# Patient Record
Sex: FEMALE | Race: WHITE | Employment: OTHER | URBAN - METROPOLITAN AREA
[De-identification: names, ages, dates, MRNs, and addresses within clinical notes are randomized per-mention and may not be internally consistent; named-entity substitution may affect disease eponyms.]

---

## 2020-07-23 LAB
LEFT VENTRICULAR EJECTION FRACTION HIGH VALUE: NORMAL
LEFT VENTRICULAR EJECTION FRACTION MODE: NORMAL
LV EF: NORMAL %

## 2020-08-17 ENCOUNTER — OFFICE VISIT (OUTPATIENT)
Dept: CARDIOLOGY CLINIC | Age: 80
End: 2020-08-17
Payer: MEDICARE

## 2020-08-17 VITALS
OXYGEN SATURATION: 95 % | WEIGHT: 148 LBS | HEART RATE: 75 BPM | BODY MASS INDEX: 24.66 KG/M2 | HEIGHT: 65 IN | DIASTOLIC BLOOD PRESSURE: 62 MMHG | SYSTOLIC BLOOD PRESSURE: 110 MMHG | RESPIRATION RATE: 16 BRPM

## 2020-08-17 PROBLEM — R06.09 DOE (DYSPNEA ON EXERTION): Status: ACTIVE | Noted: 2020-08-17

## 2020-08-17 PROCEDURE — 93000 ELECTROCARDIOGRAM COMPLETE: CPT | Performed by: INTERNAL MEDICINE

## 2020-08-17 PROCEDURE — 99205 OFFICE O/P NEW HI 60 MIN: CPT | Performed by: INTERNAL MEDICINE

## 2020-08-17 RX ORDER — FLUOXETINE HYDROCHLORIDE 40 MG/1
CAPSULE ORAL
COMMUNITY
Start: 2020-07-30 | End: 2021-07-29 | Stop reason: SDUPTHER

## 2020-08-17 RX ORDER — BUPROPION HYDROCHLORIDE 300 MG/1
TABLET ORAL
COMMUNITY
Start: 2020-07-30 | End: 2021-07-29 | Stop reason: SDUPTHER

## 2020-08-17 RX ORDER — OLANZAPINE 5 MG/1
TABLET ORAL
COMMUNITY
Start: 2020-07-30 | End: 2021-07-29 | Stop reason: SDUPTHER

## 2020-08-17 RX ORDER — PANTOPRAZOLE SODIUM 40 MG/1
TABLET, DELAYED RELEASE ORAL
COMMUNITY
Start: 2020-05-29 | End: 2021-07-29 | Stop reason: SDUPTHER

## 2020-08-17 NOTE — PROGRESS NOTES
Chief Complaint   Patient presents with    Hypertension    Shortness of Breath       Patient Active Problem List    Diagnosis Date Noted    JOSE (dyspnea on exertion) 08/17/2020    Hypertension        Current Outpatient Medications   Medication Sig Dispense Refill    buPROPion (WELLBUTRIN XL) 300 MG extended release tablet take 1 tablet by mouth every morning      FLUoxetine (PROZAC) 40 MG capsule take 1 capsule by mouth at bedtime      OLANZapine (ZYPREXA) 5 MG tablet take 1 tablet by mouth at bedtime      pantoprazole (PROTONIX) 40 MG tablet take 1 tablet by mouth once daily      losartan (COZAAR) 100 MG tablet Take 100 mg by mouth daily      metFORMIN (GLUCOPHAGE) 1000 MG tablet Take 1,000 mg by mouth 2 times daily (with meals)      LEVOTHYROXINE SODIUM PO Take 75 mcg by mouth daily.  aspirin 81 MG EC tablet Take 81 mg by mouth daily. No current facility-administered medications for this visit.          No Known Allergies    Vitals:    08/17/20 0823   BP: 110/62   Site: Right Upper Arm   Position: Sitting   Cuff Size: Medium Adult   Pulse: 75   Resp: 16   SpO2: 95%   Weight: 148 lb (67.1 kg)   Height: 5' 5\" (1.651 m)       Social History     Socioeconomic History    Marital status:      Spouse name: Not on file    Number of children: Not on file    Years of education: Not on file    Highest education level: Not on file   Occupational History    Not on file   Social Needs    Financial resource strain: Not on file    Food insecurity     Worry: Not on file     Inability: Not on file   [employer] needs     Medical: Not on file     Non-medical: Not on file   Tobacco Use    Smoking status: Never Smoker    Smokeless tobacco: Never Used   Substance and Sexual Activity    Alcohol use: No     Comment: 1 cup of coffee    Drug use: No    Sexual activity: Not on file   Lifestyle    Physical activity     Days per week: Not on file     Minutes per session: Not on file    Stress: Not on file   Relationships    Social connections     Talks on phone: Not on file     Gets together: Not on file     Attends Denominational service: Not on file     Active member of club or organization: Not on file     Attends meetings of clubs or organizations: Not on file     Relationship status: Not on file    Intimate partner violence     Fear of current or ex partner: Not on file     Emotionally abused: Not on file     Physically abused: Not on file     Forced sexual activity: Not on file   Other Topics Concern    Not on file   Social History Narrative    Not on file       Family History   Problem Relation Age of Onset    Cancer Mother     Stroke Mother     Cancer Father          SUBJECTIVE: Casimiro Otero presents to the office today for consult - dr. Claudia Mota -for cardiac evaluation   No cardiac hx. .  She complains of dyspnea and denies   exertional chest pressure/discomfort, irregular heart beat, lower extremity edema, near-syncope, orthopnea, palpitations, paroxysmal nocturnal dyspnea, syncope and tachypnea. Claims she has sob with exertion for years, but now \" I just can't take it anymore. \"  Lives with  and daughter and shares in the chores. She will get sob walking her long driveway, or going up and down stairs. This has not changed it seems, just her acceptance of it. Non smoker, no COPD, never on statin;        Review of Systems:   Heart: as above   Lungs: as above   Eyes: denies changes in vision or discharge. Ears: denies changes in hearing or pain. Nose: denies epistaxis or masses   Throat: denies sore throat or trouble swallowing. Neuro: denies numbness, tingling, tremors. Skin: denies rashes or itching. : denies hematuria, dysuria   GI: denies vomiting, diarrhea   Psych: denies mood changed, anxiety, depression. all others negative.     Physical Exam   /62 (Site: Right Upper Arm, Position: Sitting, Cuff Size: Medium Adult)   Pulse 75   Resp 16   Ht 5' 5\" (1.651

## 2020-08-19 ENCOUNTER — HOSPITAL ENCOUNTER (OUTPATIENT)
Dept: CARDIOLOGY | Age: 80
Discharge: HOME | End: 2020-08-19
Payer: MEDICARE

## 2020-08-19 VITALS
HEART RATE: 85 BPM | HEIGHT: 65 IN | SYSTOLIC BLOOD PRESSURE: 140 MMHG | DIASTOLIC BLOOD PRESSURE: 84 MMHG | OXYGEN SATURATION: 97 % | RESPIRATION RATE: 20 BRPM | WEIGHT: 148 LBS | BODY MASS INDEX: 24.66 KG/M2 | TEMPERATURE: 98.3 F

## 2020-08-19 PROCEDURE — 93017 CV STRESS TEST TRACING ONLY: CPT

## 2020-08-19 PROCEDURE — 93018 CV STRESS TEST I&R ONLY: CPT | Performed by: INTERNAL MEDICINE

## 2020-08-19 PROCEDURE — 93016 CV STRESS TEST SUPVJ ONLY: CPT | Performed by: INTERNAL MEDICINE

## 2020-08-19 NOTE — PROCEDURES
[address] - [address]. CARMEN ferraro, [address] [place] [address]  [phone]    Exercise Stress Study       Name: Terri Dunne Account Number:  [de-identified]      :  1940          Sex: female         Date of Study:  2020    Height: 5' 5\" (165.1 cm)          Weight: 148 lb (67.1 kg)    Ordering Provider: Kenya Cornejo          PCP: Agustin Bass MD    Cardiologist: Kenya Cornejo              Interpreting Physician: Alina Lopez MD    Indication:   Detecting the presence and location of coronary artery disease    Clinical History:   Patient has no known history of coronary artery disease. Resting ECG:    SR with rightward axis and with nonspecific ST changes    Exercise: The patient exercised using a Nii protocol, completing 2:00 minutes and reaching an estimated work load of 4.6 metabolic equivalents (METS). Resting HR was 93. Peak exercise heart rate was 151 ( 109% of maximum predicted heart rate for age). Baseline /84. Peak exercise /120. The blood pressure response to exercise was hypertensive      Exercise was terminated due to dyspnea and heart rate attained. The  patient states her chest was pinching for a few minutes in recovery stage, did resolve with rest.        Pulse oximetry was used to monitor oxygen saturation during the stress test.  The study was performed on Room Air. The resting pulse oximeter was 97%. The post stress O2 saturation seen during exercise was 97 %. Exercise ECG:   The patient demonstrated no arrhythmias during exercise. With exercise, there were no ST segment changes of significance at the heart rate achieved. Impression:    1. Exercise EKG was normal.  2. The patient experienced no chest pain with exercise. 3. Lee treadmill score was +2 implying intermediate risk of acute ischemic events. 4. Exercise capacity was below average.      Thank you for sending your patient to this [facility].     Electronically signed by Jonas Stone MD on 8/19/2020 at 3:17 PM

## 2020-08-20 ENCOUNTER — TELEPHONE (OUTPATIENT)
Dept: CARDIOLOGY CLINIC | Age: 80
End: 2020-08-20

## 2021-02-26 LAB
ALBUMIN SERPL-MCNC: NORMAL G/DL
ALP BLD-CCNC: NORMAL U/L
ALT SERPL-CCNC: NORMAL U/L
ANION GAP SERPL CALCULATED.3IONS-SCNC: NORMAL MMOL/L
AST SERPL-CCNC: NORMAL U/L
AVERAGE GLUCOSE: 163
BILIRUB SERPL-MCNC: NORMAL MG/DL
BUN BLDV-MCNC: NORMAL MG/DL
CALCIUM SERPL-MCNC: NORMAL MG/DL
CHLORIDE BLD-SCNC: NORMAL MMOL/L
CHOLESTEROL, TOTAL: 172 MG/DL
CHOLESTEROL/HDL RATIO: 3.1
CO2: NORMAL
CREAT SERPL-MCNC: NORMAL MG/DL
GFR CALCULATED: NORMAL
GLUCOSE BLD-MCNC: NORMAL MG/DL
HBA1C MFR BLD: 7.3 %
HDLC SERPL-MCNC: 55 MG/DL (ref 35–70)
LDL CHOLESTEROL CALCULATED: 99 MG/DL (ref 0–160)
LDL CHOLESTEROL DIRECT: NORMAL
POTASSIUM SERPL-SCNC: NORMAL MMOL/L
SODIUM BLD-SCNC: NORMAL MMOL/L
TOTAL PROTEIN: NORMAL
TRIGL SERPL-MCNC: 91 MG/DL
VLDLC SERPL CALC-MCNC: 18 MG/DL

## 2021-04-08 VITALS
HEIGHT: 64 IN | BODY MASS INDEX: 25.4 KG/M2 | HEART RATE: 76 BPM | SYSTOLIC BLOOD PRESSURE: 150 MMHG | RESPIRATION RATE: 14 BRPM | DIASTOLIC BLOOD PRESSURE: 82 MMHG | TEMPERATURE: 96.4 F

## 2021-04-08 RX ORDER — TIZANIDINE 2 MG/1
2 TABLET ORAL EVERY EVENING
COMMUNITY
End: 2021-05-24

## 2021-04-08 RX ORDER — MELOXICAM 7.5 MG/1
7.5 TABLET ORAL 2 TIMES DAILY PRN
COMMUNITY
End: 2021-05-24

## 2021-04-08 RX ORDER — GLIMEPIRIDE 1 MG/1
1 TABLET ORAL 2 TIMES DAILY
COMMUNITY
End: 2021-07-29 | Stop reason: SDUPTHER

## 2021-05-07 ENCOUNTER — TELEPHONE (OUTPATIENT)
Dept: PRIMARY CARE CLINIC | Age: 81
End: 2021-05-07

## 2021-05-07 DIAGNOSIS — M81.0 AGE-RELATED OSTEOPOROSIS WITHOUT CURRENT PATHOLOGICAL FRACTURE: Primary | ICD-10-CM

## 2021-05-10 ENCOUNTER — TELEPHONE (OUTPATIENT)
Dept: PRIMARY CARE CLINIC | Age: 81
End: 2021-05-10

## 2021-05-10 DIAGNOSIS — E11.9 DIABETES MELLITUS TYPE 2 IN NONOBESE (HCC): ICD-10-CM

## 2021-05-10 DIAGNOSIS — D63.8 CHRONIC DISEASE ANEMIA: ICD-10-CM

## 2021-05-10 DIAGNOSIS — N18.1 TYPE 2 DIABETES MELLITUS WITH STAGE 1 CHRONIC KIDNEY DISEASE AND HYPERTENSION (HCC): ICD-10-CM

## 2021-05-10 DIAGNOSIS — I12.9 TYPE 2 DIABETES MELLITUS WITH STAGE 1 CHRONIC KIDNEY DISEASE AND HYPERTENSION (HCC): ICD-10-CM

## 2021-05-10 DIAGNOSIS — E11.22 TYPE 2 DIABETES MELLITUS WITH STAGE 1 CHRONIC KIDNEY DISEASE AND HYPERTENSION (HCC): ICD-10-CM

## 2021-05-10 DIAGNOSIS — M85.80 OSTEOPENIA OF THE ELDERLY: Primary | ICD-10-CM

## 2021-05-10 DIAGNOSIS — I10 ESSENTIAL HYPERTENSION: ICD-10-CM

## 2021-05-17 DIAGNOSIS — E11.22 TYPE 2 DIABETES MELLITUS WITH STAGE 1 CHRONIC KIDNEY DISEASE AND HYPERTENSION (HCC): ICD-10-CM

## 2021-05-17 DIAGNOSIS — D63.8 CHRONIC DISEASE ANEMIA: ICD-10-CM

## 2021-05-17 DIAGNOSIS — N18.1 TYPE 2 DIABETES MELLITUS WITH STAGE 1 CHRONIC KIDNEY DISEASE AND HYPERTENSION (HCC): ICD-10-CM

## 2021-05-17 DIAGNOSIS — E11.9 DIABETES MELLITUS TYPE 2 IN NONOBESE (HCC): ICD-10-CM

## 2021-05-17 DIAGNOSIS — I12.9 TYPE 2 DIABETES MELLITUS WITH STAGE 1 CHRONIC KIDNEY DISEASE AND HYPERTENSION (HCC): ICD-10-CM

## 2021-05-17 LAB
ALBUMIN SERPL-MCNC: 4 G/DL (ref 3.5–5.2)
ALP BLD-CCNC: 90 U/L (ref 35–104)
ALT SERPL-CCNC: 18 U/L (ref 0–32)
ANION GAP SERPL CALCULATED.3IONS-SCNC: 16 MMOL/L (ref 7–16)
AST SERPL-CCNC: 21 U/L (ref 0–31)
BASOPHILS ABSOLUTE: 0.06 E9/L (ref 0–0.2)
BASOPHILS RELATIVE PERCENT: 0.7 % (ref 0–2)
BILIRUB SERPL-MCNC: 0.3 MG/DL (ref 0–1.2)
BUN BLDV-MCNC: 26 MG/DL (ref 6–23)
CALCIUM SERPL-MCNC: 9.7 MG/DL (ref 8.6–10.2)
CHLORIDE BLD-SCNC: 104 MMOL/L (ref 98–107)
CHOLESTEROL, TOTAL: 199 MG/DL (ref 0–199)
CO2: 22 MMOL/L (ref 22–29)
CREAT SERPL-MCNC: 1.3 MG/DL (ref 0.5–1)
EOSINOPHILS ABSOLUTE: 0.18 E9/L (ref 0.05–0.5)
EOSINOPHILS RELATIVE PERCENT: 2.1 % (ref 0–6)
GFR AFRICAN AMERICAN: 47
GFR NON-AFRICAN AMERICAN: 39 ML/MIN/1.73
GLUCOSE FASTING: 126 MG/DL (ref 74–99)
HCT VFR BLD CALC: 34.7 % (ref 34–48)
HDLC SERPL-MCNC: 44 MG/DL
HEMOGLOBIN: 10.6 G/DL (ref 11.5–15.5)
IMMATURE GRANULOCYTES #: 0.02 E9/L
IMMATURE GRANULOCYTES %: 0.2 % (ref 0–5)
LDL CHOLESTEROL CALCULATED: 113 MG/DL (ref 0–99)
LYMPHOCYTES ABSOLUTE: 2.88 E9/L (ref 1.5–4)
LYMPHOCYTES RELATIVE PERCENT: 34.2 % (ref 20–42)
MCH RBC QN AUTO: 27.2 PG (ref 26–35)
MCHC RBC AUTO-ENTMCNC: 30.5 % (ref 32–34.5)
MCV RBC AUTO: 89.2 FL (ref 80–99.9)
MONOCYTES ABSOLUTE: 0.53 E9/L (ref 0.1–0.95)
MONOCYTES RELATIVE PERCENT: 6.3 % (ref 2–12)
NEUTROPHILS ABSOLUTE: 4.75 E9/L (ref 1.8–7.3)
NEUTROPHILS RELATIVE PERCENT: 56.5 % (ref 43–80)
PDW BLD-RTO: 14.3 FL (ref 11.5–15)
PLATELET # BLD: 340 E9/L (ref 130–450)
PMV BLD AUTO: 11 FL (ref 7–12)
POTASSIUM SERPL-SCNC: 5.2 MMOL/L (ref 3.5–5)
RBC # BLD: 3.89 E12/L (ref 3.5–5.5)
SODIUM BLD-SCNC: 142 MMOL/L (ref 132–146)
TOTAL PROTEIN: 7 G/DL (ref 6.4–8.3)
TRIGL SERPL-MCNC: 210 MG/DL (ref 0–149)
VLDLC SERPL CALC-MCNC: 42 MG/DL
WBC # BLD: 8.4 E9/L (ref 4.5–11.5)

## 2021-05-24 ENCOUNTER — OFFICE VISIT (OUTPATIENT)
Dept: PRIMARY CARE CLINIC | Age: 81
End: 2021-05-24
Payer: MEDICARE

## 2021-05-24 VITALS
SYSTOLIC BLOOD PRESSURE: 110 MMHG | HEART RATE: 75 BPM | TEMPERATURE: 97.2 F | HEIGHT: 64 IN | DIASTOLIC BLOOD PRESSURE: 62 MMHG | BODY MASS INDEX: 25.4 KG/M2 | OXYGEN SATURATION: 96 %

## 2021-05-24 DIAGNOSIS — I12.9 TYPE 2 DIABETES MELLITUS WITH STAGE 1 CHRONIC KIDNEY DISEASE AND HYPERTENSION (HCC): Primary | ICD-10-CM

## 2021-05-24 DIAGNOSIS — F32.1 MODERATE MAJOR DEPRESSION (HCC): ICD-10-CM

## 2021-05-24 DIAGNOSIS — E11.22 TYPE 2 DIABETES MELLITUS WITH STAGE 1 CHRONIC KIDNEY DISEASE AND HYPERTENSION (HCC): Primary | ICD-10-CM

## 2021-05-24 DIAGNOSIS — I36.1 NONRHEUMATIC TRICUSPID VALVE REGURGITATION: ICD-10-CM

## 2021-05-24 DIAGNOSIS — M85.80 OSTEOPENIA OF THE ELDERLY: ICD-10-CM

## 2021-05-24 DIAGNOSIS — E06.3 AUTOIMMUNE HYPOTHYROIDISM: ICD-10-CM

## 2021-05-24 DIAGNOSIS — N18.1 TYPE 2 DIABETES MELLITUS WITH STAGE 1 CHRONIC KIDNEY DISEASE AND HYPERTENSION (HCC): Primary | ICD-10-CM

## 2021-05-24 DIAGNOSIS — K21.9 GASTROESOPHAGEAL REFLUX DISEASE WITHOUT ESOPHAGITIS: ICD-10-CM

## 2021-05-24 DIAGNOSIS — I10 ESSENTIAL HYPERTENSION: ICD-10-CM

## 2021-05-24 DIAGNOSIS — D50.9 HYPOCHROMIC MICROCYTIC ANEMIA: ICD-10-CM

## 2021-05-24 LAB
CHP ED QC CHECK: NORMAL
GLUCOSE BLD-MCNC: 176 MG/DL
HBA1C MFR BLD: 7.6 %

## 2021-05-24 PROCEDURE — 82962 GLUCOSE BLOOD TEST: CPT | Performed by: INTERNAL MEDICINE

## 2021-05-24 PROCEDURE — 3051F HG A1C>EQUAL 7.0%<8.0%: CPT | Performed by: INTERNAL MEDICINE

## 2021-05-24 PROCEDURE — 83036 HEMOGLOBIN GLYCOSYLATED A1C: CPT | Performed by: INTERNAL MEDICINE

## 2021-05-24 PROCEDURE — 99213 OFFICE O/P EST LOW 20 MIN: CPT | Performed by: INTERNAL MEDICINE

## 2021-05-24 ASSESSMENT — PATIENT HEALTH QUESTIONNAIRE - PHQ9
SUM OF ALL RESPONSES TO PHQ QUESTIONS 1-9: 0
1. LITTLE INTEREST OR PLEASURE IN DOING THINGS: 0
SUM OF ALL RESPONSES TO PHQ9 QUESTIONS 1 & 2: 0
SUM OF ALL RESPONSES TO PHQ QUESTIONS 1-9: 0
2. FEELING DOWN, DEPRESSED OR HOPELESS: 0

## 2021-05-24 NOTE — PROGRESS NOTES
Chief Complaint   Patient presents with    Diabetes     doing a poct glucose and hbg a1c.  states woke up in night shaking and sweating. ate graahm crackers and symptons resolved    Hypertension     f/u visit with labs       HPI:  Patient is here for follow-up of diabetes mellitus hypertension and osteopenia. She is doing well without any new complaints except for mild nocturnal hypoglycemia once in a great while. That is resolved by eating a couple of crackers. Hemoglobin A1c is 7.6. Rest of lab work were discussed with patient. Hypochromic anemia is stable and patient is refusing any colonoscopy. DEXA scan showed a T score of - 0.1-1.2. Patient was counseled to continue calcium and vitamin D. Past Medical History, Surgical History, and Family History has been reviewed and updated.     Review of Systems:  Constitutional:  No fever, no fatigue, no chills, no headaches, no weight change  Dermatology:  No rash, no mole, no dry or sensitive skin  ENT:  No cough, no sore throat, no sinus pain, no runny nose, no ear pain  Cardiology:  No chest pain, no palpitations, no leg edema, no shortness of breath, no PND  Gastroenterology:  No dysphagia, no abdominal pain, no nausea, no vomiting, no constipation, no diarrhea, no heartburn  Musculoskeletal:  No joint pain, no leg cramps, no back pain, no muscle aches  Respiratory:  No shortness of breath, no orthopnea, no wheezing, no JOSE, no hemoptysis  Urology:  No blood in the urine, no urinary frequency, no urinary incontinence, no urinary urgency, no nocturia, no dysuria    Vitals:    05/24/21 1103   BP: 110/62   Site: Left Upper Arm   Position: Sitting   Cuff Size: Medium Adult   Pulse: 75   Temp: 97.2 °F (36.2 °C)   SpO2: 96%   Height: 5' 4\" (1.626 m)       General:  Patient alert and oriented x 3, NAD, pleasant  HEENT:  Atraumatic, normocephalic, PERRLA, EOMI, clear conjunctiva, TMs clear, nose-clear, throat - no erythema  Neck:  Supple, no goiter, no carotid GFR Non-   Date Value Ref Range Status   05/17/2021 39 >=60 mL/min/1.73 Final     Comment:     Chronic Kidney Disease: less than 60 ml/min/1.73 sq.m. Kidney Failure: less than 15 ml/min/1.73 sq.m. Results valid for patients 18 years and older. GFR    Date Value Ref Range Status   05/17/2021 47  Final        No results found. Assessment/Plan:      Outpatient Encounter Medications as of 5/24/2021   Medication Sig Dispense Refill    glimepiride (AMARYL) 1 MG tablet Take 1 mg by mouth 2 times daily 1 TAB BY  MOUTH 2 TIIMES A DAY      Multiple Vitamin (MULTI VITAMIN W/D-3 PO) Take by mouth      buPROPion (WELLBUTRIN XL) 300 MG extended release tablet take 1 tablet by mouth every morning      FLUoxetine (PROZAC) 40 MG capsule take 1 capsule by mouth at bedtime      OLANZapine (ZYPREXA) 5 MG tablet take 1 tablet by mouth at bedtime      pantoprazole (PROTONIX) 40 MG tablet take 1 tablet by mouth once daily      losartan (COZAAR) 100 MG tablet Take 100 mg by mouth daily      metFORMIN (GLUCOPHAGE) 1000 MG tablet Take 1,000 mg by mouth 2 times daily (with meals)      levothyroxine (SYNTHROID) 75 MCG tablet Take 75 mcg by mouth daily       aspirin 81 MG chewable tablet Take 81 mg by mouth daily       [DISCONTINUED] meloxicam (MOBIC) 7.5 MG tablet Take 7.5 mg by mouth 2 times daily as needed for Pain TAKE 1 TABLET BY MOUTH 2 TIMES A DAY AS NEEDED (Patient not taking: Reported on 5/24/2021)      [DISCONTINUED] tiZANidine (ZANAFLEX) 2 MG tablet Take 2 mg by mouth every evening TAKE  ONE TABLET BY MOUTH EVERY  EVENING (Patient not taking: Reported on 5/24/2021)       No facility-administered encounter medications on file as of 5/24/2021. Kieran Martinez was seen today for diabetes and hypertension.     Diagnoses and all orders for this visit:    Type 2 diabetes mellitus with stage 1 chronic kidney disease and hypertension (HCC)  -     POCT glycosylated hemoglobin (Hb A1C)  -     POCT Glucose    Essential hypertension  -     Comprehensive Metabolic Panel; Future    Osteopenia of the elderly    Gastroesophageal reflux disease without esophagitis    Autoimmune hypothyroidism  -     TSH without Reflex; Future    Moderate major depression (HCC)    Nonrheumatic tricuspid valve regurgitation    Hypochromic microcytic anemia         There are no Patient Instructions on file for this visit. On this date 5/24/2021 I have spent 20 minutes reviewing previous notes, test results and face to face with the patient discussing the diagnosis and importance of compliance with the treatment plan as well as documenting on the day of the visit.       Geeta Seaman MD   5/24/21

## 2021-06-04 DIAGNOSIS — M81.0 AGE-RELATED OSTEOPOROSIS WITHOUT CURRENT PATHOLOGICAL FRACTURE: ICD-10-CM

## 2021-07-29 RX ORDER — LEVOTHYROXINE SODIUM 0.07 MG/1
75 TABLET ORAL DAILY
Qty: 90 TABLET | Refills: 0 | Status: SHIPPED
Start: 2021-07-29 | End: 2021-11-01 | Stop reason: SDUPTHER

## 2021-07-29 RX ORDER — GLIMEPIRIDE 1 MG/1
1 TABLET ORAL 2 TIMES DAILY
Qty: 180 TABLET | Refills: 0 | Status: SHIPPED
Start: 2021-07-29 | End: 2021-11-01 | Stop reason: SDUPTHER

## 2021-07-29 RX ORDER — OLANZAPINE 5 MG/1
5 TABLET ORAL NIGHTLY
Qty: 90 TABLET | Refills: 0 | Status: SHIPPED
Start: 2021-07-29 | End: 2022-04-19 | Stop reason: SDUPTHER

## 2021-07-29 RX ORDER — LOSARTAN POTASSIUM 100 MG/1
100 TABLET ORAL DAILY
Qty: 90 TABLET | Refills: 0 | Status: SHIPPED
Start: 2021-07-29 | End: 2021-11-01 | Stop reason: SDUPTHER

## 2021-07-29 RX ORDER — BUPROPION HYDROCHLORIDE 300 MG/1
300 TABLET ORAL EVERY MORNING
Qty: 90 TABLET | Refills: 0 | Status: SHIPPED
Start: 2021-07-29 | End: 2022-04-19 | Stop reason: SDUPTHER

## 2021-07-29 RX ORDER — PANTOPRAZOLE SODIUM 40 MG/1
40 TABLET, DELAYED RELEASE ORAL DAILY
Qty: 90 TABLET | Refills: 0 | Status: SHIPPED
Start: 2021-07-29 | End: 2021-11-01 | Stop reason: SDUPTHER

## 2021-07-29 RX ORDER — FLUOXETINE HYDROCHLORIDE 40 MG/1
40 CAPSULE ORAL NIGHTLY
Qty: 90 CAPSULE | Refills: 0 | Status: SHIPPED
Start: 2021-07-29 | End: 2021-11-01 | Stop reason: SDUPTHER

## 2021-07-29 NOTE — TELEPHONE ENCOUNTER
Last Appointment:  5/24/2021  Future Appointments   Date Time Provider Herber Davis   8/24/2021 10:00 AM Jennifer Felton MD [facility]          All medications   [facility]

## 2021-08-24 ENCOUNTER — OFFICE VISIT (OUTPATIENT)
Dept: PRIMARY CARE CLINIC | Age: 81
End: 2021-08-24
Payer: MEDICARE

## 2021-08-24 VITALS
OXYGEN SATURATION: 97 % | HEART RATE: 74 BPM | TEMPERATURE: 96 F | DIASTOLIC BLOOD PRESSURE: 72 MMHG | SYSTOLIC BLOOD PRESSURE: 134 MMHG | BODY MASS INDEX: 25.4 KG/M2 | HEIGHT: 64 IN

## 2021-08-24 DIAGNOSIS — E06.3 AUTOIMMUNE HYPOTHYROIDISM: ICD-10-CM

## 2021-08-24 DIAGNOSIS — F32.1 MODERATE MAJOR DEPRESSION (HCC): ICD-10-CM

## 2021-08-24 DIAGNOSIS — M85.80 OSTEOPENIA OF THE ELDERLY: ICD-10-CM

## 2021-08-24 DIAGNOSIS — E78.2 MIXED HYPERLIPIDEMIA: ICD-10-CM

## 2021-08-24 DIAGNOSIS — N18.1 TYPE 2 DIABETES MELLITUS WITH STAGE 1 CHRONIC KIDNEY DISEASE AND HYPERTENSION (HCC): Primary | ICD-10-CM

## 2021-08-24 DIAGNOSIS — I10 ESSENTIAL HYPERTENSION: ICD-10-CM

## 2021-08-24 DIAGNOSIS — I12.9 TYPE 2 DIABETES MELLITUS WITH STAGE 1 CHRONIC KIDNEY DISEASE AND HYPERTENSION (HCC): Primary | ICD-10-CM

## 2021-08-24 DIAGNOSIS — E11.22 TYPE 2 DIABETES MELLITUS WITH STAGE 1 CHRONIC KIDNEY DISEASE AND HYPERTENSION (HCC): Primary | ICD-10-CM

## 2021-08-24 LAB
CHP ED QC CHECK: NORMAL
GLUCOSE BLD-MCNC: 234 MG/DL
HBA1C MFR BLD: 6.9 %

## 2021-08-24 PROCEDURE — 4040F PNEUMOC VAC/ADMIN/RCVD: CPT | Performed by: INTERNAL MEDICINE

## 2021-08-24 PROCEDURE — 1123F ACP DISCUSS/DSCN MKR DOCD: CPT | Performed by: INTERNAL MEDICINE

## 2021-08-24 PROCEDURE — G8427 DOCREV CUR MEDS BY ELIG CLIN: HCPCS | Performed by: INTERNAL MEDICINE

## 2021-08-24 PROCEDURE — 1036F TOBACCO NON-USER: CPT | Performed by: INTERNAL MEDICINE

## 2021-08-24 PROCEDURE — G8399 PT W/DXA RESULTS DOCUMENT: HCPCS | Performed by: INTERNAL MEDICINE

## 2021-08-24 PROCEDURE — 83036 HEMOGLOBIN GLYCOSYLATED A1C: CPT | Performed by: INTERNAL MEDICINE

## 2021-08-24 PROCEDURE — G8417 CALC BMI ABV UP PARAM F/U: HCPCS | Performed by: INTERNAL MEDICINE

## 2021-08-24 PROCEDURE — 1090F PRES/ABSN URINE INCON ASSESS: CPT | Performed by: INTERNAL MEDICINE

## 2021-08-24 PROCEDURE — 82962 GLUCOSE BLOOD TEST: CPT | Performed by: INTERNAL MEDICINE

## 2021-08-24 PROCEDURE — 99213 OFFICE O/P EST LOW 20 MIN: CPT | Performed by: INTERNAL MEDICINE

## 2021-08-24 SDOH — ECONOMIC STABILITY: FOOD INSECURITY: WITHIN THE PAST 12 MONTHS, THE FOOD YOU BOUGHT JUST DIDN'T LAST AND YOU DIDN'T HAVE MONEY TO GET MORE.: NEVER TRUE

## 2021-08-24 SDOH — ECONOMIC STABILITY: FOOD INSECURITY: WITHIN THE PAST 12 MONTHS, YOU WORRIED THAT YOUR FOOD WOULD RUN OUT BEFORE YOU GOT MONEY TO BUY MORE.: NEVER TRUE

## 2021-08-24 ASSESSMENT — SOCIAL DETERMINANTS OF HEALTH (SDOH): HOW HARD IS IT FOR YOU TO PAY FOR THE VERY BASICS LIKE FOOD, HOUSING, MEDICAL CARE, AND HEATING?: NOT HARD AT ALL

## 2021-08-24 NOTE — PROGRESS NOTES
Chief Complaint   Patient presents with    Diabetes     f/u visit with labs. POCT glucose and hgba1c entered. . states when she wakes up in morning she is very hungry, shaky and needs to eat right away. states sometimes she needs to get up middle of night shaky and sweaty needing to get up to eat something. HPI:  Patient is here for follow-up of hypertension hyperlipidemia and diabetes mellitus. Patient has been compliant with medications. Blood work was discussed with patient. It appears within reasonable range with stable renal function and hyperkalemia. Her hemoglobin A1c is 6.9. Patient complains of some nausea mainly in the morning associated with some shaky feeling. She has been taking her Amaryl at night before she goes to bed and she was advised to take it earlier in the day or eat after supper at 4- 5:00 in the afternoon. She has been on Protonix daily. .  She appears depressed today as it is the memorial for for her 's passing away. She denies any chest pain abdominal pains vomiting or diarrhea. Her last mammogram was February 2021 and was negative. DEXA scan was May 2021 and showed mild osteopenia and she was instructed to continue with vitamin D and calcium    Past Medical History, Surgical History, and Family History has been reviewed and updated.     Review of Systems:  Constitutional:  No fever, no fatigue, no chills, no headaches, no weight change  Dermatology:  No rash, no mole, no dry or sensitive skin  ENT:  No cough, no sore throat, no sinus pain, no runny nose, no ear pain  Cardiology:  No chest pain, no palpitations, no leg edema, no shortness of breath, no PND  Gastroenterology:  No dysphagia, no abdominal pain, no nausea, no vomiting, no constipation, no diarrhea, no heartburn  Musculoskeletal:  No joint pain, no leg cramps, no back pain, no muscle aches  Respiratory:  No shortness of breath, no orthopnea, no wheezing, no JOSE, no hemoptysis  Urology:  No blood in the urine, no urinary frequency, no urinary incontinence, no urinary urgency, no nocturia, no dysuria    Vitals:    08/24/21 1013 08/24/21 1022   BP: (!) 158/74 134/72   Site: Right Upper Arm Right Upper Arm   Position: Sitting Sitting   Cuff Size: Large Adult Large Adult   Pulse: 74    Temp: 96 °F (35.6 °C)    SpO2: 97%    Height: 5' 4\" (1.626 m)        General:  Patient alert and oriented x 3, NAD, appears depressed.   Mild kyphosis  HEENT:  Atraumatic, normocephalic, PERRLA, EOMI, clear conjunctiva, TMs clear, nose-clear, throat - no erythema  Neck:  Supple, no goiter, no carotid bruits, no LAD  Lungs:  CTA   Heart:  RRR, no murmurs, gallops or rubs  Abdomen:  Soft/nt/nd, + bowel sounds  Extremities:  No clubbing, cyanosis or edema  Skin: unremarkable    Hemoglobin A1C   Date Value Ref Range Status   08/24/2021 6.9 % Final     Cholesterol, Total   Date Value Ref Range Status   05/17/2021 199 0 - 199 mg/dL Final     Triglycerides   Date Value Ref Range Status   05/17/2021 210 (H) 0 - 149 mg/dL Final     HDL   Date Value Ref Range Status   05/17/2021 44 >40 mg/dL Final     LDL Calculated   Date Value Ref Range Status   05/17/2021 113 (H) 0 - 99 mg/dL Final     VLDL Cholesterol Calculated   Date Value Ref Range Status   05/17/2021 42 mg/dL Final     VLDL   Date Value Ref Range Status   02/26/2021 18 mg/dL Final     Chol/HDL Ratio   Date Value Ref Range Status   02/26/2021 3.1  Final     Sodium   Date Value Ref Range Status   08/18/2021 142 132 - 146 mmol/L Final     Potassium   Date Value Ref Range Status   08/18/2021 5.2 (H) 3.5 - 5.0 mmol/L Final     Chloride   Date Value Ref Range Status   08/18/2021 106 98 - 107 mmol/L Final     CO2   Date Value Ref Range Status   08/18/2021 20 (L) 22 - 29 mmol/L Final     BUN   Date Value Ref Range Status   08/18/2021 21 6 - 23 mg/dL Final     CREATININE   Date Value Ref Range Status   08/18/2021 1.2 (H) 0.5 - 1.0 mg/dL Final     Glucose   Date Value Ref Range Status   08/24/2021 234 mg/dL Final     Comment:     states she had to eat this am     Calcium   Date Value Ref Range Status   08/18/2021 9.8 8.6 - 10.2 mg/dL Final     Total Protein   Date Value Ref Range Status   08/18/2021 7.0 6.4 - 8.3 g/dL Final     Albumin   Date Value Ref Range Status   08/18/2021 4.1 3.5 - 5.2 g/dL Final     Total Bilirubin   Date Value Ref Range Status   08/18/2021 0.2 0.0 - 1.2 mg/dL Final     Alkaline Phosphatase   Date Value Ref Range Status   08/18/2021 86 35 - 104 U/L Final     AST   Date Value Ref Range Status   08/18/2021 21 0 - 31 U/L Final     ALT   Date Value Ref Range Status   08/18/2021 18 0 - 32 U/L Final     GFR Non-   Date Value Ref Range Status   08/18/2021 43 >=60 mL/min/1.73 Final     Comment:     Chronic Kidney Disease: less than 60 ml/min/1.73 sq.m. Kidney Failure: less than 15 ml/min/1.73 sq.m. Results valid for patients 18 years and older. GFR    Date Value Ref Range Status   08/18/2021 52  Final        No results found.      Assessment/Plan:      Outpatient Encounter Medications as of 8/24/2021   Medication Sig Dispense Refill    buPROPion (WELLBUTRIN XL) 300 MG extended release tablet Take 1 tablet by mouth every morning 90 tablet 0    FLUoxetine (PROZAC) 40 MG capsule Take 1 capsule by mouth nightly 90 capsule 0    glimepiride (AMARYL) 1 MG tablet Take 1 tablet by mouth 2 times daily 1 TAB BY  MOUTH 2 TIIMES A  tablet 0    levothyroxine (SYNTHROID) 75 MCG tablet Take 1 tablet by mouth daily 90 tablet 0    losartan (COZAAR) 100 MG tablet Take 1 tablet by mouth daily 90 tablet 0    metFORMIN (GLUCOPHAGE) 1000 MG tablet Take 1 tablet by mouth 2 times daily (with meals) 180 tablet 0    OLANZapine (ZYPREXA) 5 MG tablet Take 1 tablet by mouth nightly 90 tablet 0    pantoprazole (PROTONIX) 40 MG tablet Take 1 tablet by mouth daily 90 tablet 0    Multiple Vitamin (MULTI VITAMIN W/D-3 PO) Take by mouth      aspirin 81 MG chewable tablet Take 81 mg by mouth daily        No facility-administered encounter medications on file as of 8/24/2021. Ryan Rasmussen was seen today for diabetes. Diagnoses and all orders for this visit:    Type 2 diabetes mellitus with stage 1 chronic kidney disease and hypertension (HCC)  -     POCT Glucose  -     POCT glycosylated hemoglobin (Hb A1C)  -     Comprehensive Metabolic Panel; Future    Autoimmune hypothyroidism  -     TSH without Reflex; Future    Essential hypertension    Moderate major depression (HCC)    Osteopenia of the elderly    Mixed hyperlipidemia  -     Lipid Panel; Future         There are no Patient Instructions on file for this visit. On this date 8/24/2021 I have spent 20 minutes reviewing previous notes, test results and face to face with the patient discussing the diagnosis and importance of compliance with the treatment plan as well as documenting on the day of the visit.       Jose David Baird MD   8/24/21

## 2021-11-01 RX ORDER — FLUOXETINE HYDROCHLORIDE 40 MG/1
40 CAPSULE ORAL NIGHTLY
Qty: 90 CAPSULE | Refills: 0 | Status: SHIPPED
Start: 2021-11-01 | End: 2022-01-26 | Stop reason: SDUPTHER

## 2021-11-01 RX ORDER — OLANZAPINE 5 MG/1
5 TABLET ORAL NIGHTLY
Qty: 90 TABLET | Refills: 0 | OUTPATIENT
Start: 2021-11-01

## 2021-11-01 RX ORDER — PANTOPRAZOLE SODIUM 40 MG/1
40 TABLET, DELAYED RELEASE ORAL DAILY
Qty: 90 TABLET | Refills: 0 | Status: SHIPPED
Start: 2021-11-01 | End: 2022-01-26 | Stop reason: SDUPTHER

## 2021-11-01 RX ORDER — BUPROPION HYDROCHLORIDE 300 MG/1
300 TABLET ORAL EVERY MORNING
Qty: 90 TABLET | Refills: 0 | OUTPATIENT
Start: 2021-11-01

## 2021-11-01 RX ORDER — LEVOTHYROXINE SODIUM 0.07 MG/1
75 TABLET ORAL DAILY
Qty: 90 TABLET | Refills: 0 | Status: SHIPPED
Start: 2021-11-01 | End: 2022-01-26 | Stop reason: SDUPTHER

## 2021-11-01 RX ORDER — GLIMEPIRIDE 1 MG/1
1 TABLET ORAL 2 TIMES DAILY
Qty: 180 TABLET | Refills: 0 | Status: SHIPPED
Start: 2021-11-01 | End: 2022-01-26 | Stop reason: SDUPTHER

## 2021-11-01 RX ORDER — LOSARTAN POTASSIUM 100 MG/1
100 TABLET ORAL DAILY
Qty: 90 TABLET | Refills: 0 | Status: SHIPPED
Start: 2021-11-01 | End: 2022-01-26 | Stop reason: SDUPTHER

## 2021-11-09 DIAGNOSIS — I12.9 TYPE 2 DIABETES MELLITUS WITH STAGE 1 CHRONIC KIDNEY DISEASE AND HYPERTENSION (HCC): ICD-10-CM

## 2021-11-09 DIAGNOSIS — E06.3 AUTOIMMUNE HYPOTHYROIDISM: ICD-10-CM

## 2021-11-09 DIAGNOSIS — E11.22 TYPE 2 DIABETES MELLITUS WITH STAGE 1 CHRONIC KIDNEY DISEASE AND HYPERTENSION (HCC): ICD-10-CM

## 2021-11-09 DIAGNOSIS — N18.1 TYPE 2 DIABETES MELLITUS WITH STAGE 1 CHRONIC KIDNEY DISEASE AND HYPERTENSION (HCC): ICD-10-CM

## 2021-11-09 DIAGNOSIS — E78.2 MIXED HYPERLIPIDEMIA: ICD-10-CM

## 2021-11-09 LAB
ALBUMIN SERPL-MCNC: 4.2 G/DL (ref 3.5–5.2)
ALP BLD-CCNC: 81 U/L (ref 35–104)
ALT SERPL-CCNC: 17 U/L (ref 0–32)
ANION GAP SERPL CALCULATED.3IONS-SCNC: 15 MMOL/L (ref 7–16)
AST SERPL-CCNC: 22 U/L (ref 0–31)
BILIRUB SERPL-MCNC: 0.3 MG/DL (ref 0–1.2)
BUN BLDV-MCNC: 23 MG/DL (ref 6–23)
CALCIUM SERPL-MCNC: 9.5 MG/DL (ref 8.6–10.2)
CHLORIDE BLD-SCNC: 103 MMOL/L (ref 98–107)
CHOLESTEROL, TOTAL: 224 MG/DL (ref 0–199)
CO2: 20 MMOL/L (ref 22–29)
CREAT SERPL-MCNC: 1.2 MG/DL (ref 0.5–1)
GFR AFRICAN AMERICAN: 52
GFR NON-AFRICAN AMERICAN: 43 ML/MIN/1.73
GLUCOSE BLD-MCNC: 142 MG/DL (ref 74–99)
HDLC SERPL-MCNC: 47 MG/DL
LDL CHOLESTEROL CALCULATED: 134 MG/DL (ref 0–99)
POTASSIUM SERPL-SCNC: 5 MMOL/L (ref 3.5–5)
SODIUM BLD-SCNC: 138 MMOL/L (ref 132–146)
TOTAL PROTEIN: 7.4 G/DL (ref 6.4–8.3)
TRIGL SERPL-MCNC: 213 MG/DL (ref 0–149)
TSH SERPL DL<=0.05 MIU/L-ACNC: 2.19 UIU/ML (ref 0.27–4.2)
VLDLC SERPL CALC-MCNC: 43 MG/DL

## 2021-11-16 ENCOUNTER — OFFICE VISIT (OUTPATIENT)
Dept: PRIMARY CARE CLINIC | Age: 81
End: 2021-11-16
Payer: MEDICARE

## 2021-11-16 VITALS
RESPIRATION RATE: 16 BRPM | WEIGHT: 147 LBS | HEIGHT: 64 IN | SYSTOLIC BLOOD PRESSURE: 130 MMHG | HEART RATE: 77 BPM | DIASTOLIC BLOOD PRESSURE: 70 MMHG | OXYGEN SATURATION: 97 % | BODY MASS INDEX: 25.1 KG/M2 | TEMPERATURE: 98.6 F

## 2021-11-16 DIAGNOSIS — I10 PRIMARY HYPERTENSION: Primary | ICD-10-CM

## 2021-11-16 DIAGNOSIS — E06.3 AUTOIMMUNE HYPOTHYROIDISM: ICD-10-CM

## 2021-11-16 DIAGNOSIS — E11.9 TYPE 2 DIABETES MELLITUS WITHOUT COMPLICATION, WITHOUT LONG-TERM CURRENT USE OF INSULIN (HCC): ICD-10-CM

## 2021-11-16 PROCEDURE — 1123F ACP DISCUSS/DSCN MKR DOCD: CPT | Performed by: INTERNAL MEDICINE

## 2021-11-16 PROCEDURE — G8399 PT W/DXA RESULTS DOCUMENT: HCPCS | Performed by: INTERNAL MEDICINE

## 2021-11-16 PROCEDURE — 99213 OFFICE O/P EST LOW 20 MIN: CPT | Performed by: INTERNAL MEDICINE

## 2021-11-16 PROCEDURE — G8484 FLU IMMUNIZE NO ADMIN: HCPCS | Performed by: INTERNAL MEDICINE

## 2021-11-16 PROCEDURE — 1090F PRES/ABSN URINE INCON ASSESS: CPT | Performed by: INTERNAL MEDICINE

## 2021-11-16 PROCEDURE — G8427 DOCREV CUR MEDS BY ELIG CLIN: HCPCS | Performed by: INTERNAL MEDICINE

## 2021-11-16 PROCEDURE — 4040F PNEUMOC VAC/ADMIN/RCVD: CPT | Performed by: INTERNAL MEDICINE

## 2021-11-16 PROCEDURE — 1036F TOBACCO NON-USER: CPT | Performed by: INTERNAL MEDICINE

## 2021-11-16 PROCEDURE — G8417 CALC BMI ABV UP PARAM F/U: HCPCS | Performed by: INTERNAL MEDICINE

## 2021-11-16 RX ORDER — LOSARTAN POTASSIUM 100 MG/1
100 TABLET ORAL DAILY
Qty: 90 TABLET | Refills: 0 | Status: CANCELLED | OUTPATIENT
Start: 2021-11-16

## 2021-11-16 RX ORDER — BUPROPION HYDROCHLORIDE 300 MG/1
300 TABLET ORAL EVERY MORNING
Qty: 90 TABLET | Refills: 0 | Status: CANCELLED | OUTPATIENT
Start: 2021-11-16

## 2021-11-16 RX ORDER — FLUOXETINE HYDROCHLORIDE 40 MG/1
40 CAPSULE ORAL NIGHTLY
Qty: 90 CAPSULE | Refills: 0 | Status: CANCELLED | OUTPATIENT
Start: 2021-11-16

## 2021-11-16 RX ORDER — GLIMEPIRIDE 1 MG/1
1 TABLET ORAL 2 TIMES DAILY
Qty: 180 TABLET | Refills: 0 | Status: CANCELLED | OUTPATIENT
Start: 2021-11-16

## 2021-11-16 RX ORDER — OLANZAPINE 5 MG/1
5 TABLET ORAL NIGHTLY
Qty: 90 TABLET | Refills: 0 | Status: CANCELLED | OUTPATIENT
Start: 2021-11-16

## 2021-11-16 RX ORDER — PANTOPRAZOLE SODIUM 40 MG/1
40 TABLET, DELAYED RELEASE ORAL DAILY
Qty: 90 TABLET | Refills: 0 | Status: CANCELLED | OUTPATIENT
Start: 2021-11-16

## 2021-11-16 RX ORDER — LEVOTHYROXINE SODIUM 0.07 MG/1
75 TABLET ORAL DAILY
Qty: 90 TABLET | Refills: 0 | Status: CANCELLED | OUTPATIENT
Start: 2021-11-16

## 2021-11-16 NOTE — PROGRESS NOTES
Chief Complaint   Patient presents with    Diabetes     pt here for her 3 month follow up pt has no complaints or concerns at this time A1C nt due until the 24th insurance will not cover until closer till then     Other     pt had her flu shot and covid booster        HPI:  Patient is here for follow-up of diabetes mellitus hypertension and hyperlipidemia. She is doing well without any new complaints. She has been compliant with medications. Blood work was discussed with patient appears within reasonable range except for mild hyperlipidemia and patient was advised for low-fat diet. Her last eye exam was 2 months ago  She has been checking her feet regularly  She has taken all her Covid vaccines and flu vaccine. Past Medical History, Surgical History, and Family History has been reviewed and updated.     Review of Systems:  Constitutional:  No fever, no fatigue, no chills, no headaches, no weight change  Dermatology:  No rash, no mole, no dry or sensitive skin  ENT:  No cough, no sore throat, no sinus pain, no runny nose, no ear pain  Cardiology:  No chest pain, no palpitations, no leg edema, no shortness of breath, no PND  Gastroenterology:  No dysphagia, no abdominal pain, no nausea, no vomiting, no constipation, no diarrhea, no heartburn  Musculoskeletal:  No joint pain, no leg cramps, no back pain, no muscle aches  Respiratory:  No shortness of breath, no orthopnea, no wheezing, no JOSE, no hemoptysis  Urology:  No blood in the urine, no urinary frequency, no urinary incontinence, no urinary urgency, no nocturia, no dysuria    Vitals:    11/16/21 1138   BP: 130/70   Pulse: 77   Resp: 16   Temp: 98.6 °F (37 °C)   SpO2: 97%   Weight: 147 lb (66.7 kg)   Height: 5' 4\" (1.626 m)       General:  Patient alert and oriented x 3, NAD, pleasant, mild kyphosis  HEENT:  Atraumatic, normocephalic, PERRLA, EOMI, clear conjunctiva, TMs clear, nose-clear, throat - no erythema  Neck:  Supple, no goiter, no carotid bruits, no LAD  Lungs:  CTA   Heart:  RRR, no murmurs, gallops or rubs  Abdomen:  Soft/nt/nd, + bowel sounds  Extremities:  No clubbing, cyanosis or edema  Skin: unremarkable    Hemoglobin A1C   Date Value Ref Range Status   08/24/2021 6.9 % Final     Cholesterol, Total   Date Value Ref Range Status   11/09/2021 224 (H) 0 - 199 mg/dL Final     Triglycerides   Date Value Ref Range Status   11/09/2021 213 (H) 0 - 149 mg/dL Final     HDL   Date Value Ref Range Status   11/09/2021 47 >40 mg/dL Final     LDL Calculated   Date Value Ref Range Status   11/09/2021 134 (H) 0 - 99 mg/dL Final     VLDL Cholesterol Calculated   Date Value Ref Range Status   11/09/2021 43 mg/dL Final     VLDL   Date Value Ref Range Status   02/26/2021 18 mg/dL Final     Chol/HDL Ratio   Date Value Ref Range Status   02/26/2021 3.1  Final     Sodium   Date Value Ref Range Status   11/09/2021 138 132 - 146 mmol/L Final     Potassium   Date Value Ref Range Status   11/09/2021 5.0 3.5 - 5.0 mmol/L Final     Chloride   Date Value Ref Range Status   11/09/2021 103 98 - 107 mmol/L Final     CO2   Date Value Ref Range Status   11/09/2021 20 (L) 22 - 29 mmol/L Final     BUN   Date Value Ref Range Status   11/09/2021 23 6 - 23 mg/dL Final     CREATININE   Date Value Ref Range Status   11/09/2021 1.2 (H) 0.5 - 1.0 mg/dL Final     Glucose   Date Value Ref Range Status   11/09/2021 142 (H) 74 - 99 mg/dL Final     Calcium   Date Value Ref Range Status   11/09/2021 9.5 8.6 - 10.2 mg/dL Final     Total Protein   Date Value Ref Range Status   11/09/2021 7.4 6.4 - 8.3 g/dL Final     Albumin   Date Value Ref Range Status   11/09/2021 4.2 3.5 - 5.2 g/dL Final     Total Bilirubin   Date Value Ref Range Status   11/09/2021 0.3 0.0 - 1.2 mg/dL Final     Alkaline Phosphatase   Date Value Ref Range Status   11/09/2021 81 35 - 104 U/L Final     AST   Date Value Ref Range Status   11/09/2021 22 0 - 31 U/L Final     ALT   Date Value Ref Range Status   11/09/2021 17 0 - 32 U/L Final     GFR Non-   Date Value Ref Range Status   11/09/2021 43 >=60 mL/min/1.73 Final     Comment:     Chronic Kidney Disease: less than 60 ml/min/1.73 sq.m. Kidney Failure: less than 15 ml/min/1.73 sq.m. Results valid for patients 18 years and older. GFR    Date Value Ref Range Status   11/09/2021 52  Final        No results found. Assessment/Plan:      Outpatient Encounter Medications as of 11/16/2021   Medication Sig Dispense Refill    FLUoxetine (PROZAC) 40 MG capsule Take 1 capsule by mouth nightly 90 capsule 0    glimepiride (AMARYL) 1 MG tablet Take 1 tablet by mouth 2 times daily 1 TAB BY  MOUTH 2 TIIMES A  tablet 0    levothyroxine (SYNTHROID) 75 MCG tablet Take 1 tablet by mouth daily 90 tablet 0    losartan (COZAAR) 100 MG tablet Take 1 tablet by mouth daily 90 tablet 0    metFORMIN (GLUCOPHAGE) 1000 MG tablet Take 1 tablet by mouth 2 times daily (with meals) 180 tablet 0    pantoprazole (PROTONIX) 40 MG tablet Take 1 tablet by mouth daily 90 tablet 0    buPROPion (WELLBUTRIN XL) 300 MG extended release tablet Take 1 tablet by mouth every morning 90 tablet 0    OLANZapine (ZYPREXA) 5 MG tablet Take 1 tablet by mouth nightly 90 tablet 0    Multiple Vitamin (MULTI VITAMIN W/D-3 PO) Take by mouth      aspirin 81 MG chewable tablet Take 81 mg by mouth daily        No facility-administered encounter medications on file as of 11/16/2021. Rosa Mcdermott was seen today for diabetes and other. Diagnoses and all orders for this visit:    Primary hypertension  -     Comprehensive Metabolic Panel; Future  -     Urinalysis; Future  -     Microalbumin / Creatinine Urine Ratio; Future    Autoimmune hypothyroidism    Type 2 diabetes mellitus without complication, without long-term current use of insulin ([facility]         There are no Patient Instructions on file for this visit.      On this date 11/16/2021 I have spent 25 minutes reviewing previous notes, test results and face to face with the patient discussing the diagnosis and importance of compliance with the treatment plan as well as documenting on the day of the visit.       Matilda Alva MD   11/16/21

## 2021-11-18 ENCOUNTER — TELEPHONE (OUTPATIENT)
Dept: PRIMARY CARE CLINIC | Age: 81
End: 2021-11-18

## 2021-11-18 NOTE — TELEPHONE ENCOUNTER
Was able to reach patient. She states the insurance company that covers her medications had conversation with her recently about the age of her current glucometer and suggested getting a new meter with the supplies from [facility]. Patient states she does wish to do that. Order info forwarded to physician for authorization.

## 2021-11-18 NOTE — TELEPHONE ENCOUNTER
Attempted to contact patient to discuss request from [facility] about new type of diabetic supplies before order can be authorized. .  The VM mailbox associated with the number listed on the chart  indicated her mailbox was full and messages could not be left.

## 2022-01-25 ENCOUNTER — TELEPHONE (OUTPATIENT)
Dept: PRIMARY CARE CLINIC | Age: 82
End: 2022-01-25

## 2022-01-25 NOTE — TELEPHONE ENCOUNTER
----- Message from David sent at 1/25/2022 12:18 PM EST -----  Subject: Message to Provider    QUESTIONS  Information for Provider? Patient requesting refill on all medications she   does not know the names or dosage or frequency of any of the medications   she needs refills for. 2. Patient is also requesting a muscle relaxer for   back pain. pt of Dr. Quinton Ruvalcaba aid in [place]   ---------------------------------------------------------------------------  --------------  [address]  What is the best way for the office to contact you? OK to leave message on   voicemail  Preferred Call Back Phone Number? [phone]  ---------------------------------------------------------------------------  --------------  SCRIPT ANSWERS  Relationship to Patient?  Self

## 2022-01-25 NOTE — TELEPHONE ENCOUNTER
----- Message from David sent at 1/25/2022 12:18 PM EST -----  Subject: Message to Provider    QUESTIONS  Information for Provider? Patient requesting refill on all medications she   does not know the names or dosage or frequency of any of the medications   she needs refills for. 2. Patient is also requesting a muscle relaxer for   back pain. pt of Dr. Kenneth Hall aid in [place]   ---------------------------------------------------------------------------  --------------  [address]  What is the best way for the office to contact you? OK to leave message on   voicemail  Preferred Call Back Phone Number? [phone]  ---------------------------------------------------------------------------  --------------  SCRIPT ANSWERS  Relationship to Patient?  Self

## 2022-01-26 RX ORDER — LOSARTAN POTASSIUM 100 MG/1
100 TABLET ORAL DAILY
Qty: 90 TABLET | Refills: 0 | Status: SHIPPED
Start: 2022-01-26 | End: 2022-04-19 | Stop reason: SDUPTHER

## 2022-01-26 RX ORDER — OLANZAPINE 5 MG/1
5 TABLET ORAL NIGHTLY
Qty: 90 TABLET | Refills: 0 | OUTPATIENT
Start: 2022-01-26

## 2022-01-26 RX ORDER — GLIMEPIRIDE 1 MG/1
1 TABLET ORAL 2 TIMES DAILY
Qty: 180 TABLET | Refills: 0 | Status: SHIPPED
Start: 2022-01-26 | End: 2022-04-19 | Stop reason: SDUPTHER

## 2022-01-26 RX ORDER — LEVOTHYROXINE SODIUM 0.07 MG/1
75 TABLET ORAL DAILY
Qty: 90 TABLET | Refills: 0 | Status: SHIPPED
Start: 2022-01-26 | End: 2022-04-19 | Stop reason: SDUPTHER

## 2022-01-26 RX ORDER — PANTOPRAZOLE SODIUM 40 MG/1
40 TABLET, DELAYED RELEASE ORAL DAILY
Qty: 90 TABLET | Refills: 0 | Status: SHIPPED
Start: 2022-01-26 | End: 2022-04-19 | Stop reason: SDUPTHER

## 2022-01-26 RX ORDER — FLUOXETINE HYDROCHLORIDE 40 MG/1
40 CAPSULE ORAL NIGHTLY
Qty: 90 CAPSULE | Refills: 0 | Status: SHIPPED
Start: 2022-01-26 | End: 2022-04-19 | Stop reason: SDUPTHER

## 2022-01-27 ENCOUNTER — TELEPHONE (OUTPATIENT)
Dept: PRIMARY CARE CLINIC | Age: 82
End: 2022-01-27

## 2022-01-27 RX ORDER — TIZANIDINE 2 MG/1
2 TABLET ORAL NIGHTLY PRN
Qty: 10 TABLET | Refills: 0 | Status: SHIPPED
Start: 2022-01-27 | End: 2022-09-14

## 2022-03-04 DIAGNOSIS — I10 PRIMARY HYPERTENSION: ICD-10-CM

## 2022-03-04 LAB
ALBUMIN SERPL-MCNC: 4.3 G/DL (ref 3.5–5.2)
ALP BLD-CCNC: 95 U/L (ref 35–104)
ALT SERPL-CCNC: 31 U/L (ref 0–32)
ANION GAP SERPL CALCULATED.3IONS-SCNC: 12 MMOL/L (ref 7–16)
AST SERPL-CCNC: 25 U/L (ref 0–31)
BACTERIA: ABNORMAL /HPF
BILIRUB SERPL-MCNC: 0.2 MG/DL (ref 0–1.2)
BILIRUBIN URINE: NEGATIVE
BLOOD, URINE: ABNORMAL
BUN BLDV-MCNC: 25 MG/DL (ref 6–23)
CALCIUM SERPL-MCNC: 9.6 MG/DL (ref 8.6–10.2)
CHLORIDE BLD-SCNC: 108 MMOL/L (ref 98–107)
CLARITY: CLEAR
CO2: 20 MMOL/L (ref 22–29)
COLOR: YELLOW
CREAT SERPL-MCNC: 1.1 MG/DL (ref 0.5–1)
CREATININE URINE: 58 MG/DL (ref 29–226)
GFR AFRICAN AMERICAN: 57
GFR NON-AFRICAN AMERICAN: 47 ML/MIN/1.73
GLUCOSE BLD-MCNC: 132 MG/DL (ref 74–99)
GLUCOSE URINE: NEGATIVE MG/DL
KETONES, URINE: NEGATIVE MG/DL
LEUKOCYTE ESTERASE, URINE: ABNORMAL
MICROALBUMIN UR-MCNC: 12.8 MG/L
MICROALBUMIN/CREAT UR-RTO: 22.1 (ref 0–30)
NITRITE, URINE: POSITIVE
PH UA: 5.5 (ref 5–9)
POTASSIUM SERPL-SCNC: 5.2 MMOL/L (ref 3.5–5)
PROTEIN UA: NEGATIVE MG/DL
RBC UA: ABNORMAL /HPF (ref 0–2)
SODIUM BLD-SCNC: 140 MMOL/L (ref 132–146)
SPECIFIC GRAVITY UA: 1.01 (ref 1–1.03)
TOTAL PROTEIN: 7.5 G/DL (ref 6.4–8.3)
UROBILINOGEN, URINE: 0.2 E.U./DL
WBC UA: ABNORMAL /HPF (ref 0–5)

## 2022-03-14 ENCOUNTER — OFFICE VISIT (OUTPATIENT)
Dept: PRIMARY CARE CLINIC | Age: 82
End: 2022-03-14
Payer: MEDICARE

## 2022-03-14 VITALS
SYSTOLIC BLOOD PRESSURE: 178 MMHG | TEMPERATURE: 97.6 F | HEIGHT: 64 IN | BODY MASS INDEX: 25.23 KG/M2 | OXYGEN SATURATION: 98 % | HEART RATE: 78 BPM | DIASTOLIC BLOOD PRESSURE: 80 MMHG

## 2022-03-14 DIAGNOSIS — G89.29 CHRONIC RIGHT-SIDED LOW BACK PAIN WITHOUT SCIATICA: ICD-10-CM

## 2022-03-14 DIAGNOSIS — R06.09 DYSPNEA ON EXERTION: ICD-10-CM

## 2022-03-14 DIAGNOSIS — F32.1 MODERATE MAJOR DEPRESSION (HCC): ICD-10-CM

## 2022-03-14 DIAGNOSIS — I10 PRIMARY HYPERTENSION: ICD-10-CM

## 2022-03-14 DIAGNOSIS — N18.1 TYPE 2 DIABETES MELLITUS WITH STAGE 1 CHRONIC KIDNEY DISEASE AND HYPERTENSION (HCC): Primary | ICD-10-CM

## 2022-03-14 DIAGNOSIS — I36.1 NONRHEUMATIC TRICUSPID VALVE REGURGITATION: ICD-10-CM

## 2022-03-14 DIAGNOSIS — I12.9 TYPE 2 DIABETES MELLITUS WITH STAGE 1 CHRONIC KIDNEY DISEASE AND HYPERTENSION (HCC): Primary | ICD-10-CM

## 2022-03-14 DIAGNOSIS — M54.50 CHRONIC RIGHT-SIDED LOW BACK PAIN WITHOUT SCIATICA: ICD-10-CM

## 2022-03-14 DIAGNOSIS — I27.20 PULMONARY HYPERTENSION (HCC): ICD-10-CM

## 2022-03-14 DIAGNOSIS — E11.22 TYPE 2 DIABETES MELLITUS WITH STAGE 1 CHRONIC KIDNEY DISEASE AND HYPERTENSION (HCC): Primary | ICD-10-CM

## 2022-03-14 PROCEDURE — G8417 CALC BMI ABV UP PARAM F/U: HCPCS | Performed by: INTERNAL MEDICINE

## 2022-03-14 PROCEDURE — G8484 FLU IMMUNIZE NO ADMIN: HCPCS | Performed by: INTERNAL MEDICINE

## 2022-03-14 PROCEDURE — G8399 PT W/DXA RESULTS DOCUMENT: HCPCS | Performed by: INTERNAL MEDICINE

## 2022-03-14 PROCEDURE — 1123F ACP DISCUSS/DSCN MKR DOCD: CPT | Performed by: INTERNAL MEDICINE

## 2022-03-14 PROCEDURE — 99214 OFFICE O/P EST MOD 30 MIN: CPT | Performed by: INTERNAL MEDICINE

## 2022-03-14 PROCEDURE — 1036F TOBACCO NON-USER: CPT | Performed by: INTERNAL MEDICINE

## 2022-03-14 PROCEDURE — G8427 DOCREV CUR MEDS BY ELIG CLIN: HCPCS | Performed by: INTERNAL MEDICINE

## 2022-03-14 PROCEDURE — 1090F PRES/ABSN URINE INCON ASSESS: CPT | Performed by: INTERNAL MEDICINE

## 2022-03-14 PROCEDURE — 4040F PNEUMOC VAC/ADMIN/RCVD: CPT | Performed by: INTERNAL MEDICINE

## 2022-03-14 RX ORDER — MELOXICAM 7.5 MG/1
7.5 TABLET ORAL DAILY PRN
Qty: 30 TABLET | Refills: 0 | Status: SHIPPED
Start: 2022-03-14 | End: 2022-04-19 | Stop reason: SDUPTHER

## 2022-03-14 RX ORDER — METOPROLOL SUCCINATE 50 MG/1
50 TABLET, EXTENDED RELEASE ORAL DAILY
Qty: 90 TABLET | Refills: 1 | Status: SHIPPED
Start: 2022-03-14 | End: 2022-04-19 | Stop reason: SDUPTHER

## 2022-03-14 NOTE — PROGRESS NOTES
Chief Complaint   Patient presents with    3 Month Follow-Up     Pt is here as a 3 month F/U, with no known recent exposure to Covid. Pt states she had labs drawn on 3/4 and these are on file for review. Pt states she has been lethargic lately, and takes a Multivitamin daily. HPI:  Patient is here for follow-up of diabetes mellitus hypertension hyperlipidemia chronic low back pain. Patient complains of multiple issues today including low back pain that is getting progressively worse especially on the right parathoracic and paralumbar spine area. She claims that the muscle relaxer did not help her at all although there is a muscle spasm that can be felt. We will start her on a small dose of meloxicam as needed and she was instructed to use Tylenol extra strength 2 of them every 8 hours daily. She is also complaining about shortness of breath with mild to moderate exertion after climbing up 1 flight of stairs or cleaning her house she has to sit down and rest and catch her breath. She also has been having a lot of headaches lately especially in the morning and today her blood pressure is 178/80. She claims that she has been compliant with medications. We will add metoprolol succinate 50 mg nightly. She is also complaining of bilateral earlobe pains and that they are very tender to sleep on them at night. Earlobes appear within normal range except for the outer rim with mild irritation and erythema especially on the right side. Patient was instructed to maybe change her laundry detergent that this might be an allergic reaction to the laundry detergent. There is no other problems with the ear. .    Past Medical History, Surgical History, and Family History has been reviewed and updated.     Review of Systems:  Constitutional:  No fever, no fatigue, no chills, no headaches, no weight change  Dermatology:  No rash, no mole, no dry or sensitive skin  ENT:  No cough, no sore throat, no sinus pain, no runny nose, no ear pain  Cardiology:  No chest pain, no palpitations, no leg edema, no shortness of breath, no PND  Gastroenterology:  No dysphagia, no abdominal pain, no nausea, no vomiting, no constipation, no diarrhea, no heartburn  Musculoskeletal:  No joint pain, no leg cramps, no back pain, no muscle aches  Respiratory:  No shortness of breath, no orthopnea, no wheezing, positive JOSE, no hemoptysis  Urology:  No blood in the urine, no urinary frequency, no urinary incontinence, no urinary urgency, no nocturia, no dysuria    Vitals:    03/14/22 1126 03/14/22 1208   BP: (!) 140/80 (!) 178/80   Pulse: 78    Temp: 97.6 °F (36.4 °C)    TempSrc: Temporal    SpO2: 98%    Height: 5' 4\" (1.626 m)        General:  Patient alert and oriented x 3, NAD, pleasant  HEENT:  Atraumatic, normocephalic, PERRLA, EOMI, clear conjunctiva, TMs clear, mild irritation of the outer rim of the earlobe especially on the left side nose-clear, throat - no erythema  Neck:  Supple, no goiter, no carotid bruits, no LAD  Lungs:  CTA   back: Positive muscular spasm on the paralumbar and parathoracic area especially on the right side  Heart:  RRR, no murmurs, gallops or rubs  Abdomen:  Soft/nt/nd, + bowel sounds  Extremities:  No clubbing, cyanosis or edema  Skin: unremarkable    Hemoglobin A1C   Date Value Ref Range Status   08/24/2021 6.9 % Final     Cholesterol, Total   Date Value Ref Range Status   11/09/2021 224 (H) 0 - 199 mg/dL Final     Triglycerides   Date Value Ref Range Status   11/09/2021 213 (H) 0 - 149 mg/dL Final     HDL   Date Value Ref Range Status   11/09/2021 47 >40 mg/dL Final     LDL Calculated   Date Value Ref Range Status   11/09/2021 134 (H) 0 - 99 mg/dL Final     VLDL Cholesterol Calculated   Date Value Ref Range Status   11/09/2021 43 mg/dL Final     VLDL   Date Value Ref Range Status   02/26/2021 18 mg/dL Final     Chol/HDL Ratio   Date Value Ref Range Status   02/26/2021 3.1  Final     Sodium   Date Value Ref Range Status 03/04/2022 140 132 - 146 mmol/L Final     Potassium   Date Value Ref Range Status   03/04/2022 5.2 (H) 3.5 - 5.0 mmol/L Final     Chloride   Date Value Ref Range Status   03/04/2022 108 (H) 98 - 107 mmol/L Final     CO2   Date Value Ref Range Status   03/04/2022 20 (L) 22 - 29 mmol/L Final     BUN   Date Value Ref Range Status   03/04/2022 25 (H) 6 - 23 mg/dL Final     CREATININE   Date Value Ref Range Status   03/04/2022 1.1 (H) 0.5 - 1.0 mg/dL Final     Glucose   Date Value Ref Range Status   03/04/2022 132 (H) 74 - 99 mg/dL Final     Calcium   Date Value Ref Range Status   03/04/2022 9.6 8.6 - 10.2 mg/dL Final     Total Protein   Date Value Ref Range Status   03/04/2022 7.5 6.4 - 8.3 g/dL Final     Albumin   Date Value Ref Range Status   03/04/2022 4.3 3.5 - 5.2 g/dL Final     Total Bilirubin   Date Value Ref Range Status   03/04/2022 0.2 0.0 - 1.2 mg/dL Final     Alkaline Phosphatase   Date Value Ref Range Status   03/04/2022 95 35 - 104 U/L Final     AST   Date Value Ref Range Status   03/04/2022 25 0 - 31 U/L Final     ALT   Date Value Ref Range Status   03/04/2022 31 0 - 32 U/L Final     GFR Non-   Date Value Ref Range Status   03/04/2022 47 >=60 mL/min/1.73 Final     Comment:     Chronic Kidney Disease: less than 60 ml/min/1.73 sq.m. Kidney Failure: less than 15 ml/min/1.73 sq.m. Results valid for patients 18 years and older. GFR    Date Value Ref Range Status   03/04/2022 57  Final        No results found.      Assessment/Plan:      Outpatient Encounter Medications as of 3/14/2022   Medication Sig Dispense Refill    metoprolol succinate (TOPROL XL) 50 MG extended release tablet Take 1 tablet by mouth daily 90 tablet 1    meloxicam (MOBIC) 7.5 MG tablet Take 1 tablet by mouth daily as needed for Pain 30 tablet 0    tiZANidine (ZANAFLEX) 2 MG tablet Take 1 tablet by mouth nightly as needed (muscle spasm) 10 tablet 0    FLUoxetine (PROZAC) 40 MG capsule Take 1 capsule by mouth nightly 90 capsule 0    glimepiride (AMARYL) 1 MG tablet Take 1 tablet by mouth 2 times daily 1 TAB BY  MOUTH 2 TIIMES A  tablet 0    levothyroxine (SYNTHROID) 75 MCG tablet Take 1 tablet by mouth daily 90 tablet 0    losartan (COZAAR) 100 MG tablet Take 1 tablet by mouth daily 90 tablet 0    metFORMIN (GLUCOPHAGE) 1000 MG tablet Take 1 tablet by mouth 2 times daily (with meals) 180 tablet 0    pantoprazole (PROTONIX) 40 MG tablet Take 1 tablet by mouth daily 90 tablet 0    buPROPion (WELLBUTRIN XL) 300 MG extended release tablet Take 1 tablet by mouth every morning 90 tablet 0    OLANZapine (ZYPREXA) 5 MG tablet Take 1 tablet by mouth nightly 90 tablet 0    Multiple Vitamin (MULTI VITAMIN W/D-3 PO) Take by mouth      aspirin 81 MG chewable tablet Take 81 mg by mouth daily        No facility-administered encounter medications on file as of 3/14/2022. Lia White was seen today for 3 month follow-up. Diagnoses and all orders for this visit:    Type 2 diabetes mellitus with stage 1 chronic kidney disease and hypertension ([facility] [address]    Primary hypertension    Moderate major depression (HCC)    Chronic right-sided low back pain without sciatica  -     meloxicam (MOBIC) 7.5 MG tablet; Take 1 tablet by mouth daily as needed for Pain    Dyspnea on exertion    Nonrheumatic tricuspid valve regurgitation    Pulmonary hypertension (HCC)    Other orders  -     metoprolol succinate (TOPROL XL) 50 MG extended release tablet; Take 1 tablet by mouth daily    Add metoprolol succinate 50 mg nightly RTC in 1 month for blood pressure check    There are no Patient Instructions on file for this visit. On this date 3/14/2022 I have spent 35 minutes reviewing previous notes, test results and face to face with the patient discussing the diagnosis and importance of compliance with the treatment plan as well as documenting on the day of the visit.       Lavelle Villegas MD   3/14/22

## 2022-04-19 ENCOUNTER — OFFICE VISIT (OUTPATIENT)
Dept: PRIMARY CARE CLINIC | Age: 82
End: 2022-04-19
Payer: MEDICARE

## 2022-04-19 VITALS
SYSTOLIC BLOOD PRESSURE: 120 MMHG | HEIGHT: 64 IN | OXYGEN SATURATION: 98 % | TEMPERATURE: 96.6 F | BODY MASS INDEX: 25.23 KG/M2 | DIASTOLIC BLOOD PRESSURE: 78 MMHG | HEART RATE: 68 BPM

## 2022-04-19 DIAGNOSIS — B35.1 FUNGAL TOENAIL INFECTION: ICD-10-CM

## 2022-04-19 DIAGNOSIS — I12.9 TYPE 2 DIABETES MELLITUS WITH STAGE 1 CHRONIC KIDNEY DISEASE AND HYPERTENSION (HCC): Primary | ICD-10-CM

## 2022-04-19 DIAGNOSIS — E11.22 TYPE 2 DIABETES MELLITUS WITH STAGE 1 CHRONIC KIDNEY DISEASE AND HYPERTENSION (HCC): Primary | ICD-10-CM

## 2022-04-19 DIAGNOSIS — M54.50 CHRONIC RIGHT-SIDED LOW BACK PAIN WITHOUT SCIATICA: ICD-10-CM

## 2022-04-19 DIAGNOSIS — E06.3 AUTOIMMUNE HYPOTHYROIDISM: ICD-10-CM

## 2022-04-19 DIAGNOSIS — I10 PRIMARY HYPERTENSION: ICD-10-CM

## 2022-04-19 DIAGNOSIS — G89.29 CHRONIC RIGHT-SIDED THORACIC BACK PAIN: ICD-10-CM

## 2022-04-19 DIAGNOSIS — N18.1 TYPE 2 DIABETES MELLITUS WITH STAGE 1 CHRONIC KIDNEY DISEASE AND HYPERTENSION (HCC): Primary | ICD-10-CM

## 2022-04-19 DIAGNOSIS — M54.6 CHRONIC RIGHT-SIDED THORACIC BACK PAIN: ICD-10-CM

## 2022-04-19 DIAGNOSIS — G89.29 CHRONIC RIGHT-SIDED LOW BACK PAIN WITHOUT SCIATICA: ICD-10-CM

## 2022-04-19 DIAGNOSIS — D50.9 HYPOCHROMIC MICROCYTIC ANEMIA: ICD-10-CM

## 2022-04-19 LAB — HBA1C MFR BLD: 7.9 %

## 2022-04-19 PROCEDURE — G8417 CALC BMI ABV UP PARAM F/U: HCPCS | Performed by: INTERNAL MEDICINE

## 2022-04-19 PROCEDURE — 3051F HG A1C>EQUAL 7.0%<8.0%: CPT | Performed by: INTERNAL MEDICINE

## 2022-04-19 PROCEDURE — 83036 HEMOGLOBIN GLYCOSYLATED A1C: CPT | Performed by: INTERNAL MEDICINE

## 2022-04-19 PROCEDURE — 99214 OFFICE O/P EST MOD 30 MIN: CPT | Performed by: INTERNAL MEDICINE

## 2022-04-19 PROCEDURE — 1123F ACP DISCUSS/DSCN MKR DOCD: CPT | Performed by: INTERNAL MEDICINE

## 2022-04-19 PROCEDURE — 4040F PNEUMOC VAC/ADMIN/RCVD: CPT | Performed by: INTERNAL MEDICINE

## 2022-04-19 PROCEDURE — G8427 DOCREV CUR MEDS BY ELIG CLIN: HCPCS | Performed by: INTERNAL MEDICINE

## 2022-04-19 PROCEDURE — G8399 PT W/DXA RESULTS DOCUMENT: HCPCS | Performed by: INTERNAL MEDICINE

## 2022-04-19 PROCEDURE — 1090F PRES/ABSN URINE INCON ASSESS: CPT | Performed by: INTERNAL MEDICINE

## 2022-04-19 PROCEDURE — 1036F TOBACCO NON-USER: CPT | Performed by: INTERNAL MEDICINE

## 2022-04-19 RX ORDER — PANTOPRAZOLE SODIUM 40 MG/1
40 TABLET, DELAYED RELEASE ORAL DAILY
Qty: 90 TABLET | Refills: 0 | Status: SHIPPED
Start: 2022-04-19 | End: 2022-06-14 | Stop reason: SDUPTHER

## 2022-04-19 RX ORDER — LEVOTHYROXINE SODIUM 0.07 MG/1
75 TABLET ORAL DAILY
Qty: 90 TABLET | Refills: 0 | Status: SHIPPED
Start: 2022-04-19 | End: 2022-06-14 | Stop reason: SDUPTHER

## 2022-04-19 RX ORDER — LOSARTAN POTASSIUM 100 MG/1
100 TABLET ORAL DAILY
Qty: 90 TABLET | Refills: 0 | Status: SHIPPED
Start: 2022-04-19 | End: 2022-06-14 | Stop reason: SDUPTHER

## 2022-04-19 RX ORDER — GLIMEPIRIDE 1 MG/1
1 TABLET ORAL 2 TIMES DAILY
Qty: 180 TABLET | Refills: 0 | Status: SHIPPED
Start: 2022-04-19 | End: 2022-06-14 | Stop reason: SDUPTHER

## 2022-04-19 RX ORDER — TRAZODONE HYDROCHLORIDE 50 MG/1
TABLET ORAL
COMMUNITY
Start: 2022-04-08 | End: 2022-09-14 | Stop reason: SDUPTHER

## 2022-04-19 RX ORDER — BUPROPION HYDROCHLORIDE 300 MG/1
300 TABLET ORAL EVERY MORNING
Qty: 90 TABLET | Refills: 0 | Status: SHIPPED
Start: 2022-04-19 | End: 2022-06-14 | Stop reason: SDUPTHER

## 2022-04-19 RX ORDER — OLANZAPINE 5 MG/1
5 TABLET ORAL NIGHTLY
Qty: 90 TABLET | Refills: 0 | Status: SHIPPED
Start: 2022-04-19 | End: 2022-06-14 | Stop reason: SDUPTHER

## 2022-04-19 RX ORDER — FLUOXETINE HYDROCHLORIDE 40 MG/1
40 CAPSULE ORAL NIGHTLY
Qty: 90 CAPSULE | Refills: 0 | Status: SHIPPED
Start: 2022-04-19 | End: 2022-06-14 | Stop reason: SDUPTHER

## 2022-04-19 RX ORDER — TIZANIDINE 2 MG/1
2 TABLET ORAL NIGHTLY PRN
Qty: 10 TABLET | Refills: 0 | Status: CANCELLED | OUTPATIENT
Start: 2022-04-19

## 2022-04-19 RX ORDER — MELOXICAM 15 MG/1
15 TABLET ORAL DAILY PRN
Qty: 90 TABLET | Refills: 0 | Status: SHIPPED
Start: 2022-04-19 | End: 2022-06-14 | Stop reason: SDUPTHER

## 2022-04-19 RX ORDER — METOPROLOL SUCCINATE 50 MG/1
50 TABLET, EXTENDED RELEASE ORAL DAILY
Qty: 90 TABLET | Refills: 0 | Status: SHIPPED
Start: 2022-04-19 | End: 2022-06-14 | Stop reason: SDUPTHER

## 2022-04-19 NOTE — PROGRESS NOTES
PND  Gastroenterology:  No dysphagia, no abdominal pain, no nausea, no vomiting, no constipation, no diarrhea, no heartburn  Musculoskeletal:  No joint pain, no leg cramps, no back pain, no muscle aches  Respiratory:  No shortness of breath, no orthopnea, no wheezing, no JOSE, no hemoptysis  Urology:  No blood in the urine, no urinary frequency, no urinary incontinence, no urinary urgency, no nocturia, no dysuria    Vitals:    04/19/22 0944   BP: 120/78   Pulse: 68   Temp: 96.6 °F (35.9 °C)   TempSrc: Temporal   SpO2: 98%   Height: 5' 4\" (1.626 m)       General:  Patient alert and oriented x 3, NAD, depressed and kyphotic  HEENT:  Atraumatic, normocephalic, PERRLA, EOMI, clear conjunctiva, TMs clear, nose-clear, throat - no erythema  Neck:  Supple, no goiter, no carotid bruits, no LAD  Lungs:  CTA   Heart:  RRR, no murmurs, gallops or rubs  Abdomen:  Soft/nt/nd, + bowel sounds  Extremities:  No clubbing, cyanosis or edema  Fungal toenail on the right big toe.   Intact pedal pulses  Skin: unremarkable    Hemoglobin A1C   Date Value Ref Range Status   04/19/2022 7.9 % Final     Cholesterol, Total   Date Value Ref Range Status   11/09/2021 224 (H) 0 - 199 mg/dL Final     Triglycerides   Date Value Ref Range Status   11/09/2021 213 (H) 0 - 149 mg/dL Final     HDL   Date Value Ref Range Status   11/09/2021 47 >40 mg/dL Final     LDL Calculated   Date Value Ref Range Status   11/09/2021 134 (H) 0 - 99 mg/dL Final     VLDL Cholesterol Calculated   Date Value Ref Range Status   11/09/2021 43 mg/dL Final     VLDL   Date Value Ref Range Status   02/26/2021 18 mg/dL Final     Chol/HDL Ratio   Date Value Ref Range Status   02/26/2021 3.1  Final     Sodium   Date Value Ref Range Status   03/04/2022 140 132 - 146 mmol/L Final     Potassium   Date Value Ref Range Status   03/04/2022 5.2 (H) 3.5 - 5.0 mmol/L Final     Chloride   Date Value Ref Range Status   03/04/2022 108 (H) 98 - 107 mmol/L Final     CO2   Date Value Ref Range Status   03/04/2022 20 (L) 22 - 29 mmol/L Final     BUN   Date Value Ref Range Status   03/04/2022 25 (H) 6 - 23 mg/dL Final     CREATININE   Date Value Ref Range Status   03/04/2022 1.1 (H) 0.5 - 1.0 mg/dL Final     Glucose   Date Value Ref Range Status   03/04/2022 132 (H) 74 - 99 mg/dL Final     Calcium   Date Value Ref Range Status   03/04/2022 9.6 8.6 - 10.2 mg/dL Final     Total Protein   Date Value Ref Range Status   03/04/2022 7.5 6.4 - 8.3 g/dL Final     Albumin   Date Value Ref Range Status   03/04/2022 4.3 3.5 - 5.2 g/dL Final     Total Bilirubin   Date Value Ref Range Status   03/04/2022 0.2 0.0 - 1.2 mg/dL Final     Alkaline Phosphatase   Date Value Ref Range Status   03/04/2022 95 35 - 104 U/L Final     AST   Date Value Ref Range Status   03/04/2022 25 0 - 31 U/L Final     ALT   Date Value Ref Range Status   03/04/2022 31 0 - 32 U/L Final     GFR Non-   Date Value Ref Range Status   03/04/2022 47 >=60 mL/min/1.73 Final     Comment:     Chronic Kidney Disease: less than 60 ml/min/1.73 sq.m. Kidney Failure: less than 15 ml/min/1.73 sq.m. Results valid for patients 18 years and older. GFR    Date Value Ref Range Status   03/04/2022 57  Final        No results found.      Assessment/Plan:      Outpatient Encounter Medications as of 4/19/2022   Medication Sig Dispense Refill    traZODone (DESYREL) 50 MG tablet take 1 tablet by mouth at bedtime if needed      buPROPion (WELLBUTRIN XL) 300 MG extended release tablet Take 1 tablet by mouth every morning 90 tablet 0    glimepiride (AMARYL) 1 MG tablet Take 1 tablet by mouth 2 times daily 1 TAB BY  MOUTH 2 TIIMES A  tablet 0    FLUoxetine (PROZAC) 40 MG capsule Take 1 capsule by mouth nightly 90 capsule 0    levothyroxine (SYNTHROID) 75 MCG tablet Take 1 tablet by mouth daily 90 tablet 0    losartan (COZAAR) 100 MG tablet Take 1 tablet by mouth daily 90 tablet 0    meloxicam (MOBIC) 15 MG tablet Take 1 tablet by mouth daily as needed for Pain 90 tablet 0    metFORMIN (GLUCOPHAGE) 1000 MG tablet Take 1 tablet by mouth 2 times daily (with meals) 180 tablet 0    metoprolol succinate (TOPROL XL) 50 MG extended release tablet Take 1 tablet by mouth daily 90 tablet 0    OLANZapine (ZYPREXA) 5 MG tablet Take 1 tablet by mouth nightly 90 tablet 0    pantoprazole (PROTONIX) 40 MG tablet Take 1 tablet by mouth daily 90 tablet 0    tiZANidine (ZANAFLEX) 2 MG tablet Take 1 tablet by mouth nightly as needed (muscle spasm) 10 tablet 0    Multiple Vitamin (MULTI VITAMIN W/D-3 PO) Take by mouth      aspirin 81 MG chewable tablet Take 81 mg by mouth daily       [DISCONTINUED] metoprolol succinate (TOPROL XL) 50 MG extended release tablet Take 1 tablet by mouth daily 90 tablet 1    [DISCONTINUED] meloxicam (MOBIC) 7.5 MG tablet Take 1 tablet by mouth daily as needed for Pain 30 tablet 0    [DISCONTINUED] FLUoxetine (PROZAC) 40 MG capsule Take 1 capsule by mouth nightly 90 capsule 0    [DISCONTINUED] glimepiride (AMARYL) 1 MG tablet Take 1 tablet by mouth 2 times daily 1 TAB BY  MOUTH 2 TIIMES A  tablet 0    [DISCONTINUED] levothyroxine (SYNTHROID) 75 MCG tablet Take 1 tablet by mouth daily 90 tablet 0    [DISCONTINUED] losartan (COZAAR) 100 MG tablet Take 1 tablet by mouth daily 90 tablet 0    [DISCONTINUED] metFORMIN (GLUCOPHAGE) 1000 MG tablet Take 1 tablet by mouth 2 times daily (with meals) 180 tablet 0    [DISCONTINUED] pantoprazole (PROTONIX) 40 MG tablet Take 1 tablet by mouth daily 90 tablet 0    [DISCONTINUED] buPROPion (WELLBUTRIN XL) 300 MG extended release tablet Take 1 tablet by mouth every morning 90 tablet 0    [DISCONTINUED] OLANZapine (ZYPREXA) 5 MG tablet Take 1 tablet by mouth nightly 90 tablet 0     No facility-administered encounter medications on file as of 4/19/2022. Baron Tay was seen today for 1 month follow-up.     Diagnoses and all orders for this visit:    Type 2 diabetes

## 2022-05-03 ENCOUNTER — OFFICE VISIT (OUTPATIENT)
Dept: PODIATRY | Age: 82
End: 2022-05-03
Payer: MEDICARE

## 2022-05-03 VITALS — BODY MASS INDEX: 24.75 KG/M2 | WEIGHT: 145 LBS | HEIGHT: 64 IN

## 2022-05-03 DIAGNOSIS — B35.1 ONYCHOMYCOSIS: Primary | ICD-10-CM

## 2022-05-03 DIAGNOSIS — E11.9 TYPE 2 DIABETES MELLITUS WITHOUT COMPLICATION, WITHOUT LONG-TERM CURRENT USE OF INSULIN (HCC): ICD-10-CM

## 2022-05-03 DIAGNOSIS — M79.675 PAIN OF TOE OF LEFT FOOT: ICD-10-CM

## 2022-05-03 PROCEDURE — 99203 OFFICE O/P NEW LOW 30 MIN: CPT | Performed by: PODIATRIST

## 2022-05-03 PROCEDURE — 3051F HG A1C>EQUAL 7.0%<8.0%: CPT | Performed by: PODIATRIST

## 2022-05-03 PROCEDURE — 4040F PNEUMOC VAC/ADMIN/RCVD: CPT | Performed by: PODIATRIST

## 2022-05-03 PROCEDURE — 1036F TOBACCO NON-USER: CPT | Performed by: PODIATRIST

## 2022-05-03 PROCEDURE — G8427 DOCREV CUR MEDS BY ELIG CLIN: HCPCS | Performed by: PODIATRIST

## 2022-05-03 PROCEDURE — 1090F PRES/ABSN URINE INCON ASSESS: CPT | Performed by: PODIATRIST

## 2022-05-03 PROCEDURE — G8420 CALC BMI NORM PARAMETERS: HCPCS | Performed by: PODIATRIST

## 2022-05-03 PROCEDURE — G8399 PT W/DXA RESULTS DOCUMENT: HCPCS | Performed by: PODIATRIST

## 2022-05-03 PROCEDURE — 1123F ACP DISCUSS/DSCN MKR DOCD: CPT | Performed by: PODIATRIST

## 2022-05-03 NOTE — PROGRESS NOTES
Patient is in today for evaluation of left great toenail. Patient believes she has a fungal infection.  pcp is Aramis Sahni MD  Last ov 4/19/22

## 2022-05-03 NOTE — PROGRESS NOTES
5/3/22     Yaw Jatinder    : 1940 Sex: female   Age: 79 y.o. 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 Chief Complaint   Patient presents with    Nail Problem     left great toe        HPI: Patient has tried multiple OTC treatments without improvement noted. Patient presented today to discuss treatment options available. Patient states that she does have issues with certain shoe gear and activities due to the nail dystrophy present. Patient denies any recent injury to the left foot. No other additional abnormalities noted. ROS:  Const: Positives and pertinent negatives as per HPI. Musculo: Denies symptoms other than stated above. Neuro: Denies symptoms other than stated above. Skin: Denies symptoms other than stated above. Current Medications:    Current Outpatient Medications:     ciclopirox (PENLAC) 8 % solution, Apply topically daily to affected nails. , Disp: 6 mL, Rfl: 2    traZODone (DESYREL) 50 MG tablet, take 1 tablet by mouth at bedtime if needed, Disp: , Rfl:     buPROPion (WELLBUTRIN XL) 300 MG extended release tablet, Take 1 tablet by mouth every morning, Disp: 90 tablet, Rfl: 0    glimepiride (AMARYL) 1 MG tablet, Take 1 tablet by mouth 2 times daily 1 TAB BY  MOUTH 2 TIIMES A DAY, Disp: 180 tablet, Rfl: 0    FLUoxetine (PROZAC) 40 MG capsule, Take 1 capsule by mouth nightly, Disp: 90 capsule, Rfl: 0    levothyroxine (SYNTHROID) 75 MCG tablet, Take 1 tablet by mouth daily, Disp: 90 tablet, Rfl: 0    losartan (COZAAR) 100 MG tablet, Take 1 tablet by mouth daily, Disp: 90 tablet, Rfl: 0    meloxicam (MOBIC) 15 MG tablet, Take 1 tablet by mouth daily as needed for Pain, Disp: 90 tablet, Rfl: 0    metFORMIN (GLUCOPHAGE) 1000 MG tablet, Take 1 tablet by mouth 2 times daily (with meals), Disp: 180 tablet, Rfl: 0    metoprolol succinate (TOPROL XL) 50 MG extended release tablet, Take 1 tablet by mouth daily, Disp: 90 tablet, Rfl: 0    OLANZapine (ZYPREXA) 5 MG tablet, Take 1 tablet by mouth nightly, Disp: 90 tablet, Rfl: 0    pantoprazole (PROTONIX) 40 MG tablet, Take 1 tablet by mouth daily, Disp: 90 tablet, Rfl: 0    tiZANidine (ZANAFLEX) 2 MG tablet, Take 1 tablet by mouth nightly as needed (muscle spasm), Disp: 10 tablet, Rfl: 0    Multiple Vitamin (MULTI VITAMIN W/D-3 PO), Take by mouth, Disp: , Rfl:     aspirin 81 MG chewable tablet, Take 81 mg by mouth daily , Disp: , Rfl:     Allergies:  No Known Allergies    Vitals:    05/03/22 0927   Weight: 145 lb (65.8 kg)   Height: 5' 4\" (1.626 m)        Past Medical History:   Diagnosis Date    Depression     Diabetes mellitus ([facility] [address]     Hyperlipidemia     Hypertension     Thyroid disease     [facility]      Family History   Problem Relation Age of Onset    Cancer Mother         ESOPHAGEAL     Stroke Mother     Cancer Father         KIDNEY     Coronary Art Dis Brother      Past Surgical History:   Procedure Laterality Date    CHOLECYSTECTOMY      COLONOSCOPY      EYE SURGERY      cataract both eyes    HYSTERECTOMY      JOINT REPLACEMENT Left     KNEE    SHOULDER SURGERY  rt rotator    UPPER GASTROINTESTINAL ENDOSCOPY  5/15//12    toni     Social History     Tobacco Use    Smoking status: Never Smoker    Smokeless tobacco: Never Used   Vaping Use    Vaping Use: Not on file   Substance Use Topics    Alcohol use: No     Comment: 1 cup of coffee    Drug use: No           Diagnostic studies:    No results found. Procedures:    None    Exam:  VASCULAR: Pedal pulses palpable but diminished to palpation left foot.   Capillary refill time delayed digits 1 through 5 left foot  NEUROLOGICAL: Epicritic sensations intact digital regions left foot  DERMATOLOGICAL: Nail dystrophy, thickening, discoloration, with mild subungual debris present left great toe. No maceration of webspaces noted left foot. Mild edematous issues noted into the left lower extremities with varicosities and stasis skin changes present. MUSCULOSKELETAL: No digital deformities noted left foot. Mild antalgic gait noted upon evaluation. Plan Per Assessment  Lala Beasley was seen today for nail problem. Diagnoses and all orders for this visit:    Onychomycosis  -     ciclopirox (PENLAC) 8 % solution; Apply topically daily to affected nails. Pain of toe of left foot    Type 2 diabetes mellitus without complication, without long-term current use of insulin ([facility] [address]        1. New patient evaluation and management  2. We did discuss appropriate nail care treatment options with patient in detail today. Prescription was given today for topical nail lacquer for patient to utilize daily to the nail region. 3. We did discuss additional diabetic foot care techniques with patient today. 4. Patient will be followed up at a later date for continued evaluation and management until issues are improved. Seen By:    Ailyn Melissa DPM    Electronically signed by Ailyn Melissa DPM on 5/3/2022 at 10:54 AM      This note was created using voice recognition software. The note was reviewed however may contain grammatical errors.

## 2022-05-03 NOTE — LETTER
[address]  Phone: [phone]  Fax: [phone]    Rebekah Hallman  [id number]   1940  5/3/2022      Dear Dr. Ameya Holden,    I would like to thank you for the kind referral of Kang Hallman. She presented to the office today for evaluation regarding nail dystrophy and tenderness to her left great toe. We did discuss topical treatment options with patient today, prescription was given for a nail lacquer to be applied daily. We did discuss additional diabetic foot care options with patient today. Patient will be followed up at a later date until issues are improved. If you should have any questions concerning her visit today, please do not hesitate to contact me.     Sincerely,    Emmie Harris DPM

## 2022-05-11 DIAGNOSIS — G89.29 CHRONIC RIGHT-SIDED THORACIC BACK PAIN: ICD-10-CM

## 2022-05-11 DIAGNOSIS — M54.6 CHRONIC RIGHT-SIDED THORACIC BACK PAIN: ICD-10-CM

## 2022-05-11 LAB
BASOPHILS ABSOLUTE: 0.07 E9/L (ref 0–0.2)
BASOPHILS RELATIVE PERCENT: 0.8 % (ref 0–2)
EOSINOPHILS ABSOLUTE: 0.2 E9/L (ref 0.05–0.5)
EOSINOPHILS RELATIVE PERCENT: 2.4 % (ref 0–6)
HCT VFR BLD CALC: 35.3 % (ref 34–48)
HEMOGLOBIN: 10.6 G/DL (ref 11.5–15.5)
IMMATURE GRANULOCYTES #: 0.03 E9/L
IMMATURE GRANULOCYTES %: 0.4 % (ref 0–5)
LYMPHOCYTES ABSOLUTE: 2.06 E9/L (ref 1.5–4)
LYMPHOCYTES RELATIVE PERCENT: 24.3 % (ref 20–42)
MCH RBC QN AUTO: 25.4 PG (ref 26–35)
MCHC RBC AUTO-ENTMCNC: 30 % (ref 32–34.5)
MCV RBC AUTO: 84.4 FL (ref 80–99.9)
MONOCYTES ABSOLUTE: 0.71 E9/L (ref 0.1–0.95)
MONOCYTES RELATIVE PERCENT: 8.4 % (ref 2–12)
NEUTROPHILS ABSOLUTE: 5.39 E9/L (ref 1.8–7.3)
NEUTROPHILS RELATIVE PERCENT: 63.7 % (ref 43–80)
PDW BLD-RTO: 16.9 FL (ref 11.5–15)
PLATELET # BLD: 354 E9/L (ref 130–450)
PMV BLD AUTO: 9.9 FL (ref 7–12)
RBC # BLD: 4.18 E12/L (ref 3.5–5.5)
WBC # BLD: 8.5 E9/L (ref 4.5–11.5)

## 2022-05-13 LAB
ALBUMIN SERPL-MCNC: 3.2 G/DL (ref 3.5–4.7)
ALPHA-1-GLOBULIN: 0.2 G/DL (ref 0.2–0.4)
ALPHA-2-GLOBULIN: 1 G/DL (ref 0.5–1)
BETA GLOBULIN: 1.1 G/DL (ref 0.8–1.3)
ELECTROPHORESIS: ABNORMAL
GAMMA GLOBULIN: 1.2 G/DL (ref 0.7–1.6)
TOTAL PROTEIN: 6.7 G/DL (ref 6.4–8.3)

## 2022-05-23 ENCOUNTER — OFFICE VISIT (OUTPATIENT)
Dept: PRIMARY CARE CLINIC | Age: 82
End: 2022-05-23
Payer: MEDICARE

## 2022-05-23 VITALS
SYSTOLIC BLOOD PRESSURE: 162 MMHG | OXYGEN SATURATION: 97 % | BODY MASS INDEX: 24.89 KG/M2 | HEART RATE: 73 BPM | HEIGHT: 64 IN | DIASTOLIC BLOOD PRESSURE: 70 MMHG | TEMPERATURE: 97.9 F

## 2022-05-23 DIAGNOSIS — G89.29 CHRONIC RIGHT-SIDED THORACIC BACK PAIN: ICD-10-CM

## 2022-05-23 DIAGNOSIS — E11.9 TYPE 2 DIABETES MELLITUS WITHOUT COMPLICATION, WITHOUT LONG-TERM CURRENT USE OF INSULIN (HCC): ICD-10-CM

## 2022-05-23 DIAGNOSIS — K52.1 DIARRHEA DUE TO DRUG: Primary | ICD-10-CM

## 2022-05-23 DIAGNOSIS — R91.1 SOLID NODULE OF LUNG 6 MM TO 8 MM IN DIAMETER: ICD-10-CM

## 2022-05-23 DIAGNOSIS — D50.9 HYPOCHROMIC MICROCYTIC ANEMIA: ICD-10-CM

## 2022-05-23 DIAGNOSIS — M54.6 CHRONIC RIGHT-SIDED THORACIC BACK PAIN: ICD-10-CM

## 2022-05-23 DIAGNOSIS — F32.1 MODERATE MAJOR DEPRESSION (HCC): ICD-10-CM

## 2022-05-23 DIAGNOSIS — I27.20 PULMONARY HYPERTENSION (HCC): ICD-10-CM

## 2022-05-23 DIAGNOSIS — E06.3 AUTOIMMUNE HYPOTHYROIDISM: ICD-10-CM

## 2022-05-23 PROCEDURE — 1036F TOBACCO NON-USER: CPT | Performed by: INTERNAL MEDICINE

## 2022-05-23 PROCEDURE — G8427 DOCREV CUR MEDS BY ELIG CLIN: HCPCS | Performed by: INTERNAL MEDICINE

## 2022-05-23 PROCEDURE — G8420 CALC BMI NORM PARAMETERS: HCPCS | Performed by: INTERNAL MEDICINE

## 2022-05-23 PROCEDURE — 1123F ACP DISCUSS/DSCN MKR DOCD: CPT | Performed by: INTERNAL MEDICINE

## 2022-05-23 PROCEDURE — 99214 OFFICE O/P EST MOD 30 MIN: CPT | Performed by: INTERNAL MEDICINE

## 2022-05-23 PROCEDURE — 3051F HG A1C>EQUAL 7.0%<8.0%: CPT | Performed by: INTERNAL MEDICINE

## 2022-05-23 PROCEDURE — G8399 PT W/DXA RESULTS DOCUMENT: HCPCS | Performed by: INTERNAL MEDICINE

## 2022-05-23 PROCEDURE — 1090F PRES/ABSN URINE INCON ASSESS: CPT | Performed by: INTERNAL MEDICINE

## 2022-05-23 RX ORDER — METRONIDAZOLE 500 MG/1
500 TABLET ORAL 2 TIMES DAILY
Qty: 14 TABLET | Refills: 0 | Status: SHIPPED | OUTPATIENT
Start: 2022-05-23 | End: 2022-05-30

## 2022-05-23 ASSESSMENT — PATIENT HEALTH QUESTIONNAIRE - PHQ9
10. IF YOU CHECKED OFF ANY PROBLEMS, HOW DIFFICULT HAVE THESE PROBLEMS MADE IT FOR YOU TO DO YOUR WORK, TAKE CARE OF THINGS AT HOME, OR GET ALONG WITH OTHER PEOPLE: 0
7. TROUBLE CONCENTRATING ON THINGS, SUCH AS READING THE NEWSPAPER OR WATCHING TELEVISION: 0
SUM OF ALL RESPONSES TO PHQ QUESTIONS 1-9: 0
SUM OF ALL RESPONSES TO PHQ QUESTIONS 1-9: 0
8. MOVING OR SPEAKING SO SLOWLY THAT OTHER PEOPLE COULD HAVE NOTICED. OR THE OPPOSITE, BEING SO FIGETY OR RESTLESS THAT YOU HAVE BEEN MOVING AROUND A LOT MORE THAN USUAL: 0
1. LITTLE INTEREST OR PLEASURE IN DOING THINGS: 0
SUM OF ALL RESPONSES TO PHQ QUESTIONS 1-9: 0
SUM OF ALL RESPONSES TO PHQ QUESTIONS 1-9: 0
10. IF YOU CHECKED OFF ANY PROBLEMS, HOW DIFFICULT HAVE THESE PROBLEMS MADE IT FOR YOU TO DO YOUR WORK, TAKE CARE OF THINGS AT HOME, OR GET ALONG WITH OTHER PEOPLE: 0
3. TROUBLE FALLING OR STAYING ASLEEP: 0
2. FEELING DOWN, DEPRESSED OR HOPELESS: 0
SUM OF ALL RESPONSES TO PHQ QUESTIONS 1-9: 0
5. POOR APPETITE OR OVEREATING: 0
2. FEELING DOWN, DEPRESSED OR HOPELESS: 0
3. TROUBLE FALLING OR STAYING ASLEEP: 0
5. POOR APPETITE OR OVEREATING: 0
4. FEELING TIRED OR HAVING LITTLE ENERGY: 0
4. FEELING TIRED OR HAVING LITTLE ENERGY: 0
SUM OF ALL RESPONSES TO PHQ9 QUESTIONS 1 & 2: 0
1. LITTLE INTEREST OR PLEASURE IN DOING THINGS: 0
7. TROUBLE CONCENTRATING ON THINGS, SUCH AS READING THE NEWSPAPER OR WATCHING TELEVISION: 0
SUM OF ALL RESPONSES TO PHQ9 QUESTIONS 1 & 2: 0
SUM OF ALL RESPONSES TO PHQ QUESTIONS 1-9: 0
8. MOVING OR SPEAKING SO SLOWLY THAT OTHER PEOPLE COULD HAVE NOTICED. OR THE OPPOSITE, BEING SO FIGETY OR RESTLESS THAT YOU HAVE BEEN MOVING AROUND A LOT MORE THAN USUAL: 0

## 2022-05-23 NOTE — PROGRESS NOTES
Chief Complaint   Patient presents with    Diabetes     Pt is here as a 1 month F/U, with c/o SOB, and some unsteadiness when ambulating  that has been ongoing, but worse this past week. Pt states she did fall on her buttocks when she missed a step in the house. CT of the Chest on file from 2 weeks ago. HPI:  Patient is here for follow-up of diabetes mellitus hypertension and hyperlipidemia and chronic anemia. Patient had a large bowel movement in the office and soiled herself and she is complaining about having diarrhea for the last 6 months to a year. She describes it as going 1 time but very watery and loose. She denies any abdominal pains or cramping any nausea or vomiting. She has been on the metformin and questionable the reason for the diarrhea as she is denying any antibiotics or any recent exposure to any sick individuals. .  Blood work was discussed with patient. Her hemoglobin count appears stable at 10.6. She had a CT scan of the lung and showed a right pulmonary nodule 6 mm in diameter to be followed up in 6 to 12-month. According the patient she had fallen down few days ago going down the cellar but she denies any current acute problem she has been complaining about her back pain which incidentally showing on the CT scan of the chest thoracic spine decrease in height. Past Medical History, Surgical History, and Family History has been reviewed and updated.     Review of Systems:  Constitutional:  No fever, no fatigue, no chills, no headaches, no weight change  Dermatology:  No rash, no mole, no dry or sensitive skin  ENT:  No cough, no sore throat, no sinus pain, no runny nose, no ear pain  Cardiology:  No chest pain, no palpitations, no leg edema, no shortness of breath, no PND  Gastroenterology:  No dysphagia, no abdominal pain, no nausea, no vomiting, no constipation, no diarrhea, no heartburn  Musculoskeletal:  No joint pain, no leg cramps, no back pain, no muscle aches  Respiratory:  No shortness of breath, no orthopnea, no wheezing, no JOSE, no hemoptysis  Urology:  No blood in the urine, no urinary frequency, no urinary incontinence, no urinary urgency, no nocturia, no dysuria    Vitals:    05/23/22 1020   BP: (!) 162/70   Pulse: 73   Temp: 97.9 °F (36.6 °C)   TempSrc: Temporal   SpO2: 97%   Height: 5' 4\" (1.626 m)       General:  Patient alert and oriented x 3, NAD, foul odor and soiling of undergarments  HEENT:  Atraumatic, normocephalic, PERRLA, EOMI, clear conjunctiva, TMs clear, nose-clear, throat - no erythema  Neck:  Supple, no goiter, no carotid bruits, no LAD  Lungs:  CTA   Heart:  RRR, no murmurs, gallops or rubs  Abdomen:  Soft/nt/nd, + bowel sounds  Extremities:  No clubbing, cyanosis or edema  Skin: unremarkable    Hemoglobin A1C   Date Value Ref Range Status   04/19/2022 7.9 % Final     Cholesterol, Total   Date Value Ref Range Status   11/09/2021 224 (H) 0 - 199 mg/dL Final     Triglycerides   Date Value Ref Range Status   11/09/2021 213 (H) 0 - 149 mg/dL Final     HDL   Date Value Ref Range Status   11/09/2021 47 >40 mg/dL Final     LDL Calculated   Date Value Ref Range Status   11/09/2021 134 (H) 0 - 99 mg/dL Final     VLDL Cholesterol Calculated   Date Value Ref Range Status   11/09/2021 43 mg/dL Final     VLDL   Date Value Ref Range Status   02/26/2021 18 mg/dL Final     Chol/HDL Ratio   Date Value Ref Range Status   02/26/2021 3.1  Final     Sodium   Date Value Ref Range Status   03/04/2022 140 132 - 146 mmol/L Final     Potassium   Date Value Ref Range Status   03/04/2022 5.2 (H) 3.5 - 5.0 mmol/L Final     Chloride   Date Value Ref Range Status   03/04/2022 108 (H) 98 - 107 mmol/L Final     CO2   Date Value Ref Range Status   03/04/2022 20 (L) 22 - 29 mmol/L Final     BUN   Date Value Ref Range Status   03/04/2022 25 (H) 6 - 23 mg/dL Final     CREATININE   Date Value Ref Range Status   03/04/2022 1.1 (H) 0.5 - 1.0 mg/dL Final     Glucose   Date Value Ref Range Status   03/04/2022 132 (H) 74 - 99 mg/dL Final     Calcium   Date Value Ref Range Status   03/04/2022 9.6 8.6 - 10.2 mg/dL Final     Total Protein   Date Value Ref Range Status   05/11/2022 6.7 6.4 - 8.3 g/dL Final     Albumin   Date Value Ref Range Status   05/11/2022 3.2 (L) 3.5 - 4.7 g/dL Final     Total Bilirubin   Date Value Ref Range Status   03/04/2022 0.2 0.0 - 1.2 mg/dL Final     Alkaline Phosphatase   Date Value Ref Range Status   03/04/2022 95 35 - 104 U/L Final     AST   Date Value Ref Range Status   03/04/2022 25 0 - 31 U/L Final     ALT   Date Value Ref Range Status   03/04/2022 31 0 - 32 U/L Final     GFR Non-   Date Value Ref Range Status   03/04/2022 47 >=60 mL/min/1.73 Final     Comment:     Chronic Kidney Disease: less than 60 ml/min/1.73 sq.m. Kidney Failure: less than 15 ml/min/1.73 sq.m. Results valid for patients 18 years and older. GFR    Date Value Ref Range Status   03/04/2022 57  Final        CT CHEST WO CONTRAST    Result Date: 5/3/2022  EXAMINATION: CT OF THE CHEST WITHOUT CONTRAST 5/3/2022 8:47 am TECHNIQUE: CT of the chest was performed without the administration of intravenous contrast. Multiplanar reformatted images are provided for review. Dose modulation, iterative reconstruction, and/or weight based adjustment of the mA/kV was utilized to reduce the radiation dose to as low as reasonably achievable. COMPARISON: None. HISTORY: ORDERING SYSTEM PROVIDED HISTORY: Chronic right-sided thoracic back pain FINDINGS: Mediastinum: Thyroid is homogeneous in attenuation. No bulky mediastinal adenopathy. Central airways are patent. Esophagus is normal course and caliber to the distal segment where there is a moderate hiatal hernia. Patent nonaneurysmal thoracic aorta. Cardiac size within normal limits without pericardial effusion. Lungs/pleura: Lungs are clear without focal opacification or consolidation.  Located in the anterior aspect of the superior right lower lobe adjacent to the major fissure is a 6 mm area of nodular density could represent nodular scarring less likely fissural node given appearance no pleural effusion or pleural process. Upper Abdomen: Visualized portions of the upper abdomen unremarkable. Soft Tissues/Bones: No aggressive osseous lesion other than a T9 chronic appearing compression deformity likely with 25% height loss anteriorly. No retropulsion or spinal canal stenosis associated. No acute intrathoracic process with right lower lobe pulmonary nodule recommendations as below with mild chronic changes background. No focal consolidation T9 vertebral body height loss anteriorly with minimal superior endplate irregularity probable chronic appearing compression deformity with 25% height loss centrally versus prominent Schmorl's node and adjacent degenerative changes however no retropulsion or spinal canal stenosis associated. RECOMMENDATIONS: 6 mm right solid pulmonary nodule. Recommend a non-contrast Chest CT at 6-12 months. If patient is high risk for malignancy, recommend an additional non-contrast Chest CT at 18-24 months; if patient is low risk for malignancy a non-contrast Chest CT at 18-24 months is optional. These guidelines do not apply to immunocompromised patients and patients with cancer. Follow up in patients with significant comorbidities as clinically warranted. For lung cancer screening, adhere to Lung-RADS guidelines. Reference: Radiology. 2017; 284(1):228-43. Assessment/Plan:      Outpatient Encounter Medications as of 5/23/2022   Medication Sig Dispense Refill    metroNIDAZOLE (FLAGYL) 500 MG tablet Take 1 tablet by mouth 2 times daily for 7 days 14 tablet 0    ciclopirox (PENLAC) 8 % solution Apply topically daily to affected nails.  6 mL 2    traZODone (DESYREL) 50 MG tablet take 1 tablet by mouth at bedtime if needed      buPROPion (WELLBUTRIN XL) 300 MG extended release tablet Take 1 tablet by mouth every morning 90 tablet 0    glimepiride (AMARYL) 1 MG tablet Take 1 tablet by mouth 2 times daily 1 TAB BY  MOUTH 2 TIIMES A  tablet 0    FLUoxetine (PROZAC) 40 MG capsule Take 1 capsule by mouth nightly 90 capsule 0    levothyroxine (SYNTHROID) 75 MCG tablet Take 1 tablet by mouth daily 90 tablet 0    losartan (COZAAR) 100 MG tablet Take 1 tablet by mouth daily 90 tablet 0    meloxicam (MOBIC) 15 MG tablet Take 1 tablet by mouth daily as needed for Pain 90 tablet 0    metoprolol succinate (TOPROL XL) 50 MG extended release tablet Take 1 tablet by mouth daily 90 tablet 0    OLANZapine (ZYPREXA) 5 MG tablet Take 1 tablet by mouth nightly 90 tablet 0    pantoprazole (PROTONIX) 40 MG tablet Take 1 tablet by mouth daily 90 tablet 0    tiZANidine (ZANAFLEX) 2 MG tablet Take 1 tablet by mouth nightly as needed (muscle spasm) 10 tablet 0    Multiple Vitamin (MULTI VITAMIN W/D-3 PO) Take by mouth      aspirin 81 MG chewable tablet Take 81 mg by mouth daily       [DISCONTINUED] metFORMIN (GLUCOPHAGE) 1000 MG tablet Take 1 tablet by mouth 2 times daily (with meals) 180 tablet 0     No facility-administered encounter medications on file as of 5/23/2022. Elba Keys was seen today for diabetes. Diagnoses and all orders for this visit:    Diarrhea due to drug  -     metroNIDAZOLE (FLAGYL) 500 MG tablet;  Take 1 tablet by mouth 2 times daily for 7 days    Solid nodule of lung 6 mm to 8 mm in diameter    Type 2 diabetes mellitus without complication, without long-term current use of insulin (HCC)    Autoimmune hypothyroidism    Moderate major depression (HCC)    Hypochromic microcytic anemia    Chronic right-sided thoracic back pain    Pulmonary hypertension ([facility] [address]    Patient was counseled to change and be clean but she declined claiming she is just going home  Discontinue metformin  Start Flagyl 500 mg twice a day for 1 week  RTC in 3 to 4 weeks for follow-up    There are no Patient Instructions on file for this visit. On this date 5/23/2022 I have spent 30 minutes reviewing previous notes, test results and face to face with the patient discussing the diagnosis and importance of compliance with the treatment plan as well as documenting on the day of the visit.       Miquel Painter MD   5/23/22

## 2022-06-07 ENCOUNTER — OFFICE VISIT (OUTPATIENT)
Dept: PODIATRY | Age: 82
End: 2022-06-07
Payer: MEDICARE

## 2022-06-07 VITALS — WEIGHT: 140 LBS | BODY MASS INDEX: 23.9 KG/M2 | HEIGHT: 64 IN

## 2022-06-07 DIAGNOSIS — I87.2 VENOUS INSUFFICIENCY (CHRONIC) (PERIPHERAL): ICD-10-CM

## 2022-06-07 DIAGNOSIS — E11.9 TYPE 2 DIABETES MELLITUS WITHOUT COMPLICATION, WITHOUT LONG-TERM CURRENT USE OF INSULIN (HCC): ICD-10-CM

## 2022-06-07 DIAGNOSIS — B35.1 ONYCHOMYCOSIS: Primary | ICD-10-CM

## 2022-06-07 PROCEDURE — G8420 CALC BMI NORM PARAMETERS: HCPCS | Performed by: PODIATRIST

## 2022-06-07 PROCEDURE — 99213 OFFICE O/P EST LOW 20 MIN: CPT | Performed by: PODIATRIST

## 2022-06-07 PROCEDURE — G8399 PT W/DXA RESULTS DOCUMENT: HCPCS | Performed by: PODIATRIST

## 2022-06-07 PROCEDURE — 3051F HG A1C>EQUAL 7.0%<8.0%: CPT | Performed by: PODIATRIST

## 2022-06-07 PROCEDURE — 1123F ACP DISCUSS/DSCN MKR DOCD: CPT | Performed by: PODIATRIST

## 2022-06-07 PROCEDURE — 1090F PRES/ABSN URINE INCON ASSESS: CPT | Performed by: PODIATRIST

## 2022-06-07 PROCEDURE — G8427 DOCREV CUR MEDS BY ELIG CLIN: HCPCS | Performed by: PODIATRIST

## 2022-06-07 PROCEDURE — 1036F TOBACCO NON-USER: CPT | Performed by: PODIATRIST

## 2022-06-07 NOTE — PROGRESS NOTES
22     Dimitri Sargent    : 1940   Sex: female    Age: 79 y.o. Patient's PCP/Provider is:  Artis Roper MD    Subjective:  Patient is seen today for follow-up regarding continued evaluation regarding nail dystrophy issues left great toe. Patient had been applying the topical medication as instructed without issues noted. She denies any additional issues at this time. Chief Complaint   Patient presents with    Nail Problem     left great toe       ROS:  Const: Positives and pertinent negatives as per HPI. Musculo: Denies symptoms other than stated above. Neuro: Denies symptoms other than stated above. Skin: Denies symptoms other than stated above. Current Medications:    Current Outpatient Medications:     ciclopirox (PENLAC) 8 % solution, Apply topically daily to affected nails. , Disp: 6 mL, Rfl: 2    traZODone (DESYREL) 50 MG tablet, take 1 tablet by mouth at bedtime if needed, Disp: , Rfl:     buPROPion (WELLBUTRIN XL) 300 MG extended release tablet, Take 1 tablet by mouth every morning, Disp: 90 tablet, Rfl: 0    glimepiride (AMARYL) 1 MG tablet, Take 1 tablet by mouth 2 times daily 1 TAB BY  MOUTH 2 TIIMES A DAY, Disp: 180 tablet, Rfl: 0    FLUoxetine (PROZAC) 40 MG capsule, Take 1 capsule by mouth nightly, Disp: 90 capsule, Rfl: 0    levothyroxine (SYNTHROID) 75 MCG tablet, Take 1 tablet by mouth daily, Disp: 90 tablet, Rfl: 0    losartan (COZAAR) 100 MG tablet, Take 1 tablet by mouth daily, Disp: 90 tablet, Rfl: 0    meloxicam (MOBIC) 15 MG tablet, Take 1 tablet by mouth daily as needed for Pain, Disp: 90 tablet, Rfl: 0    metoprolol succinate (TOPROL XL) 50 MG extended release tablet, Take 1 tablet by mouth daily, Disp: 90 tablet, Rfl: 0    OLANZapine (ZYPREXA) 5 MG tablet, Take 1 tablet by mouth nightly, Disp: 90 tablet, Rfl: 0    pantoprazole (PROTONIX) 40 MG tablet, Take 1 tablet by mouth daily, Disp: 90 tablet, Rfl: 0    tiZANidine (ZANAFLEX) 2 MG tablet, Take 1 tablet by mouth nightly as needed (muscle spasm), Disp: 10 tablet, Rfl: 0    Multiple Vitamin (MULTI VITAMIN W/D-3 PO), Take by mouth, Disp: , Rfl:     aspirin 81 MG chewable tablet, Take 81 mg by mouth daily , Disp: , Rfl:     Allergies:  No Known Allergies    Vitals:    06/07/22 1114   Weight: 140 lb (63.5 kg)   Height: 5' 4\" (1.626 m)       Exam:  Neurovascular status unchanged. Great toenail improved proximal third of the nailbed region with current treatment. Old ingrown nail issues noted distally. No maceration webspaces noted left foot. No plantar calluses or forefoot swelling noted. Diagnostic Studies:     No results found. Procedures:    None    Plan Per Assessment  Linda Messina was seen today for nail problem. Diagnoses and all orders for this visit:    Onychomycosis    Type 2 diabetes mellitus without complication, without long-term current use of insulin (HCC)    Venous insufficiency (chronic) (peripheral)      1. Evaluation and management  2. Debridement dystrophic nail regions performed to patient tolerance. 3. We did recommend continued use of the topical nail lacquer to the affected digital nail on a daily basis. 4. Diabetic foot discussions were performed on today's visit as well to prevent additional issues. 5. Patient will be followed up at a later date for continued evaluation and care. Seen By:    Jania Petersen DPM    Electronically signed by Jania Petersen DPM on 6/7/2022 at 12:33 PM    This note was created using voice recognition software. The note was reviewed however may contain grammatical errors. - - -

## 2022-06-07 NOTE — PROGRESS NOTES
Patient is in today for 1 month left great toenail fungus. Patient has no other concerns at this time.  pcp is Mookie Lara MD  Last ov 5/23/22

## 2022-06-14 ENCOUNTER — OFFICE VISIT (OUTPATIENT)
Dept: PRIMARY CARE CLINIC | Age: 82
End: 2022-06-14
Payer: MEDICARE

## 2022-06-14 VITALS
OXYGEN SATURATION: 97 % | WEIGHT: 143 LBS | SYSTOLIC BLOOD PRESSURE: 158 MMHG | HEART RATE: 60 BPM | TEMPERATURE: 98 F | BODY MASS INDEX: 24.41 KG/M2 | DIASTOLIC BLOOD PRESSURE: 88 MMHG | HEIGHT: 64 IN

## 2022-06-14 DIAGNOSIS — G89.29 CHRONIC RIGHT-SIDED LOW BACK PAIN WITHOUT SCIATICA: ICD-10-CM

## 2022-06-14 DIAGNOSIS — I12.9 TYPE 2 DIABETES MELLITUS WITH STAGE 1 CHRONIC KIDNEY DISEASE AND HYPERTENSION (HCC): ICD-10-CM

## 2022-06-14 DIAGNOSIS — K52.1 DRUG-INDUCED DIARRHEA: ICD-10-CM

## 2022-06-14 DIAGNOSIS — M54.50 CHRONIC RIGHT-SIDED LOW BACK PAIN WITHOUT SCIATICA: ICD-10-CM

## 2022-06-14 DIAGNOSIS — I10 PRIMARY HYPERTENSION: ICD-10-CM

## 2022-06-14 DIAGNOSIS — N18.1 TYPE 2 DIABETES MELLITUS WITH STAGE 1 CHRONIC KIDNEY DISEASE AND HYPERTENSION (HCC): ICD-10-CM

## 2022-06-14 DIAGNOSIS — M54.6 ACUTE MIDLINE THORACIC BACK PAIN: Primary | ICD-10-CM

## 2022-06-14 DIAGNOSIS — E06.3 AUTOIMMUNE HYPOTHYROIDISM: ICD-10-CM

## 2022-06-14 DIAGNOSIS — E11.22 TYPE 2 DIABETES MELLITUS WITH STAGE 1 CHRONIC KIDNEY DISEASE AND HYPERTENSION (HCC): ICD-10-CM

## 2022-06-14 DIAGNOSIS — R29.6 FALL IN ELDERLY PATIENT: ICD-10-CM

## 2022-06-14 PROCEDURE — G8420 CALC BMI NORM PARAMETERS: HCPCS | Performed by: INTERNAL MEDICINE

## 2022-06-14 PROCEDURE — 1036F TOBACCO NON-USER: CPT | Performed by: INTERNAL MEDICINE

## 2022-06-14 PROCEDURE — 1123F ACP DISCUSS/DSCN MKR DOCD: CPT | Performed by: INTERNAL MEDICINE

## 2022-06-14 PROCEDURE — 99214 OFFICE O/P EST MOD 30 MIN: CPT | Performed by: INTERNAL MEDICINE

## 2022-06-14 PROCEDURE — 1090F PRES/ABSN URINE INCON ASSESS: CPT | Performed by: INTERNAL MEDICINE

## 2022-06-14 PROCEDURE — 3051F HG A1C>EQUAL 7.0%<8.0%: CPT | Performed by: INTERNAL MEDICINE

## 2022-06-14 PROCEDURE — G8427 DOCREV CUR MEDS BY ELIG CLIN: HCPCS | Performed by: INTERNAL MEDICINE

## 2022-06-14 PROCEDURE — G8399 PT W/DXA RESULTS DOCUMENT: HCPCS | Performed by: INTERNAL MEDICINE

## 2022-06-14 RX ORDER — LIDOCAINE 4 G/G
1 PATCH TOPICAL DAILY
Qty: 30 PATCH | Refills: 0 | Status: SHIPPED | OUTPATIENT
Start: 2022-06-14 | End: 2022-07-14

## 2022-06-14 RX ORDER — BUPROPION HYDROCHLORIDE 300 MG/1
300 TABLET ORAL EVERY MORNING
Qty: 90 TABLET | Refills: 0 | Status: SHIPPED | OUTPATIENT
Start: 2022-06-14

## 2022-06-14 RX ORDER — OLANZAPINE 5 MG/1
5 TABLET ORAL NIGHTLY
Qty: 90 TABLET | Refills: 0 | Status: SHIPPED | OUTPATIENT
Start: 2022-06-14

## 2022-06-14 RX ORDER — GLIMEPIRIDE 1 MG/1
1 TABLET ORAL 2 TIMES DAILY
Qty: 180 TABLET | Refills: 0 | Status: SHIPPED
Start: 2022-06-14 | End: 2022-09-14 | Stop reason: SDUPTHER

## 2022-06-14 RX ORDER — METOPROLOL SUCCINATE 50 MG/1
50 TABLET, EXTENDED RELEASE ORAL DAILY
Qty: 90 TABLET | Refills: 0 | Status: SHIPPED
Start: 2022-06-14 | End: 2022-09-14 | Stop reason: SDUPTHER

## 2022-06-14 RX ORDER — FLUOXETINE HYDROCHLORIDE 40 MG/1
40 CAPSULE ORAL NIGHTLY
Qty: 90 CAPSULE | Refills: 0 | Status: SHIPPED
Start: 2022-06-14 | End: 2022-10-26 | Stop reason: SDUPTHER

## 2022-06-14 RX ORDER — PANTOPRAZOLE SODIUM 40 MG/1
40 TABLET, DELAYED RELEASE ORAL DAILY
Qty: 90 TABLET | Refills: 0 | Status: SHIPPED
Start: 2022-06-14 | End: 2022-09-14 | Stop reason: SDUPTHER

## 2022-06-14 RX ORDER — MELOXICAM 15 MG/1
15 TABLET ORAL DAILY PRN
Qty: 90 TABLET | Refills: 0 | Status: SHIPPED
Start: 2022-06-14 | End: 2022-09-14 | Stop reason: SDUPTHER

## 2022-06-14 RX ORDER — LOSARTAN POTASSIUM 100 MG/1
100 TABLET ORAL DAILY
Qty: 90 TABLET | Refills: 0 | Status: SHIPPED
Start: 2022-06-14 | End: 2022-09-14 | Stop reason: SDUPTHER

## 2022-06-14 RX ORDER — LEVOTHYROXINE SODIUM 0.07 MG/1
75 TABLET ORAL DAILY
Qty: 90 TABLET | Refills: 0 | Status: SHIPPED
Start: 2022-06-14 | End: 2022-09-14 | Stop reason: SDUPTHER

## 2022-06-14 NOTE — PROGRESS NOTES
Chief Complaint   Patient presents with    Hypertension     Pt is here as a F/U, from 3 weeks ago in which she had diarrhea and elevated BP. Pt states her back is hurting her today, as she is ambulating with a cane. HPI:  Patient is here for follow-up of diarrhea and explosive stools. This has completely resolved after stopping the metformin and taking the Flagyl for 1 week. Patient complains of severe mid back pain that started about 2 weeks ago since she fell down backwards. She declined going to the emergency room at the time. She denied any loss of consciousness or hitting her head. She was able to get up by herself but she is now using a cane for stability as she cannot walk straight without hurting. Try to talk to her about getting an x-ray of the thoracic spine but she had one just 3 months ago which showed a compression fraction that is probably old at T9 with some degenerative joint disease and spondylosis and she refused to get another one. Her blood pressure is mildly elevated as she is in pain today. I offered her to send her to pain clinic but she declined. We will start her on lidocaine patch daily. .    Past Medical History, Surgical History, and Family History has been reviewed and updated.     Review of Systems:  Constitutional:  No fever, no fatigue, no chills, no headaches, no weight change  Dermatology:  No rash, no mole, no dry or sensitive skin  ENT:  No cough, no sore throat, no sinus pain, no runny nose, no ear pain  Cardiology:  No chest pain, no palpitations, no leg edema, no shortness of breath, no PND  Gastroenterology:  No dysphagia, no abdominal pain, no nausea, no vomiting, no constipation, no diarrhea, no heartburn  Musculoskeletal:  No joint pain, no leg cramps, no back pain, no muscle aches  Respiratory:  No shortness of breath, no orthopnea, no wheezing, no JOSE, no hemoptysis  Urology:  No blood in the urine, no urinary frequency, no urinary incontinence, no urinary urgency, no nocturia, no dysuria    Vitals:    06/14/22 1059   BP: (!) 158/88   Pulse: 60   Temp: 98 °F (36.7 °C)   TempSrc: Temporal   SpO2: 97%   Weight: 143 lb (64.9 kg)   Height: 5' 4\" (1.626 m)       General:  Patient alert and oriented x 3, NAD, with kyphosis using a cane as she feels unsteady  HEENT:  Atraumatic, normocephalic, PERRLA, EOMI, clear conjunctiva, TMs clear, nose-clear, throat - no erythema  Neck:  Supple, no goiter, no carotid bruits, no LAD  Lungs:  CTA  Back: Positive kyphoscoliosis in the thoracic spine with point tenderness at the level of T9-T10 level. No skin rash.   Heart:  RRR, no murmurs, gallops or rubs  Abdomen:  Soft/nt/nd, + bowel sounds  Extremities:  No clubbing, cyanosis or edema  Skin: unremarkable    Hemoglobin A1C   Date Value Ref Range Status   04/19/2022 7.9 % Final     Cholesterol, Total   Date Value Ref Range Status   11/09/2021 224 (H) 0 - 199 mg/dL Final     Triglycerides   Date Value Ref Range Status   11/09/2021 213 (H) 0 - 149 mg/dL Final     HDL   Date Value Ref Range Status   11/09/2021 47 >40 mg/dL Final     LDL Calculated   Date Value Ref Range Status   11/09/2021 134 (H) 0 - 99 mg/dL Final     VLDL Cholesterol Calculated   Date Value Ref Range Status   11/09/2021 43 mg/dL Final     VLDL   Date Value Ref Range Status   02/26/2021 18 mg/dL Final     Chol/HDL Ratio   Date Value Ref Range Status   02/26/2021 3.1  Final     Sodium   Date Value Ref Range Status   03/04/2022 140 132 - 146 mmol/L Final     Potassium   Date Value Ref Range Status   03/04/2022 5.2 (H) 3.5 - 5.0 mmol/L Final     Chloride   Date Value Ref Range Status   03/04/2022 108 (H) 98 - 107 mmol/L Final     CO2   Date Value Ref Range Status   03/04/2022 20 (L) 22 - 29 mmol/L Final     BUN   Date Value Ref Range Status   03/04/2022 25 (H) 6 - 23 mg/dL Final     CREATININE   Date Value Ref Range Status   03/04/2022 1.1 (H) 0.5 - 1.0 mg/dL Final     Glucose   Date Value Ref Range Status   03/04/2022 132 (H) 74 - 99 mg/dL Final     Calcium   Date Value Ref Range Status   03/04/2022 9.6 8.6 - 10.2 mg/dL Final     Total Protein   Date Value Ref Range Status   05/11/2022 6.7 6.4 - 8.3 g/dL Final     Albumin   Date Value Ref Range Status   05/11/2022 3.2 (L) 3.5 - 4.7 g/dL Final     Total Bilirubin   Date Value Ref Range Status   03/04/2022 0.2 0.0 - 1.2 mg/dL Final     Alkaline Phosphatase   Date Value Ref Range Status   03/04/2022 95 35 - 104 U/L Final     AST   Date Value Ref Range Status   03/04/2022 25 0 - 31 U/L Final     ALT   Date Value Ref Range Status   03/04/2022 31 0 - 32 U/L Final     GFR Non-   Date Value Ref Range Status   03/04/2022 47 >=60 mL/min/1.73 Final     Comment:     Chronic Kidney Disease: less than 60 ml/min/1.73 sq.m. Kidney Failure: less than 15 ml/min/1.73 sq.m. Results valid for patients 18 years and older. GFR    Date Value Ref Range Status   03/04/2022 57  Final        No results found.      Assessment/Plan:      Outpatient Encounter Medications as of 6/14/2022   Medication Sig Dispense Refill    buPROPion (WELLBUTRIN XL) 300 MG extended release tablet Take 1 tablet by mouth every morning 90 tablet 0    FLUoxetine (PROZAC) 40 MG capsule Take 1 capsule by mouth nightly 90 capsule 0    glimepiride (AMARYL) 1 MG tablet Take 1 tablet by mouth 2 times daily 1 TAB BY  MOUTH 2 TIIMES A  tablet 0    levothyroxine (SYNTHROID) 75 MCG tablet Take 1 tablet by mouth daily 90 tablet 0    losartan (COZAAR) 100 MG tablet Take 1 tablet by mouth daily 90 tablet 0    meloxicam (MOBIC) 15 MG tablet Take 1 tablet by mouth daily as needed for Pain 90 tablet 0    metoprolol succinate (TOPROL XL) 50 MG extended release tablet Take 1 tablet by mouth daily 90 tablet 0    OLANZapine (ZYPREXA) 5 MG tablet Take 1 tablet by mouth nightly 90 tablet 0    pantoprazole (PROTONIX) 40 MG tablet Take 1 tablet by mouth daily 90 tablet 0    lidocaine 4 % external patch Place 1 patch onto the skin daily 30 patch 0    ciclopirox (PENLAC) 8 % solution Apply topically daily to affected nails. 6 mL 2    traZODone (DESYREL) 50 MG tablet take 1 tablet by mouth at bedtime if needed      tiZANidine (ZANAFLEX) 2 MG tablet Take 1 tablet by mouth nightly as needed (muscle spasm) 10 tablet 0    Multiple Vitamin (MULTI VITAMIN W/D-3 PO) Take by mouth      aspirin 81 MG chewable tablet Take 81 mg by mouth daily       [DISCONTINUED] buPROPion (WELLBUTRIN XL) 300 MG extended release tablet Take 1 tablet by mouth every morning 90 tablet 0    [DISCONTINUED] glimepiride (AMARYL) 1 MG tablet Take 1 tablet by mouth 2 times daily 1 TAB BY  MOUTH 2 TIIMES A  tablet 0    [DISCONTINUED] FLUoxetine (PROZAC) 40 MG capsule Take 1 capsule by mouth nightly 90 capsule 0    [DISCONTINUED] levothyroxine (SYNTHROID) 75 MCG tablet Take 1 tablet by mouth daily 90 tablet 0    [DISCONTINUED] losartan (COZAAR) 100 MG tablet Take 1 tablet by mouth daily 90 tablet 0    [DISCONTINUED] meloxicam (MOBIC) 15 MG tablet Take 1 tablet by mouth daily as needed for Pain 90 tablet 0    [DISCONTINUED] metoprolol succinate (TOPROL XL) 50 MG extended release tablet Take 1 tablet by mouth daily 90 tablet 0    [DISCONTINUED] OLANZapine (ZYPREXA) 5 MG tablet Take 1 tablet by mouth nightly 90 tablet 0    [DISCONTINUED] pantoprazole (PROTONIX) 40 MG tablet Take 1 tablet by mouth daily 90 tablet 0     No facility-administered encounter medications on file as of 6/14/2022. Patsy Lei was seen today for hypertension. Diagnoses and all orders for this visit:    Acute midline thoracic back pain    Type 2 diabetes mellitus with stage 1 chronic kidney disease and hypertension (HCC)  -     glimepiride (AMARYL) 1 MG tablet; Take 1 tablet by mouth 2 times daily 1 TAB BY  MOUTH 2 TIIMES A DAY  -     Lipid Panel; Future  -     Comprehensive Metabolic Panel;  Future  -     CBC with Auto Differential; Future  - Urinalysis; Future  -     Microalbumin / Creatinine Urine Ratio; Future    Primary hypertension  -     losartan (COZAAR) 100 MG tablet; Take 1 tablet by mouth daily  -     metoprolol succinate (TOPROL XL) 50 MG extended release tablet; Take 1 tablet by mouth daily    Chronic right-sided low back pain without sciatica  -     meloxicam (MOBIC) 15 MG tablet; Take 1 tablet by mouth daily as needed for Pain    Autoimmune hypothyroidism  -     TSH; Future    Fall in elderly patient    Drug-induced diarrhea    Other orders  -     buPROPion (WELLBUTRIN XL) 300 MG extended release tablet; Take 1 tablet by mouth every morning  -     FLUoxetine (PROZAC) 40 MG capsule; Take 1 capsule by mouth nightly  -     levothyroxine (SYNTHROID) 75 MCG tablet; Take 1 tablet by mouth daily  -     OLANZapine (ZYPREXA) 5 MG tablet; Take 1 tablet by mouth nightly  -     pantoprazole (PROTONIX) 40 MG tablet; Take 1 tablet by mouth daily  -     lidocaine 4 % external patch; Place 1 patch onto the skin daily         There are no Patient Instructions on file for this visit. On this date 6/14/2022 I have spent 30 minutes reviewing previous notes, test results and face to face with the patient discussing the diagnosis and importance of compliance with the treatment plan as well as documenting on the day of the visit.       Roxanne Wang MD   6/14/22

## 2022-06-17 ENCOUNTER — TELEPHONE (OUTPATIENT)
Dept: PRIMARY CARE CLINIC | Age: 82
End: 2022-06-17

## 2022-06-17 NOTE — TELEPHONE ENCOUNTER
----- Message from Edsarah Vargason sent at 6/17/2022 10:54 AM EDT -----  Subject: Referral Request    QUESTIONS   Reason for referral request? Patient would like a referral for pain   management for back pain   Has the physician seen you for this condition before? Yes  Select a date? 2022-06-14  Select the Provider the patient wants to be referred to, if known (PCP or   Specialist)? Roxie Maldonado   Preferred Specialist (if applicable)? Do you already have an appointment scheduled? No  Additional Information for Provider?   ---------------------------------------------------------------------------  --------------  CALL BACK INFO  What is the best way for the office to contact you? OK to leave message on   voicemail  Preferred Call Back Phone Number? [phone]  ---------------------------------------------------------------------------  --------------  SCRIPT ANSWERS  Relationship to Patient?  Self

## 2022-06-20 DIAGNOSIS — M54.6 ACUTE MIDLINE THORACIC BACK PAIN: Primary | ICD-10-CM

## 2022-06-20 NOTE — TELEPHONE ENCOUNTER
PT CALLED AND TOLD ABOUT REFERRAL TO [facility]IN [facility], THEY WILL CALL PT WITH A APPT

## 2022-07-05 ENCOUNTER — OFFICE VISIT (OUTPATIENT)
Dept: PAIN MANAGEMENT | Age: 82
End: 2022-07-05
Payer: MEDICARE

## 2022-07-05 VITALS
BODY MASS INDEX: 24.41 KG/M2 | DIASTOLIC BLOOD PRESSURE: 82 MMHG | HEIGHT: 64 IN | WEIGHT: 143 LBS | HEART RATE: 72 BPM | OXYGEN SATURATION: 96 % | SYSTOLIC BLOOD PRESSURE: 120 MMHG | TEMPERATURE: 96.8 F

## 2022-07-05 DIAGNOSIS — M47.894 THORACIC FACET JOINT SYNDROME: ICD-10-CM

## 2022-07-05 DIAGNOSIS — M47.812 CERVICAL FACET JOINT SYNDROME: Primary | ICD-10-CM

## 2022-07-05 DIAGNOSIS — S22.000A COMPRESSION FRACTURE OF THORACIC VERTEBRA, UNSPECIFIED THORACIC VERTEBRAL LEVEL, INITIAL ENCOUNTER (HCC): ICD-10-CM

## 2022-07-05 DIAGNOSIS — G89.4 CHRONIC PAIN SYNDROME: ICD-10-CM

## 2022-07-05 DIAGNOSIS — M50.90 CERVICAL DISC DISORDER: ICD-10-CM

## 2022-07-05 DIAGNOSIS — M51.9 THORACIC DISC DISEASE: ICD-10-CM

## 2022-07-05 DIAGNOSIS — M47.814 THORACIC SPONDYLOSIS: ICD-10-CM

## 2022-07-05 DIAGNOSIS — M47.812 CERVICAL SPONDYLOSIS: ICD-10-CM

## 2022-07-05 PROCEDURE — G8427 DOCREV CUR MEDS BY ELIG CLIN: HCPCS | Performed by: PAIN MEDICINE

## 2022-07-05 PROCEDURE — 99204 OFFICE O/P NEW MOD 45 MIN: CPT | Performed by: PAIN MEDICINE

## 2022-07-05 PROCEDURE — G8420 CALC BMI NORM PARAMETERS: HCPCS | Performed by: PAIN MEDICINE

## 2022-07-05 PROCEDURE — 1123F ACP DISCUSS/DSCN MKR DOCD: CPT | Performed by: PAIN MEDICINE

## 2022-07-05 PROCEDURE — G8399 PT W/DXA RESULTS DOCUMENT: HCPCS | Performed by: PAIN MEDICINE

## 2022-07-05 PROCEDURE — 1090F PRES/ABSN URINE INCON ASSESS: CPT | Performed by: PAIN MEDICINE

## 2022-07-05 PROCEDURE — 1036F TOBACCO NON-USER: CPT | Performed by: PAIN MEDICINE

## 2022-07-05 NOTE — PROGRESS NOTES
[address] [facility], [address] [facility]      [phone]          Consult Note      Patient:  GENA Victoria 1940    Date of Service:  22    Requesting Physician:  Sherryle Porch, MD    Reason for Consult:      Patient presents with complaints of mid back pain that started 2 years and has been progressively getting worse lately. Pain is sharp/constant. Pain is aggravated by movement. Pain is relieved by heating pad. HISTORY OF PRESENT ILLNESS:      Pain does not radiate. She  does not have numbness, tingling and does not have bladder or bowel dysfunction. She has not been on anticoagulation medications to include none. The patient  has not been on herbal supplements. The patient is diabetic. Imaging:  Thoracic spine Xray . Compression of T9  Mild spondylosis T11-12     Previous treatments: medications. .      Opioid Agreement:  Renewal date:N/A    Past Medical History:   Diagnosis Date    Depression     [facility] [address]     Hyperlipidemia     Hypertension     Thyroid disease     Type 2 diabetes mellitus without complication ([facility] [address]      Past Surgical History:   Procedure Laterality Date    CHOLECYSTECTOMY      COLONOSCOPY      EYE SURGERY      cataract both eyes    HYSTERECTOMY (CERVIX STATUS UNKNOWN)      JOINT REPLACEMENT Left     KNEE    SHOULDER SURGERY  rt rotator    UPPER GASTROINTESTINAL ENDOSCOPY  5/15//12    toni       Prior to Admission medications    Medication Sig Start Date End Date Taking?  Authorizing Provider   buPROPion (WELLBUTRIN XL) 300 MG extended release tablet Take 1 tablet by mouth every morning 22  Yes Zane Cao MD   FLUoxetine (PROZAC) 40 MG capsule Take 1 capsule by mouth nightly 22  Yes Zane Cao MD   glimepiride (AMARYL) 1 MG tablet Take 1 tablet by mouth 2 times daily 1 TAB BY  MOUTH 2 TIIMES A DAY 22  Yes Znae Cao MD   levothyroxine (SYNTHROID) 75 MCG tablet Take 1 tablet by mouth daily 6/14/22  Yes Tory Neves MD   losartan (COZAAR) 100 MG tablet Take 1 tablet by mouth daily 6/14/22  Yes Tory Neves MD   meloxicam (MOBIC) 15 MG tablet Take 1 tablet by mouth daily as needed for Pain 6/14/22  Yes Tory Neves MD   metoprolol succinate (TOPROL XL) 50 MG extended release tablet Take 1 tablet by mouth daily 6/14/22  Yes Tory Neves MD   OLANZapine (ZYPREXA) 5 MG tablet Take 1 tablet by mouth nightly 6/14/22  Yes Tory Neves MD   pantoprazole (PROTONIX) 40 MG tablet Take 1 tablet by mouth daily 6/14/22  Yes Tory Neves MD   lidocaine 4 % external patch Place 1 patch onto the skin daily 6/14/22 7/14/22 Yes Tory Neves MD   traZODone (DESYREL) 50 MG tablet take 1 tablet by mouth at bedtime if needed 4/8/22  Yes Historical Provider, MD   ciclopirox (PENLAC) 8 % solution Apply topically daily to affected nails.   Patient not taking: Reported on 7/5/2022 5/3/22   Kahlil Chavira DPM   tiZANidine (ZANAFLEX) 2 MG tablet Take 1 tablet by mouth nightly as needed (muscle spasm)  Patient not taking: Reported on 7/5/2022 1/27/22   Tory Neves MD   Multiple Vitamin (MULTI VITAMIN W/D-3 PO) Take by mouth  Patient not taking: Reported on 7/5/2022    Historical Provider, MD   aspirin 81 MG chewable tablet Take 81 mg by mouth daily   Patient not taking: Reported on 7/5/2022    Historical Provider, MD       No Known Allergies    Social History     Socioeconomic History    Marital status:      Spouse name: Not on file    Number of children: Not on file    Years of education: Not on file    Highest education level: Not on file   Occupational History    Not on file   Tobacco Use    Smoking status: Never Smoker    Smokeless tobacco: Never Used   Vaping Use    Vaping Use: Not on file   Substance and Sexual Activity    Alcohol use: No     Comment: 1 cup of coffee    Drug use: No    Sexual activity: Not on file   Other Topics Concern    Not on file   Social History Narrative    Not on file     Social Determinants of Health     Financial Resource Strain: Low Risk     Difficulty of Paying Living Expenses: Not hard at all   Food Insecurity: No Food Insecurity    Worried About Running Out of Food in the Last Year: Never true    [address] in the Last Year: Never true   Transportation Needs:     Lack of Transportation (Medical): Not on file    Lack of Transportation (Non-Medical): Not on file   Physical Activity:     Days of Exercise per Week: Not on file    Minutes of Exercise per Session: Not on file   Stress:     Feeling of Stress : Not on file   Social Connections:     Frequency of Communication with Friends and Family: Not on file    Frequency of Social Gatherings with Friends and Family: Not on file    Attends [employer] Services: Not on file    Active Member of [address] [place] [address] or Organizations: Not on file    Attends Club or Organization Meetings: Not on file    Marital Status: Not on file   Intimate Partner Violence:     Fear of Current or Ex-Partner: Not on file    Emotionally Abused: Not on file    Physically Abused: Not on file    Sexually Abused: Not on file   Housing Stability:     Unable to Pay for Housing in the Last Year: Not on file    Number of [place] in the Last Year: Not on file    Unstable Housing in the Last Year: Not on file       Family History   Problem Relation Age of Onset    Cancer Mother         ESOPHAGEAL     Stroke Mother     Cancer Father         KIDNEY     Coronary Art Dis Brother        REVIEW OF SYSTEMS:     Patient specifically denies fever/chills, chest pain, shortness of breath, new bowel or bladder complaints. All other review of systems was negative.     PHYSICAL EXAMINATION:      /82   Pulse 72   Temp 96.8 °F (36 °C) (Infrared)   Ht 5' 4\" (1.626 m)   Wt 143 lb (64.9 kg)   SpO2 96%   BMI 24.55 kg/m²     General:      General appearance:   elderly, pleasant and well-hydrated. , in severe discomfort and A & O x3  Build:Overweight    HEENT:    Head:normocephalic and atraumatic  Sclera: icterus absent,     Lungs:    Breathing:Breathing Pattern: regular, no distress    Abdomen:    Shape:non-distended and normal  Tenderness:none    Cervical spine:    Inspection:normal  Palpation:tenderness paravertebral muscles, facet loading, left, right, positive and tenderness. Range of motion:abnormal moderately flexion, extension rotation bilateral and is  painful. Thoracic spine:    Spine inspection:exaggerated kyphosis   pationPal:tenderness paravertebral muscles and midline tenderness. T6-8  Range of motion:not tested    Musculoskeletal:    Trigger points in Paraveteral:absent bilaterally  Jiang's:negative right, negative left   SLR:negative right, negative left, sitting     Extremities:    Tremors:None bilaterally upper and lower  Range of motion: pain with internal rotation of hips negative. Intact:Yes  Edema:Normal    Knee: Inspection:Left knee replaced    Neurological:    Sensory:normal to light touch bilateral upper and lower extremities  Motor:              Right Bicep4+/5           Left Bicep4+/5              Right Triceps4+/5       Left Triceps4+/5          Right Deltoid4+/5     Left Deltoid4+/5                  Right Quadriceps4+/5          Left Quadriceps4+/5           Right Gastrocnemius4+/5    Left Gastrocnemius4+/5  Right Ant Tibialis4+/5  Left Ant Tibialis4+/5  Reflexes:    Right Brachioradialis reflexNOT DONE  Left Brachioradialis reflexNOT DONE  Right Biceps reflexNOT DONE  Left Biceps reflexNOT DONE  Right Triceps reflex+/-  Left Triceps reflexNOT DONE  Right Quadriceps reflex1+  Left Quadriceps reflexNOT DONE  Right Achilles reflex1+  Left Achilles reflex0  [address]    Dermatology:    Skin:no unusual rashes and no skin lesions    Impression:  Mid back pain  Plan:  ?? compression fracture. Will schedule patient for thoracic spine MRI.   Cancel urine screen. OARRS report reviewed 07/2022. Patient encouraged to stay active and to lose weight  Treatment plan discussed with the patient including medications and procedure side effects. We discussed with the patient that combining opioids, benzodiazepines, alcohol, illicit drugs or sleep aids increases the risk of respiratory depression including death. We discussed that these medications may cause drowsiness, sedation or dizziness and have counseled the patient not to drive or operate machinery. We have discussed that these medications will be prescribed only by one provider. We have discussed with the patient about age related risk factors and have thoroughly discussed the importance of taking these medications as prescribed. The patient verbalizes understanding. mily Boo M.D.

## 2022-07-12 ENCOUNTER — OFFICE VISIT (OUTPATIENT)
Dept: PODIATRY | Age: 82
End: 2022-07-12
Payer: MEDICARE

## 2022-07-12 VITALS — WEIGHT: 140 LBS | HEIGHT: 64 IN | BODY MASS INDEX: 23.9 KG/M2

## 2022-07-12 DIAGNOSIS — M79.675 PAIN OF TOE OF LEFT FOOT: ICD-10-CM

## 2022-07-12 DIAGNOSIS — L60.0 OC (ONYCHOCRYPTOSIS): ICD-10-CM

## 2022-07-12 DIAGNOSIS — B35.1 ONYCHOMYCOSIS: Primary | ICD-10-CM

## 2022-07-12 PROCEDURE — G8399 PT W/DXA RESULTS DOCUMENT: HCPCS | Performed by: PODIATRIST

## 2022-07-12 PROCEDURE — G8427 DOCREV CUR MEDS BY ELIG CLIN: HCPCS | Performed by: PODIATRIST

## 2022-07-12 PROCEDURE — G8420 CALC BMI NORM PARAMETERS: HCPCS | Performed by: PODIATRIST

## 2022-07-12 PROCEDURE — 1036F TOBACCO NON-USER: CPT | Performed by: PODIATRIST

## 2022-07-12 PROCEDURE — 1123F ACP DISCUSS/DSCN MKR DOCD: CPT | Performed by: PODIATRIST

## 2022-07-12 PROCEDURE — 1090F PRES/ABSN URINE INCON ASSESS: CPT | Performed by: PODIATRIST

## 2022-07-12 PROCEDURE — 99213 OFFICE O/P EST LOW 20 MIN: CPT | Performed by: PODIATRIST

## 2022-07-12 NOTE — PROGRESS NOTES
22     Deandre Carballo    : 1940   Sex: female    Age: 79 y.o. Patient's PCP/Provider is:  Meghana West MD    Subjective:  Patient is seen today for follow-up regarding continued evaluation regarding nail dystrophy and ingrown nail issues left great toe. Overall patient is doing well with current topical medications. She denies any additional issues at this time. Chief Complaint   Patient presents with    Nail Problem     left great toenail        ROS:  Const: Positives and pertinent negatives as per HPI. Musculo: Denies symptoms other than stated above. Neuro: Denies symptoms other than stated above. Skin: Denies symptoms other than stated above. Current Medications:    Current Outpatient Medications:     buPROPion (WELLBUTRIN XL) 300 MG extended release tablet, Take 1 tablet by mouth every morning, Disp: 90 tablet, Rfl: 0    FLUoxetine (PROZAC) 40 MG capsule, Take 1 capsule by mouth nightly, Disp: 90 capsule, Rfl: 0    glimepiride (AMARYL) 1 MG tablet, Take 1 tablet by mouth 2 times daily 1 TAB BY  MOUTH 2 TIIMES A DAY, Disp: 180 tablet, Rfl: 0    levothyroxine (SYNTHROID) 75 MCG tablet, Take 1 tablet by mouth daily, Disp: 90 tablet, Rfl: 0    losartan (COZAAR) 100 MG tablet, Take 1 tablet by mouth daily, Disp: 90 tablet, Rfl: 0    meloxicam (MOBIC) 15 MG tablet, Take 1 tablet by mouth daily as needed for Pain, Disp: 90 tablet, Rfl: 0    metoprolol succinate (TOPROL XL) 50 MG extended release tablet, Take 1 tablet by mouth daily, Disp: 90 tablet, Rfl: 0    OLANZapine (ZYPREXA) 5 MG tablet, Take 1 tablet by mouth nightly, Disp: 90 tablet, Rfl: 0    pantoprazole (PROTONIX) 40 MG tablet, Take 1 tablet by mouth daily, Disp: 90 tablet, Rfl: 0    lidocaine 4 % external patch, Place 1 patch onto the skin daily, Disp: 30 patch, Rfl: 0    ciclopirox (PENLAC) 8 % solution, Apply topically daily to affected nails. , Disp: 6 mL, Rfl: 2    traZODone (DESYREL) 50 MG tablet, take 1 tablet by mouth at bedtime if needed, Disp: , Rfl:     tiZANidine (ZANAFLEX) 2 MG tablet, Take 1 tablet by mouth nightly as needed (muscle spasm), Disp: 10 tablet, Rfl: 0    Multiple Vitamin (MULTI VITAMIN W/D-3 PO), Take by mouth , Disp: , Rfl:     aspirin 81 MG chewable tablet, Take 81 mg by mouth daily  (Patient not taking: Reported on 7/5/2022), Disp: , Rfl:     Allergies:  No Known Allergies    Vitals:    07/12/22 1031   Weight: 140 lb (63.5 kg)   Height: 5' 4\" (1.626 m)       Exam:  Neurovascular status unchanged. Mycotic nail issues noted left great toe region. Ingrown nail issues noted left great toe. No signs of any infection noted left foot. No maceration webspaces noted bilateral foot. No plantar calluses or heel fissuring noted bilateral foot. Diagnostic Studies:     No results found. Procedures:    None    Plan Per Assessment  Higinio Omalley was seen today for nail problem. Diagnoses and all orders for this visit:    Onychomycosis    OC (onychocryptosis)    Pain of toe of left foot      1. Evaluation and management  2. Debridement dystrophic and ingrown nail was performed to patient tolerance. No nail matrix procedures performed on today's visit. 3. She is to continue with the topical nail regimen on a daily basis. 4. Patient will be followed up at a later date for continued evaluation and management. Seen By:    Salina Arana DPM    Electronically signed by Salina Arana DPM on 7/12/2022 at 10:47 AM    This note was created using voice recognition software. The note was reviewed however may contain grammatical errors.

## 2022-07-20 ENCOUNTER — OFFICE VISIT (OUTPATIENT)
Dept: PAIN MANAGEMENT | Age: 82
End: 2022-07-20
Payer: MEDICARE

## 2022-07-20 VITALS
TEMPERATURE: 98.7 F | SYSTOLIC BLOOD PRESSURE: 128 MMHG | HEIGHT: 64 IN | OXYGEN SATURATION: 97 % | HEART RATE: 68 BPM | DIASTOLIC BLOOD PRESSURE: 72 MMHG | WEIGHT: 140 LBS | BODY MASS INDEX: 23.9 KG/M2

## 2022-07-20 DIAGNOSIS — M51.9 THORACIC DISC DISEASE: ICD-10-CM

## 2022-07-20 DIAGNOSIS — M47.814 THORACIC SPONDYLOSIS: ICD-10-CM

## 2022-07-20 DIAGNOSIS — M50.90 CERVICAL DISC DISORDER: ICD-10-CM

## 2022-07-20 DIAGNOSIS — S22.000A COMPRESSION FRACTURE OF THORACIC VERTEBRA, UNSPECIFIED THORACIC VERTEBRAL LEVEL, INITIAL ENCOUNTER (HCC): ICD-10-CM

## 2022-07-20 DIAGNOSIS — M47.812 CERVICAL FACET JOINT SYNDROME: ICD-10-CM

## 2022-07-20 DIAGNOSIS — M47.812 CERVICAL SPONDYLOSIS: ICD-10-CM

## 2022-07-20 DIAGNOSIS — M47.894 THORACIC FACET JOINT SYNDROME: ICD-10-CM

## 2022-07-20 DIAGNOSIS — G89.4 CHRONIC PAIN SYNDROME: Primary | ICD-10-CM

## 2022-07-20 PROCEDURE — G8399 PT W/DXA RESULTS DOCUMENT: HCPCS | Performed by: PAIN MEDICINE

## 2022-07-20 PROCEDURE — 1090F PRES/ABSN URINE INCON ASSESS: CPT | Performed by: PAIN MEDICINE

## 2022-07-20 PROCEDURE — G8427 DOCREV CUR MEDS BY ELIG CLIN: HCPCS | Performed by: PAIN MEDICINE

## 2022-07-20 PROCEDURE — 99213 OFFICE O/P EST LOW 20 MIN: CPT | Performed by: PAIN MEDICINE

## 2022-07-20 PROCEDURE — 1036F TOBACCO NON-USER: CPT | Performed by: PAIN MEDICINE

## 2022-07-20 PROCEDURE — G8420 CALC BMI NORM PARAMETERS: HCPCS | Performed by: PAIN MEDICINE

## 2022-07-20 PROCEDURE — 1123F ACP DISCUSS/DSCN MKR DOCD: CPT | Performed by: PAIN MEDICINE

## 2022-07-20 PROCEDURE — 99214 OFFICE O/P EST MOD 30 MIN: CPT | Performed by: PAIN MEDICINE

## 2022-07-20 RX ORDER — SODIUM CHLORIDE 0.9 % (FLUSH) 0.9 %
5-40 SYRINGE (ML) INJECTION PRN
Status: CANCELLED | OUTPATIENT
Start: 2022-07-20

## 2022-07-20 RX ORDER — SODIUM CHLORIDE 0.9 % (FLUSH) 0.9 %
5-40 SYRINGE (ML) INJECTION EVERY 12 HOURS SCHEDULED
Status: CANCELLED | OUTPATIENT
Start: 2022-07-20

## 2022-07-20 RX ORDER — SODIUM CHLORIDE 9 MG/ML
INJECTION, SOLUTION INTRAVENOUS PRN
Status: CANCELLED | OUTPATIENT
Start: 2022-07-20

## 2022-07-20 NOTE — PROGRESS NOTES
[address]  [address] [facility], [address] [place] [address]  [phone]    Follow up Note      Kelly Wang     Date of Visit:  7/20/2022    CC:  Patient presents for follow up   Chief Complaint   Patient presents with    Results     MRI     HPI:    Pain is unchanged. Appropriate analgesia with current medications regimen: N/A. Change in quality of symptoms:no. Medication side effects:not applicable . Recent diagnostic testing:Thoracic spine MRI. Recent interventional procedures:none. She has not been on anticoagulation medications to include none. The patient  has not been on herbal supplements. The patient is diabetic. Imaging:  Thoracic spine MRI 2022:    1. Mild chronic compression fracture deformity of the T8 vertebral body. No   acute fracture. 2. Mild exaggeration of the thoracic kyphosis. 3. Small disc protrusions at T6-7 and T10-11. No central canal stenosis. 4. Mild neural foraminal stenoses at multiple levels, resulting from facet   hypertrophic changes. Thoracic spine Xray 2022. Compression of T9  Mild spondylosis T11-12      Previous treatments: medications. .         Potential Aberrant Drug-Related Behavior:    No    Urine Drug Screening:  None    OARRS report:  07/2022 consistent. Opioid Agreement:  Renewal date:N/A    Past Medical History:   Diagnosis Date    Depression     Diabetes mellitus ([address]     Hyperlipidemia     Hypertension     Thyroid disease     Type 2 diabetes mellitus without complication ([address]      Past Surgical History:   Procedure Laterality Date    CHOLECYSTECTOMY      COLONOSCOPY      EYE SURGERY      cataract both eyes    HYSTERECTOMY (CERVIX STATUS UNKNOWN)      JOINT REPLACEMENT Left     KNEE    SHOULDER SURGERY  rt rotator    UPPER GASTROINTESTINAL ENDOSCOPY  5/15//12    toni     Prior to Admission medications    Medication Sig Start Date End Date Taking?  Authorizing Provider   buPROPion (WELLBUTRIN XL) 300 MG extended release tablet Take 1 tablet by mouth every morning 6/14/22  Yes Kassandra Correa MD   FLUoxetine (PROZAC) 40 MG capsule Take 1 capsule by mouth nightly 6/14/22  Yes Kassandra Correa MD   glimepiride (AMARYL) 1 MG tablet Take 1 tablet by mouth 2 times daily 1 TAB BY  MOUTH 2 TIIMES A DAY 6/14/22  Yes Kassandra Correa MD   levothyroxine (SYNTHROID) 75 MCG tablet Take 1 tablet by mouth daily 6/14/22  Yes Kassandra Correa MD   losartan (COZAAR) 100 MG tablet Take 1 tablet by mouth daily 6/14/22  Yes Kassandra Correa MD   meloxicam (MOBIC) 15 MG tablet Take 1 tablet by mouth daily as needed for Pain 6/14/22  Yes Kassandra Correa MD   metoprolol succinate (TOPROL XL) 50 MG extended release tablet Take 1 tablet by mouth daily 6/14/22  Yes Kassandra Correa MD   OLANZapine (ZYPREXA) 5 MG tablet Take 1 tablet by mouth nightly 6/14/22  Yes Kassandra Correa MD   pantoprazole (PROTONIX) 40 MG tablet Take 1 tablet by mouth daily 6/14/22  Yes Kassandra Correa MD   ciclopirox (PENLAC) 8 % solution Apply topically daily to affected nails. 5/3/22  Yes Antonieta Crisostomo, VENANCIO   traZODone (DESYREL) 50 MG tablet take 1 tablet by mouth at bedtime if needed 4/8/22  Yes Historical Provider, MD   tiZANidine (ZANAFLEX) 2 MG tablet Take 1 tablet by mouth nightly as needed (muscle spasm) 1/27/22  Yes Kassandra Correa MD   Multiple Vitamin (MULTI VITAMIN W/D-3 PO) Take by mouth    Yes Historical Provider, MD   aspirin 81 MG chewable tablet Take 81 mg by mouth in the morning.   .   Yes Historical Provider, MD     No Known Allergies    Social History     Socioeconomic History    Marital status:      Spouse name: Not on file    Number of children: Not on file    Years of education: Not on file    Highest education level: Not on file   Occupational History    Not on file   Tobacco Use    Smoking status: Never    Smokeless tobacco: Never   Vaping Use    Vaping Use: Not on file   Substance and Sexual Activity    Alcohol use: No     Comment: 1 cup of coffee    Drug use: No    Sexual activity: Not on file   Other Topics Concern    Not on file   Social History Narrative    Not on file     Social Determinants of Health     Financial Resource Strain: Low Risk     Difficulty of Paying Living Expenses: Not hard at all   Food Insecurity: No Food Insecurity    Worried About Running Out of Food in the Last Year: Never true    Ran Out of Food in the Last Year: Never true   Transportation Needs: Not on file   Physical Activity: Not on file   Stress: Not on file   Social Connections: Not on file   Intimate Partner Violence: Not on file   Housing Stability: Not on file     Family History   Problem Relation Age of Onset    Cancer Mother         ESOPHAGEAL     Stroke Mother     Cancer Father         KIDNEY     Coronary Art Dis Brother      REVIEW OF SYSTEMS:     Kaitlynn Palafox denies fever/chills, chest pain, shortness of breath, new bowel or bladder complaints. All other review of systems was negative. PHYSICAL EXAMINATION:      /72   Pulse 68   Temp 98.7 °F (37.1 °C) (Infrared)   Ht 5' 4\" (1.626 m)   Wt 140 lb (63.5 kg)   SpO2 97%   BMI 24.03 kg/m²     General:       General appearance:   elderly, pleasant and well-hydrated. , in moderate discomfort and A & O x3  Build:Overweight     HEENT:     Head:normocephalic and atraumatic  Sclera: icterus absent,     Lungs:     Breathing:Breathing Pattern: regular, no distress     Abdomen:     Shape:non-distended and normal  Tenderness:none     Cervical spine:     Inspection:normal  Palpation:tenderness paravertebral muscles, facet loading, left, right, positive and tenderness. Range of motion:abnormal moderately flexion, extension rotation bilateral and is  painful. Thoracic spine:     Spine inspection:exaggerated kyphosis   pationPal:tenderness paravertebral muscles and midline tenderness. T6-8/facet tenderness bilaterally  Range of motion:not tested     Musculoskeletal:     Trigger points in Paraveteral:absent bilaterally  Jiang's:negative right, negative left  SLR:negative right, negative left, sitting     Extremities:     Tremors:None bilaterally upper and lower  Range of motion: pain with internal rotation of hips negative. Intact:Yes  Edema:Normal     Knee: Inspection:Left knee replaced     Neurological:     Sensory:normal to light touch bilateral upper and lower extremities  Motor:                           Right Quadriceps4+/5          Left Quadriceps4+/5           Right Gastrocnemius4+/5    Left Gastrocnemius4+/5  Right Ant Tibialis4+/5  Left Ant Tibialis4+/5  Reflexes:    Right Quadriceps reflex1+  Left Quadriceps reflexNOT DONE  Right Achilles reflex1+  Left Achilles reflex0  [address]     Dermatology:     Skin:no unusual rashes and no skin lesions     Impression:  Mid back pain  Plan:  Follow up on her mid back pain with no acute issues. Results of thoracic spine MRI were discussed with the patient. Discussed thoracic facet MBB at T8,9,19, patient agrees. OARRS report reviewed 07/2022. Patient encouraged to stay active and to lose weight  Treatment plan discussed with the patient including medications and procedure side effects. We discussed with the patient that combining opioids, benzodiazepines, alcohol, illicit drugs or sleep aids increases the risk of respiratory depression including death. We discussed that these medications may cause drowsiness, sedation or dizziness and have counseled the patient not to drive or operate machinery. We have discussed that these medications will be prescribed only by one provider. We have discussed with the patient about age related risk factors and have thoroughly discussed the importance of taking these medications as prescribed. The patient verbalizes understanding. ccrefhuaning kelby Ochoa M.D.

## 2022-07-20 NOTE — PROGRESS NOTES
Do you currently have any of the following:    Fever: No  Headache:  No  Cough: No  Shortness of breath: No  Exposed to anyone with these symptoms: No                                                                                                                Terrell Torre presents to the [facility] on 7/20/2022. Dora Paris is complaining of pain in neck. The pain is intermittent. The pain is described as shooting and sharp. Pain is rated on her best day at a 0, on her worst day at a 10, and on average at a 5 on the VAS scale. She took her last dose of  Zanaflex  yesterday. Dora Paris does not have issues with constipation. Any procedures since your last visit: No.    She is  on NSAIDS and  is  not on anticoagulation medications to include ASA and is managed by Tory Neves MD  .     Pacemaker or defibrillator: No.    Medication Contract and Consent for Opioid Use Documents Filed        No documents found                       Temp 98.7 °F (37.1 °C) (Infrared)   Ht 5' 4\" (1.626 m)   Wt 140 lb (63.5 kg)   BMI 24.03 kg/m²      No LMP recorded. Patient has had a hysterectomy.

## 2022-08-04 ENCOUNTER — TELEPHONE (OUTPATIENT)
Dept: PAIN MANAGEMENT | Age: 82
End: 2022-08-04

## 2022-08-04 NOTE — TELEPHONE ENCOUNTER
Call to [address] Dr that procedure was approved for 8/11/2022 and that the surgery center should call her a few days before for the pre op call and after 3:00 PM the business day before with the arrival time. Instructed to call office back if any questions. Lizett Malcolm verbalized understanding.      Alfreda Youssef RN  Pain Management

## 2022-08-11 ENCOUNTER — HOSPITAL ENCOUNTER (OUTPATIENT)
Age: 82
Setting detail: OUTPATIENT SURGERY
Discharge: HOME | End: 2022-08-11
Attending: PAIN MEDICINE | Admitting: PAIN MEDICINE
Payer: MEDICARE

## 2022-08-11 ENCOUNTER — HOSPITAL ENCOUNTER (OUTPATIENT)
Dept: OPERATING ROOM | Age: 82
Setting detail: OUTPATIENT SURGERY
Discharge: HOME | End: 2022-08-11
Attending: PAIN MEDICINE
Payer: MEDICARE

## 2022-08-11 VITALS
HEART RATE: 61 BPM | WEIGHT: 140 LBS | DIASTOLIC BLOOD PRESSURE: 72 MMHG | TEMPERATURE: 98 F | HEIGHT: 64 IN | BODY MASS INDEX: 23.9 KG/M2 | SYSTOLIC BLOOD PRESSURE: 165 MMHG | RESPIRATION RATE: 16 BRPM | OXYGEN SATURATION: 99 %

## 2022-08-11 DIAGNOSIS — M47.814 OSTEOARTHRITIS OF THORACIC SPINE, UNSPECIFIED SPINAL OSTEOARTHRITIS COMPLICATION STATUS: ICD-10-CM

## 2022-08-11 PROCEDURE — 6360000002 HC RX W HCPCS: Performed by: PAIN MEDICINE

## 2022-08-11 PROCEDURE — 3600000005 HC SURGERY LEVEL 5 BASE: Performed by: PAIN MEDICINE

## 2022-08-11 PROCEDURE — 7100000010 HC PHASE II RECOVERY - FIRST 15 MIN: Performed by: PAIN MEDICINE

## 2022-08-11 PROCEDURE — 2500000003 HC RX 250 WO HCPCS: Performed by: PAIN MEDICINE

## 2022-08-11 PROCEDURE — 2709999900 HC NON-CHARGEABLE SUPPLY: Performed by: PAIN MEDICINE

## 2022-08-11 PROCEDURE — 3209999900 FLUORO FOR SURGICAL PROCEDURES

## 2022-08-11 PROCEDURE — 7100000011 HC PHASE II RECOVERY - ADDTL 15 MIN: Performed by: PAIN MEDICINE

## 2022-08-11 RX ORDER — SODIUM CHLORIDE 0.9 % (FLUSH) 0.9 %
5-40 SYRINGE (ML) INJECTION PRN
Status: DISCONTINUED | OUTPATIENT
Start: 2022-08-11 | End: 2022-08-11 | Stop reason: HOSPADM

## 2022-08-11 RX ORDER — SODIUM CHLORIDE 9 MG/ML
INJECTION, SOLUTION INTRAVENOUS PRN
Status: DISCONTINUED | OUTPATIENT
Start: 2022-08-11 | End: 2022-08-11 | Stop reason: HOSPADM

## 2022-08-11 RX ORDER — SODIUM CHLORIDE 0.9 % (FLUSH) 0.9 %
5-40 SYRINGE (ML) INJECTION EVERY 12 HOURS SCHEDULED
Status: DISCONTINUED | OUTPATIENT
Start: 2022-08-11 | End: 2022-08-11 | Stop reason: HOSPADM

## 2022-08-11 RX ORDER — LIDOCAINE HYDROCHLORIDE 5 MG/ML
INJECTION, SOLUTION INFILTRATION; INTRAVENOUS PRN
Status: DISCONTINUED | OUTPATIENT
Start: 2022-08-11 | End: 2022-08-11 | Stop reason: ALTCHOICE

## 2022-08-11 ASSESSMENT — PAIN DESCRIPTION - DESCRIPTORS: DESCRIPTORS: ACHING

## 2022-08-11 ASSESSMENT — PAIN - FUNCTIONAL ASSESSMENT: PAIN_FUNCTIONAL_ASSESSMENT: 0-10

## 2022-08-11 NOTE — OP NOTE
2022    Patient: Denia Wooten  :  1940  Age:  79 y.o. Sex:  female     PRE-OPERATIVE DIAGNOSIS: Bilateral Thoracic spondylosis, thoracic facet syndrome. POST-OPERATIVE DIAGNOSIS: Same. PROCEDURE:  # 1 Fluoroscopic guided thoracic facet medial branch blocks at levels T8,9,10  Bilateral.    SURGEON:  BRIAN Sosa M.D. ANESTHESIA: Local    ESTIMATED BLOOD LOSS: None.  ______________________________________________________________________  BRIEF HISTORY:  Denia Wooten comes in today for 1 Fluoroscopic guided thoracic facet medial branch blocks at levels T8,9,10 Bilateral. The potential complications of this procedure were discussed with her again today. She has elected to undergo the aforementioned procedure. Wendie complete History & Physical examination were reviewed in depth, a copy of which is in the chart. DESCRIPTION OF PROCEDURE:    After confirming written and informed consent, a time-out was performed and Wendie name and date of birth, the procedure to be performed as well as the plan for the location of the needle insertion were confirmed. The patient was brought into the procedure room and placed in the prone position on the fluoroscopy table. Standard monitors were placed and vital signs were observed throughout the procedure. The area of the thoracic spine was prepped with chloraprep  and draped in a sterile manner. AP fluoroscopy was used to identify and myles the junction of the transverse process and the pedicle at the targeted levels. The skin and subcutaneous tissues in these identified areas were anesthetized with 0.5% lidocaine. A 22 # gauge 3 1/2 spinal needle was advanced toward the targeted points until bone was contacted. Satisfactory needle placement was confirmed by AP and oblique projections. At this point and after negative aspiration was confirmed.  A solution of 0.25% Marcaine with 40 Depomedrol 1 ml was then injected and distributed equally at each level.    Disposition the patient tolerated the procedure well and there were no complications . Vital signs remained stable throughout the procedure. The patient was escorted to the recovery area where they remained until discharge and written discharge instructions for the procedure were given. Plan: Tobi Chowdhury will return to our pain management center as scheduled.      Andrews Awan MD

## 2022-08-11 NOTE — DISCHARGE INSTRUCTIONS
[facility] Pain Management Department  [phone]   Post-Pain Block/ Radiofrequency Home Going Instructions    1-Go home, rest for the remainder of the day    2-Please do not lift over 20 pounds the day of the injection    3-If you received sedation No: alcohol, driving, operating lawn mowers, plows, tractors or other dangerous equipment until next morning. Do not make important decisions or sign legal documents for 24 hours. You may experience light headedness, dizziness, nausea or sleepiness after sedation. Do not stay alone. A responsible adult must be with you for 24 hours. You could be nauseated from the medications you have received. Your IV site may be sore and bruised. 4-No dietary restrictions     5-Resume all medications the same day, blood thinners to be resumed 24 hours after injection    6-Keep the surgical site clean and dry, you may shower the next morning and remove the      dressing. 7- No sitz baths, tub baths or hot tubs/swimming for 24 hours. 8- If you have any pain at the injection site(s), application of an ice pack to the area should be       helpful, 20 minutes on/20 minutes off for next 48 hours. 9- [facility] at if you develop. Fever greater than 100.4 F  Have bleeding or drainage from the puncture site  Have progressive Leg/arm numbness and or weakness  Loss of control of bowel and or bladder (wet/soil yourself)  Severe headache with inability to lift head    10-You may return to work the next day            Infection After Surgery: Care Instructions  Overview  After surgery, an infection is always possible. It doesn't mean that thesurgery didn't go well. Because an infection can be serious, your doctor has taken steps to manage it. Your doctor checked the infection and cleaned it if necessary. Your doctor may have made an opening in the area so that the pus can drain out.  You may have gauze in the cut so that the area will stay open and keep draining. You mayneed antibiotics. You will need to follow up with your doctor to make sure the infection has goneaway. Follow-up care is a key part of your treatment and safety. Be sure to make and go to all appointments, and call your doctor if you are having problems. It's also a good idea to know your test results and keep alist of the medicines you take. How can you care for yourself at home? Make sure your surgeon knows about the infection, especially if you saw another doctor about your symptoms. If your doctor prescribed antibiotics, take them as directed. Do not stop taking them just because you feel better. You need to take the full course of antibiotics. Ask your doctor if you can take an over-the-counter pain medicine, such as acetaminophen (Tylenol), ibuprofen (Advil, Motrin), or naproxen (Aleve). Be safe with medicines. Read and follow all instructions on the label. Do not take two or more pain medicines at the same time unless the doctor told you to. Many pain medicines have acetaminophen, which is Tylenol. Too much acetaminophen (Tylenol) can be harmful. Prop up the area on a pillow anytime you sit or lie down during the next 3 days. Try to keep it above the level of your heart. This will help reduce swelling. Keep the skin clean and dry. You may have a bandage over the cut (incision). A bandage helps the incision heal and protects it. Your doctor will tell you how to take care of this. Keep it clean and dry. You may have drainage from the wound. If your doctor told you how to care for your incision, follow your doctor's instructions. If you did not get instructions, follow this general advice:  Wash around the incision with clean water 2 times a day. Don't use hydrogen peroxide or alcohol, which can slow healing. When should you call for help? Call your doctor now or seek immediate medical care if:    You have signs that your infection is getting worse, such as:   Increased pain, swelling, warmth, or redness in the area. Red streaks leading from the area. Pus draining from the wound. A new or higher fever. Watch closely for changes in your health, and be sure to contact your doctor ifyou have any problems. Where can you learn more? Go to [email or web address] org and sign in to your [employer] account. Enter C340 in the Premium Store box to learn more about \"Infection After Surgery: Care Instructions. \"     If you do not have an account, please click on the \"Sign Up Now\" link. Current as of: January 20, 2022               Content Version: 13.3  © 2006-2022 Healthwise, [facility]. Care instructions adapted under license by [facility] ([facility]). If you have questions about a medical condition or this instruction, always ask your healthcare professional. [address]  any warranty or liability for your use of this information.

## 2022-08-11 NOTE — H&P
[address]  [address] [facility], [address] [place] [address] Yovany  [phone]    Procedure History & Physical      Manuelita Ill Rable     HPI:    Patient  is here for axial mid back pain for bilateral T8,9,10 MBB  Labs/imaging studies reviewed   All question and concerns addressed including R/B/A associated with the procedure    Past Medical History:   Diagnosis Date    Depression     Diabetes mellitus ([facility] [address]     Hyperlipidemia     Hypertension     Thyroid disease     Type 2 diabetes mellitus without complication ([facility] [address]        Past Surgical History:   Procedure Laterality Date    CHOLECYSTECTOMY      COLONOSCOPY      EYE SURGERY      cataract both eyes    HYSTERECTOMY (CERVIX STATUS UNKNOWN)      JOINT REPLACEMENT Left     KNEE    SHOULDER SURGERY  rt rotator    UPPER GASTROINTESTINAL ENDOSCOPY  5/15//12    toni       Prior to Admission medications    Medication Sig Start Date End Date Taking?  Authorizing Provider   buPROPion (WELLBUTRIN XL) 300 MG extended release tablet Take 1 tablet by mouth every morning 6/14/22   Tessa Austin MD   FLUoxetine (PROZAC) 40 MG capsule Take 1 capsule by mouth nightly 6/14/22   Tessa Austin MD   glimepiride (AMARYL) 1 MG tablet Take 1 tablet by mouth 2 times daily 1 TAB BY  MOUTH 2 TIIMES A DAY 6/14/22   Tessa Austin MD   levothyroxine (SYNTHROID) 75 MCG tablet Take 1 tablet by mouth daily 6/14/22   Tessa Austin MD   losartan (COZAAR) 100 MG tablet Take 1 tablet by mouth daily 6/14/22   Tessa Austin MD   meloxicam (MOBIC) 15 MG tablet Take 1 tablet by mouth daily as needed for Pain 6/14/22   Tessa Austin MD   metoprolol succinate (TOPROL XL) 50 MG extended release tablet Take 1 tablet by mouth daily 6/14/22   Tessa Austin MD   OLANZapine (ZYPREXA) 5 MG tablet Take 1 tablet by mouth nightly 6/14/22   Tessa Austin MD   pantoprazole (PROTONIX) 40 MG tablet Take 1 tablet by mouth daily 6/14/22   Tessa Austin MD   ciclopirox (PENLAC) 8 % solution Apply topically daily to affected nails.  5/3/22   Delroy Ng, DPSOHEILA   traZODone (DESYREL) 50 MG tablet take 1 tablet by mouth at bedtime if needed 4/8/22   Historical Provider, MD   tiZANidine (ZANAFLEX) 2 MG tablet Take 1 tablet by mouth nightly as needed (muscle spasm) 1/27/22   Rafaela Velazquez MD       No Known Allergies    Social History     Socioeconomic History    Marital status:      Spouse name: Not on file    Number of children: Not on file    Years of education: Not on file    Highest education level: Not on file   Occupational History    Not on file   Tobacco Use    Smoking status: Never    Smokeless tobacco: Never   Vaping Use    Vaping Use: Not on file   Substance and Sexual Activity    Alcohol use: No     Comment: 1 cup of coffee    Drug use: No    Sexual activity: Not on file   Other Topics Concern    Not on file   Social History Narrative    Not on file     Social Determinants of Health     Financial Resource Strain: Low Risk     Difficulty of Paying Living Expenses: Not hard at all   Food Insecurity: No Food Insecurity    Worried About Running Out of Food in the Last Year: Never true    Ran Out of Food in the Last Year: Never true   Transportation Needs: Not on file   Physical Activity: Not on file   Stress: Not on file   Social Connections: Not on file   Intimate Partner Violence: Not on file   Housing Stability: Not on file       Family History   Problem Relation Age of Onset    Cancer Mother         ESOPHAGEAL     Stroke Mother     Cancer Father         KIDNEY     Coronary Art Dis Brother          REVIEW OF SYSTEMS:    CONSTITUTIONAL:  negative for  fevers, chills, sweats and fatigue    RESPIRATORY:  negative for  dry cough, cough with sputum, dyspnea, wheezing and chest pain    CARDIOVASCULAR:  negative for chest pain, dyspnea, palpitations, syncope    GASTROINTESTINAL:  negative for nausea, vomiting, change in bowel habits, diarrhea, constipation and abdominal pain    MUSCULOSKELETAL: negative for muscle weakness    SKIN: negative for itching or rashes. BEHAVIOR/PSYCH:  negative for poor appetite, increased appetite, decreased sleep and poor concentration    All other systems negative      PHYSICAL EXAM:    VITALS:  BP (S) (!) 165/72 Comment: patient states \"always high\". Assymptomatic  Pulse 61   Temp 98 °F (36.7 °C) (Skin)   Resp 16   Ht 5' 4\" (1.626 m)   Wt 140 lb (63.5 kg)   SpO2 99%   BMI 24.03 kg/m²     CONSTITUTIONAL:  awake, alert, cooperative, no apparent distress, and appears stated age    EYES: PERRLA, EOMI    LUNGS:  No increased work of breathing, no audible wheezing    CARDIOVASCULAR:  regular rate and rhythm    ABDOMEN:  Soft non tender non distended     EXTREMITIES: no signs of clubbing or cyanosis. MUSCULOSKELETAL: negative for flaccid muscle tone or spastic movements. SKIN: gross examination reveals no signs of rashes, or diaphoresis. NEURO: Cranial nerves II-XII grossly intact. No signs of agitated mood. Assessment/Plan:    Axial mid back pain for bilateral T8,9,10 MBB.

## 2022-08-16 ENCOUNTER — OFFICE VISIT (OUTPATIENT)
Dept: PODIATRY | Age: 82
End: 2022-08-16
Payer: MEDICARE

## 2022-08-16 VITALS — BODY MASS INDEX: 23.9 KG/M2 | WEIGHT: 140 LBS | HEIGHT: 64 IN | TEMPERATURE: 98 F

## 2022-08-16 DIAGNOSIS — B35.1 ONYCHOMYCOSIS: Primary | ICD-10-CM

## 2022-08-16 DIAGNOSIS — E11.9 TYPE 2 DIABETES MELLITUS WITHOUT COMPLICATION, WITHOUT LONG-TERM CURRENT USE OF INSULIN (HCC): ICD-10-CM

## 2022-08-16 DIAGNOSIS — L60.0 OC (ONYCHOCRYPTOSIS): ICD-10-CM

## 2022-08-16 PROCEDURE — G8420 CALC BMI NORM PARAMETERS: HCPCS | Performed by: PODIATRIST

## 2022-08-16 PROCEDURE — G8399 PT W/DXA RESULTS DOCUMENT: HCPCS | Performed by: PODIATRIST

## 2022-08-16 PROCEDURE — G8427 DOCREV CUR MEDS BY ELIG CLIN: HCPCS | Performed by: PODIATRIST

## 2022-08-16 PROCEDURE — 1036F TOBACCO NON-USER: CPT | Performed by: PODIATRIST

## 2022-08-16 PROCEDURE — 99213 OFFICE O/P EST LOW 20 MIN: CPT | Performed by: PODIATRIST

## 2022-08-16 PROCEDURE — 1090F PRES/ABSN URINE INCON ASSESS: CPT | Performed by: PODIATRIST

## 2022-08-16 PROCEDURE — 1123F ACP DISCUSS/DSCN MKR DOCD: CPT | Performed by: PODIATRIST

## 2022-08-16 PROCEDURE — 3051F HG A1C>EQUAL 7.0%<8.0%: CPT | Performed by: PODIATRIST

## 2022-08-16 NOTE — PROGRESS NOTES
22     Ethlyn Fearing    : 1940   Sex: female    Age: 79 y.o. Patient's PCP/Provider is:  Julio Agrawal MD    Subjective:  Patient is seen today for follow-up regarding continued evaluation regarding nail dystrophy and ingrown issues left great toe region. Patient has been applying the topical medication as instructed without additional issues noted. No other abnormalities noted at this time. Chief Complaint   Patient presents with    Follow-up     Discoloration of L grt toe ongoing fungal issure       ROS:  Const: Positives and pertinent negatives as per HPI. Musculo: Denies symptoms other than stated above. Neuro: Denies symptoms other than stated above. Skin: Denies symptoms other than stated above. Current Medications:    Current Outpatient Medications:     buPROPion (WELLBUTRIN XL) 300 MG extended release tablet, Take 1 tablet by mouth every morning, Disp: 90 tablet, Rfl: 0    FLUoxetine (PROZAC) 40 MG capsule, Take 1 capsule by mouth nightly, Disp: 90 capsule, Rfl: 0    glimepiride (AMARYL) 1 MG tablet, Take 1 tablet by mouth 2 times daily 1 TAB BY  MOUTH 2 TIIMES A DAY, Disp: 180 tablet, Rfl: 0    levothyroxine (SYNTHROID) 75 MCG tablet, Take 1 tablet by mouth daily, Disp: 90 tablet, Rfl: 0    losartan (COZAAR) 100 MG tablet, Take 1 tablet by mouth daily, Disp: 90 tablet, Rfl: 0    meloxicam (MOBIC) 15 MG tablet, Take 1 tablet by mouth daily as needed for Pain, Disp: 90 tablet, Rfl: 0    metoprolol succinate (TOPROL XL) 50 MG extended release tablet, Take 1 tablet by mouth daily, Disp: 90 tablet, Rfl: 0    OLANZapine (ZYPREXA) 5 MG tablet, Take 1 tablet by mouth nightly, Disp: 90 tablet, Rfl: 0    pantoprazole (PROTONIX) 40 MG tablet, Take 1 tablet by mouth daily, Disp: 90 tablet, Rfl: 0    ciclopirox (PENLAC) 8 % solution, Apply topically daily to affected nails. , Disp: 6 mL, Rfl: 2    traZODone (DESYREL) 50 MG tablet, take 1 tablet by mouth at bedtime if needed, Disp: , Rfl: tiZANidine (ZANAFLEX) 2 MG tablet, Take 1 tablet by mouth nightly as needed (muscle spasm), Disp: 10 tablet, Rfl: 0    Allergies:  No Known Allergies    Vitals:    08/16/22 0944   Temp: 98 °F (36.7 °C)   Weight: 140 lb (63.5 kg)   Height: 5' 4\" (1.626 m)       Exam:  Neurovascular status unchanged. Nail growth noted proximal third of the nailbed region left great toe. Mild ingrown nail issues noted distally left great toe. No signs of infection noted digital regions bilateral foot. No maceration webspaces noted bilateral foot. Mild edematous issues noted into both lower extremities. Diagnostic Studies:     No results found. Procedures:    None    Plan Per Assessment  Latonya Goodman was seen today for follow-up. Diagnoses and all orders for this visit:    Onychomycosis    OC (onychocryptosis)    Type 2 diabetes mellitus without complication, without long-term current use of insulin ([facility])      Evaluation and management  She is to continue with the topical nail lacquer to the affected digital nail region on a daily basis. We did discuss additional diabetic foot care techniques with patient in detail today to any additional lower extremity issues from occurring. Patient will be followed up at a later date for continued evaluation and management. Seen By:    Tess Ferguson DPM    Electronically signed by Tess Ferguson DPM on 8/16/2022 at 10:03 AM    This note was created using voice recognition software. The note was reviewed however may contain grammatical errors.

## 2022-08-18 ENCOUNTER — OFFICE VISIT (OUTPATIENT)
Dept: PAIN MANAGEMENT | Age: 82
End: 2022-08-18
Payer: MEDICARE

## 2022-08-18 VITALS
BODY MASS INDEX: 23.32 KG/M2 | RESPIRATION RATE: 16 BRPM | SYSTOLIC BLOOD PRESSURE: 116 MMHG | WEIGHT: 140 LBS | HEART RATE: 54 BPM | OXYGEN SATURATION: 100 % | TEMPERATURE: 96.5 F | HEIGHT: 65 IN | DIASTOLIC BLOOD PRESSURE: 60 MMHG

## 2022-08-18 DIAGNOSIS — G89.4 CHRONIC PAIN SYNDROME: Primary | ICD-10-CM

## 2022-08-18 DIAGNOSIS — M48.061 SPINAL STENOSIS OF LUMBAR REGION WITHOUT NEUROGENIC CLAUDICATION: ICD-10-CM

## 2022-08-18 PROCEDURE — 1123F ACP DISCUSS/DSCN MKR DOCD: CPT | Performed by: PAIN MEDICINE

## 2022-08-18 PROCEDURE — 1090F PRES/ABSN URINE INCON ASSESS: CPT | Performed by: PAIN MEDICINE

## 2022-08-18 PROCEDURE — G8399 PT W/DXA RESULTS DOCUMENT: HCPCS | Performed by: PAIN MEDICINE

## 2022-08-18 PROCEDURE — G8427 DOCREV CUR MEDS BY ELIG CLIN: HCPCS | Performed by: PAIN MEDICINE

## 2022-08-18 PROCEDURE — 99214 OFFICE O/P EST MOD 30 MIN: CPT | Performed by: PAIN MEDICINE

## 2022-08-18 PROCEDURE — G8420 CALC BMI NORM PARAMETERS: HCPCS | Performed by: PAIN MEDICINE

## 2022-08-18 PROCEDURE — 1036F TOBACCO NON-USER: CPT | Performed by: PAIN MEDICINE

## 2022-08-18 PROCEDURE — 99213 OFFICE O/P EST LOW 20 MIN: CPT | Performed by: PAIN MEDICINE

## 2022-08-18 NOTE — PROGRESS NOTES
[address]  [address] [facility], [address] [place] [address] Y[place]any  [phone]    Follow up Note      Rolando Wang     Date of Visit:  8/18/2022    CC:  Patient presents for follow up   Chief Complaint   Patient presents with    Follow Up After Procedure     Lower back pain. HPI:    Follow up mid back pain with complaints of increased low back pain aggravated by standing and walking. Appropriate analgesia with current medications regimen: N/A. Change in quality of symptoms:no. Medication side effects:not applicable . Recent diagnostic testing:none  Recent interventional procedures:Thoracic facet MBB at T8,,9,10 patient mentioned that her mid back pain is not bothering her that much. She has not been on anticoagulation medications to include none. The patient  has not been on herbal supplements. The patient is diabetic. Imaging:  Thoracic spine MRI 2022:    1. Mild chronic compression fracture deformity of the T8 vertebral body. No   acute fracture. 2. Mild exaggeration of the thoracic kyphosis. 3. Small disc protrusions at T6-7 and T10-11. No central canal stenosis. 4. Mild neural foraminal stenoses at multiple levels, resulting from facet   hypertrophic changes. Thoracic spine Xray 2022. Compression of T9  Mild spondylosis T11-12      Previous treatments: medications. .         Potential Aberrant Drug-Related Behavior:    No    Urine Drug Screening:  None    OARRS report:  08/2022 consistent.     Opioid Agreement:  Renewal date:N/A    Past Medical History:   Diagnosis Date    Depression     Diabetes mellitus ([address]     Hyperlipidemia     Hypertension     Thyroid disease     Type 2 diabetes mellitus without complication ([address]      Past Surgical History:   Procedure Laterality Date    CHOLECYSTECTOMY      COLONOSCOPY      EYE SURGERY      cataract both eyes    HYSTERECTOMY (CERVIX STATUS UNKNOWN)      JOINT REPLACEMENT Left     KNEE    PAIN MANAGEMENT PROCEDURE Bilateral 8/11/2022    BILATERAL THORACIC FACET MEDIAL BRANCH BLOCK AT T8, 9, 10 (CPT [id number]) performed by Marvin Singh MD at [place] [id number]  rt rotator    UPPER GASTROINTESTINAL ENDOSCOPY  5/15//12    toni     Prior to Admission medications    Medication Sig Start Date End Date Taking? Authorizing Provider   buPROPion (WELLBUTRIN XL) 300 MG extended release tablet Take 1 tablet by mouth every morning 6/14/22  Yes Filemon Ojeda MD   FLUoxetine (PROZAC) 40 MG capsule Take 1 capsule by mouth nightly 6/14/22  Yes Filemon Ojeda MD   glimepiride (AMARYL) 1 MG tablet Take 1 tablet by mouth 2 times daily 1 TAB BY  MOUTH 2 TIIMES A DAY 6/14/22  Yes Filemon Ojeda MD   levothyroxine (SYNTHROID) 75 MCG tablet Take 1 tablet by mouth daily 6/14/22  Yes Filemon Ojeda MD   losartan (COZAAR) 100 MG tablet Take 1 tablet by mouth daily 6/14/22  Yes Filemon Ojeda MD   meloxicam (MOBIC) 15 MG tablet Take 1 tablet by mouth daily as needed for Pain 6/14/22  Yes Filemon Ojeda MD   metoprolol succinate (TOPROL XL) 50 MG extended release tablet Take 1 tablet by mouth daily 6/14/22  Yes Filemon Ojdea MD   OLANZapine (ZYPREXA) 5 MG tablet Take 1 tablet by mouth nightly 6/14/22  Yes Filemon Ojeda MD   pantoprazole (PROTONIX) 40 MG tablet Take 1 tablet by mouth daily 6/14/22  Yes Filemon Ojeda MD   ciclopirox (PENLAC) 8 % solution Apply topically daily to affected nails.  5/3/22  Yes Salina Carr, DPM   traZODone (DESYREL) 50 MG tablet take 1 tablet by mouth at bedtime if needed 4/8/22  Yes Historical Provider, MD   tiZANidine (ZANAFLEX) 2 MG tablet Take 1 tablet by mouth nightly as needed (muscle spasm) 1/27/22  Yes Filemon Ojeda MD     No Known Allergies    Social History     Socioeconomic History    Marital status:      Spouse name: Not on file    Number of children: Not on file    Years of education: Not on file    Highest education level: Not on file   Occupational History    Not on file Tobacco Use    Smoking status: Never    Smokeless tobacco: Never   Vaping Use    Vaping Use: Not on file   Substance and Sexual Activity    Alcohol use: No     Comment: 1 cup of coffee    Drug use: No    Sexual activity: Not on file   Other Topics Concern    Not on file   Social History Narrative    Not on file     Social Determinants of Health     Financial Resource Strain: Low Risk     Difficulty of Paying Living Expenses: Not hard at all   Food Insecurity: No Food Insecurity    Worried About Running Out of Food in the Last Year: Never true    Ran Out of Food in the Last Year: Never true   Transportation Needs: Not on file   Physical Activity: Not on file   Stress: Not on file   Social Connections: Not on file   Intimate Partner Violence: Not on file   Housing Stability: Not on file     Family History   Problem Relation Age of Onset    Cancer Mother         ESOPHAGEAL     Stroke Mother     Cancer Father         KIDNEY     Coronary Art Dis Brother      REVIEW OF SYSTEMS:     Rosa Mcdermott denies fever/chills, chest pain, shortness of breath, new bowel or bladder complaints. All other review of systems was negative. PHYSICAL EXAMINATION:      /60   Pulse 54   Temp (!) 96.5 °F (35.8 °C) (Infrared)   Resp 16   Ht 5' 4.8\" (1.646 m)   Wt 140 lb (63.5 kg)   SpO2 100%   BMI 23.44 kg/m²     General:       General appearance:   elderly, pleasant and well-hydrated. , in moderate discomfort and A & O x3  Build:Overweight     HEENT:     Head:normocephalic and atraumatic  Sclera: icterus absent,     Lungs:     Breathing:Breathing Pattern: regular, no distress     Abdomen:     Shape:non-distended and normal  Tenderness:none     Cervical spine:     Inspection:normal  Palpation:tenderness paravertebral muscles, facet loading, left, right, positive and tenderness. Range of motion:abnormal moderately flexion, extension rotation bilateral and is  painful.      Thoracic spine:     Spine inspection:exaggerated kyphosis pationPal:tenderness paravertebral muscles and midline tenderness. T6-8/facet tenderness bilaterally  Range of motion:not tested    Lumbar spine:    Spine inspection:normal   CVA tenderness:No   Palpation:tenderness paravertebral muscles, facet loading, left, right, positive, and tenderness. Range of motion:abnormal moderately Lateral bending, flexion, extension rotation bilateral and is  painful. Musculoskeletal:     Trigger points in Paraveteral:absent bilaterally  Jiang's:negative right, negative left  SLR:negative right, negative left, sitting     Extremities:     Tremors:None bilaterally upper and lower  Range of motion: pain with internal rotation of hips negative. Intact:Yes  Edema:Normal     Knee: Inspection:Left knee replaced     Neurological:     Sensory:normal to light touch bilateral upper and lower extremities  Motor:                           Right Quadriceps4+/5          Left Quadriceps4+/5           Right Gastrocnemius4+/5    Left Gastrocnemius4+/5  Right Ant Tibialis4+/5  Left Ant Tibialis4+/5  Reflexes:    Right Quadriceps reflex1+  Left Quadriceps reflexNOT DONE  Right Achilles reflex1+  Left Achilles reflex0  [address]     Dermatology:     Skin:no unusual rashes and no skin lesions     Impression:  Mid back pain  Plan:  Follow up on her mid back pain with complaints of increased low back pain aggravated by standing and walking. Patient is s/p facet MBB at T8,9,10 and per patient her mid back pain is not bothering her, will hold off on repeating for now. Will schedule patient for lumbar spine MRI. OARRS report reviewed 08/2022. Patient encouraged to stay active and to lose weight. Treatment plan discussed with the patient including medications and procedure side effects. We discussed with the patient that combining opioids, benzodiazepines, alcohol, illicit drugs or sleep aids increases the risk of respiratory depression including death.  We discussed that these medications

## 2022-09-07 DIAGNOSIS — E06.3 AUTOIMMUNE HYPOTHYROIDISM: ICD-10-CM

## 2022-09-07 DIAGNOSIS — N18.1 TYPE 2 DIABETES MELLITUS WITH STAGE 1 CHRONIC KIDNEY DISEASE AND HYPERTENSION (HCC): ICD-10-CM

## 2022-09-07 DIAGNOSIS — E11.22 TYPE 2 DIABETES MELLITUS WITH STAGE 1 CHRONIC KIDNEY DISEASE AND HYPERTENSION (HCC): ICD-10-CM

## 2022-09-07 DIAGNOSIS — I12.9 TYPE 2 DIABETES MELLITUS WITH STAGE 1 CHRONIC KIDNEY DISEASE AND HYPERTENSION (HCC): ICD-10-CM

## 2022-09-07 LAB
ALBUMIN SERPL-MCNC: 4.1 G/DL (ref 3.5–5.2)
ALP BLD-CCNC: 121 U/L (ref 35–104)
ALT SERPL-CCNC: 24 U/L (ref 0–32)
ANION GAP SERPL CALCULATED.3IONS-SCNC: 14 MMOL/L (ref 7–16)
AST SERPL-CCNC: 22 U/L (ref 0–31)
BACTERIA: ABNORMAL /HPF
BASOPHILS ABSOLUTE: 0.06 E9/L (ref 0–0.2)
BASOPHILS RELATIVE PERCENT: 0.8 % (ref 0–2)
BILIRUB SERPL-MCNC: 0.2 MG/DL (ref 0–1.2)
BILIRUBIN URINE: NEGATIVE
BLOOD, URINE: ABNORMAL
BUN BLDV-MCNC: 19 MG/DL (ref 6–23)
CALCIUM SERPL-MCNC: 9.8 MG/DL (ref 8.6–10.2)
CHLORIDE BLD-SCNC: 100 MMOL/L (ref 98–107)
CHOLESTEROL, TOTAL: 231 MG/DL (ref 0–199)
CLARITY: CLEAR
CO2: 23 MMOL/L (ref 22–29)
COLOR: YELLOW
CREAT SERPL-MCNC: 1.2 MG/DL (ref 0.5–1)
CREATININE URINE: 68 MG/DL (ref 29–226)
EOSINOPHILS ABSOLUTE: 0.16 E9/L (ref 0.05–0.5)
EOSINOPHILS RELATIVE PERCENT: 2 % (ref 0–6)
EPITHELIAL CELLS, UA: ABNORMAL /HPF
GFR AFRICAN AMERICAN: 52
GFR NON-AFRICAN AMERICAN: 43 ML/MIN/1.73
GLUCOSE BLD-MCNC: 365 MG/DL (ref 74–99)
GLUCOSE URINE: >=1000 MG/DL
HCT VFR BLD CALC: 37.4 % (ref 34–48)
HDLC SERPL-MCNC: 53 MG/DL
HEMOGLOBIN: 11.8 G/DL (ref 11.5–15.5)
IMMATURE GRANULOCYTES #: 0.03 E9/L
IMMATURE GRANULOCYTES %: 0.4 % (ref 0–5)
KETONES, URINE: NEGATIVE MG/DL
LDL CHOLESTEROL CALCULATED: 138 MG/DL (ref 0–99)
LEUKOCYTE ESTERASE, URINE: NEGATIVE
LYMPHOCYTES ABSOLUTE: 2.55 E9/L (ref 1.5–4)
LYMPHOCYTES RELATIVE PERCENT: 32.3 % (ref 20–42)
MCH RBC QN AUTO: 27.3 PG (ref 26–35)
MCHC RBC AUTO-ENTMCNC: 31.6 % (ref 32–34.5)
MCV RBC AUTO: 86.4 FL (ref 80–99.9)
MICROALBUMIN UR-MCNC: 23.3 MG/L
MICROALBUMIN/CREAT UR-RTO: 34.3 (ref 0–30)
MONOCYTES ABSOLUTE: 0.64 E9/L (ref 0.1–0.95)
MONOCYTES RELATIVE PERCENT: 8.1 % (ref 2–12)
NEUTROPHILS ABSOLUTE: 4.46 E9/L (ref 1.8–7.3)
NEUTROPHILS RELATIVE PERCENT: 56.4 % (ref 43–80)
NITRITE, URINE: POSITIVE
PDW BLD-RTO: 16 FL (ref 11.5–15)
PH UA: 6 (ref 5–9)
PLATELET # BLD: 286 E9/L (ref 130–450)
PMV BLD AUTO: 10.1 FL (ref 7–12)
POTASSIUM SERPL-SCNC: 4.6 MMOL/L (ref 3.5–5)
PROTEIN UA: NEGATIVE MG/DL
RBC # BLD: 4.33 E12/L (ref 3.5–5.5)
RBC UA: ABNORMAL /HPF (ref 0–2)
SODIUM BLD-SCNC: 137 MMOL/L (ref 132–146)
SPECIFIC GRAVITY UA: 1.01 (ref 1–1.03)
TOTAL PROTEIN: 6.7 G/DL (ref 6.4–8.3)
TRIGL SERPL-MCNC: 198 MG/DL (ref 0–149)
TSH SERPL DL<=0.05 MIU/L-ACNC: 0.6 UIU/ML (ref 0.27–4.2)
UROBILINOGEN, URINE: 0.2 E.U./DL
VLDLC SERPL CALC-MCNC: 40 MG/DL
WBC # BLD: 7.9 E9/L (ref 4.5–11.5)
WBC UA: ABNORMAL /HPF (ref 0–5)
YEAST: PRESENT /HPF

## 2022-09-14 ENCOUNTER — HOSPITAL ENCOUNTER (INPATIENT)
Age: 82
LOS: 2 days | Discharge: HOME HEALTH | DRG: 638 | End: 2022-09-16
Attending: EMERGENCY MEDICINE | Admitting: INTERNAL MEDICINE
Payer: MEDICARE

## 2022-09-14 ENCOUNTER — APPOINTMENT (OUTPATIENT)
Dept: GENERAL RADIOLOGY | Age: 82
DRG: 638 | End: 2022-09-14
Payer: MEDICARE

## 2022-09-14 ENCOUNTER — OFFICE VISIT (OUTPATIENT)
Dept: PRIMARY CARE CLINIC | Age: 82
End: 2022-09-14
Payer: MEDICARE

## 2022-09-14 VITALS
DIASTOLIC BLOOD PRESSURE: 80 MMHG | TEMPERATURE: 97.3 F | WEIGHT: 136.6 LBS | HEART RATE: 92 BPM | SYSTOLIC BLOOD PRESSURE: 130 MMHG | OXYGEN SATURATION: 98 % | BODY MASS INDEX: 23.32 KG/M2 | HEIGHT: 64 IN

## 2022-09-14 DIAGNOSIS — N18.1 TYPE 2 DIABETES MELLITUS WITH STAGE 1 CHRONIC KIDNEY DISEASE AND HYPERTENSION (HCC): ICD-10-CM

## 2022-09-14 DIAGNOSIS — T43.595A: ICD-10-CM

## 2022-09-14 DIAGNOSIS — N30.00 ACUTE CYSTITIS WITHOUT HEMATURIA: ICD-10-CM

## 2022-09-14 DIAGNOSIS — E11.65 HYPERGLYCEMIA DUE TO DIABETES MELLITUS (HCC): Primary | ICD-10-CM

## 2022-09-14 DIAGNOSIS — M54.50 CHRONIC RIGHT-SIDED LOW BACK PAIN WITHOUT SCIATICA: ICD-10-CM

## 2022-09-14 DIAGNOSIS — I12.9 TYPE 2 DIABETES MELLITUS WITH STAGE 1 CHRONIC KIDNEY DISEASE AND HYPERTENSION (HCC): ICD-10-CM

## 2022-09-14 DIAGNOSIS — E08.65 DIABETES MELLITUS DUE TO UNDERLYING CONDITION, UNCONTROLLED, WITH HYPERGLYCEMIA, WITHOUT LONG-TERM CURRENT USE OF INSULIN (HCC): Primary | ICD-10-CM

## 2022-09-14 DIAGNOSIS — I10 PRIMARY HYPERTENSION: ICD-10-CM

## 2022-09-14 DIAGNOSIS — G89.29 CHRONIC RIGHT-SIDED LOW BACK PAIN WITHOUT SCIATICA: ICD-10-CM

## 2022-09-14 DIAGNOSIS — E11.22 TYPE 2 DIABETES MELLITUS WITH STAGE 1 CHRONIC KIDNEY DISEASE AND HYPERTENSION (HCC): ICD-10-CM

## 2022-09-14 LAB
ALBUMIN SERPL-MCNC: 3.8 G/DL (ref 3.5–5.2)
ALP BLD-CCNC: 124 U/L (ref 35–104)
ALT SERPL-CCNC: 19 U/L (ref 0–32)
ANION GAP SERPL CALCULATED.3IONS-SCNC: 14 MMOL/L (ref 7–16)
AST SERPL-CCNC: 17 U/L (ref 0–31)
BACTERIA: ABNORMAL /HPF
BASOPHILS ABSOLUTE: 0.04 E9/L (ref 0–0.2)
BASOPHILS RELATIVE PERCENT: 0.5 % (ref 0–2)
BETA-HYDROXYBUTYRATE: 0.33 MMOL/L (ref 0.02–0.27)
BILIRUB SERPL-MCNC: 0.3 MG/DL (ref 0–1.2)
BILIRUBIN URINE: NEGATIVE
BLOOD, URINE: NEGATIVE
BUN BLDV-MCNC: 25 MG/DL (ref 6–23)
CALCIUM SERPL-MCNC: 10 MG/DL (ref 8.6–10.2)
CHLORIDE BLD-SCNC: 90 MMOL/L (ref 98–107)
CHP ED QC CHECK: NORMAL
CHP ED QC CHECK: YES
CLARITY: CLEAR
CO2: 25 MMOL/L (ref 22–29)
COLOR: YELLOW
CREAT SERPL-MCNC: 1.3 MG/DL (ref 0.5–1)
EOSINOPHILS ABSOLUTE: 0.04 E9/L (ref 0.05–0.5)
EOSINOPHILS RELATIVE PERCENT: 0.5 % (ref 0–6)
GFR AFRICAN AMERICAN: 47
GFR NON-AFRICAN AMERICAN: 39 ML/MIN/1.73
GLUCOSE BLD-MCNC: 314 MG/DL
GLUCOSE BLD-MCNC: 528 MG/DL
GLUCOSE BLD-MCNC: 529 MG/DL (ref 74–99)
GLUCOSE URINE: >=1000 MG/DL
HBA1C MFR BLD: 15 %
HCT VFR BLD CALC: 38.7 % (ref 34–48)
HEMOGLOBIN: 12.3 G/DL (ref 11.5–15.5)
IMMATURE GRANULOCYTES #: 0.03 E9/L
IMMATURE GRANULOCYTES %: 0.4 % (ref 0–5)
KETONES, URINE: NEGATIVE MG/DL
LACTIC ACID: 2.8 MMOL/L (ref 0.5–2.2)
LEUKOCYTE ESTERASE, URINE: NEGATIVE
LIPASE: 56 U/L (ref 13–60)
LYMPHOCYTES ABSOLUTE: 1.5 E9/L (ref 1.5–4)
LYMPHOCYTES RELATIVE PERCENT: 17.6 % (ref 20–42)
MAGNESIUM: 2 MG/DL (ref 1.6–2.6)
MCH RBC QN AUTO: 26.9 PG (ref 26–35)
MCHC RBC AUTO-ENTMCNC: 31.8 % (ref 32–34.5)
MCV RBC AUTO: 84.7 FL (ref 80–99.9)
METER GLUCOSE: 256 MG/DL (ref 74–99)
METER GLUCOSE: 314 MG/DL (ref 74–99)
METER GLUCOSE: 452 MG/DL (ref 74–99)
METER GLUCOSE: >500 MG/DL (ref 74–99)
MONOCYTES ABSOLUTE: 0.48 E9/L (ref 0.1–0.95)
MONOCYTES RELATIVE PERCENT: 5.6 % (ref 2–12)
NEUTROPHILS ABSOLUTE: 6.45 E9/L (ref 1.8–7.3)
NEUTROPHILS RELATIVE PERCENT: 75.4 % (ref 43–80)
NITRITE, URINE: POSITIVE
OSMOLALITY URINE: 536 MOSM/KG (ref 300–900)
PDW BLD-RTO: 14.9 FL (ref 11.5–15)
PH UA: 6.5 (ref 5–9)
PHOSPHORUS: 2.9 MG/DL (ref 2.5–4.5)
PLATELET # BLD: 334 E9/L (ref 130–450)
PMV BLD AUTO: 9.4 FL (ref 7–12)
POTASSIUM REFLEX MAGNESIUM: 4.6 MMOL/L (ref 3.5–5)
PROTEIN UA: NEGATIVE MG/DL
RBC # BLD: 4.57 E12/L (ref 3.5–5.5)
RBC UA: ABNORMAL /HPF (ref 0–2)
SODIUM BLD-SCNC: 129 MMOL/L (ref 132–146)
SPECIFIC GRAVITY UA: <=1.005 (ref 1–1.03)
TOTAL PROTEIN: 7.1 G/DL (ref 6.4–8.3)
TROPONIN, HIGH SENSITIVITY: 26 NG/L (ref 0–9)
TROPONIN, HIGH SENSITIVITY: 34 NG/L (ref 0–9)
UROBILINOGEN, URINE: 0.2 E.U./DL
WBC # BLD: 8.5 E9/L (ref 4.5–11.5)
WBC UA: ABNORMAL /HPF (ref 0–5)
YEAST: PRESENT /HPF

## 2022-09-14 PROCEDURE — 6360000002 HC RX W HCPCS

## 2022-09-14 PROCEDURE — 87088 URINE BACTERIA CULTURE: CPT

## 2022-09-14 PROCEDURE — 83690 ASSAY OF LIPASE: CPT

## 2022-09-14 PROCEDURE — 82010 KETONE BODYS QUAN: CPT

## 2022-09-14 PROCEDURE — 83935 ASSAY OF URINE OSMOLALITY: CPT

## 2022-09-14 PROCEDURE — 1036F TOBACCO NON-USER: CPT | Performed by: INTERNAL MEDICINE

## 2022-09-14 PROCEDURE — 93005 ELECTROCARDIOGRAM TRACING: CPT

## 2022-09-14 PROCEDURE — 82962 GLUCOSE BLOOD TEST: CPT

## 2022-09-14 PROCEDURE — 99285 EMERGENCY DEPT VISIT HI MDM: CPT

## 2022-09-14 PROCEDURE — 83036 HEMOGLOBIN GLYCOSYLATED A1C: CPT | Performed by: INTERNAL MEDICINE

## 2022-09-14 PROCEDURE — G8420 CALC BMI NORM PARAMETERS: HCPCS | Performed by: INTERNAL MEDICINE

## 2022-09-14 PROCEDURE — 83605 ASSAY OF LACTIC ACID: CPT

## 2022-09-14 PROCEDURE — 84100 ASSAY OF PHOSPHORUS: CPT

## 2022-09-14 PROCEDURE — 6370000000 HC RX 637 (ALT 250 FOR IP)

## 2022-09-14 PROCEDURE — 84484 ASSAY OF TROPONIN QUANT: CPT

## 2022-09-14 PROCEDURE — 1090F PRES/ABSN URINE INCON ASSESS: CPT | Performed by: INTERNAL MEDICINE

## 2022-09-14 PROCEDURE — 96360 HYDRATION IV INFUSION INIT: CPT

## 2022-09-14 PROCEDURE — 83735 ASSAY OF MAGNESIUM: CPT

## 2022-09-14 PROCEDURE — 1123F ACP DISCUSS/DSCN MKR DOCD: CPT | Performed by: INTERNAL MEDICINE

## 2022-09-14 PROCEDURE — 2580000003 HC RX 258

## 2022-09-14 PROCEDURE — G8427 DOCREV CUR MEDS BY ELIG CLIN: HCPCS | Performed by: INTERNAL MEDICINE

## 2022-09-14 PROCEDURE — 3051F HG A1C>EQUAL 7.0%<8.0%: CPT | Performed by: INTERNAL MEDICINE

## 2022-09-14 PROCEDURE — 1200000000 HC SEMI PRIVATE

## 2022-09-14 PROCEDURE — 6370000000 HC RX 637 (ALT 250 FOR IP): Performed by: INTERNAL MEDICINE

## 2022-09-14 PROCEDURE — 80053 COMPREHEN METABOLIC PANEL: CPT

## 2022-09-14 PROCEDURE — 82962 GLUCOSE BLOOD TEST: CPT | Performed by: INTERNAL MEDICINE

## 2022-09-14 PROCEDURE — 99215 OFFICE O/P EST HI 40 MIN: CPT | Performed by: INTERNAL MEDICINE

## 2022-09-14 PROCEDURE — 96361 HYDRATE IV INFUSION ADD-ON: CPT

## 2022-09-14 PROCEDURE — 36415 COLL VENOUS BLD VENIPUNCTURE: CPT

## 2022-09-14 PROCEDURE — 85025 COMPLETE CBC W/AUTO DIFF WBC: CPT

## 2022-09-14 PROCEDURE — 81001 URINALYSIS AUTO W/SCOPE: CPT

## 2022-09-14 PROCEDURE — 2580000003 HC RX 258: Performed by: INTERNAL MEDICINE

## 2022-09-14 PROCEDURE — G8399 PT W/DXA RESULTS DOCUMENT: HCPCS | Performed by: INTERNAL MEDICINE

## 2022-09-14 PROCEDURE — 71045 X-RAY EXAM CHEST 1 VIEW: CPT

## 2022-09-14 RX ORDER — SODIUM CHLORIDE 0.9 % (FLUSH) 0.9 %
5-40 SYRINGE (ML) INJECTION 2 TIMES DAILY
Status: DISCONTINUED | OUTPATIENT
Start: 2022-09-14 | End: 2022-09-16 | Stop reason: HOSPADM

## 2022-09-14 RX ORDER — TRAZODONE HYDROCHLORIDE 50 MG/1
50 TABLET ORAL NIGHTLY PRN
Qty: 90 TABLET | Refills: 0 | Status: SHIPPED
Start: 2022-09-14 | End: 2022-09-14

## 2022-09-14 RX ORDER — PANTOPRAZOLE SODIUM 40 MG/1
40 TABLET, DELAYED RELEASE ORAL DAILY
Status: DISCONTINUED | OUTPATIENT
Start: 2022-09-15 | End: 2022-09-16 | Stop reason: HOSPADM

## 2022-09-14 RX ORDER — FLUOXETINE HYDROCHLORIDE 20 MG/1
40 CAPSULE ORAL DAILY
Status: DISCONTINUED | OUTPATIENT
Start: 2022-09-15 | End: 2022-09-16 | Stop reason: HOSPADM

## 2022-09-14 RX ORDER — 0.9 % SODIUM CHLORIDE 0.9 %
1000 INTRAVENOUS SOLUTION INTRAVENOUS ONCE
Status: COMPLETED | OUTPATIENT
Start: 2022-09-14 | End: 2022-09-14

## 2022-09-14 RX ORDER — METOPROLOL SUCCINATE 50 MG/1
50 TABLET, EXTENDED RELEASE ORAL DAILY
Status: DISCONTINUED | OUTPATIENT
Start: 2022-09-15 | End: 2022-09-16 | Stop reason: HOSPADM

## 2022-09-14 RX ORDER — OLANZAPINE 5 MG/1
5 TABLET ORAL NIGHTLY
Qty: 90 TABLET | Refills: 0 | Status: CANCELLED | OUTPATIENT
Start: 2022-09-14

## 2022-09-14 RX ORDER — LEVOTHYROXINE SODIUM 0.07 MG/1
75 TABLET ORAL DAILY
Status: DISCONTINUED | OUTPATIENT
Start: 2022-09-15 | End: 2022-09-16 | Stop reason: HOSPADM

## 2022-09-14 RX ORDER — LIDOCAINE 4 G/G
1 PATCH TOPICAL DAILY
COMMUNITY
End: 2022-09-20

## 2022-09-14 RX ORDER — SODIUM CHLORIDE 9 MG/ML
INJECTION, SOLUTION INTRAVENOUS CONTINUOUS
Status: DISCONTINUED | OUTPATIENT
Start: 2022-09-14 | End: 2022-09-16 | Stop reason: HOSPADM

## 2022-09-14 RX ORDER — PANTOPRAZOLE SODIUM 40 MG/1
40 TABLET, DELAYED RELEASE ORAL DAILY
Qty: 90 TABLET | Refills: 0 | Status: SHIPPED | OUTPATIENT
Start: 2022-09-14

## 2022-09-14 RX ORDER — LOSARTAN POTASSIUM 100 MG/1
100 TABLET ORAL DAILY
Qty: 90 TABLET | Refills: 0 | Status: SHIPPED | OUTPATIENT
Start: 2022-09-14

## 2022-09-14 RX ORDER — HYDRALAZINE HYDROCHLORIDE 20 MG/ML
5 INJECTION INTRAMUSCULAR; INTRAVENOUS EVERY 8 HOURS PRN
Status: DISCONTINUED | OUTPATIENT
Start: 2022-09-14 | End: 2022-09-16 | Stop reason: HOSPADM

## 2022-09-14 RX ORDER — MELOXICAM 15 MG/1
15 TABLET ORAL DAILY PRN
Qty: 90 TABLET | Refills: 0 | Status: SHIPPED
Start: 2022-09-14 | End: 2022-09-16

## 2022-09-14 RX ORDER — M-VIT,TX,IRON,MINS/CALC/FOLIC 27MG-0.4MG
1 TABLET ORAL DAILY
COMMUNITY

## 2022-09-14 RX ORDER — LEVOTHYROXINE SODIUM 0.07 MG/1
75 TABLET ORAL DAILY
Qty: 90 TABLET | Refills: 0 | Status: SHIPPED | OUTPATIENT
Start: 2022-09-14

## 2022-09-14 RX ORDER — INSULIN LISPRO 100 [IU]/ML
0-8 INJECTION, SOLUTION INTRAVENOUS; SUBCUTANEOUS
Status: DISCONTINUED | OUTPATIENT
Start: 2022-09-14 | End: 2022-09-16 | Stop reason: HOSPADM

## 2022-09-14 RX ORDER — GLIMEPIRIDE 2 MG/1
2 TABLET ORAL 2 TIMES DAILY
Qty: 180 TABLET | Refills: 0 | Status: ON HOLD
Start: 2022-09-14 | End: 2022-09-16 | Stop reason: HOSPADM

## 2022-09-14 RX ORDER — CIPROFLOXACIN 2 MG/ML
200 INJECTION, SOLUTION INTRAVENOUS EVERY 12 HOURS
Status: DISCONTINUED | OUTPATIENT
Start: 2022-09-14 | End: 2022-09-16 | Stop reason: HOSPADM

## 2022-09-14 RX ORDER — OLANZAPINE 5 MG/1
5 TABLET ORAL NIGHTLY
Status: DISCONTINUED | OUTPATIENT
Start: 2022-09-14 | End: 2022-09-16 | Stop reason: HOSPADM

## 2022-09-14 RX ORDER — FLUOXETINE HYDROCHLORIDE 40 MG/1
40 CAPSULE ORAL NIGHTLY
Qty: 90 CAPSULE | Refills: 0 | Status: CANCELLED | OUTPATIENT
Start: 2022-09-14

## 2022-09-14 RX ORDER — LOSARTAN POTASSIUM 100 MG/1
100 TABLET ORAL DAILY
Status: DISCONTINUED | OUTPATIENT
Start: 2022-09-15 | End: 2022-09-16 | Stop reason: HOSPADM

## 2022-09-14 RX ORDER — POTASSIUM CHLORIDE 20 MEQ/1
20 TABLET, EXTENDED RELEASE ORAL DAILY
Status: DISCONTINUED | OUTPATIENT
Start: 2022-09-14 | End: 2022-09-16 | Stop reason: HOSPADM

## 2022-09-14 RX ORDER — TRAZODONE HYDROCHLORIDE 50 MG/1
50 TABLET ORAL NIGHTLY
Status: DISCONTINUED | OUTPATIENT
Start: 2022-09-14 | End: 2022-09-16 | Stop reason: HOSPADM

## 2022-09-14 RX ORDER — ASPIRIN 81 MG/1
81 TABLET, CHEWABLE ORAL DAILY
COMMUNITY

## 2022-09-14 RX ORDER — LIDOCAINE 4 G/G
1 PATCH TOPICAL DAILY
Status: DISCONTINUED | OUTPATIENT
Start: 2022-09-15 | End: 2022-09-16 | Stop reason: HOSPADM

## 2022-09-14 RX ORDER — BUPROPION HYDROCHLORIDE 300 MG/1
300 TABLET ORAL EVERY MORNING
Qty: 90 TABLET | Refills: 0 | Status: CANCELLED | OUTPATIENT
Start: 2022-09-14

## 2022-09-14 RX ORDER — METOPROLOL SUCCINATE 50 MG/1
50 TABLET, EXTENDED RELEASE ORAL DAILY
Qty: 90 TABLET | Refills: 0 | Status: SHIPPED | OUTPATIENT
Start: 2022-09-14

## 2022-09-14 RX ORDER — INSULIN GLARGINE 100 [IU]/ML
15 INJECTION, SOLUTION SUBCUTANEOUS NIGHTLY
Status: DISCONTINUED | OUTPATIENT
Start: 2022-09-14 | End: 2022-09-15

## 2022-09-14 RX ORDER — TRAZODONE HYDROCHLORIDE 50 MG/1
50 TABLET ORAL NIGHTLY
COMMUNITY

## 2022-09-14 RX ORDER — INSULIN LISPRO 100 [IU]/ML
0-4 INJECTION, SOLUTION INTRAVENOUS; SUBCUTANEOUS NIGHTLY
Status: DISCONTINUED | OUTPATIENT
Start: 2022-09-14 | End: 2022-09-16 | Stop reason: HOSPADM

## 2022-09-14 RX ORDER — M-VIT,TX,IRON,MINS/CALC/FOLIC 27MG-0.4MG
1 TABLET ORAL DAILY
Status: DISCONTINUED | OUTPATIENT
Start: 2022-09-15 | End: 2022-09-16 | Stop reason: HOSPADM

## 2022-09-14 RX ORDER — BUPROPION HYDROCHLORIDE 150 MG/1
300 TABLET ORAL EVERY MORNING
Status: DISCONTINUED | OUTPATIENT
Start: 2022-09-15 | End: 2022-09-16 | Stop reason: HOSPADM

## 2022-09-14 RX ORDER — ASPIRIN 81 MG/1
81 TABLET, CHEWABLE ORAL DAILY
Status: DISCONTINUED | OUTPATIENT
Start: 2022-09-15 | End: 2022-09-16 | Stop reason: HOSPADM

## 2022-09-14 RX ORDER — CIPROFLOXACIN 250 MG/1
250 TABLET, FILM COATED ORAL 2 TIMES DAILY
Qty: 14 TABLET | Refills: 0 | Status: SHIPPED | OUTPATIENT
Start: 2022-09-14 | End: 2022-09-21

## 2022-09-14 RX ORDER — TIZANIDINE 2 MG/1
2 TABLET ORAL NIGHTLY PRN
Status: ON HOLD | COMMUNITY
End: 2022-09-16 | Stop reason: HOSPADM

## 2022-09-14 RX ADMIN — CIPROFLOXACIN 200 MG: 2 INJECTION, SOLUTION INTRAVENOUS at 15:22

## 2022-09-14 RX ADMIN — SODIUM CHLORIDE: 9 INJECTION, SOLUTION INTRAVENOUS at 22:46

## 2022-09-14 RX ADMIN — SODIUM CHLORIDE 1000 ML: 9 INJECTION, SOLUTION INTRAVENOUS at 13:40

## 2022-09-14 RX ADMIN — OLANZAPINE 5 MG: 5 TABLET, FILM COATED ORAL at 23:43

## 2022-09-14 RX ADMIN — INSULIN GLARGINE 15 UNITS: 100 INJECTION, SOLUTION SUBCUTANEOUS at 22:09

## 2022-09-14 RX ADMIN — SODIUM CHLORIDE 1000 ML: 9 INJECTION, SOLUTION INTRAVENOUS at 12:17

## 2022-09-14 RX ADMIN — POTASSIUM CHLORIDE 20 MEQ: 1500 TABLET, EXTENDED RELEASE ORAL at 14:56

## 2022-09-14 RX ADMIN — TRAZODONE HYDROCHLORIDE 50 MG: 50 TABLET ORAL at 23:43

## 2022-09-14 SDOH — ECONOMIC STABILITY: FOOD INSECURITY: WITHIN THE PAST 12 MONTHS, THE FOOD YOU BOUGHT JUST DIDN'T LAST AND YOU DIDN'T HAVE MONEY TO GET MORE.: NEVER TRUE

## 2022-09-14 SDOH — ECONOMIC STABILITY: FOOD INSECURITY: WITHIN THE PAST 12 MONTHS, YOU WORRIED THAT YOUR FOOD WOULD RUN OUT BEFORE YOU GOT MONEY TO BUY MORE.: NEVER TRUE

## 2022-09-14 ASSESSMENT — ENCOUNTER SYMPTOMS
WHEEZING: 0
EYE REDNESS: 0
SHORTNESS OF BREATH: 0
ABDOMINAL PAIN: 0
DIARRHEA: 0
NAUSEA: 0
RHINORRHEA: 0
VOMITING: 0
EYE ITCHING: 0

## 2022-09-14 ASSESSMENT — SOCIAL DETERMINANTS OF HEALTH (SDOH): HOW HARD IS IT FOR YOU TO PAY FOR THE VERY BASICS LIKE FOOD, HOUSING, MEDICAL CARE, AND HEATING?: NOT HARD AT ALL

## 2022-09-14 NOTE — PROGRESS NOTES
Chief Complaint   Patient presents with    Difficulty Walking     Pt is here as a routine visit, and states she had nerve block injections in her back and this is better at this time. Pt also states she is having difficulty ambulating, vertigo, with thirst and weight loss. AIC done at this visit, and labs on file from 9/7 for review. HPI:  Patient is here for follow-up of diabetes mellitus hypertension hyperlipidemia and depression. Blood work was discussed with patient her fasting blood sugar was more than 350. Her hemoglobin A1c today is more than 15. Patient claims that she has been compliant with the glimepiride and diet but apparently she is not. She has received an epidural injection in the thoracic spine which helped her a lot with her pain but now she is complaining about lumbosacral spine pain and severe lumbar kyphosis that she is not able to straighten her back out. She had an MRI done of the lumbosacral spine which showed severe spinal stenosis on the L4-L5 and L5-S1 level. She is seeing pain clinic next week for possible epidural injection. .  Patient complains of not feeling well and on reviewing her blood work her UA was positive for large nitrite and large bacteria. We will place her on Cipro 250 mg twice a day for GFR and creatinine clearance. On checking her blood sugar in the office it was 528. I contacted her daughter-in-law and we will contact her daughter to take her to the hospital to admit her for further management and to rule out DKA. Discussed with patient and she is in agreement    Past Medical History, Surgical History, and Family History has been reviewed and updated.     Review of Systems:  Constitutional:  No fever, no fatigue, no chills, no headaches, no weight change  Dermatology:  No rash, no mole, no dry or sensitive skin  ENT:  No cough, no sore throat, no sinus pain, no runny nose, no ear pain  Cardiology:  No chest pain, no palpitations, no leg edema, no shortness of breath, no PND  Gastroenterology:  No dysphagia, no abdominal pain, no nausea, no vomiting, no constipation, no diarrhea, no heartburn  Musculoskeletal:  No joint pain, no leg cramps, no back pain, no muscle aches  Respiratory:  No shortness of breath, no orthopnea, no wheezing, no JOSE, no hemoptysis  Urology:  No blood in the urine, no urinary frequency, no urinary incontinence, no urinary urgency, no nocturia, no dysuria    Vitals:    09/14/22 0922   BP: 130/80   Pulse: 92   Temp: 97.3 °F (36.3 °C)   TempSrc: Temporal   SpO2: 98%   Weight: 136 lb 9.6 oz (62 kg)   Height: 5' 4\" (1.626 m)       General:  Patient alert and oriented x 3, NAD, frail and exhausted  HEENT:  Atraumatic, normocephalic, PERRLA, EOMI, clear conjunctiva, TMs clear, nose-clear, throat - no erythema  Neck:  Supple, no goiter, no carotid bruits, no LAD  Lungs:  CTA   Heart:  RRR, no murmurs, gallops or rubs  Abdomen:  Soft/nt/nd, + bowel sounds  Extremities:  No clubbing, cyanosis or edema  Skin: unremarkable    Hemoglobin A1C   Date Value Ref Range Status   04/19/2022 7.9 % Final     Cholesterol, Total   Date Value Ref Range Status   09/07/2022 231 (H) 0 - 199 mg/dL Final     Triglycerides   Date Value Ref Range Status   09/07/2022 198 (H) 0 - 149 mg/dL Final     HDL   Date Value Ref Range Status   09/07/2022 53 >40 mg/dL Final     LDL Calculated   Date Value Ref Range Status   09/07/2022 138 (H) 0 - 99 mg/dL Final     VLDL Cholesterol Calculated   Date Value Ref Range Status   09/07/2022 40 mg/dL Final     VLDL   Date Value Ref Range Status   02/26/2021 18 mg/dL Final     Chol/HDL Ratio   Date Value Ref Range Status   02/26/2021 3.1  Final     Sodium   Date Value Ref Range Status   09/07/2022 137 132 - 146 mmol/L Final     Potassium   Date Value Ref Range Status   09/07/2022 4.6 3.5 - 5.0 mmol/L Final     Chloride   Date Value Ref Range Status   09/07/2022 100 98 - 107 mmol/L Final     CO2   Date Value Ref Range Status 09/07/2022 23 22 - 29 mmol/L Final     BUN   Date Value Ref Range Status   09/07/2022 19 6 - 23 mg/dL Final     Creatinine   Date Value Ref Range Status   09/07/2022 1.2 (H) 0.5 - 1.0 mg/dL Final     Glucose   Date Value Ref Range Status   09/07/2022 365 (H) 74 - 99 mg/dL Final     Calcium   Date Value Ref Range Status   09/07/2022 9.8 8.6 - 10.2 mg/dL Final     Total Protein   Date Value Ref Range Status   09/07/2022 6.7 6.4 - 8.3 g/dL Final     Albumin   Date Value Ref Range Status   09/07/2022 4.1 3.5 - 5.2 g/dL Final     Total Bilirubin   Date Value Ref Range Status   09/07/2022 0.2 0.0 - 1.2 mg/dL Final     Alkaline Phosphatase   Date Value Ref Range Status   09/07/2022 121 (H) 35 - 104 U/L Final     AST   Date Value Ref Range Status   09/07/2022 22 0 - 31 U/L Final     ALT   Date Value Ref Range Status   09/07/2022 24 0 - 32 U/L Final     GFR Non-   Date Value Ref Range Status   09/07/2022 43 >=60 mL/min/1.73 Final     Comment:     Chronic Kidney Disease: less than 60 ml/min/1.73 sq.m. Kidney Failure: less than 15 ml/min/1.73 sq.m. Results valid for patients 18 years and older. GFR    Date Value Ref Range Status   09/07/2022 52  Final        MRI LUMBAR SPINE WO CONTRAST    Result Date: 8/31/2022  EXAMINATION: MRI OF THE LUMBAR SPINE WITHOUT CONTRAST, 8/31/2022 11:16 am TECHNIQUE: Multiplanar multisequence MRI of the lumbar spine was performed without the administration of intravenous contrast. COMPARISON: None. HISTORY: ORDERING SYSTEM PROVIDED HISTORY: Spinal stenosis of lumbar region without neurogenic claudication FINDINGS: BONES/ALIGNMENT:  No fracture or joint dislocation. The vertebral body heights are preserved. Mild marrow edema along the endplates at J2-1 likely represent Modic type 1 changes. No evidence of a marrow infiltrative process. SPINAL CORD: The conus terminates normally. SOFT TISSUES: No paraspinal mass identified.  L1-L2: Disc desiccation with a tiny central disc protrusion. No significant central canal, lateral recess or neural foraminal stenoses. L2-L3: Disc desiccation with a small disc bulge. Mild facet hypertrophy. No significant central canal or lateral recess stenosis. No significant central canal, lateral recess or neural foraminal stenoses. L3-L4: Prominent loss of disc height with a disc osteophyte complex. Moderate facet and ligamentous hypertrophy. Moderate central canal stenosis. Severe lateral recess and neural foraminal stenoses. L4-L5: Moderate loss of disc height with a small disc osteophyte complex. Mild-to-moderate facet and ligamentum flavum hypertrophy. Mild-to-moderate central canal stenosis. Moderate lateral recess stenoses. Severe right and moderate to severe left neural foraminal stenoses. L5-S1: Severe loss of disc height with a disc osteophyte complex. Superimposed right paracentral disc protrusion with impingement of the right S1 nerve. .  Mild facet hypertrophy. Mild-to-moderate central canal stenosis. Severe right and moderate left lateral recess stenoses. Moderate to severe bilateral neural foraminal stenoses. 1.  No fracture or bony destructive lesion. 2. Moderate central canal stenosis at L3-4. Mild to moderate stenoses at L4-5 and L5-S1. 3.  Multilevel neural foraminal stenoses, worst (severe) at the bilateral L3-4 and right L4-5 levels. Moderate to severe neural foraminal stenoses at the left L4-5 and bilateral L5-S1 levels. 4. Severe lateral recess stenoses at L3-4 bilaterally and at the right L5-S1 levels.   4 RECOMMENDATIONS: Unavailable        Assessment/Plan:      Outpatient Encounter Medications as of 9/14/2022   Medication Sig Dispense Refill    glimepiride (AMARYL) 2 MG tablet Take 1 tablet by mouth 2 times daily 1 TAB BY  MOUTH 2 TIIMES A  tablet 0    levothyroxine (SYNTHROID) 75 MCG tablet Take 1 tablet by mouth daily 90 tablet 0    losartan (COZAAR) 100 MG tablet Take 1 tablet by mouth daily 90 tablet 0    meloxicam (MOBIC) 15 MG tablet Take 1 tablet by mouth daily as needed for Pain 90 tablet 0    metoprolol succinate (TOPROL XL) 50 MG extended release tablet Take 1 tablet by mouth daily 90 tablet 0    pantoprazole (PROTONIX) 40 MG tablet Take 1 tablet by mouth daily 90 tablet 0    ciprofloxacin (CIPRO) 250 MG tablet Take 1 tablet by mouth 2 times daily for 7 days 14 tablet 0    buPROPion (WELLBUTRIN XL) 300 MG extended release tablet Take 1 tablet by mouth every morning 90 tablet 0    FLUoxetine (PROZAC) 40 MG capsule Take 1 capsule by mouth nightly 90 capsule 0    OLANZapine (ZYPREXA) 5 MG tablet Take 1 tablet by mouth nightly 90 tablet 0    ciclopirox (PENLAC) 8 % solution Apply topically daily to affected nails. 6 mL 2    [DISCONTINUED] traZODone (DESYREL) 50 MG tablet Take 1 tablet by mouth nightly as needed for Sleep 90 tablet 0    [DISCONTINUED] glimepiride (AMARYL) 1 MG tablet Take 1 tablet by mouth 2 times daily 1 TAB BY  MOUTH 2 TIIMES A  tablet 0    [DISCONTINUED] levothyroxine (SYNTHROID) 75 MCG tablet Take 1 tablet by mouth daily 90 tablet 0    [DISCONTINUED] losartan (COZAAR) 100 MG tablet Take 1 tablet by mouth daily 90 tablet 0    [DISCONTINUED] meloxicam (MOBIC) 15 MG tablet Take 1 tablet by mouth daily as needed for Pain 90 tablet 0    [DISCONTINUED] metoprolol succinate (TOPROL XL) 50 MG extended release tablet Take 1 tablet by mouth daily 90 tablet 0    [DISCONTINUED] pantoprazole (PROTONIX) 40 MG tablet Take 1 tablet by mouth daily 90 tablet 0    [DISCONTINUED] traZODone (DESYREL) 50 MG tablet take 1 tablet by mouth at bedtime if needed      [DISCONTINUED] tiZANidine (ZANAFLEX) 2 MG tablet Take 1 tablet by mouth nightly as needed (muscle spasm) (Patient not taking: Reported on 9/14/2022) 10 tablet 0     No facility-administered encounter medications on file as of 9/14/2022. Geno Villa was seen today for difficulty walking.     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 (HCC)  -     glimepiride (AMARYL) 2 MG tablet; Take 1 tablet by mouth 2 times daily 1 TAB BY  MOUTH 2 TIIMES A DAY    Primary hypertension  -     losartan (COZAAR) 100 MG tablet; Take 1 tablet by mouth daily  -     metoprolol succinate (TOPROL XL) 50 MG extended release tablet; Take 1 tablet by mouth daily    Chronic right-sided low back pain without sciatica  -     meloxicam (MOBIC) 15 MG tablet; Take 1 tablet by mouth daily as needed for Pain    Acute cystitis without hematuria  -     ciprofloxacin (CIPRO) 250 MG tablet; Take 1 tablet by mouth 2 times daily for 7 days  -     Urinalysis; Future    Other orders  -     levothyroxine (SYNTHROID) 75 MCG tablet; Take 1 tablet by mouth daily  -     pantoprazole (PROTONIX) 40 MG tablet; Take 1 tablet by mouth daily  -     Discontinue: traZODone (DESYREL) 50 MG tablet; Take 1 tablet by mouth nightly as needed for Sleep  -     Cancel: Influenza, FLUAD, (age 72 y+), IM, PF, 0.5 mL     On checking her blood sugar to be above 500 and her hemoglobin A1c to be above 15 patient was advised to go to the hospital to rule out DKA. Talk to her daughter and her daughter-in-law and advised them to take her to the hospital.  Patient refused to go in an ambulance. Patient was given 15 units of Humulin R subcu x1 now  Will follow with her progress after being evaluated in the emergency room    There are no Patient Instructions on file for this visit. On this date 9/14/2022 I have spent 45 minutes reviewing previous notes, test results and face to face with the patient discussing the diagnosis and importance of compliance with the treatment plan as well as documenting on the day of the visit.       Tessa Austin MD   9/14/22

## 2022-09-14 NOTE — ED PROVIDER NOTES
Inocencia Trujillo is a 79 y.o. female    Chief Complaint   Patient presents with    Hyperglycemia     Sent in by PCP for admission           HPI   Inocencia Trujillo is a 79 y.o. female presenting to the ED for Hyperglycemia (Sent in by PCP for admission)    History comes primarily from the patient. Patient presents from her PCP's office, Dr. Ivan Jane, due to glucose level >500; Dr. Ivan Jane wanted the patient admitted and she admits her on patient's. Patient has been feeling mildly unwell, with feeling of malaise for the last couple weeks. She complains of polyuria and polydipsia. She has not been eating very much, but otherwise has not changes in diet. Patient does have diabetes but takes only 1 oral diabetic medication, glimepiride; she does not take any insulin. Otherwise, the patient has not felt ill or sick. Patient's other medications include Zyprexa and levothyroxine. She denies having taken any steroids recently. Patient does appear to have had a urinary tract infection for which she has been taking ciprofloxacin since 9/7. Patient did receive a shot of 15 units of regular insulin by her PCP prior to being sent to the ED. Review of Systems   Constitutional:  Positive for fatigue (malaise). Negative for activity change, appetite change and fever. HENT:  Negative for congestion and rhinorrhea. Eyes:  Negative for redness and itching. Respiratory:  Negative for shortness of breath and wheezing. Cardiovascular:  Negative for chest pain and palpitations. Gastrointestinal:  Negative for abdominal pain, diarrhea, nausea and vomiting. Endocrine: Positive for polydipsia and polyuria. Genitourinary:  Negative for difficulty urinating, frequency and urgency. Musculoskeletal:  Negative for arthralgias and myalgias. Skin:  Negative for pallor and rash. Neurological:  Negative for dizziness and numbness. Psychiatric/Behavioral:  Negative for agitation and confusion.     All other systems reviewed and are negative. Physical Exam  Vitals and nursing note reviewed. Constitutional:       General: She is not in acute distress. Appearance: Normal appearance. She is not ill-appearing. HENT:      Head: Normocephalic and atraumatic. Right Ear: External ear normal.      Left Ear: External ear normal.      Nose: Nose normal. No rhinorrhea. Mouth/Throat:      Mouth: Mucous membranes are moist.      Pharynx: Oropharynx is clear. Eyes:      Extraocular Movements: Extraocular movements intact. Conjunctiva/sclera: Conjunctivae normal.      Pupils: Pupils are equal, round, and reactive to light. Cardiovascular:      Rate and Rhythm: Normal rate and regular rhythm. Pulses: Normal pulses. Heart sounds: Normal heart sounds. No murmur heard. Pulmonary:      Effort: Pulmonary effort is normal. No respiratory distress. Breath sounds: Normal breath sounds. No wheezing. Abdominal:      General: Bowel sounds are normal. There is no distension. Palpations: Abdomen is soft. Tenderness: There is no abdominal tenderness. Musculoskeletal:         General: No swelling or deformity. Normal range of motion. Cervical back: Normal range of motion and neck supple. No rigidity. Right lower leg: No edema. Left lower leg: No edema. Skin:     General: Skin is warm and dry. Coloration: Skin is not jaundiced or pale. Neurological:      General: No focal deficit present. Mental Status: She is alert and oriented to person, place, and time. Mental status is at baseline. Motor: No weakness. Psychiatric:         Mood and Affect: Mood normal.         Behavior: Behavior normal.        Procedures     MDM  Patient presented to the Emergency Department for Hyperglycemia (Sent in by PCP for admission)    Patient's workup notable for hyperglycemia without DKA, and UTI. Patient treated with 2L of saline and per admitting provider, started on ciprofloxacin. Discussed with the patient's PcP, Dr. Madina Calvillo, the admitting provider. Patient will be started on subQ insulin and admitted to the hospital for further management. Patient noted to take zyprexa which can metabolic syndrome with hyperglycemia. This might be a consideration due to the fact that the patient has only ever been on one oral diabetic medication in the past.      EKG: This EKG is signed and interpreted by me. Rate: 71  Rhythm: sinus  Axis: normal  Interpretation: new t-wave inversions in V2 and aVL. Nomrmal qrs, qtc. Other intervals and segments appear normal. No ST elevations or depressions. Comparison: changes compared to previous EKG    ED Course as of 09/14/22 1606   Wed Sep 14, 2022   1453   ATTENDING PROVIDER ATTESTATION:     I have personally performed and/or participated in the history, exam, medical decision making, and procedures and agree with all pertinent clinical information unless otherwise noted. I have also reviewed and agree with the past medical, family and social history unless otherwise noted. I have discussed this patient in detail with the resident and provided the instruction and education regarding the evidence-based evaluation and treatment of elevated BGL in physician office. Any EKG that may have been performed has been personally reviewed by me and I agree with the documentation as noted by the resident. History: patient presents with concern for elevated BGL. She recently stopped the metformin due to diarrhea. Her BGL has been out of control since then. She denies other complaints. She did recently have a UTI. My findings: Kae Bryan is a 79 y.o. female whom is in no distress. Physical exam reveals well appearing. Slightly dry mucous membranes. Heart RRR, lungs CTA, abdomen is soft and nontender. No focal neurologic deficits. My plan: Symptomatic and supportive care.  Will admit at request of PCP for treatment and further modification of 7.30 E9/L    Immature Granulocytes # 0.03 E9/L    Lymphocytes Absolute 1.50 1.50 - 4.00 E9/L    Monocytes Absolute 0.48 0.10 - 0.95 E9/L    Eosinophils Absolute 0.04 (L) 0.05 - 0.50 E9/L    Basophils Absolute 0.04 0.00 - 0.20 E9/L   Comprehensive Metabolic Panel w/ Reflex to MG   Result Value Ref Range    Sodium 129 (L) 132 - 146 mmol/L    Potassium reflex Magnesium 4.6 3.5 - 5.0 mmol/L    Chloride 90 (L) 98 - 107 mmol/L    CO2 25 22 - 29 mmol/L    Anion Gap 14 7 - 16 mmol/L    Glucose 529 (HH) 74 - 99 mg/dL    BUN 25 (H) 6 - 23 mg/dL    Creatinine 1.3 (H) 0.5 - 1.0 mg/dL    GFR Non-African American 39 >=60 mL/min/1.73    GFR African American 47     Calcium 10.0 8.6 - 10.2 mg/dL    Total Protein 7.1 6.4 - 8.3 g/dL    Albumin 3.8 3.5 - 5.2 g/dL    Total Bilirubin 0.3 0.0 - 1.2 mg/dL    Alkaline Phosphatase 124 (H) 35 - 104 U/L    ALT 19 0 - 32 U/L    AST 17 0 - 31 U/L   Troponin   Result Value Ref Range    Troponin, High Sensitivity 34 (H) 0 - 9 ng/L   Lactic Acid   Result Value Ref Range    Lactic Acid 2.8 (H) 0.5 - 2.2 mmol/L   Urinalysis with Microscopic   Result Value Ref Range    Color, UA Yellow Straw/Yellow    Clarity, UA Clear Clear    Glucose, Ur >=1000 (A) Negative mg/dL    Bilirubin Urine Negative Negative    Ketones, Urine Negative Negative mg/dL    Specific Gravity, UA <=1.005 1.005 - 1.030    Blood, Urine Negative Negative    pH, UA 6.5 5.0 - 9.0    Protein, UA Negative Negative mg/dL    Urobilinogen, Urine 0.2 <2.0 E.U./dL    Nitrite, Urine POSITIVE (A) Negative    Leukocyte Esterase, Urine Negative Negative    WBC, UA 2-5 0 - 5 /HPF    RBC, UA NONE 0 - 2 /HPF    Bacteria, UA NONE SEEN None Seen /HPF    Yeast, UA Present (A) None Seen /HPF   Lipase   Result Value Ref Range    Lipase 56 13 - 60 U/L   Beta-Hydroxybutyrate   Result Value Ref Range    Beta-Hydroxybutyrate 0.33 (H) 0.02 - 0.27 mmol/L   Magnesium   Result Value Ref Range    Magnesium 2.0 1.6 - 2.6 mg/dL   Phosphorus   Result Value Ref Range Phosphorus 2.9 2.5 - 4.5 mg/dL   Troponin   Result Value Ref Range    Troponin, High Sensitivity 26 (H) 0 - 9 ng/L   POCT Glucose   Result Value Ref Range    Glucose 314 mg/dL    QC OK? yes    POCT Glucose   Result Value Ref Range    Meter Glucose >500 (H) 74 - 99 mg/dL   POCT Glucose   Result Value Ref Range    Meter Glucose 452 (H) 74 - 99 mg/dL   POCT Glucose   Result Value Ref Range    Meter Glucose 314 (H) 74 - 99 mg/dL   EKG 12 Lead   Result Value Ref Range    Ventricular Rate 71 BPM    Atrial Rate 71 BPM    P-R Interval 152 ms    QRS Duration 86 ms    Q-T Interval 404 ms    QTc Calculation (Bazett) 439 ms    P Axis -28 degrees    R Axis 50 degrees    T Axis 90 degrees       RADIOLOGY:  XR CHEST PORTABLE   Final Result   Right lower lobe subsegmental atelectasis. Otherwise unremarkable chest.               ------------------------- NURSING NOTES AND VITALS REVIEWED ---------------------------  Date / Time Roomed:  9/14/2022 11:13 AM  ED Bed Assignment:  01/01    The nursing notes within the ED encounter and vital signs as below have been reviewed. Patient Vitals for the past 24 hrs:   BP Temp Temp src Pulse Resp SpO2   09/14/22 1449 (!) 161/92 -- -- 57 17 100 %   09/14/22 1423 (!) 178/90 -- -- 60 18 100 %   09/14/22 1417 -- -- -- 62 17 100 %   09/14/22 1339 (!) 161/68 -- -- 62 21 100 %   09/14/22 1301 -- -- -- 65 15 99 %   09/14/22 1203 -- -- -- 70 14 99 %   09/14/22 1110 (!) 169/67 97.2 °F (36.2 °C) Infrared 95 16 96 %       Oxygen Saturation Interpretation: Normal    ------------------------------------------ PROGRESS NOTES ------------------------------------------  Re-evaluation(s):  Time: Multiple. Patients symptoms show no change  Repeat physical examination is not changed    Counseling:  I have spoken with the patient and discussed todays results, in addition to providing specific details for the plan of care and counseling regarding the diagnosis and prognosis.   Their questions are answered at this time and they are agreeable with the plan of admission.    --------------------------------- ADDITIONAL PROVIDER NOTES ---------------------------------  Consultations:  Time: 0986. Spoke with Dr. Chris Evans. Discussed case. They will admit the patient. This patient's ED course included: a personal history and physicial examination, multiple bedside re-evaluations, IV medications, cardiac monitoring, and continuous pulse oximetry    This patient has remained hemodynamically stable during their ED course. Diagnosis:  1. Hyperglycemia due to diabetes mellitus ([facility] [address]    2. Adverse effect of olanzapine, initial encounter        Disposition:  Patient's disposition: Admit to telemetry  Patient's condition is stable.            Manasa Singer MD  Resident  09/14/22 9814

## 2022-09-15 PROBLEM — N30.00 ACUTE CYSTITIS WITHOUT HEMATURIA: Status: ACTIVE | Noted: 2022-09-15

## 2022-09-15 PROBLEM — E11.65 HYPERGLYCEMIC CRISIS IN DIABETES MELLITUS (HCC): Status: ACTIVE | Noted: 2022-09-15

## 2022-09-15 LAB
ALBUMIN SERPL-MCNC: 3.7 G/DL (ref 3.5–5.2)
ALP BLD-CCNC: 111 U/L (ref 35–104)
ALT SERPL-CCNC: 19 U/L (ref 0–32)
ANION GAP SERPL CALCULATED.3IONS-SCNC: 10 MMOL/L (ref 7–16)
AST SERPL-CCNC: 19 U/L (ref 0–31)
BASOPHILS ABSOLUTE: 0.06 E9/L (ref 0–0.2)
BASOPHILS RELATIVE PERCENT: 0.7 % (ref 0–2)
BILIRUB SERPL-MCNC: 0.3 MG/DL (ref 0–1.2)
BUN BLDV-MCNC: 16 MG/DL (ref 6–23)
CALCIUM SERPL-MCNC: 9.5 MG/DL (ref 8.6–10.2)
CHLORIDE BLD-SCNC: 104 MMOL/L (ref 98–107)
CO2: 25 MMOL/L (ref 22–29)
CREAT SERPL-MCNC: 1.1 MG/DL (ref 0.5–1)
EKG ATRIAL RATE: 71 BPM
EKG P AXIS: -28 DEGREES
EKG P-R INTERVAL: 152 MS
EKG Q-T INTERVAL: 404 MS
EKG QRS DURATION: 86 MS
EKG QTC CALCULATION (BAZETT): 439 MS
EKG R AXIS: 50 DEGREES
EKG T AXIS: 90 DEGREES
EKG VENTRICULAR RATE: 71 BPM
EOSINOPHILS ABSOLUTE: 0.13 E9/L (ref 0.05–0.5)
EOSINOPHILS RELATIVE PERCENT: 1.5 % (ref 0–6)
GFR AFRICAN AMERICAN: 57
GFR NON-AFRICAN AMERICAN: 47 ML/MIN/1.73
GLUCOSE BLD-MCNC: 207 MG/DL (ref 74–99)
HCT VFR BLD CALC: 38.5 % (ref 34–48)
HEMOGLOBIN: 11.8 G/DL (ref 11.5–15.5)
IMMATURE GRANULOCYTES #: 0.03 E9/L
IMMATURE GRANULOCYTES %: 0.3 % (ref 0–5)
LACTIC ACID: 1 MMOL/L (ref 0.5–2.2)
LYMPHOCYTES ABSOLUTE: 2.37 E9/L (ref 1.5–4)
LYMPHOCYTES RELATIVE PERCENT: 27.2 % (ref 20–42)
MCH RBC QN AUTO: 26.8 PG (ref 26–35)
MCHC RBC AUTO-ENTMCNC: 30.6 % (ref 32–34.5)
MCV RBC AUTO: 87.3 FL (ref 80–99.9)
METER GLUCOSE: 181 MG/DL (ref 74–99)
METER GLUCOSE: 186 MG/DL (ref 74–99)
METER GLUCOSE: 221 MG/DL (ref 74–99)
METER GLUCOSE: 285 MG/DL (ref 74–99)
METER GLUCOSE: 311 MG/DL (ref 74–99)
MONOCYTES ABSOLUTE: 0.76 E9/L (ref 0.1–0.95)
MONOCYTES RELATIVE PERCENT: 8.7 % (ref 2–12)
NEUTROPHILS ABSOLUTE: 5.37 E9/L (ref 1.8–7.3)
NEUTROPHILS RELATIVE PERCENT: 61.6 % (ref 43–80)
PDW BLD-RTO: 15.3 FL (ref 11.5–15)
PLATELET # BLD: 335 E9/L (ref 130–450)
PMV BLD AUTO: 9.9 FL (ref 7–12)
POTASSIUM SERPL-SCNC: 4.1 MMOL/L (ref 3.5–5)
RBC # BLD: 4.41 E12/L (ref 3.5–5.5)
SODIUM BLD-SCNC: 139 MMOL/L (ref 132–146)
TOTAL PROTEIN: 6.6 G/DL (ref 6.4–8.3)
WBC # BLD: 8.7 E9/L (ref 4.5–11.5)

## 2022-09-15 PROCEDURE — 36415 COLL VENOUS BLD VENIPUNCTURE: CPT

## 2022-09-15 PROCEDURE — 6360000002 HC RX W HCPCS

## 2022-09-15 PROCEDURE — 83605 ASSAY OF LACTIC ACID: CPT

## 2022-09-15 PROCEDURE — 6370000000 HC RX 637 (ALT 250 FOR IP): Performed by: INTERNAL MEDICINE

## 2022-09-15 PROCEDURE — 1200000000 HC SEMI PRIVATE

## 2022-09-15 PROCEDURE — 80053 COMPREHEN METABOLIC PANEL: CPT

## 2022-09-15 PROCEDURE — 6370000000 HC RX 637 (ALT 250 FOR IP)

## 2022-09-15 PROCEDURE — 87040 BLOOD CULTURE FOR BACTERIA: CPT

## 2022-09-15 PROCEDURE — 99223 1ST HOSP IP/OBS HIGH 75: CPT | Performed by: INTERNAL MEDICINE

## 2022-09-15 PROCEDURE — 82962 GLUCOSE BLOOD TEST: CPT

## 2022-09-15 PROCEDURE — 85025 COMPLETE CBC W/AUTO DIFF WBC: CPT

## 2022-09-15 PROCEDURE — 2580000003 HC RX 258: Performed by: INTERNAL MEDICINE

## 2022-09-15 RX ORDER — DEXTROSE MONOHYDRATE 100 MG/ML
INJECTION, SOLUTION INTRAVENOUS CONTINUOUS PRN
Status: DISCONTINUED | OUTPATIENT
Start: 2022-09-15 | End: 2022-09-16 | Stop reason: HOSPADM

## 2022-09-15 RX ORDER — INSULIN GLARGINE 100 [IU]/ML
18 INJECTION, SOLUTION SUBCUTANEOUS NIGHTLY
Status: DISCONTINUED | OUTPATIENT
Start: 2022-09-15 | End: 2022-09-16 | Stop reason: HOSPADM

## 2022-09-15 RX ADMIN — ASPIRIN 81 MG 81 MG: 81 TABLET ORAL at 09:28

## 2022-09-15 RX ADMIN — INSULIN LISPRO 2 UNITS: 100 INJECTION, SOLUTION INTRAVENOUS; SUBCUTANEOUS at 11:31

## 2022-09-15 RX ADMIN — POTASSIUM CHLORIDE 20 MEQ: 1500 TABLET, EXTENDED RELEASE ORAL at 09:28

## 2022-09-15 RX ADMIN — CIPROFLOXACIN 200 MG: 2 INJECTION, SOLUTION INTRAVENOUS at 03:22

## 2022-09-15 RX ADMIN — OLANZAPINE 5 MG: 5 TABLET, FILM COATED ORAL at 20:16

## 2022-09-15 RX ADMIN — CIPROFLOXACIN 200 MG: 2 INJECTION, SOLUTION INTRAVENOUS at 15:20

## 2022-09-15 RX ADMIN — INSULIN LISPRO 6 UNITS: 100 INJECTION, SOLUTION INTRAVENOUS; SUBCUTANEOUS at 16:35

## 2022-09-15 RX ADMIN — INSULIN GLARGINE 18 UNITS: 100 INJECTION, SOLUTION SUBCUTANEOUS at 20:18

## 2022-09-15 RX ADMIN — LEVOTHYROXINE SODIUM 75 MCG: 0.07 TABLET ORAL at 05:56

## 2022-09-15 RX ADMIN — METOPROLOL SUCCINATE 50 MG: 50 TABLET, EXTENDED RELEASE ORAL at 09:28

## 2022-09-15 RX ADMIN — FLUOXETINE HYDROCHLORIDE 40 MG: 20 CAPSULE ORAL at 09:27

## 2022-09-15 RX ADMIN — SODIUM CHLORIDE, PRESERVATIVE FREE 10 ML: 5 INJECTION INTRAVENOUS at 09:26

## 2022-09-15 RX ADMIN — TRAZODONE HYDROCHLORIDE 50 MG: 50 TABLET ORAL at 20:17

## 2022-09-15 RX ADMIN — SODIUM CHLORIDE, PRESERVATIVE FREE 10 ML: 5 INJECTION INTRAVENOUS at 20:16

## 2022-09-15 RX ADMIN — LOSARTAN POTASSIUM 100 MG: 100 TABLET, FILM COATED ORAL at 09:27

## 2022-09-15 RX ADMIN — PANTOPRAZOLE SODIUM 40 MG: 40 TABLET, DELAYED RELEASE ORAL at 09:28

## 2022-09-15 RX ADMIN — MULTIPLE VITAMINS W/ MINERALS TAB 1 TABLET: TAB at 09:28

## 2022-09-15 RX ADMIN — BUPROPION HYDROCHLORIDE 300 MG: 150 TABLET, FILM COATED, EXTENDED RELEASE ORAL at 09:28

## 2022-09-15 ASSESSMENT — PAIN SCALES - GENERAL
PAINLEVEL_OUTOF10: 0
PAINLEVEL_OUTOF10: 0

## 2022-09-15 NOTE — H&P
History and Physical      CHIEF COMPLAINT:  \"Not feeling well\"  Patient had a blood sugar 528 in the ambulatory setting and an acute urinary tract infection    History of Present Illness: This is an 59-year-old white female well-known to me with past medical history significant for diabetes mellitus hypertension hyperlipidemia and bipolar disorder. She presented to the office for a routine follow-up with a complaint of not feeling well lately over the course of the last 2 weeks. Patient was not very specific but she generally felt weak, very thirsty with decreased appetite and loss of weight. Blood sugar in the office was 528. Hemoglobin A1c was more than 15. Patient was given 15 units of Humulin R subcu and she was advised to come to the emergency room. In the emergency room she received 2 L of IV normal saline. Her blood sugar started coming down in the 400 range. Her sodium was 129 secondary to the hyperglycemia. She was also found to have a UTI with large leukocyte karthik and bacteria and yeast.  She was started on Cipro 200 mg IV every 12 hours for creatinine clearance. Her bicarb remained within reasonable range, anion gap was ranging from 10-14. There were no ketones in the urine. She was admitted with severe hyperglycemia and was started on long-acting insulin at night and insulin sliding scale for the day. Today she feels a lot better denying any shortness of breath chest pains abdominal pains nausea vomiting or diarrhea. Her blood sugars are ranging in the 200 range. We will start diabetic education and will continue monitoring her blood sugars over the course of the next 24 hours.       Past Medical History:   Diagnosis Date    Depression     Diabetes mellitus ([facility] [address]     Hyperlipidemia     Hypertension     Thyroid disease     Type 2 diabetes mellitus without complication ([facility] [address]        Past Surgical History:   Procedure Laterality Date    CHOLECYSTECTOMY      COLONOSCOPY      EYE SURGERY cataract both eyes    HYSTERECTOMY (CERVIX STATUS UNKNOWN)      JOINT REPLACEMENT Left     KNEE    PAIN MANAGEMENT PROCEDURE Bilateral 8/11/2022    BILATERAL THORACIC FACET MEDIAL BRANCH BLOCK AT T8, 9, 10 (CPT 99175) performed by Chano Barrientos MD at [place] [id number]  rt rotator    UPPER GASTROINTESTINAL ENDOSCOPY  5/15//12    toni       Medications Prior to Admission:    Medications Prior to Admission: aspirin 81 MG chewable tablet, Take 81 mg by mouth daily  traZODone (DESYREL) 50 MG tablet, Take 50 mg by mouth nightly  tiZANidine (ZANAFLEX) 2 MG tablet, Take 2 mg by mouth nightly as needed  lidocaine 4 % external patch, Place 1 patch onto the skin daily Mid lower back  Multiple Vitamins-Minerals (THERAPEUTIC MULTIVITAMIN-MINERALS) tablet, Take 1 tablet by mouth daily  glimepiride (AMARYL) 2 MG tablet, Take 1 tablet by mouth 2 times daily 1 TAB BY  MOUTH 2 TIIMES A DAY  levothyroxine (SYNTHROID) 75 MCG tablet, Take 1 tablet by mouth daily  losartan (COZAAR) 100 MG tablet, Take 1 tablet by mouth daily  meloxicam (MOBIC) 15 MG tablet, Take 1 tablet by mouth daily as needed for Pain  metoprolol succinate (TOPROL XL) 50 MG extended release tablet, Take 1 tablet by mouth daily  pantoprazole (PROTONIX) 40 MG tablet, Take 1 tablet by mouth daily  ciprofloxacin (CIPRO) 250 MG tablet, Take 1 tablet by mouth 2 times daily for 7 days (Patient not taking: Reported on 9/14/2022)  buPROPion (WELLBUTRIN XL) 300 MG extended release tablet, Take 1 tablet by mouth every morning  FLUoxetine (PROZAC) 40 MG capsule, Take 1 capsule by mouth nightly (Patient taking differently: Take 40 mg by mouth daily)  OLANZapine (ZYPREXA) 5 MG tablet, Take 1 tablet by mouth nightly  ciclopirox (PENLAC) 8 % solution, Apply topically daily to affected nails. (Patient not taking: Reported on 9/14/2022)    Allergies:    Patient has no known allergies. Social History:    reports that she has never smoked.  She has never used 05:16 AM    K 4.6 09/14/2022 11:53 AM     09/15/2022 05:16 AM    CO2 25 09/15/2022 05:16 AM    BUN 16 09/15/2022 05:16 AM    CREATININE 1.1 09/15/2022 05:16 AM    GFRAA 57 09/15/2022 05:16 AM    LABGLOM 47 09/15/2022 05:16 AM    GLUCOSE 207 09/15/2022 05:16 AM    PROT 6.6 09/15/2022 05:16 AM    LABALBU 3.7 09/15/2022 05:16 AM    CALCIUM 9.5 09/15/2022 05:16 AM    BILITOT 0.3 09/15/2022 05:16 AM    ALKPHOS 111 09/15/2022 05:16 AM    AST 19 09/15/2022 05:16 AM    ALT 19 09/15/2022 05:16 AM         ASSESSMENT:      Principal Problem:    Hyperglycemic crisis in diabetes mellitus ([facility] [address]  Active Problems:    Pain of toe of left foot    Spinal stenosis of lumbar region without neurogenic claudication    Acute cystitis without hematuria    Hypertension    Gastroesophageal reflux disease without esophagitis    Hyperlipidemia    Depression    Pulmonary hypertension ([address]  Resolved Problems:    * No resolved hospital problems. *      PLAN:    #1 start IV fluid hydration  2. Insulin sliding scale and check sugar every 4 hours  3. Start long-acting basal insulin. Discontinue sulfonylurea  4. Start Cipro 200 mg IV every 12 hours  5. Diabetic education  6. Patient will be followed until time of discharge.         Please note that over 50 minutes was spent in evaluating the patient, review of records and results, discussion with staff/family, etc.      Electronically signed by Tessa Austin MD on 9/15/2022 at 12:04 PM

## 2022-09-15 NOTE — CARE COORDINATION
SS NOTE: NO COVID TESTING DONE. Pt is on room air. Pt has a peripheral line. Pt is on IV Apresoline and Cipro. Pt's BS this morning is 180. SW met with pt today. Pt relates that she resides with her dtr Fannie Gibson in a 1 floor plan with 2 step entry. Pt relates that she was I pta with adls iadls and dtr drove. Pt has no hx of dme, HHC or SNF/rehab. Pt's PCP is Dr Megan Cohen. Pt plans on returning home after dch. SS to continue. ATIYA Underwood.9/15/2022.3:44 PM.

## 2022-09-15 NOTE — PROGRESS NOTES
Dr Irma Abad notified of admission and that BP has been running 150-160/90's since ED and that current , awaiting orders.  Electronically signed by Eulalio Marin RN on 9/14/2022 at 9:09 PM

## 2022-09-16 VITALS
DIASTOLIC BLOOD PRESSURE: 67 MMHG | OXYGEN SATURATION: 100 % | BODY MASS INDEX: 23.08 KG/M2 | HEIGHT: 64 IN | WEIGHT: 135.2 LBS | TEMPERATURE: 97.4 F | HEART RATE: 65 BPM | SYSTOLIC BLOOD PRESSURE: 142 MMHG | RESPIRATION RATE: 15 BRPM

## 2022-09-16 LAB
ALBUMIN SERPL-MCNC: 3.4 G/DL (ref 3.5–5.2)
ALP BLD-CCNC: 98 U/L (ref 35–104)
ALT SERPL-CCNC: 18 U/L (ref 0–32)
ANION GAP SERPL CALCULATED.3IONS-SCNC: 7 MMOL/L (ref 7–16)
AST SERPL-CCNC: 19 U/L (ref 0–31)
BILIRUB SERPL-MCNC: <0.2 MG/DL (ref 0–1.2)
BUN BLDV-MCNC: 14 MG/DL (ref 6–23)
CALCIUM SERPL-MCNC: 9.2 MG/DL (ref 8.6–10.2)
CHLORIDE BLD-SCNC: 108 MMOL/L (ref 98–107)
CO2: 24 MMOL/L (ref 22–29)
CREAT SERPL-MCNC: 1.2 MG/DL (ref 0.5–1)
GFR AFRICAN AMERICAN: 52
GFR NON-AFRICAN AMERICAN: 43 ML/MIN/1.73
GLUCOSE BLD-MCNC: 301 MG/DL (ref 74–99)
METER GLUCOSE: 185 MG/DL (ref 74–99)
METER GLUCOSE: 316 MG/DL (ref 74–99)
METER GLUCOSE: 370 MG/DL (ref 74–99)
POTASSIUM SERPL-SCNC: 4.8 MMOL/L (ref 3.5–5)
SODIUM BLD-SCNC: 139 MMOL/L (ref 132–146)
TOTAL PROTEIN: 6 G/DL (ref 6.4–8.3)

## 2022-09-16 PROCEDURE — 80053 COMPREHEN METABOLIC PANEL: CPT

## 2022-09-16 PROCEDURE — 82962 GLUCOSE BLOOD TEST: CPT

## 2022-09-16 PROCEDURE — 6360000002 HC RX W HCPCS

## 2022-09-16 PROCEDURE — 6370000000 HC RX 637 (ALT 250 FOR IP): Performed by: INTERNAL MEDICINE

## 2022-09-16 PROCEDURE — 2580000003 HC RX 258: Performed by: INTERNAL MEDICINE

## 2022-09-16 PROCEDURE — 6370000000 HC RX 637 (ALT 250 FOR IP)

## 2022-09-16 PROCEDURE — 36415 COLL VENOUS BLD VENIPUNCTURE: CPT

## 2022-09-16 PROCEDURE — 99239 HOSP IP/OBS DSCHRG MGMT >30: CPT | Performed by: INTERNAL MEDICINE

## 2022-09-16 RX ORDER — INSULIN GLARGINE 100 [IU]/ML
25 INJECTION, SOLUTION SUBCUTANEOUS NIGHTLY
Qty: 10 ML | Refills: 3 | Status: SHIPPED | OUTPATIENT
Start: 2022-09-16 | End: 2022-09-23 | Stop reason: SDUPTHER

## 2022-09-16 RX ADMIN — PANTOPRAZOLE SODIUM 40 MG: 40 TABLET, DELAYED RELEASE ORAL at 08:38

## 2022-09-16 RX ADMIN — ASPIRIN 81 MG 81 MG: 81 TABLET ORAL at 08:38

## 2022-09-16 RX ADMIN — INSULIN LISPRO 8 UNITS: 100 INJECTION, SOLUTION INTRAVENOUS; SUBCUTANEOUS at 12:01

## 2022-09-16 RX ADMIN — LEVOTHYROXINE SODIUM 75 MCG: 0.07 TABLET ORAL at 05:57

## 2022-09-16 RX ADMIN — SODIUM CHLORIDE: 9 INJECTION, SOLUTION INTRAVENOUS at 06:00

## 2022-09-16 RX ADMIN — METOPROLOL SUCCINATE 50 MG: 50 TABLET, EXTENDED RELEASE ORAL at 08:38

## 2022-09-16 RX ADMIN — CIPROFLOXACIN 200 MG: 2 INJECTION, SOLUTION INTRAVENOUS at 02:35

## 2022-09-16 RX ADMIN — BUPROPION HYDROCHLORIDE 300 MG: 150 TABLET, FILM COATED, EXTENDED RELEASE ORAL at 08:37

## 2022-09-16 RX ADMIN — INSULIN LISPRO 6 UNITS: 100 INJECTION, SOLUTION INTRAVENOUS; SUBCUTANEOUS at 08:39

## 2022-09-16 RX ADMIN — FLUOXETINE HYDROCHLORIDE 40 MG: 20 CAPSULE ORAL at 08:37

## 2022-09-16 RX ADMIN — MULTIPLE VITAMINS W/ MINERALS TAB 1 TABLET: TAB at 08:38

## 2022-09-16 RX ADMIN — LOSARTAN POTASSIUM 100 MG: 100 TABLET, FILM COATED ORAL at 08:38

## 2022-09-16 RX ADMIN — SODIUM CHLORIDE, PRESERVATIVE FREE 10 ML: 5 INJECTION INTRAVENOUS at 08:37

## 2022-09-16 RX ADMIN — POTASSIUM CHLORIDE 20 MEQ: 1500 TABLET, EXTENDED RELEASE ORAL at 08:37

## 2022-09-16 ASSESSMENT — PAIN SCALES - GENERAL
PAINLEVEL_OUTOF10: 0
PAINLEVEL_OUTOF10: 0

## 2022-09-16 NOTE — DISCHARGE SUMMARY
Physician Discharge Summary     Patient ID:  Jeyson Messina  [id number]  8 y.o.  1940    Admit date: 9/14/2022    Discharge date and time: 9/16/2022    Admission Diagnoses: Principal Problem:    Hyperglycemic crisis in diabetes mellitus ([facility] [address]  Active Problems:    Pain of toe of left foot    Spinal stenosis of lumbar region without neurogenic claudication    Acute cystitis without hematuria    Hypertension    Gastroesophageal reflux disease without esophagitis    Hyperlipidemia    Depression    Pulmonary hypertension ([address]  Resolved Problems:    * No resolved hospital problems. *      Discharge Diagnoses:   Principal Problem:    Hyperglycemic crisis in diabetes mellitus ([facility] [place] [address]  Active Problems:    Pain of toe of left foot    Spinal stenosis of lumbar region without neurogenic claudication    Acute cystitis without hematuria    Hypertension    Gastroesophageal reflux disease without esophagitis    Hyperlipidemia    Depression    Pulmonary hypertension ([facility] [address]  Resolved Problems:    * No resolved hospital problems. *    Consults: Dietitian    Hospital Course: This is an 13-year-old white female well-known to me with past medical history significant for diabetes mellitus hypertension hyperlipidemia and bipolar disorder. She presented to the office for a routine follow-up with a complaint of not feeling well lately over the course of the last 2 weeks. Patient was not very specific but she generally felt weak, very thirsty with decreased appetite and loss of weight. Blood sugar in the office was 528. Hemoglobin A1c was more than 15. Patient was given 15 units of Humulin R subcu and she was advised to come to the emergency room. In the emergency room she received 2 L of IV normal saline. Her blood sugar started coming down in the 400 range. Her sodium was 129 secondary to the hyperglycemia.   She was also found to have a UTI with large leukocyte karthik and bacteria and yeast.  She was started on Cipro 200 mg IV every 12 hours for creatinine clearance. Her bicarb remained within reasonable range, anion gap was ranging from 10-14. There were no ketones in the urine. She was admitted with severe hyperglycemia and was started on long-acting insulin at night and insulin sliding scale for the day. Today she feels a lot better denying any shortness of breath chest pains abdominal pains nausea vomiting or diarrhea. Her blood sugars are better controlled yet still in the 300 range and so we will increase her Lantus at night to 25 units nightly. She had t diabetic education. On 9/16/2022 she was ambulating well her vitals were stable, her CO2 was 24 BUN of 14 and creatinine of 1.2 and blood sugar of 301. She will be discharged today with home health care.   Patient is to follow-up in the office in 1 week  Condition at discharge: Stable comfortable condition    Disposition: home    Patient Instructions:   Current Discharge Medication List        START taking these medications    Details   insulin glargine (LANTUS) 100 UNIT/ML injection vial Inject 25 Units into the skin nightly  Qty: 10 mL, Refills: 3           CONTINUE these medications which have NOT CHANGED    Details   aspirin 81 MG chewable tablet Take 81 mg by mouth daily      traZODone (DESYREL) 50 MG tablet Take 50 mg by mouth nightly      lidocaine 4 % external patch Place 1 patch onto the skin daily Mid lower back      Multiple Vitamins-Minerals (THERAPEUTIC MULTIVITAMIN-MINERALS) tablet Take 1 tablet by mouth daily      levothyroxine (SYNTHROID) 75 MCG tablet Take 1 tablet by mouth daily  Qty: 90 tablet, Refills: 0      losartan (COZAAR) 100 MG tablet Take 1 tablet by mouth daily  Qty: 90 tablet, Refills: 0    Associated Diagnoses: Primary hypertension      metoprolol succinate (TOPROL XL) 50 MG extended release tablet Take 1 tablet by mouth daily  Qty: 90 tablet, Refills: 0    Associated Diagnoses: Primary hypertension      pantoprazole (PROTONIX) 40 MG tablet Take 1 tablet by mouth daily  Qty: 90 tablet, Refills: 0      ciprofloxacin (CIPRO) 250 MG tablet Take 1 tablet by mouth 2 times daily for 7 days  Qty: 14 tablet, Refills: 0    Associated Diagnoses: Acute cystitis without hematuria      buPROPion (WELLBUTRIN XL) 300 MG extended release tablet Take 1 tablet by mouth every morning  Qty: 90 tablet, Refills: 0      FLUoxetine (PROZAC) 40 MG capsule Take 1 capsule by mouth nightly  Qty: 90 capsule, Refills: 0      OLANZapine (ZYPREXA) 5 MG tablet Take 1 tablet by mouth nightly  Qty: 90 tablet, Refills: 0           STOP taking these medications       glimepiride (AMARYL) 2 MG tablet Comments:   Reason for Stopping:         meloxicam (MOBIC) 15 MG tablet Comments:   Reason for Stopping:         tiZANidine (ZANAFLEX) 2 MG tablet Comments:   Reason for Stopping:         ciclopirox (PENLAC) 8 % solution Comments:   Reason for Stopping:             Activity: activity as tolerated  Diet: diabetic diet    Follow-up 1 week   note that over 30 minutes was spent in preparing discharge papers, discussing discharge with patient, medication review, etc.      Electronically signed by Filemon Ojeda MD on 9/16/2022 at 1:06 PM

## 2022-09-16 NOTE — PROGRESS NOTES
Pt educated on use of new lantus pen, states understanding and handouts provided. Pt will have Emily Stein for further teaching and guidance.

## 2022-09-16 NOTE — PLAN OF CARE
Problem: ABCDS Injury Assessment  Goal: Absence of physical injury  9/16/2022 1727 by Ijeoma Madden RN  Outcome: Completed  Flowsheets (Taken 9/16/2022 0830)  Absence of Physical Injury: Implement safety measures based on patient assessment  9/16/2022 0616 by Kirk Ledezma RN  Outcome: Progressing     Problem: Discharge Planning  Goal: Discharge to home or other facility with appropriate resources  9/16/2022 1727 by Ijeoma Madden RN  Outcome: Completed  Flowsheets (Taken 9/16/2022 0830)  Discharge to home or other facility with appropriate resources:   Identify barriers to discharge with patient and caregiver   Arrange for needed discharge resources and transportation as appropriate   Identify discharge learning needs (meds, wound care, etc)   Refer to discharge planning if patient needs post-hospital services based on physician order or complex needs related to functional status, cognitive ability or social support system  9/16/2022 0616 by Kirk Ledezma RN  Outcome: Progressing     Problem: Safety - Adult  Goal: Free from fall injury  9/16/2022 1727 by Ijeoma Madden RN  Outcome: Completed  Flowsheets (Taken 9/16/2022 0830)  Free From Fall Injury:   Instruct family/caregiver on patient safety   Based on caregiver fall risk screen, instruct family/caregiver to ask for assistance with transferring infant if caregiver noted to have fall risk factors  9/16/2022 0616 by Kirk Ledezma RN  Outcome: Progressing     Problem: Pain  Goal: Verbalizes/displays adequate comfort level or baseline comfort level  9/16/2022 1727 by Ijeoma Madden RN  Outcome: Completed  Flowsheets (Taken 9/16/2022 0830)  Verbalizes/displays adequate comfort level or baseline comfort level:   Encourage patient to monitor pain and request assistance   Assess pain using appropriate pain scale   Administer analgesics based on type and severity of pain and evaluate response  9/16/2022 0616 by Kirk Ledezma RN  Outcome: Progressing Problem: Nutrition Deficit:  Goal: Optimize nutritional status  Outcome: Completed

## 2022-09-16 NOTE — PLAN OF CARE
Problem: ABCDS Injury Assessment  Goal: Absence of physical injury  Outcome: Progressing     Problem: Discharge Planning  Goal: Discharge to home or other facility with appropriate resources  Outcome: Progressing     Problem: Safety - Adult  Goal: Free from fall injury  Outcome: Progressing     Problem: Pain  Goal: Verbalizes/displays adequate comfort level or baseline comfort level  Outcome: Progressing

## 2022-09-16 NOTE — PROGRESS NOTES
Pt, daughter and son educated on insulin injections, glucose monitor, lancets and diet by myself and diabetic educator.

## 2022-09-16 NOTE — CONSULTS
Comprehensive Nutrition Assessment    Type and Reason for Visit:  Initial, Consult, Patient Education (Consult for Diabetes Education)    Nutrition Recommendations/Plan: Education Completed: See below     Malnutrition Assessment:  Malnutrition Status:  No malnutrition (09/16/22 1045)    Context:  Chronic Illness     Findings of the 6 clinical characteristics of malnutrition:  Energy Intake:  Mild decrease in energy intake (Comment)  Weight Loss:  Unable to assess (no accurate wt methods per EMR review)     Body Fat Loss:  No significant body fat loss     Muscle Mass Loss:  No significant muscle mass loss    Fluid Accumulation:  No significant fluid accumulation     Strength:  Not Performed    Nutrition Assessment:    Pt admits w/ Dx: hyperglycemia without DKA, and UTI w/ PMH of diabetes mellitus, hypertension. hyperlipidemia and bipolar disorder. Pt tolerating diet w/ >75% po intakes. Diet Consult for Diabetes Education completed, provided educational material, reviewed diet concepts. Patient very positive for change and had a good understanding of diet. All questions answered and provided call back information if any questions arise. Nutrition Related Findings:    A/ox4, abd WDL, +BS, no edema,  creatinine: 1.2^, A1C 9/14/22 15^^, I/O WNL Wound Type: None       Current Nutrition Intake & Therapies:    Average Meal Intake: %     ADULT DIET; Regular; 4 carb choices (60 gm/meal)    Anthropometric Measures:  Height: 5' 4\" (162.6 cm)  Ideal Body Weight (IBW): 120 lbs (55 kg)       Current Body Weight: 135 lb (61.2 kg) (9/14 bed scale), 112.5 % IBW.  Weight Source: Bed Scale  Current BMI (kg/m2): 23.2  Usual Body Weight: 140 lb (63.5 kg) (per emr review: no wt methods)  % Weight Change (Calculated): -3.6                    BMI Categories: Normal Weight (BMI 22.0 to 24.9) age over 72    Estimated Daily Nutrient Needs:  Energy Requirements Based On: Formula  Weight Used for Energy Requirements: Current  Energy (kcal/day): 3420-4634  Weight Used for Protein Requirements: Current  Protein (g/day): 60-70 (x1.0-1.2gm/kg)  Method Used for Fluid Requirements: 1 ml/kcal  Fluid (ml/day): 7319-2358    Nutrition Diagnosis:   Food & Nutrition-related knowledge deficit related to endocrine dysfuntion as evidenced by lab values, other (comment) (Consult for Diabetes Education A1C 15 9/14/22)    Nutrition Interventions:   Food and/or Nutrient Delivery: Continue Current Diet  Nutrition Education/Counseling: Education completed  Coordination of Nutrition Care: Continue to monitor while inpatient, Coordination of Care  Plan of Care discussed with: Patient    Goals:     Goals: PO intake 75% or greater       Nutrition Monitoring and Evaluation:      Food/Nutrient Intake Outcomes: Food and Nutrient Intake  Physical Signs/Symptoms Outcomes: Biochemical Data, GI Status, Fluid Status or Edema, Nutrition Focused Physical Findings, Skin, Weight    Discharge Planning:    Recommend pursue outpatient diabetes education     Kathy Vieyra RD, LD  Contact: [phone]

## 2022-09-16 NOTE — CARE COORDINATION
SS NOTE: NO COVID TESTING DONE. Pt to be dched today. She has been seen by the diabetic educator and has begun diabetic teaching. Pt is on room air. Pt has a peripheral line. Pt is on IV Apresoline and Cipro. Dr Juwan German requests [facility] for continued diabetic teaching at home. SW met with pt today. She approves C with [facility] and they are willing to accept pt for admission over the weekend. They have orders and can follow pt while here. Pt's dtr Vickie to transport pt home and assist her if needed after dch. ATIYA Cortes.9/16/2022.1:33 PM.      The Plan for Transition of Care is related to the following treatment goals: [address] [facility]  The Patient was provided with a choice of provider and agrees   with the discharge plan. [x] Yes [] No    Freedom of choice list was provided with basic dialogue that supports the patient's individualized plan of care/goals, treatment preferences and shares the quality data associated with the providers.  [x] Yes [] No

## 2022-09-16 NOTE — PROGRESS NOTES
Reason for consult:    A1C:   15%                      Patient states the following concerns/barriers to diabetes self-management:     [x] None       [] Medication cost   [] Food cost/availability        [] Reading  [] Hearing   [] Vision                [] Work    [] Transportation  [] No insurance  [] Physical limitations    [] Other:        Patient states the following about their diabetes/health:   [x]   19 yr hx of Type 2 DM  [x]   No formal DM education in the past  [x]   Not eating breakfast and sometimes no lunch  [x]   Taking glimepiride at supper and hs at home. Discussed  []         Diabetes survival packet provided to:   [x] Patient         Information reviewed:   Definition of diabetes   Target glucose ranges/A1C   Self-monitoring of blood glucose   Prevention/symptoms/treatment of hypo-/hyperglycemia   Medication adherence   The plate method/meal planning guidelines   The benefits of exercise and recommendations   Reducing the risk of chronic complications      Diabetes medications reviewed (use, purpose, action): Glimepiride, Lantus, and Humalog (Storage also reviewed)  Showed a Lantus solostar insulin pen. Used written/pictorial instructions. Demo given and return demo by pt. Son, Linnette Brown, was there during session. Pt did fair with pen but needed verbal assist.  She did measure correctly. She lives with her daughter, who had to leave for work and missed session. Brother feels he can do it with instructions and asked to help over the weekend. To call with any concerns. She did give her lst injection with her nurse today. Site selection and rotation reviewed. Needle disposal reviewed.             Post-education Assessment  [x]  Attentive to teaching  [x]  Answered questions appropriately when asked   [x]  Seems able to apply concepts to daily lifestyle  [x]  Seems motivated to do well  [x]  Verbalized an understanding of the plate method for portion control, RD here for diet education  [x] Verbalized an understanding of prescribed antidiabetes medications   [x]  Verbalized an understanding of target glucose ranges/A1C level  [x]  Expresses an intent to comply with treatment plan, family to oversee. []  Showed very little interest in complying with treatment plan   []  Seems to have trouble applying concepts to daily lifestyle       COMMENTS:  pt brought new meter from home and instructed on meter. Lancet device very difficult to take off and pt needs a new meter, different brand. Stated had meter for a while but never used. Goals reviewed and when to call dr. Calvin Xavier sheets given and to take to Dr visits. To test per Dr instruction. Recommendations:   [x] Carbohydrate-controlled diet    [x] Script for glucometer and supplies (per preference of patient's insurance)  [x] Script for insulin pens and pen needles (if insulin is ordered at discharge for home use)   [] Consult to social work: patient has no insurance or has financial hardship  [] Inpatient consult to endocrinologist   [] Follow up with endocrinologist as an outpatient   [] Home healthcare nursing to increase compliance to treatment plan   [x] Script for outpatient diabetes education classes (from doctor)        Thank you for this consult.     Erika Mcconnell RN [facility]

## 2022-09-17 LAB — URINE CULTURE, ROUTINE: NORMAL

## 2022-09-19 ENCOUNTER — CARE COORDINATION (OUTPATIENT)
Dept: CARE COORDINATION | Age: 82
End: 2022-09-19

## 2022-09-19 DIAGNOSIS — E11.65 HYPERGLYCEMIA DUE TO DIABETES MELLITUS (HCC): Primary | ICD-10-CM

## 2022-09-19 PROCEDURE — 1111F DSCHRG MED/CURRENT MED MERGE: CPT | Performed by: INTERNAL MEDICINE

## 2022-09-19 NOTE — PROGRESS NOTES
CLINICAL PHARMACY NOTE: MEDS TO BEDS    Total # of Prescriptions Filled: 2   The following medications were delivered to the patient:  Lantus Solostar 100 unit/mL  Techlite Pen Needles 31g x 5mm    Additional Documentation:

## 2022-09-19 NOTE — CARE COORDINATION
[facility] Transitions Initial Follow Up Call    Call within 2 business days of discharge: Yes    Patient: Gregorio Dominguez Patient : 1940   MRN: [id number]  Reason for Admission: [facility] -2022 Hyperglycemic Crisis in Diabetic   Discharge Date: 22 RARS: Readmission Risk Score: 12.3      Last Discharge [facility]       Date Complaint Diagnosis Description Type Department Provider    22 Hyperglycemia Hyperglycemia due to diabetes mellitus ([facility] [address] . .. ED to Hosp-Admission (Discharged) (ADMITTED) Mandi Lebron MD; Travon Benavides. .. Spoke with: Pt. States she is feeling much better. States her bs today was 139. Denies chest pain, sob, dizziness. States she is eating a diabetic diet and did review changes to meds and v/u on usage of insulin pen. Reviewed diabetic zone tool. Pt states she is having home health come to visit and they are reviewing the diabetic education. Pt is taking all meds, reviewed and 1111f order placed. No bowel and bladder issues noted. Reviewed dc instructions and pt is to call pcp to make follow up appt. Will route message to office staff. No home med or transportation needs voiced. Ctn info given and will follow up next week Transitions of Care Initial Call    Was this an external facility discharge? No Discharge Facility: [address] [place] [address] to be reviewed by the provider   Additional needs identified to be addressed with provider: NO  none             Method of communication with provider : none    Advance Care Planning:   Does patient have an Advance Directive:  NOT REVIEWED . Care Transition Nurse contacted the patient by telephone to perform post hospital discharge assessment. Verified name and  with patient as identifiers. Provided introduction to self, and explanation of the CTN role. CTN reviewed discharge instructions, medical action plan and red flags with patient who verbalized understanding.  Patient given an opportunity to ask questions and does not have any further questions or concerns at this time. Were discharge instructions available to patient? Yes. Reviewed appropriate site of care based on symptoms and resources available to patient including: PCP  Specialist  Home health  When to call 911. The patient agrees to contact the PCP office for questions related to their healthcare. Medication reconciliation was performed with patient, who verbalizes understanding of administration of home medications. Advised obtaining a 90-day supply of all daily and as-needed medications. Was patient discharged with a pulse oximeter? no    CTN provided contact information. Plan for follow-up call in 5-7 days based on severity of symptoms and risk factors.   Plan for next call: symptom management-blood sugars, diet, pain  follow up appointment-pcp        Facility: [address] [place] [address] 24 Hour Call    Do you have a copy of your discharge instructions?: Yes  Do you have all of your prescriptions and are they filled?: Yes  Have you been contacted by a [address]?: No  Have you scheduled your follow up appointment?: No  Do you feel like you have everything you need to keep you well at home?: Yes  Care Transitions Interventions         Follow Up  Future Appointments   Date Time Provider Herber Davis   9/20/2022  3:15 PM Yvonne Fox MD [facility]   10/18/2022 10:30 AM David Kwan, VENANCIO [facility]       Jerrod Hays LPN

## 2022-09-20 ENCOUNTER — OFFICE VISIT (OUTPATIENT)
Dept: PAIN MANAGEMENT | Age: 82
End: 2022-09-20
Payer: MEDICARE

## 2022-09-20 VITALS
TEMPERATURE: 96.6 F | HEIGHT: 65 IN | BODY MASS INDEX: 22.49 KG/M2 | DIASTOLIC BLOOD PRESSURE: 70 MMHG | WEIGHT: 135 LBS | HEART RATE: 71 BPM | RESPIRATION RATE: 16 BRPM | OXYGEN SATURATION: 95 % | SYSTOLIC BLOOD PRESSURE: 120 MMHG

## 2022-09-20 DIAGNOSIS — G89.4 CHRONIC PAIN SYNDROME: Primary | ICD-10-CM

## 2022-09-20 DIAGNOSIS — M48.061 SPINAL STENOSIS OF LUMBAR REGION WITHOUT NEUROGENIC CLAUDICATION: ICD-10-CM

## 2022-09-20 DIAGNOSIS — S22.000A COMPRESSION FRACTURE OF THORACIC VERTEBRA, UNSPECIFIED THORACIC VERTEBRAL LEVEL, INITIAL ENCOUNTER (HCC): ICD-10-CM

## 2022-09-20 DIAGNOSIS — M47.814 THORACIC SPONDYLOSIS: ICD-10-CM

## 2022-09-20 DIAGNOSIS — M47.812 CERVICAL SPONDYLOSIS: ICD-10-CM

## 2022-09-20 DIAGNOSIS — M47.812 CERVICAL FACET JOINT SYNDROME: ICD-10-CM

## 2022-09-20 DIAGNOSIS — M51.9 THORACIC DISC DISEASE: ICD-10-CM

## 2022-09-20 DIAGNOSIS — M50.90 CERVICAL DISC DISORDER: ICD-10-CM

## 2022-09-20 DIAGNOSIS — M47.894 THORACIC FACET JOINT SYNDROME: ICD-10-CM

## 2022-09-20 LAB
BLOOD CULTURE, ROUTINE: NORMAL
CULTURE, BLOOD 2: NORMAL

## 2022-09-20 PROCEDURE — G8427 DOCREV CUR MEDS BY ELIG CLIN: HCPCS | Performed by: PAIN MEDICINE

## 2022-09-20 PROCEDURE — 1036F TOBACCO NON-USER: CPT | Performed by: PAIN MEDICINE

## 2022-09-20 PROCEDURE — 1111F DSCHRG MED/CURRENT MED MERGE: CPT | Performed by: PAIN MEDICINE

## 2022-09-20 PROCEDURE — 99214 OFFICE O/P EST MOD 30 MIN: CPT | Performed by: PAIN MEDICINE

## 2022-09-20 PROCEDURE — G8420 CALC BMI NORM PARAMETERS: HCPCS | Performed by: PAIN MEDICINE

## 2022-09-20 PROCEDURE — 99213 OFFICE O/P EST LOW 20 MIN: CPT | Performed by: PAIN MEDICINE

## 2022-09-20 PROCEDURE — 1123F ACP DISCUSS/DSCN MKR DOCD: CPT | Performed by: PAIN MEDICINE

## 2022-09-20 PROCEDURE — G8399 PT W/DXA RESULTS DOCUMENT: HCPCS | Performed by: PAIN MEDICINE

## 2022-09-20 PROCEDURE — 1090F PRES/ABSN URINE INCON ASSESS: CPT | Performed by: PAIN MEDICINE

## 2022-09-20 RX ORDER — INSULIN GLARGINE 100 [IU]/ML
INJECTION, SOLUTION SUBCUTANEOUS
COMMUNITY
Start: 2022-09-16 | End: 2022-09-20

## 2022-09-20 NOTE — PROGRESS NOTES
Do you currently have any of the following:    Fever: No  Headache:  No  Cough: No  Shortness of breath: No  Exposed to anyone with these symptoms: No                                                                                                                Buelah Marker presents to the [facility] on 9/20/2022. Loni Jacobs is complaining of pain in her lower back. . The pain is  only when she walks. . The pain is described as like something grabbing her inside. . Pain is rated on her best day at a 4, on her worst day at a 9, and on average at a 6 on the VAS scale. She took her last dose of  nothing at this time. Berna Eaton does not have issues with constipation. Any procedures since your last visit: No,       She is not on NSAIDS and  is  on anticoagulation medications to include ASA and is managed by Blue Jasmine MD  .     Pacemaker or defibrillator: No Physician managing device is NA. Medication Contract and Consent for Opioid Use Documents Filed        No documents found                       /70   Pulse 71   Temp (!) 96.6 °F (35.9 °C) (Infrared)   Resp 16   Ht 5' 4.5\" (1.638 m)   Wt 135 lb (61.2 kg)   SpO2 95%   BMI 22.81 kg/m²      No LMP recorded. Patient has had a hysterectomy.

## 2022-09-20 NOTE — PROGRESS NOTES
[address]  [address] [facility], [address] [place] [address]  [phone]    Follow up Note      Chase Wang     Date of Visit:  9/20/2022    CC:  Patient presents for follow up   Chief Complaint   Patient presents with    Results     MRI       HPI:    Follow up mid back and low back pain with no acute issues  Appropriate analgesia with current medications regimen: N/A. Change in quality of symptoms:no. Medication side effects:not applicable . Recent diagnostic testing:Lumbar spine MRI 2022  Recent interventional procedures:none    She has not been on anticoagulation medications to include none. The patient  has not been on herbal supplements. The patient is diabetic. Imaging:  Thoracic spine MRI 2022:    1. Mild chronic compression fracture deformity of the T8 vertebral body. No   acute fracture. 2. Mild exaggeration of the thoracic kyphosis. 3. Small disc protrusions at T6-7 and T10-11. No central canal stenosis. 4. Mild neural foraminal stenoses at multiple levels, resulting from facet   hypertrophic changes. Thoracic spine Xray 2022. Compression of T9  Mild spondylosis T11-12      Lumbar spine MRI 2022:    1. No fracture or bony destructive lesion. 2. Moderate central canal stenosis at L3-4. Mild to moderate stenoses at   L4-5 and L5-S1.   3.  Multilevel neural foraminal stenoses, worst (severe) at the bilateral   L3-4 and right L4-5 levels. Moderate to severe neural foraminal stenoses at   the left L4-5 and bilateral L5-S1 levels. 4. Severe lateral recess stenoses at L3-4 bilaterally and at the right L5-S1   levels. 4     Previous treatments: medications. .         Potential Aberrant Drug-Related Behavior:    No    Urine Drug Screening:  None    OARRS report:  09/2022 consistent.     Opioid Agreement:  Renewal date:N/A    Past Medical History:   Diagnosis Date    Depression     Diabetes mellitus ([facility] [address]     Hyperlipidemia     Hypertension     Thyroid disease     Type 2 diabetes mellitus without complication (HCC)      Past Surgical History:   Procedure Laterality Date    CHOLECYSTECTOMY      COLONOSCOPY      EYE SURGERY      cataract both eyes    HYSTERECTOMY (CERVIX STATUS UNKNOWN)      JOINT REPLACEMENT Left     KNEE    PAIN MANAGEMENT PROCEDURE Bilateral 8/11/2022    BILATERAL THORACIC FACET MEDIAL BRANCH BLOCK AT T8, 9, 10 (CPT 90234) performed by Natty Colmenares MD at [place] [address]  rt rotator    UPPER GASTROINTESTINAL ENDOSCOPY  5/15//12    toni     Prior to Admission medications    Medication Sig Start Date End Date Taking?  Authorizing Provider   insulin glargine (LANTUS) 100 UNIT/ML injection vial Inject 25 Units into the skin nightly 9/16/22  Yes Quinton Otot MD   levothyroxine (SYNTHROID) 75 MCG tablet Take 1 tablet by mouth daily 9/14/22  Yes Quinton Otto MD   losartan (COZAAR) 100 MG tablet Take 1 tablet by mouth daily 9/14/22  Yes Quinton Otto MD   metoprolol succinate (TOPROL XL) 50 MG extended release tablet Take 1 tablet by mouth daily 9/14/22  Yes Quinton Otto MD   pantoprazole (PROTONIX) 40 MG tablet Take 1 tablet by mouth daily 9/14/22  Yes Quinton Otto MD   ciprofloxacin (CIPRO) 250 MG tablet Take 1 tablet by mouth 2 times daily for 7 days 9/14/22 9/21/22 Yes Quinton Otto MD   aspirin 81 MG chewable tablet Take 81 mg by mouth daily   Yes Historical Provider, MD   traZODone (DESYREL) 50 MG tablet Take 50 mg by mouth nightly   Yes Historical Provider, MD   Multiple Vitamins-Minerals (THERAPEUTIC MULTIVITAMIN-MINERALS) tablet Take 1 tablet by mouth daily   Yes Historical Provider, MD   buPROPion (WELLBUTRIN XL) 300 MG extended release tablet Take 1 tablet by mouth every morning 6/14/22  Yes Quinton Otto MD   FLUoxetine (PROZAC) 40 MG capsule Take 1 capsule by mouth nightly  Patient taking differently: Take 40 mg by mouth daily 6/14/22  Yes Quinton Otto MD   OLANZapine (ZYPREXA) 5 MG tablet Take 1 tablet by mouth nightly 6/14/22  Yes Azeb Bundy MD   glimepiride (AMARYL) 2 MG tablet Take 1 tablet by mouth 2 times daily 1 TAB BY  MOUTH 2 TIIMES A DAY 9/14/22 9/16/22  Azeb Bundy MD   meloxicam (MOBIC) 15 MG tablet Take 1 tablet by mouth daily as needed for Pain 9/14/22 9/16/22  Azeb Bundy MD     No Known Allergies    Social History     Socioeconomic History    Marital status:      Spouse name: Not on file    Number of children: Not on file    Years of education: Not on file    Highest education level: Not on file   Occupational History    Not on file   Tobacco Use    Smoking status: Never    Smokeless tobacco: Never   Vaping Use    Vaping Use: Not on file   Substance and Sexual Activity    Alcohol use: No     Comment: 1 cup of coffee    Drug use: No    Sexual activity: Not on file   Other Topics Concern    Not on file   Social History Narrative    Not on file     Social Determinants of Health     Financial Resource Strain: Low Risk     Difficulty of Paying Living Expenses: Not hard at all   Food Insecurity: No Food Insecurity    Worried About Running Out of Food in the Last Year: Never true    Ran Out of Food in the Last Year: Never true   Transportation Needs: Not on file   Physical Activity: Not on file   Stress: Not on file   Social Connections: Not on file   Intimate Partner Violence: Not on file   Housing Stability: Not on file     Family History   Problem Relation Age of Onset    Cancer Mother         ESOPHAGEAL     Stroke Mother     Cancer Father         KIDNEY     Coronary Art Dis Brother      REVIEW OF SYSTEMS:     Valentin Contras denies fever/chills, chest pain, shortness of breath, new bowel or bladder complaints. All other review of systems was negative.     PHYSICAL EXAMINATION:      /70   Pulse 71   Temp (!) 96.6 °F (35.9 °C) (Infrared)   Resp 16   Ht 5' 4.5\" (1.638 m)   Wt 135 lb (61.2 kg)   SpO2 95%   BMI 22.81 kg/m²     General:       General appearance:   elderly, pleasant and well-hydrated. , in moderate discomfort and A & O x3  Build:Overweight     HEENT:     Head:normocephalic and atraumatic  Sclera: icterus absent,     Lungs:     Breathing:Breathing Pattern: regular, no distress     Abdomen:     Shape:non-distended and normal  Tenderness:none     Thoracic spine:     Spine inspection:exaggerated kyphosis   pationPal:tenderness paravertebral muscles and midline tenderness. T6-8/facet tenderness bilaterally  Range of motion:not tested    Lumbar spine:    Spine inspection:normal   CVA tenderness:No   Palpation:tenderness paravertebral muscles, facet loading, left, right, positive, and tenderness. Range of motion:not tested. Musculoskeletal:     Trigger points in Paraveteral:absent bilaterally  Jiang's:negative right, negative left  SLR:negative right, negative left, sitting     Extremities:     Tremors:None bilaterally upper and lower  Range of motion: pain with internal rotation of hips negative. Intact:Yes  Edema:Normal     Knee: Inspection:Left knee replaced     Neurological:     Sensory:normal to light touch bilateral upper and lower extremities  Motor:                           Right Quadriceps4+/5          Left Quadriceps4+/5           Right Gastrocnemius4+/5    Left Gastrocnemius4+/5  Right Ant Tibialis4+/5  Left Ant Tibialis4+/5  Reflexes:    Right Quadriceps reflex1+  Left Quadriceps reflexNOT DONE  Right Achilles reflex1+  Left Achilles reflex0  [address]     Dermatology:     Skin:no unusual rashes and no skin lesions     Impression:  Mid back pain  Plan:  Follow up on her mid and low back pain with no acute issues. Results of lumbar spine MRI were discussed with the patient. Discussed treatment options including LESI L3-4, patient agrees. Patient would benefit from repeat Bilateral T8,9,10 patient will think about it  OARRS report reviewed 09/2022. Patient encouraged to stay active and to lose weight.   Treatment plan discussed with the patient including procedure side effects. We discussed with the patient that combining opioids, benzodiazepines, alcohol, illicit drugs or sleep aids increases the risk of respiratory depression including death. We discussed that these medications may cause drowsiness, sedation or dizziness and have counseled the patient not to drive or operate machinery. We have discussed that these medications will be prescribed only by one provider. We have discussed with the patient about age related risk factors and have thoroughly discussed the importance of taking these medications as prescribed. The patient verbalizes understanding. mily Ames M.D.

## 2022-09-23 ENCOUNTER — OFFICE VISIT (OUTPATIENT)
Dept: PRIMARY CARE CLINIC | Age: 82
End: 2022-09-23
Payer: MEDICARE

## 2022-09-23 VITALS
OXYGEN SATURATION: 99 % | HEART RATE: 71 BPM | TEMPERATURE: 97.6 F | SYSTOLIC BLOOD PRESSURE: 155 MMHG | DIASTOLIC BLOOD PRESSURE: 80 MMHG

## 2022-09-23 DIAGNOSIS — E08.65 DIABETES MELLITUS DUE TO UNDERLYING CONDITION, UNCONTROLLED, WITH HYPERGLYCEMIA, WITHOUT LONG-TERM CURRENT USE OF INSULIN (HCC): Primary | ICD-10-CM

## 2022-09-23 DIAGNOSIS — R73.9 HYPERGLYCEMIA: ICD-10-CM

## 2022-09-23 LAB
CHP ED QC CHECK: NORMAL
GLUCOSE BLD-MCNC: 443 MG/DL

## 2022-09-23 PROCEDURE — G8420 CALC BMI NORM PARAMETERS: HCPCS | Performed by: STUDENT IN AN ORGANIZED HEALTH CARE EDUCATION/TRAINING PROGRAM

## 2022-09-23 PROCEDURE — 1123F ACP DISCUSS/DSCN MKR DOCD: CPT | Performed by: STUDENT IN AN ORGANIZED HEALTH CARE EDUCATION/TRAINING PROGRAM

## 2022-09-23 PROCEDURE — 1036F TOBACCO NON-USER: CPT | Performed by: STUDENT IN AN ORGANIZED HEALTH CARE EDUCATION/TRAINING PROGRAM

## 2022-09-23 PROCEDURE — G8427 DOCREV CUR MEDS BY ELIG CLIN: HCPCS | Performed by: STUDENT IN AN ORGANIZED HEALTH CARE EDUCATION/TRAINING PROGRAM

## 2022-09-23 PROCEDURE — 1111F DSCHRG MED/CURRENT MED MERGE: CPT | Performed by: STUDENT IN AN ORGANIZED HEALTH CARE EDUCATION/TRAINING PROGRAM

## 2022-09-23 PROCEDURE — 99213 OFFICE O/P EST LOW 20 MIN: CPT | Performed by: STUDENT IN AN ORGANIZED HEALTH CARE EDUCATION/TRAINING PROGRAM

## 2022-09-23 PROCEDURE — 1090F PRES/ABSN URINE INCON ASSESS: CPT | Performed by: STUDENT IN AN ORGANIZED HEALTH CARE EDUCATION/TRAINING PROGRAM

## 2022-09-23 PROCEDURE — 82962 GLUCOSE BLOOD TEST: CPT | Performed by: STUDENT IN AN ORGANIZED HEALTH CARE EDUCATION/TRAINING PROGRAM

## 2022-09-23 PROCEDURE — G8399 PT W/DXA RESULTS DOCUMENT: HCPCS | Performed by: STUDENT IN AN ORGANIZED HEALTH CARE EDUCATION/TRAINING PROGRAM

## 2022-09-23 RX ORDER — INSULIN GLARGINE 100 [IU]/ML
30 INJECTION, SOLUTION SUBCUTANEOUS NIGHTLY
Qty: 10 ML | Refills: 3
Start: 2022-09-23 | End: 2022-10-11 | Stop reason: SDUPTHER

## 2022-09-23 RX ORDER — GLIMEPIRIDE 2 MG/1
2 TABLET ORAL EVERY MORNING
Qty: 30 TABLET | Refills: 3 | Status: SHIPPED
Start: 2022-09-23 | End: 2022-10-11 | Stop reason: SDUPTHER

## 2022-09-23 ASSESSMENT — ENCOUNTER SYMPTOMS
RESPIRATORY NEGATIVE: 1
GASTROINTESTINAL NEGATIVE: 1

## 2022-09-23 NOTE — PROGRESS NOTES
Oc Rodriguez (:  1940) is a 79 y.o. female,Established patient, here for evaluation of the following chief complaint(s):  Diabetes (Glucose 498 and 516 from home. At offfice 443)         ASSESSMENT/PLAN:  1. Diabetes mellitus due to underlying condition, uncontrolled, with hyperglycemia, without long-term current use of insulin ([facility])  -     POCT Glucose  -     glimepiride (AMARYL) 2 MG tablet; Take 1 tablet by mouth every morning, Disp-30 tablet, R-3Normal  -     insulin glargine (LANTUS) 100 UNIT/ML injection vial; Inject 30 Units into the skin nightly, Disp-10 mL, R-3NO PRINT  2. Hyperglycemia  -     insulin regular (HUMULIN R;NOVOLIN R) injection 10 Units; 10 Units, SubCUTAneous, ONCE, 1 dose, On 22 at 1915      No follow-ups on file. Patient given 10 units regular insulin in office; she does not appear dehydrated on exam; given than blood sugars are rising throughout the day, will restart glimepiride to help improve glycemic control during the day and increase Lantus to 30 units at night; discussed increase risk of hypoglycemia risk with restarting glimepiride and patient aware of signs and symptoms; discussed decreasing carbohydrate intake, stop drinking orange juice and increasing water intake; may benefit from sliding scale insulin if still not at goal; given severe hyperglycemia and fluctuating renal function, would avoid SGTL2I at this time    Patient to call office on Monday with blood sugar reading    Subjective   SUBJECTIVE/OBJECTIVE:  HPI    Patient is a 78 y/o F with a PMHx of T2DM who presents to office for acute visit of hyperglycemia. She was recently hospitalized for hyperglycemia and discharged home on Lantus 25 units at night.  Her glimeperide was discontinued; she reports that her blood sugar at lunch today was 498; blood sugars will run in low 200s in the morning but then will increase to 300s-400s throughout the day; patient reports that she does not eat sugary foods; she does drink orange juice and eat plenty of fruit; reports she has been drinking plenty of water    She currently endorses some shakiness but denies chest pain, vision changes, abdominal pain, N/V, polyuria     Review of Systems   Constitutional: Negative. HENT: Negative. Respiratory: Negative. Cardiovascular: Negative. Gastrointestinal: Negative. Endocrine: Negative. Neurological: Negative. Objective   Physical Exam  Vitals reviewed. Constitutional:       General: She is not in acute distress. Appearance: Normal appearance. HENT:      Mouth/Throat:      Mouth: Mucous membranes are moist.      Pharynx: Oropharynx is clear. Eyes:      General:         Right eye: No discharge. Left eye: No discharge. Cardiovascular:      Rate and Rhythm: Normal rate and regular rhythm. Pulses: Normal pulses. Heart sounds: Normal heart sounds. Pulmonary:      Effort: Pulmonary effort is normal.      Breath sounds: Normal breath sounds. Abdominal:      Palpations: Abdomen is soft. Tenderness: There is no abdominal tenderness. Neurological:      Mental Status: She is alert. An electronic signature was used to authenticate this note.     --Negrito Luis MD

## 2022-09-27 ENCOUNTER — TELEPHONE (OUTPATIENT)
Dept: PAIN MANAGEMENT | Age: 82
End: 2022-09-27

## 2022-09-27 PROBLEM — M54.16 LUMBAR RADICULOPATHY: Status: ACTIVE | Noted: 2022-09-27

## 2022-09-27 NOTE — TELEPHONE ENCOUNTER
Call to [address] that procedure was approved for 10/6/2022 and that [facility] should call her a few days before for the pre op call and between 2:00 PM and 4:00 PM  the business day before with the arrival time. Instructed Irineo Best to hold ibuprofen for 24 hours, naprosyn for 4 days and any aspirin containing products or fish oil for 7 days, last dose 9/28/2022. Instructed to call office back if any questions. Irineo Best verbalized understanding.     Electronically signed by Florinda Cushing, RN on 9/27/2022 at 3:37 PM

## 2022-09-28 ENCOUNTER — TELEPHONE (OUTPATIENT)
Dept: PRIMARY CARE CLINIC | Age: 82
End: 2022-09-28

## 2022-09-28 ENCOUNTER — CARE COORDINATION (OUTPATIENT)
Dept: CARE COORDINATION | Age: 82
End: 2022-09-28

## 2022-09-28 NOTE — CARE COORDINATION
Care Transitions Outreach Attempt    Call within 2 business days of discharge: Yes   Attempted to reach patient for transitions of care follow up. Unable to reach patient. Left hipaa compliant message requesting call back will attempt again at later time and date     Patient: Yaw Arizmendi Patient : 1940 MRN: [id number]    Last Discharge [address]       Date Complaint Diagnosis Description Type Department Provider    22 Hyperglycemia Hyperglycemia due to diabetes mellitus ([facility] [address] . .. ED to Hosp-Admission (Discharged) (ADMITTED) Rin Mendez MD; Bairon Paredes. .. Was this an external facility discharge?  No Discharge Facility: [facility] -2022 Hyperglycemic Crisis in Diabetic     Noted following upcoming appointments from discharge chart review:   [facility] follow up appointment(s):   Future Appointments   Date Time Provider Herber Davis   10/18/2022 10:30 AM VENANCIO [facility]   [facility]     Non-[facility] follow up appointment(s): na

## 2022-09-29 NOTE — TELEPHONE ENCOUNTER
Attempted to call pharmacy 3 times today and was placed on hold, the last call made was at 4:55pm. Will attempt to call tomorrow.

## 2022-10-04 ENCOUNTER — TELEPHONE (OUTPATIENT)
Dept: PRIMARY CARE CLINIC | Age: 82
End: 2022-10-04

## 2022-10-05 ENCOUNTER — CARE COORDINATION (OUTPATIENT)
Dept: CARE COORDINATION | Age: 82
End: 2022-10-05

## 2022-10-05 NOTE — CARE COORDINATION
Care Transitions Follow Up Call    Needs to be reviewed by the provider   Additional needs identified to be addressed with provider: No  none             Method of communication with provider : none      Care Transition Nurse contacted the patient by telephone to follow up after admission on [facility] -2022 Hyperglycemic Crisis in Diabetic . Verified name and  with patient as identifiers. Addressed changes since last contact: none  Discussed follow-up appointments. If no appointment was previously scheduled, appointment scheduling offered: Yes. Is follow up appointment scheduled within 7 days of discharge? Yes. Advance Care Planning:   Does patient have an Advance Directive:  not reviewed . CTN reviewed discharge instructions, medical action plan and red flags with patient and discussed any barriers to care and/or understanding of plan of care after discharge. Discussed appropriate site of care based on symptoms and resources available to patient including: PCP  Home health  When to call 911. The patient agrees to contact the PCP office for questions related to their healthcare. Patients top risk factors for readmission: medical condition-diabetes  Interventions to address risk factors: Obtained and reviewed discharge summary and/or continuity of care documents      Non-[facility] follow up appointment(s): na    CTN provided contact information for future needs. Plan for follow-up call in 5-7 days based on severity of symptoms and risk factors. Plan for next call: symptom management-fs, diet, nighttime s/s    Pt, states she is doing ok. States she has been waking up in the night with low sugars. This week she woke up sweaty and fs was 44. Did get up and drink some juice. States she has a call out to pcp. States she is following diabetic diet and reviewed zone tool. Denies chest pain, sob or fever. No n/v/d. [facility] home health is visiting for pt/ot. And its going well.  Pt states she is able to move around on own without assistive devices. Reviewed meds and is taking all per order. Denies home, med or transportation needs.  Ctn info given and will follow up next week

## 2022-10-05 NOTE — TELEPHONE ENCOUNTER
PT WAS CALLED AND TOLD TO DECREASE HER INSULIN AT NIGHT TO 27 UNITS. AND A HOSPITAL F/U APPOINTMENT WAS MADE WITH THE PT FOR 10-11-22.

## 2022-10-06 ENCOUNTER — HOSPITAL ENCOUNTER (OUTPATIENT)
Dept: OPERATING ROOM | Age: 82
Setting detail: OUTPATIENT SURGERY
Discharge: HOME | End: 2022-10-06
Attending: PAIN MEDICINE
Payer: MEDICARE

## 2022-10-06 ENCOUNTER — HOSPITAL ENCOUNTER (OUTPATIENT)
Age: 82
Setting detail: OUTPATIENT SURGERY
Discharge: HOME | End: 2022-10-06
Attending: PAIN MEDICINE | Admitting: PAIN MEDICINE
Payer: MEDICARE

## 2022-10-06 VITALS
BODY MASS INDEX: 23.05 KG/M2 | HEART RATE: 62 BPM | HEIGHT: 64 IN | OXYGEN SATURATION: 98 % | SYSTOLIC BLOOD PRESSURE: 161 MMHG | DIASTOLIC BLOOD PRESSURE: 81 MMHG | RESPIRATION RATE: 16 BRPM | WEIGHT: 135 LBS

## 2022-10-06 DIAGNOSIS — M54.16 LUMBAR RADICULOPATHY: ICD-10-CM

## 2022-10-06 PROCEDURE — 62323 NJX INTERLAMINAR LMBR/SAC: CPT | Performed by: PAIN MEDICINE

## 2022-10-06 PROCEDURE — 3209999900 FLUORO FOR SURGICAL PROCEDURES

## 2022-10-06 PROCEDURE — 2500000003 HC RX 250 WO HCPCS: Performed by: PAIN MEDICINE

## 2022-10-06 PROCEDURE — 6360000004 HC RX CONTRAST MEDICATION: Performed by: PAIN MEDICINE

## 2022-10-06 PROCEDURE — 7100000010 HC PHASE II RECOVERY - FIRST 15 MIN: Performed by: PAIN MEDICINE

## 2022-10-06 PROCEDURE — 2709999900 HC NON-CHARGEABLE SUPPLY: Performed by: PAIN MEDICINE

## 2022-10-06 PROCEDURE — 7100000011 HC PHASE II RECOVERY - ADDTL 15 MIN: Performed by: PAIN MEDICINE

## 2022-10-06 PROCEDURE — 3600000005 HC SURGERY LEVEL 5 BASE: Performed by: PAIN MEDICINE

## 2022-10-06 PROCEDURE — 6360000002 HC RX W HCPCS: Performed by: PAIN MEDICINE

## 2022-10-06 RX ORDER — LIDOCAINE HYDROCHLORIDE 5 MG/ML
INJECTION, SOLUTION INFILTRATION; INTRAVENOUS PRN
Status: DISCONTINUED | OUTPATIENT
Start: 2022-10-06 | End: 2022-10-06 | Stop reason: ALTCHOICE

## 2022-10-06 ASSESSMENT — PAIN - FUNCTIONAL ASSESSMENT: PAIN_FUNCTIONAL_ASSESSMENT: NONE - DENIES PAIN

## 2022-10-06 NOTE — OP NOTE
10/6/2022    Patient: Andria Olivera  :  1940  Age:  79 y.o. Sex:  female     PRE-OPERATIVE DIAGNOSIS: Lumbar disc displacement, lumbar radiculopathy. POST-OPERATIVE DIAGNOSIS: Same. PROCEDURE: Fluoroscopic guided therapeutic lumbar epidural steroid injection at the L3-4 level (# 1). SURGEON: BRIAN Saini M.D.. ANESTHESIA: Local    ESTIMATED BLOOD LOSS: None.  ______________________________________________________________________    BRIEF HISTORY:  Andria Olivera comes in today for first lumbar epidural injection at L3-4 level. The potential complications of this procedure were discussed with her again today. She has elected to undergo the aforementioned procedure. Wendie complete History & Physical examination were reviewed in depth, a copy of which is in the chart. DESCRIPTION OF PROCEDURE:    After confirming written and informed consent, a time-out was performed and Wendie name and date of birth, the procedure to be performed as well as the plan for the location of the needle insertion were confirmed. The patient was brought into the procedure room and placed in the prone position on the fluoroscopy table. A pillow was placed under the patient's lower abdomen/upper pelvis to increase lumbar interlaminar space. Standard monitors were placed, and vital signs were observed throughout the procedure. The area of the lumbar spine was prepped with chloraprep and draped in a sterile manner. The L3-4 interspace was identified and marked under AP fluoroscopy. The skin and subcutaneous tissues at the above level were anesthestized with 0.5% lidocaine. With intermittent fluoroscopy, an # 18 gauge 3 1/2 tuohy epidural needle was inserted and directed toward the interlaminar space. The needle was slowly advanced using loss of resistance technique and 5 cc glass syringe  until the tip of the epidural needle has passed through the ligamentum flavum and entered the epidural space.  AP and lateral fluoroscopic imaging is performed to verify that the epidural needle is properly placed. Negative aspiration of blood and CSF was confirmed. 0.5 ml of omnipaque 240 was used for confirmation of even epidural spread under both live and AP fluoroscopy. After negative aspiration, a solution of 0.5 % Lidocaine 3 ml and 40 mg DepoMedrol was easily injected. The needle was gently removed intact . The patient back was cleaned and a Band-Aid was placed over the needle insertion point. Disposition the patient tolerated the procedure well and there were no complications . Vital signs remained stable throughout the procedure. The patient was escorted to the recovery area where they remained until discharge and written discharge instructions for the procedure were given. Plan: Julisa Novoa will return to our pain management center as scheduled.      Lindsey Brown MD

## 2022-10-06 NOTE — DISCHARGE INSTRUCTIONS
[facility]) Pain Management Department  [phone]   Post-Pain Block/ Radiofrequency Home Going Instructions    1-Go home, rest for the remainder of the day  2-Please do not lift over 20 pounds the day of the injection  3-If you received sedation No: alcohol, driving, operating lawn mowers, plows, tractors or other dangerous equipment until next morning. Do not make important decisions or sign legal documents for 24 hours. You may experience light headedness, dizziness, nausea or sleepiness after sedation. Do not stay alone. A responsible adult must be with you for 24 hours. You could be nauseated from the medications you have received. Your IV site may be sore and bruised. 4-No dietary restrictions     5-Resume all medications the same day, blood thinners to be resumed 24 hours after injection    6-Keep the surgical site clean and dry, you may shower the next morning and remove the      dressing. 7- No sitz baths, tub baths or hot tubs/swimming for 24 hours. 8- If you have any pain at the injection site(s), application of an ice pack to the area should be       helpful, 20 minutes on/20 minutes off for next 48 hours. 9- [facility] at if you develop. Fever greater than 100.4 F  Have bleeding or drainage from the puncture site  Have progressive Leg/arm numbness and or weakness  Loss of control of bowel and or bladder (wet/soil yourself)  Severe headache with inability to lift head  10-You may return to work the next day          Infection After Surgery: Care Instructions  Overview  After surgery, an infection is always possible. It doesn't mean that the surgery didn't go well. Because an infection can be serious, your doctor has taken steps to manage it. Your doctor checked the infection and cleaned it if necessary. Your doctor may have made an opening in the area so that the pus can drain out.  You may have gauze in the cut so that the area will stay open and keep draining. You may need antibiotics. You will need to follow up with your doctor to make sure the infection has gone away. Follow-up care is a key part of your treatment and safety. Be sure to make and go to all appointments, and call your doctor if you are having problems. It's also a good idea to know your test results and keep a list of the medicines you take. How can you care for yourself at home? Make sure your surgeon knows about the infection, especially if you saw another doctor about your symptoms. If your doctor prescribed antibiotics, take them as directed. Do not stop taking them just because you feel better. You need to take the full course of antibiotics. Ask your doctor if you can take an over-the-counter pain medicine, such as acetaminophen (Tylenol), ibuprofen (Advil, Motrin), or naproxen (Aleve). Be safe with medicines. Read and follow all instructions on the label. Do not take two or more pain medicines at the same time unless the doctor told you to. Many pain medicines have acetaminophen, which is Tylenol. Too much acetaminophen (Tylenol) can be harmful. Prop up the area on a pillow anytime you sit or lie down during the next 3 days. Try to keep it above the level of your heart. This will help reduce swelling. Keep the skin clean and dry. You may have a bandage over the cut (incision). A bandage helps the incision heal and protects it. Your doctor will tell you how to take care of this. Keep it clean and dry. You may have drainage from the wound. If your doctor told you how to care for your incision, follow your doctor's instructions. If you did not get instructions, follow this general advice:  Wash around the incision with clean water 2 times a day. Don't use hydrogen peroxide or alcohol, which can slow healing. When should you call for help? Call your doctor now or seek immediate medical care if:    You have signs that your infection is getting worse, such as:   Increased pain, swelling, warmth, or redness in the area. Red streaks leading from the area. Pus draining from the wound. A new or higher fever. Watch closely for changes in your health, and be sure to contact your doctor if you have any problems. Where can you learn more? Go to [email or web address] and sign in to your PingSome account. Enter C340 in the [place] box to learn more about \"Infection After Surgery: Care Instructions. \"     If you do not have an account, please click on the \"Sign Up Now\" link. Current as of: January 20, 2022               Content Version: 13.4  © 4273-6012 [employer], [employer]orporated. Care instructions adapted under license by [facility] ([facility]). If you have questions about a medical condition or this instruction, always ask your healthcare professional. Jeremy Garcia any warranty or liability for your use of this information.

## 2022-10-06 NOTE — H&P
[address]  [address] [facility], [address] [place] [address] Yovany  [phone]    Procedure History & Physical      Tracey Wang     HPI:    Patient  is here for low back and leg pain for LESI L3-4  Labs/imaging studies reviewed   All question and concerns addressed including R/B/A associated with the procedure    Past Medical History:   Diagnosis Date    Depression     Diabetes mellitus ([facility] [address]     Hyperlipidemia     Hypertension     Thyroid disease     Type 2 diabetes mellitus without complication ([facility] [address]        Past Surgical History:   Procedure Laterality Date    CHOLECYSTECTOMY      COLONOSCOPY      EYE SURGERY      cataract both eyes    HYSTERECTOMY (CERVIX STATUS UNKNOWN)      JOINT REPLACEMENT Left     KNEE    PAIN MANAGEMENT PROCEDURE Bilateral 8/11/2022    BILATERAL THORACIC FACET MEDIAL BRANCH BLOCK AT T8, 9, 10 (CPT [id number]) performed by Michael Horner MD at [address] rotator    UPPER GASTROINTESTINAL ENDOSCOPY  5/15//12    toni       Prior to Admission medications    Medication Sig Start Date End Date Taking?  Authorizing Provider   glimepiride (AMARYL) 2 MG tablet Take 1 tablet by mouth every morning 9/23/22   April Santos MD   insulin glargine (LANTUS) 100 UNIT/ML injection vial Inject 30 Units into the skin nightly 9/23/22   April Santos MD   levothyroxine (SYNTHROID) 75 MCG tablet Take 1 tablet by mouth daily 9/14/22   Armando Hampton MD   losartan (COZAAR) 100 MG tablet Take 1 tablet by mouth daily 9/14/22   Armando Hampton MD   metoprolol succinate (TOPROL XL) 50 MG extended release tablet Take 1 tablet by mouth daily 9/14/22   Armando Hampton MD   pantoprazole (PROTONIX) 40 MG tablet Take 1 tablet by mouth daily 9/14/22   Armando Hampton MD   aspirin 81 MG chewable tablet Take 81 mg by mouth daily    Historical Provider, MD   traZODone (DESYREL) 50 MG tablet Take 50 mg by mouth nightly    Historical Provider, MD   Multiple Vitamins-Minerals (THERAPEUTIC MULTIVITAMIN-MINERALS) tablet Take 1 tablet by mouth daily    Historical Provider, MD   meloxicam (MOBIC) 15 MG tablet Take 1 tablet by mouth daily as needed for Pain 9/14/22 9/16/22  Shanell Torres MD   buPROPion (WELLBUTRIN XL) 300 MG extended release tablet Take 1 tablet by mouth every morning 6/14/22   Shanell Torres MD   FLUoxetine (PROZAC) 40 MG capsule Take 1 capsule by mouth nightly  Patient taking differently: Take 40 mg by mouth daily 6/14/22   Shanell Torres MD   OLANZapine (ZYPREXA) 5 MG tablet Take 1 tablet by mouth nightly 6/14/22   Shanell Torres MD       No Known Allergies    Social History     Socioeconomic History    Marital status:      Spouse name: Not on file    Number of children: Not on file    Years of education: Not on file    Highest education level: Not on file   Occupational History    Not on file   Tobacco Use    Smoking status: Never    Smokeless tobacco: Never   Vaping Use    Vaping Use: Not on file   Substance and Sexual Activity    Alcohol use: No     Comment: 1 cup of coffee    Drug use: No    Sexual activity: Not on file   Other Topics Concern    Not on file   Social History Narrative    Not on file     Social Determinants of Health     Financial Resource Strain: Low Risk     Difficulty of Paying Living Expenses: Not hard at all   Food Insecurity: No Food Insecurity    Worried About Running Out of Food in the Last Year: Never true    Ran Out of Food in the Last Year: Never true   Transportation Needs: Not on file   Physical Activity: Not on file   Stress: Not on file   Social Connections: Not on file   Intimate Partner Violence: Not on file   Housing Stability: Not on file       Family History   Problem Relation Age of Onset    Cancer Mother         ESOPHAGEAL     Stroke Mother     Cancer Father         KIDNEY     Coronary Art Dis Brother          REVIEW OF SYSTEMS:    CONSTITUTIONAL:  negative for  fevers, chills, sweats and fatigue    RESPIRATORY:  negative for  dry cough, cough with sputum, dyspnea, wheezing and chest pain    CARDIOVASCULAR:  negative for chest pain, dyspnea, palpitations, syncope    GASTROINTESTINAL:  negative for nausea, vomiting, change in bowel habits, diarrhea, constipation and abdominal pain    MUSCULOSKELETAL: negative for muscle weakness    SKIN: negative for itching or rashes. BEHAVIOR/PSYCH:  negative for poor appetite, increased appetite, decreased sleep and poor concentration    All other systems negative      PHYSICAL EXAM:    VITALS:  BP (!) 162/69   Pulse 63   Resp 14   Ht 5' 4\" (1.626 m)   Wt 135 lb (61.2 kg)   SpO2 98%   BMI 23.17 kg/m²     CONSTITUTIONAL:  awake, alert, cooperative, no apparent distress, and appears stated age    EYES: PERRLA, EOMI    LUNGS:  No increased work of breathing, no audible wheezing    CARDIOVASCULAR:  regular rate and rhythm    ABDOMEN:  Soft non tender non distended     EXTREMITIES: no signs of clubbing or cyanosis. MUSCULOSKELETAL: negative for flaccid muscle tone or spastic movements. SKIN: gross examination reveals no signs of rashes, or diaphoresis. NEURO: Cranial nerves II-XII grossly intact. No signs of agitated mood. Assessment/Plan:    Low back and leg pain for LESI L3-4.

## 2022-10-11 ENCOUNTER — OFFICE VISIT (OUTPATIENT)
Dept: PRIMARY CARE CLINIC | Age: 82
End: 2022-10-11
Payer: MEDICARE

## 2022-10-11 VITALS
HEIGHT: 64 IN | OXYGEN SATURATION: 99 % | BODY MASS INDEX: 23.17 KG/M2 | HEART RATE: 67 BPM | SYSTOLIC BLOOD PRESSURE: 122 MMHG | DIASTOLIC BLOOD PRESSURE: 78 MMHG | TEMPERATURE: 97.5 F

## 2022-10-11 DIAGNOSIS — I10 PRIMARY HYPERTENSION: ICD-10-CM

## 2022-10-11 DIAGNOSIS — F32.1 MODERATE MAJOR DEPRESSION (HCC): ICD-10-CM

## 2022-10-11 DIAGNOSIS — R35.0 FREQUENCY OF URINATION AND POLYURIA: ICD-10-CM

## 2022-10-11 DIAGNOSIS — E78.2 MIXED HYPERLIPIDEMIA: ICD-10-CM

## 2022-10-11 DIAGNOSIS — E06.3 AUTOIMMUNE HYPOTHYROIDISM: ICD-10-CM

## 2022-10-11 DIAGNOSIS — N30.00 ACUTE CYSTITIS WITHOUT HEMATURIA: ICD-10-CM

## 2022-10-11 DIAGNOSIS — Z09 HOSPITAL DISCHARGE FOLLOW-UP: ICD-10-CM

## 2022-10-11 DIAGNOSIS — E08.65 DIABETES MELLITUS DUE TO UNDERLYING CONDITION, UNCONTROLLED, WITH HYPERGLYCEMIA, WITHOUT LONG-TERM CURRENT USE OF INSULIN (HCC): Primary | ICD-10-CM

## 2022-10-11 DIAGNOSIS — R35.89 FREQUENCY OF URINATION AND POLYURIA: ICD-10-CM

## 2022-10-11 LAB
BILIRUBIN, POC: NORMAL
BLOOD URINE, POC: NORMAL
CHP ED QC CHECK: NORMAL
CLARITY, POC: CLEAR
COLOR, POC: YELLOW
GLUCOSE BLD-MCNC: 304 MG/DL
GLUCOSE URINE, POC: NORMAL
KETONES, POC: NORMAL
LEUKOCYTE EST, POC: NORMAL
NITRITE, POC: POSITIVE
PH, POC: 7
PROTEIN, POC: NORMAL
SPECIFIC GRAVITY, POC: 1.02
UROBILINOGEN, POC: NORMAL

## 2022-10-11 PROCEDURE — G8427 DOCREV CUR MEDS BY ELIG CLIN: HCPCS | Performed by: INTERNAL MEDICINE

## 2022-10-11 PROCEDURE — G8399 PT W/DXA RESULTS DOCUMENT: HCPCS | Performed by: INTERNAL MEDICINE

## 2022-10-11 PROCEDURE — G8484 FLU IMMUNIZE NO ADMIN: HCPCS | Performed by: INTERNAL MEDICINE

## 2022-10-11 PROCEDURE — 1111F DSCHRG MED/CURRENT MED MERGE: CPT | Performed by: INTERNAL MEDICINE

## 2022-10-11 PROCEDURE — 99215 OFFICE O/P EST HI 40 MIN: CPT | Performed by: INTERNAL MEDICINE

## 2022-10-11 PROCEDURE — 1036F TOBACCO NON-USER: CPT | Performed by: INTERNAL MEDICINE

## 2022-10-11 PROCEDURE — 1123F ACP DISCUSS/DSCN MKR DOCD: CPT | Performed by: INTERNAL MEDICINE

## 2022-10-11 PROCEDURE — 82962 GLUCOSE BLOOD TEST: CPT | Performed by: INTERNAL MEDICINE

## 2022-10-11 PROCEDURE — G8420 CALC BMI NORM PARAMETERS: HCPCS | Performed by: INTERNAL MEDICINE

## 2022-10-11 PROCEDURE — 81002 URINALYSIS NONAUTO W/O SCOPE: CPT | Performed by: INTERNAL MEDICINE

## 2022-10-11 PROCEDURE — 1090F PRES/ABSN URINE INCON ASSESS: CPT | Performed by: INTERNAL MEDICINE

## 2022-10-11 RX ORDER — CALCIUM CITRATE/VITAMIN D3 200MG-6.25
1 TABLET ORAL 3 TIMES DAILY
COMMUNITY
End: 2022-10-12 | Stop reason: SDUPTHER

## 2022-10-11 RX ORDER — ALCOHOL ANTISEPTIC PADS
1 PADS, MEDICATED (EA) TOPICAL 3 TIMES DAILY
COMMUNITY
End: 2022-10-12 | Stop reason: SDUPTHER

## 2022-10-11 RX ORDER — GLIMEPIRIDE 2 MG/1
4 TABLET ORAL EVERY MORNING
Qty: 30 TABLET | Refills: 3
Start: 2022-10-11

## 2022-10-11 RX ORDER — PEN NEEDLE, DIABETIC 31 GX3/16"
NEEDLE, DISPOSABLE MISCELLANEOUS
COMMUNITY
Start: 2022-09-16

## 2022-10-11 RX ORDER — OLANZAPINE 5 MG/1
5 TABLET ORAL NIGHTLY
Qty: 90 TABLET | Refills: 0 | Status: CANCELLED | OUTPATIENT
Start: 2022-10-11

## 2022-10-11 RX ORDER — CIPROFLOXACIN 500 MG/1
500 TABLET, FILM COATED ORAL 2 TIMES DAILY
Qty: 14 TABLET | Refills: 0 | Status: SHIPPED | OUTPATIENT
Start: 2022-10-11 | End: 2022-10-21

## 2022-10-11 RX ORDER — INSULIN GLARGINE 100 [IU]/ML
INJECTION, SOLUTION SUBCUTANEOUS
Qty: 1 EACH | Refills: 0
Start: 2022-10-11

## 2022-10-11 RX ORDER — BUPROPION HYDROCHLORIDE 300 MG/1
300 TABLET ORAL EVERY MORNING
Qty: 90 TABLET | Refills: 0 | Status: CANCELLED | OUTPATIENT
Start: 2022-10-11

## 2022-10-11 RX ORDER — FLUOXETINE HYDROCHLORIDE 40 MG/1
40 CAPSULE ORAL NIGHTLY
Qty: 90 CAPSULE | Refills: 0 | Status: CANCELLED | OUTPATIENT
Start: 2022-10-11

## 2022-10-11 NOTE — PROGRESS NOTES
Post-Discharge Transitional Care  Follow Up      Evert Wang   YOB: 1940    Date of Office Visit:  10/11/2022  Date of Hospital Admission: 10/6/22  Date of Hospital Discharge: 10/6/22  Risk of hospital readmission (high >=14%. Medium >=10%) :Readmission Risk Score: 12.3      Care management risk score Rising risk (score 2-5) and Complex Care (Scores >=6): No Risk Score On File     Non face to face  following discharge, date last encounter closed (first attempt may have been earlier): 09/28/2022    Call initiated 2 business days of discharge: Yes    ASSESSMENT/PLAN:   Diabetes mellitus due to underlying condition, uncontrolled, with hyperglycemia, without long-term current use of insulin (HCC)  -     insulin glargine (LANTUS) 100 UNIT/ML injection vial; Pt is taking 25 units at this time HS., Disp-1 each, R-0NO PRINT  -     glimepiride (AMARYL) 2 MG tablet; Take 2 tablets by mouth every morning, Disp-30 tablet, R-3NO PRINT  -     POCT Urinalysis no Micro  -     POCT Glucose  Hospital discharge follow-up  -     HI DISCHARGE MEDS RECONCILED W/ CURRENT OUTPATIENT MED LIST  Primary hypertension  Autoimmune hypothyroidism  Frequency of urination and polyuria  -     POCT Urinalysis no Micro  Mixed hyperlipidemia  Moderate major depression ([address]  Acute cystitis without hematuria    Medical Decision Making: high complexity  Return in about 2 weeks (around 10/25/2022). On this date 10/11/2022 I have spent 40 minutes reviewing previous notes, test results and face to face with the patient discussing the diagnosis and importance of compliance with the treatment plan as well as documenting on the day of the visit. Subjective:   HPI:  Follow up of Hospital problems/diagnosis(es):   #1 diabetes mellitus with uncontrolled hyperglycemia  2. History of hypertension  3. History of hyperlipidemia  4.   History of bipolar disorder  #5  Acute cystitis    Inpatient course: Discharge summary reviewed- see chart.    Interval history/Current status:   Patient was started on Lantus 30 units nightly but she never came back for a follow-up appointment close to the discharge and she claimed later on that her blood sugars are dropping down in the morning to 70 and she starts feeling shaky and symptomatic so her insulin was decreased to 28 units nightly. She is accompanied today by her daughter who claims that this has been still going on and today her fasting blood sugar was 81 but her blood sugars through the day are ranging in 300-400 range so we will decrease her insulin at night to 25 units nightly and we will increase her Amaryl through the day to 4 mg daily. Patient was advised to check her fasting blood sugar and 2-hour postprandial and bring the blood sugar log in 2 weeks. She denies any current problems except for increased urination lately and she was advised that this might be secondary to her uncontrolled hyperglycemia we will check a urine analysis today.     Patient Active Problem List   Diagnosis    Hypertension    Dyspnea on exertion    Osteopenia of the elderly    Gastroesophageal reflux disease without esophagitis    Autoimmune hypothyroidism    Moderate major depression (HCC)    Nonrheumatic tricuspid valve regurgitation    Hypochromic microcytic anemia    Hyperlipidemia    Depression    Type 2 diabetes mellitus without complication ([facility] [address]    Pulmonary hypertension (HCC)    Chronic right-sided thoracic back pain    Onychomycosis    Pain of toe of left foot    Solid nodule of lung 6 mm to 8 mm in diameter    Fall in elderly patient    Acute midline thoracic back pain    Drug-induced diarrhea    Compression fracture of thoracic vertebra [facility])    Thoracic facet joint syndrome    Thoracic spondylosis    Thoracic disc disease    Cervical spondylosis    Cervical disc disorder    Cervical facet joint syndrome    Chronic pain syndrome    Spinal stenosis of lumbar region without neurogenic claudication Diabetes mellitus due to underlying condition, uncontrolled, with hyperglycemia, without long-term current use of insulin (HCC)    Hyperglycemia due to diabetes mellitus ([facility] [address]    Hyperglycemic crisis in diabetes mellitus ([facility] [address]    Acute cystitis without hematuria    Lumbar radiculopathy       Medications listed as ordered at the time of discharge from hospital     Medication List            Accurate as of October 11, 2022  1:37 PM. If you have any questions, ask your nurse or doctor. CHANGE how you take these medications      FLUoxetine 40 MG capsule  Commonly known as: PROzac  Take 1 capsule by mouth nightly  What changed: when to take this     glimepiride 2 MG tablet  Commonly known as: Amaryl  Take 2 tablets by mouth every morning  What changed: how much to take  Changed by: Shaji Wick MD     insulin glargine 100 UNIT/ML injection vial  Commonly known as: LANTUS  Pt is taking 25 units at this time HS.   What changed:   how much to take  how to take this  when to take this  additional instructions  Changed by: Shaji Wick MD            CONTINUE taking these medications      aspirin 81 MG chewable tablet     buPROPion 300 MG extended release tablet  Commonly known as: WELLBUTRIN XL  Take 1 tablet by mouth every morning     levothyroxine 75 MCG tablet  Commonly known as: SYNTHROID  Take 1 tablet by mouth daily     losartan 100 MG tablet  Commonly known as: COZAAR  Take 1 tablet by mouth daily     metoprolol succinate 50 MG extended release tablet  Commonly known as: TOPROL XL  Take 1 tablet by mouth daily     OLANZapine 5 MG tablet  Commonly known as: ZYPREXA  Take 1 tablet by mouth nightly     pantoprazole 40 MG tablet  Commonly known as: PROTONIX  Take 1 tablet by mouth daily     TechLite Pen Needles 31G X 5 MM Misc  Generic drug: Insulin Pen Needle     therapeutic multivitamin-minerals tablet     traZODone 50 MG tablet  Commonly known as: DESYREL               Where to Get Your Medications Information about where to get these medications is not yet available    Ask your nurse or doctor about these medications  glimepiride 2 MG tablet  insulin glargine 100 UNIT/ML injection vial           Medications marked \"taking\" at this time  Outpatient Medications Marked as Taking for the 10/11/22 encounter (Office Visit) with Aileen Stewart MD   Medication Sig Dispense Refill    TECHLITE PEN NEEDLES 31G X 5 MM MISC       insulin glargine (LANTUS) 100 UNIT/ML injection vial Pt is taking 25 units at this time HS. 1 each 0    glimepiride (AMARYL) 2 MG tablet Take 2 tablets by mouth every morning 30 tablet 3    levothyroxine (SYNTHROID) 75 MCG tablet Take 1 tablet by mouth daily 90 tablet 0    losartan (COZAAR) 100 MG tablet Take 1 tablet by mouth daily 90 tablet 0    metoprolol succinate (TOPROL XL) 50 MG extended release tablet Take 1 tablet by mouth daily 90 tablet 0    pantoprazole (PROTONIX) 40 MG tablet Take 1 tablet by mouth daily 90 tablet 0    aspirin 81 MG chewable tablet Take 81 mg by mouth daily      traZODone (DESYREL) 50 MG tablet Take 50 mg by mouth nightly      Multiple Vitamins-Minerals (THERAPEUTIC MULTIVITAMIN-MINERALS) tablet Take 1 tablet by mouth daily      buPROPion (WELLBUTRIN XL) 300 MG extended release tablet Take 1 tablet by mouth every morning 90 tablet 0    FLUoxetine (PROZAC) 40 MG capsule Take 1 capsule by mouth nightly (Patient taking differently: Take 40 mg by mouth daily) 90 capsule 0    OLANZapine (ZYPREXA) 5 MG tablet Take 1 tablet by mouth nightly 90 tablet 0        Medications patient taking as of now reconciled against medications ordered at time of hospital discharge: Yes    A comprehensive review of systems was negative.      Objective:    /78   Pulse 67   Temp 97.5 °F (36.4 °C) (Temporal)   Ht 5' 4\" (1.626 m)   SpO2 99%   BMI 23.17 kg/m²   General Appearance: alert and oriented to person, place and time, well developed and well- nourished, in no acute distress  Skin: warm and dry, no rash or erythema  Head: normocephalic and atraumatic  Eyes: pupils equal, round, and reactive to light, extraocular eye movements intact, conjunctivae normal  ENT: tympanic membrane, external ear and ear canal normal bilaterally, nose without deformity, nasal mucosa and turbinates normal without polyps  Neck: supple and non-tender without mass, no thyromegaly or thyroid nodules, no cervical lymphadenopathy  Pulmonary/Chest: clear to auscultation bilaterally- no wheezes, rales or rhonchi, normal air movement, no respiratory distress  Cardiovascular: normal rate, regular rhythm, normal S1 and S2, no murmurs, rubs, clicks, or gallops, distal pulses intact, no carotid bruits  Abdomen: soft, non-tender, non-distended, normal bowel sounds, no masses or organomegaly  Extremities: no cyanosis, clubbing or edema  Musculoskeletal: normal range of motion, no joint swelling, deformity or tenderness  Neurologic: reflexes normal and symmetric, no cranial nerve deficit, gait, coordination and speech normal      An electronic signature was used to authenticate this note.   --Kareem Sigala MD    Urine spot test positive for large leukocyte karthik and bacteria    Cipro 500 mg twice a day for 1 week

## 2022-10-12 ENCOUNTER — CARE COORDINATION (OUTPATIENT)
Dept: CARE COORDINATION | Age: 82
End: 2022-10-12

## 2022-10-12 RX ORDER — ALCOHOL ANTISEPTIC PADS
1 PADS, MEDICATED (EA) TOPICAL 3 TIMES DAILY
Qty: 300 EACH | Refills: 1 | Status: SHIPPED | OUTPATIENT
Start: 2022-10-12

## 2022-10-12 RX ORDER — CALCIUM CITRATE/VITAMIN D3 200MG-6.25
1 TABLET ORAL 3 TIMES DAILY
Qty: 300 EACH | Refills: 1 | Status: SHIPPED | OUTPATIENT
Start: 2022-10-12

## 2022-10-12 NOTE — CARE COORDINATION
Care Transitions Follow Up Call    Needs to be reviewed by the provider   Additional needs identified to be addressed with provider: No  none             Method of communication with provider : none      Care Transition Nurse contacted the patient by telephone to follow up after admission on  [facility] -2022 Hyperglycemic Crisis in Diabetic . Verified name and  with patient as identifiers. Addressed changes since last contact: none  Discussed follow-up appointments. If no appointment was previously scheduled, appointment scheduling offered: Yes. Is follow up appointment scheduled within 7 days of discharge? Yes. Advance Care Planning:   Does patient have an Advance Directive:  not reviewed . CTN reviewed discharge instructions, medical action plan and red flags with patient and discussed any barriers to care and/or understanding of plan of care after discharge. Discussed appropriate site of care based on symptoms and resources available to patient including: PCP  Home health  When to call 911. The patient agrees to contact the PCP office for questions related to their healthcare. Patients top risk factors for readmission: medical condition-diabeties   Interventions to address risk factors: Obtained and reviewed discharge summary and/or continuity of care documents      Non-[facility] follow up appointment(s): na    CTN provided contact information for future needs. No further follow-up call indicated based on severity of symptoms and risk factors. Plan for next call:     Spoke with pt, states she did see pcp regarding low blood sugars in the morning, did start amaryl and pt states she is doing better. Bs this am was 74 and she ate oatmeal and is feeling good. States she is taking an atb for uti and is not having any side effects. No n/v/d and no fever, cp or sob. Pt is up and moving on her own without issues. Denies home, med or transportation needs.  Ctn info given for future needs and ctn will sign off

## 2022-10-18 ENCOUNTER — PROCEDURE VISIT (OUTPATIENT)
Dept: PODIATRY | Age: 82
End: 2022-10-18
Payer: MEDICARE

## 2022-10-18 VITALS — BODY MASS INDEX: 23.56 KG/M2 | WEIGHT: 138 LBS | HEIGHT: 64 IN

## 2022-10-18 DIAGNOSIS — I73.9 PVD (PERIPHERAL VASCULAR DISEASE) (HCC): ICD-10-CM

## 2022-10-18 DIAGNOSIS — M79.674 PAIN OF TOE OF RIGHT FOOT: ICD-10-CM

## 2022-10-18 DIAGNOSIS — B35.1 ONYCHOMYCOSIS: Primary | ICD-10-CM

## 2022-10-18 DIAGNOSIS — M79.675 PAIN OF TOE OF LEFT FOOT: ICD-10-CM

## 2022-10-18 DIAGNOSIS — E11.51 TYPE II DIABETES MELLITUS WITH PERIPHERAL CIRCULATORY DISORDER (HCC): ICD-10-CM

## 2022-10-18 DIAGNOSIS — R26.2 DIFFICULTY WALKING: ICD-10-CM

## 2022-10-18 PROCEDURE — 11720 DEBRIDE NAIL 1-5: CPT | Performed by: PODIATRIST

## 2022-10-18 NOTE — PROGRESS NOTES
Patient in today for nail care. Patient does not have any complaints of pain at this time.  Patient's PCP is Alex Gibson MD date of last ov 10/11/22        Gomez Kamara LPN

## 2022-10-18 NOTE — PROGRESS NOTES
10/18/22     Kwabena Galeano    : 1940  Sex: female  Age: 79 y.o. Subjective: The patient is seen today for evaluation regarding diabetic foot evaluation and mycotic nail care. No other complaints noted. Chief Complaint   Patient presents with    Nail Problem     Nail care       Current Medications:    Current Outpatient Medications:     Ultra Thin Lancets 31G MISC, 1 each by Does not apply route in the morning, at noon, and at bedtime, Disp: 300 each, Rfl: 1    blood glucose test strips (TRUE METRIX BLOOD GLUCOSE TEST) strip, 1 each by In Vitro route 3 times daily As needed. , Disp: 300 each, Rfl: 1    TECHLITE PEN NEEDLES 31G X 5 MM MISC, , Disp: , Rfl:     insulin glargine (LANTUS) 100 UNIT/ML injection vial, Pt is taking 25 units at this time HS., Disp: 1 each, Rfl: 0    glimepiride (AMARYL) 2 MG tablet, Take 2 tablets by mouth every morning, Disp: 30 tablet, Rfl: 3    ciprofloxacin (CIPRO) 500 MG tablet, Take 1 tablet by mouth 2 times daily for 10 days, Disp: 14 tablet, Rfl: 0    levothyroxine (SYNTHROID) 75 MCG tablet, Take 1 tablet by mouth daily, Disp: 90 tablet, Rfl: 0    losartan (COZAAR) 100 MG tablet, Take 1 tablet by mouth daily, Disp: 90 tablet, Rfl: 0    metoprolol succinate (TOPROL XL) 50 MG extended release tablet, Take 1 tablet by mouth daily, Disp: 90 tablet, Rfl: 0    pantoprazole (PROTONIX) 40 MG tablet, Take 1 tablet by mouth daily, Disp: 90 tablet, Rfl: 0    aspirin 81 MG chewable tablet, Take 81 mg by mouth daily, Disp: , Rfl:     traZODone (DESYREL) 50 MG tablet, Take 50 mg by mouth nightly, Disp: , Rfl:     Multiple Vitamins-Minerals (THERAPEUTIC MULTIVITAMIN-MINERALS) tablet, Take 1 tablet by mouth daily, Disp: , Rfl:     buPROPion (WELLBUTRIN XL) 300 MG extended release tablet, Take 1 tablet by mouth every morning, Disp: 90 tablet, Rfl: 0    FLUoxetine (PROZAC) 40 MG capsule, Take 1 capsule by mouth nightly (Patient taking differently: Take 40 mg by mouth daily), Disp: 90 capsule, Rfl: 0    OLANZapine (ZYPREXA) 5 MG tablet, Take 1 tablet by mouth nightly, Disp: 90 tablet, Rfl: 0    Allergies:  No Known Allergies    Past Surgical History:   Procedure Laterality Date    CHOLECYSTECTOMY      COLONOSCOPY      EYE SURGERY      cataract both eyes    HYSTERECTOMY (CERVIX STATUS UNKNOWN)      JOINT REPLACEMENT Left     KNEE    PAIN MANAGEMENT PROCEDURE Bilateral 8/11/2022    BILATERAL THORACIC FACET MEDIAL BRANCH BLOCK AT T8, 9, 10 (CPT 87825) performed by Michelle Oliva MD at [address] [place] [address] N/A 10/6/2022    EPIDURAL STEROID INJECTION L3-4 WITH 40 DEPO performed by Michelle Oliva MD at [place] [id number]  rt rotator    UPPER GASTROINTESTINAL ENDOSCOPY  5/15//12    toni     Past Medical History:   Diagnosis Date    Depression     Diabetes mellitus ([facility]     Hyperlipidemia     Hypertension     Thyroid disease     Type 2 diabetes mellitus without complication ([facility]        Vitals:    10/18/22 1038   Weight: 138 lb (62.6 kg)   Height: 5' 4\" (1.626 m)       Exam:  Pedal pulses diminished to palpation bilateral foot. At this time the nail/s 1, 5 right foot and nail/s 1, 5 left foot are noted to be thickened, dystrophic and discolored with subungual debris present. Tenderness noted to palpation. Minimal hair growth is noted to both lower extremities. Edema noted with both varicosities and stasis skin changes present bilaterally. Coolness is noted to the digital regions to palpation. Capillary fill time delayed digital areas bilateral foot. No heel fissuring or macerations of the web spaces. No plantar calluses and/or ulcerative areas are noted. Patient is having difficulty with gait/walking. Plan Per Assessment  Umesh Asif was seen today for nail problem.     Diagnoses and all orders for this visit:    Onychomycosis    Pain of toe of right foot    Pain of toe of left foot    PVD (peripheral vascular disease) (HCC)    Type II diabetes mellitus with peripheral circulatory disorder (HCC)    Difficulty walking        1. Evaluation and Management  2. Manual and electrical debridement of the mycotic nails was performed for thickness and length to prevent injection and/or ulceration. 3. Discussed additional diabetic lower extremity care techniques with patient today. 4. It was discussed in detail with the patient proper caring for the vascular compromised foot. The fact that they have compromised blood flow put the patient at risk for infection/gangrene/amputation. The patient should not walk barefoot. Shoe gear should fit properly and socks should be worn with shoes. If any skin lesions are noted, they are instructed to contact the office immediately. 5. We will see the patient back at a later date for continued podiatric management and care. Patient was advised to call the office with any questions or concerns prior to their next appointment if needed. Seen By:    Maya Carrera DPM    Electronically signed by Maya Carrera DPM on 10/18/2022 at 11:06 AM      This note was created using voice recognition software. The note was reviewed however may contain grammatical errors.

## 2022-10-19 ENCOUNTER — TELEPHONE (OUTPATIENT)
Dept: PRIMARY CARE CLINIC | Age: 82
End: 2022-10-19

## 2022-10-19 NOTE — TELEPHONE ENCOUNTER
Home health care called stating that patient is concerned about blood sugar levels    She has a snack before bed but has woken up this week with a reading of 70 this morning- 47 and 54 the last couple days     Please call patient to discuss

## 2022-10-20 NOTE — TELEPHONE ENCOUNTER
Patient notified at home to decrease her nightly insulin to 20 units @ HS, and to notify office of any further problems with hypoglycemia.

## 2022-10-25 ENCOUNTER — TELEPHONE (OUTPATIENT)
Dept: PRIMARY CARE CLINIC | Age: 82
End: 2022-10-25

## 2022-10-25 NOTE — TELEPHONE ENCOUNTER
Rachel Elkins with [facility] care called states patient still having trouble with glucose control    Some mornings patients glucose in the 50 and 60     And in the evening 300's    Pt has appt on 10/26/22

## 2022-10-26 ENCOUNTER — OFFICE VISIT (OUTPATIENT)
Dept: PRIMARY CARE CLINIC | Age: 82
End: 2022-10-26
Payer: MEDICARE

## 2022-10-26 VITALS
BODY MASS INDEX: 23.69 KG/M2 | DIASTOLIC BLOOD PRESSURE: 78 MMHG | OXYGEN SATURATION: 93 % | TEMPERATURE: 97.4 F | HEIGHT: 64 IN | HEART RATE: 72 BPM | SYSTOLIC BLOOD PRESSURE: 136 MMHG

## 2022-10-26 DIAGNOSIS — I10 PRIMARY HYPERTENSION: ICD-10-CM

## 2022-10-26 DIAGNOSIS — E11.649 NOCTURNAL HYPOGLYCEMIA IN PATIENT WITH TYPE 2 DIABETES MELLITUS (HCC): Primary | ICD-10-CM

## 2022-10-26 DIAGNOSIS — M54.16 LUMBAR RADICULOPATHY: ICD-10-CM

## 2022-10-26 DIAGNOSIS — E06.3 AUTOIMMUNE HYPOTHYROIDISM: ICD-10-CM

## 2022-10-26 DIAGNOSIS — E08.65 DIABETES MELLITUS DUE TO UNDERLYING CONDITION, UNCONTROLLED, WITH HYPERGLYCEMIA, WITHOUT LONG-TERM CURRENT USE OF INSULIN (HCC): ICD-10-CM

## 2022-10-26 PROCEDURE — G8420 CALC BMI NORM PARAMETERS: HCPCS | Performed by: INTERNAL MEDICINE

## 2022-10-26 PROCEDURE — 3078F DIAST BP <80 MM HG: CPT | Performed by: INTERNAL MEDICINE

## 2022-10-26 PROCEDURE — 1090F PRES/ABSN URINE INCON ASSESS: CPT | Performed by: INTERNAL MEDICINE

## 2022-10-26 PROCEDURE — 3046F HEMOGLOBIN A1C LEVEL >9.0%: CPT | Performed by: INTERNAL MEDICINE

## 2022-10-26 PROCEDURE — G8427 DOCREV CUR MEDS BY ELIG CLIN: HCPCS | Performed by: INTERNAL MEDICINE

## 2022-10-26 PROCEDURE — 1123F ACP DISCUSS/DSCN MKR DOCD: CPT | Performed by: INTERNAL MEDICINE

## 2022-10-26 PROCEDURE — G8484 FLU IMMUNIZE NO ADMIN: HCPCS | Performed by: INTERNAL MEDICINE

## 2022-10-26 PROCEDURE — G8399 PT W/DXA RESULTS DOCUMENT: HCPCS | Performed by: INTERNAL MEDICINE

## 2022-10-26 PROCEDURE — 3074F SYST BP LT 130 MM HG: CPT | Performed by: INTERNAL MEDICINE

## 2022-10-26 PROCEDURE — 1036F TOBACCO NON-USER: CPT | Performed by: INTERNAL MEDICINE

## 2022-10-26 PROCEDURE — 99213 OFFICE O/P EST LOW 20 MIN: CPT | Performed by: INTERNAL MEDICINE

## 2022-10-26 RX ORDER — BUPROPION HYDROCHLORIDE 300 MG/1
300 TABLET ORAL EVERY MORNING
Qty: 90 TABLET | Refills: 0 | Status: CANCELLED | OUTPATIENT
Start: 2022-10-26

## 2022-10-26 RX ORDER — FLUOXETINE HYDROCHLORIDE 40 MG/1
40 CAPSULE ORAL NIGHTLY
Qty: 90 CAPSULE | Refills: 0 | Status: SHIPPED | OUTPATIENT
Start: 2022-10-26

## 2022-10-26 NOTE — PROGRESS NOTES
Chief Complaint   Patient presents with    2 Week Follow-Up     Pt is here as a 2 week F/U, and states her blood sugars are still running high in the afternoon / evening and drop through the night. Self glucose this am of 136. Pt wakes up in the middle of the night often with very low blood sugars and is shaking. HPI:  Patient is here for follow-up of diabetes mellitus with nocturnal hypoglycemia and daily hyperglycemia. Blood sugar log shows fasting blood sugar of . Postprandial blood sugar is in the 300 range. Patient claims she has not been eating through the day she does not have an appetite she has not been cooking for herself. She was offered to have Meals on Wheels but she declined that to. She was instructed to take her insulin in the morning and decrease it to 20 units subcutaneously and take her glimepiride after lunch. Patient is to continue checking blood sugar log and to RTC in 1 week. She denies any nausea or vomiting any abdominal pains any shortness of breath or chest pains. She claims that she has a lot of gas and she was advised to use Gas-X over-the-counter. .    Past Medical History, Surgical History, and Family History has been reviewed and updated.     Review of Systems:  Constitutional:  No fever, no fatigue, no chills, no headaches, no weight change  Dermatology:  No rash, no mole, no dry or sensitive skin  ENT:  No cough, no sore throat, no sinus pain, no runny nose, no ear pain  Cardiology:  No chest pain, no palpitations, no leg edema, no shortness of breath, no PND  Gastroenterology:  No dysphagia, no abdominal pain, no nausea, no vomiting, no constipation, no diarrhea, no heartburn  Musculoskeletal:  No joint pain, no leg cramps, no back pain, no muscle aches  Respiratory:  No shortness of breath, no orthopnea, no wheezing, no JOSE, no hemoptysis  Urology:  No blood in the urine, no urinary frequency, no urinary incontinence, no urinary urgency, no nocturia, no dysuria    Vitals:    10/26/22 1125 10/26/22 1202   BP: (!) 146/76 136/78   Pulse: 72    Temp: 97.4 °F (36.3 °C)    TempSrc: Temporal    SpO2: 93%    Height: 5' 4\" (1.626 m)        General:  Patient alert and oriented x 3, NAD, pleasant  HEENT:  Atraumatic, normocephalic, PERRLA, EOMI, clear conjunctiva, TMs clear, nose-clear, throat - no erythema  Neck:  Supple, no goiter, no carotid bruits, no LAD  Lungs:  CTA   Heart:  RRR, no murmurs, gallops or rubs  Abdomen:  Soft/nt/nd, + bowel sounds  Lymph node examination: unremarkable  Neurological exam : unremarkable  Extremities:  No clubbing, cyanosis or edema  Skin: unremarkable    Hemoglobin A1C   Date Value Ref Range Status   09/14/2022 15.0 % Final     Cholesterol, Total   Date Value Ref Range Status   09/07/2022 231 (H) 0 - 199 mg/dL Final     Triglycerides   Date Value Ref Range Status   09/07/2022 198 (H) 0 - 149 mg/dL Final     HDL   Date Value Ref Range Status   09/07/2022 53 >40 mg/dL Final     LDL Calculated   Date Value Ref Range Status   09/07/2022 138 (H) 0 - 99 mg/dL Final     VLDL Cholesterol Calculated   Date Value Ref Range Status   09/07/2022 40 mg/dL Final     VLDL   Date Value Ref Range Status   02/26/2021 18 mg/dL Final     Chol/HDL Ratio   Date Value Ref Range Status   02/26/2021 3.1  Final     Sodium   Date Value Ref Range Status   09/16/2022 139 132 - 146 mmol/L Final     Potassium   Date Value Ref Range Status   09/16/2022 4.8 3.5 - 5.0 mmol/L Final     Chloride   Date Value Ref Range Status   09/16/2022 108 (H) 98 - 107 mmol/L Final     CO2   Date Value Ref Range Status   09/16/2022 24 22 - 29 mmol/L Final     BUN   Date Value Ref Range Status   09/16/2022 14 6 - 23 mg/dL Final     Creatinine   Date Value Ref Range Status   09/16/2022 1.2 (H) 0.5 - 1.0 mg/dL Final     Glucose   Date Value Ref Range Status   10/11/2022 304 mg/dL Final     Calcium   Date Value Ref Range Status   09/16/2022 9.2 8.6 - 10.2 mg/dL Final     Total Protein   Date Value Ref Range Status   09/16/2022 6.0 (L) 6.4 - 8.3 g/dL Final     Albumin   Date Value Ref Range Status   09/16/2022 3.4 (L) 3.5 - 5.2 g/dL Final     Total Bilirubin   Date Value Ref Range Status   09/16/2022 <0.2 0.0 - 1.2 mg/dL Final     Alkaline Phosphatase   Date Value Ref Range Status   09/16/2022 98 35 - 104 U/L Final     AST   Date Value Ref Range Status   09/16/2022 19 0 - 31 U/L Final     ALT   Date Value Ref Range Status   09/16/2022 18 0 - 32 U/L Final     GFR Non-   Date Value Ref Range Status   09/16/2022 43 >=60 mL/min/1.73 Final     Comment:     Chronic Kidney Disease: less than 60 ml/min/1.73 sq.m. Kidney Failure: less than 15 ml/min/1.73 sq.m. Results valid for patients 18 years and older. GFR    Date Value Ref Range Status   09/16/2022 52  Final        FLUORO FOR SURGICAL PROCEDURES    Result Date: 10/6/2022  Radiology exam is complete. No Radiologist dictation. Please follow up with ordering provider. Assessment/Plan:      Outpatient Encounter Medications as of 10/26/2022   Medication Sig Dispense Refill    FLUoxetine (PROZAC) 40 MG capsule Take 1 capsule by mouth nightly 90 capsule 0    Ultra Thin Lancets 31G MISC 1 each by Does not apply route in the morning, at noon, and at bedtime 300 each 1    blood glucose test strips (TRUE METRIX BLOOD GLUCOSE TEST) strip 1 each by In Vitro route 3 times daily As needed. 300 each 1    TECHLITE PEN NEEDLES 31G X 5 MM MISC       insulin glargine (LANTUS) 100 UNIT/ML injection vial Pt is taking 25 units at this time HS.  (Patient taking differently: Pt is taking 20 units at HS.) 1 each 0    glimepiride (AMARYL) 2 MG tablet Take 2 tablets by mouth every morning 30 tablet 3    levothyroxine (SYNTHROID) 75 MCG tablet Take 1 tablet by mouth daily 90 tablet 0    losartan (COZAAR) 100 MG tablet Take 1 tablet by mouth daily 90 tablet 0    metoprolol succinate (TOPROL XL) 50 MG extended release tablet Take 1 tablet by mouth daily 90 tablet 0    pantoprazole (PROTONIX) 40 MG tablet Take 1 tablet by mouth daily 90 tablet 0    aspirin 81 MG chewable tablet Take 81 mg by mouth daily      traZODone (DESYREL) 50 MG tablet Take 50 mg by mouth nightly      Multiple Vitamins-Minerals (THERAPEUTIC MULTIVITAMIN-MINERALS) tablet Take 1 tablet by mouth daily      buPROPion (WELLBUTRIN XL) 300 MG extended release tablet Take 1 tablet by mouth every morning 90 tablet 0    OLANZapine (ZYPREXA) 5 MG tablet Take 1 tablet by mouth nightly 90 tablet 0    [DISCONTINUED] meloxicam (MOBIC) 15 MG tablet Take 1 tablet by mouth daily as needed for Pain 90 tablet 0    [DISCONTINUED] FLUoxetine (PROZAC) 40 MG capsule Take 1 capsule by mouth nightly (Patient taking differently: Take 40 mg by mouth daily) 90 capsule 0     Facility-Administered Encounter Medications as of 10/26/2022   Medication Dose Route Frequency Provider Last Rate Last Admin    insulin regular (HUMULIN R;NOVOLIN R) injection 10 Units  10 Units SubCUTAneous Once MD Sakshi Bowie was seen today for 2 week follow-up. Diagnoses and all orders for this visit:    Nocturnal hypoglycemia in patient with type 2 diabetes mellitus ([facility]    Primary hypertension    Diabetes mellitus due to underlying condition, uncontrolled, with hyperglycemia, without long-term current use of insulin ([facility])    Autoimmune hypothyroidism    Lumbar radiculopathy    Other orders  -     FLUoxetine (PROZAC) 40 MG capsule; Take 1 capsule by mouth nightly     Switch the timing of the Lantus injection to 20 units in the morning and glimepiride 2 mg after lunch and check blood sugar log  Patient Instructions   Gas-X twice / day     On this date 10/26/2022 I have spent 25 minutes reviewing previous notes, test results and face to face with the patient discussing the diagnosis and importance of compliance with the treatment plan as well as documenting on the day of the visit.       Yamila Carlos MD 10/26/22

## 2022-10-27 ENCOUNTER — OFFICE VISIT (OUTPATIENT)
Dept: PAIN MANAGEMENT | Age: 82
End: 2022-10-27
Payer: MEDICARE

## 2022-10-27 VITALS
SYSTOLIC BLOOD PRESSURE: 118 MMHG | HEIGHT: 64 IN | TEMPERATURE: 98.8 F | OXYGEN SATURATION: 96 % | WEIGHT: 138 LBS | BODY MASS INDEX: 23.56 KG/M2 | DIASTOLIC BLOOD PRESSURE: 76 MMHG | HEART RATE: 79 BPM | RESPIRATION RATE: 16 BRPM

## 2022-10-27 DIAGNOSIS — G89.4 CHRONIC PAIN SYNDROME: Primary | ICD-10-CM

## 2022-10-27 DIAGNOSIS — M48.061 SPINAL STENOSIS OF LUMBAR REGION WITHOUT NEUROGENIC CLAUDICATION: ICD-10-CM

## 2022-10-27 DIAGNOSIS — M50.90 CERVICAL DISC DISORDER: ICD-10-CM

## 2022-10-27 DIAGNOSIS — M51.9 THORACIC DISC DISEASE: ICD-10-CM

## 2022-10-27 DIAGNOSIS — M47.814 THORACIC SPONDYLOSIS: ICD-10-CM

## 2022-10-27 DIAGNOSIS — M47.812 CERVICAL FACET JOINT SYNDROME: ICD-10-CM

## 2022-10-27 DIAGNOSIS — M47.894 THORACIC FACET JOINT SYNDROME: ICD-10-CM

## 2022-10-27 DIAGNOSIS — S22.000A COMPRESSION FRACTURE OF THORACIC VERTEBRA, UNSPECIFIED THORACIC VERTEBRAL LEVEL, INITIAL ENCOUNTER (HCC): ICD-10-CM

## 2022-10-27 DIAGNOSIS — M47.812 CERVICAL SPONDYLOSIS: ICD-10-CM

## 2022-10-27 PROCEDURE — 3078F DIAST BP <80 MM HG: CPT | Performed by: PAIN MEDICINE

## 2022-10-27 PROCEDURE — 1036F TOBACCO NON-USER: CPT | Performed by: PAIN MEDICINE

## 2022-10-27 PROCEDURE — G8427 DOCREV CUR MEDS BY ELIG CLIN: HCPCS | Performed by: PAIN MEDICINE

## 2022-10-27 PROCEDURE — G8399 PT W/DXA RESULTS DOCUMENT: HCPCS | Performed by: PAIN MEDICINE

## 2022-10-27 PROCEDURE — G8420 CALC BMI NORM PARAMETERS: HCPCS | Performed by: PAIN MEDICINE

## 2022-10-27 PROCEDURE — 99213 OFFICE O/P EST LOW 20 MIN: CPT | Performed by: PAIN MEDICINE

## 2022-10-27 PROCEDURE — 3074F SYST BP LT 130 MM HG: CPT | Performed by: PAIN MEDICINE

## 2022-10-27 PROCEDURE — 99214 OFFICE O/P EST MOD 30 MIN: CPT | Performed by: PAIN MEDICINE

## 2022-10-27 PROCEDURE — 1123F ACP DISCUSS/DSCN MKR DOCD: CPT | Performed by: PAIN MEDICINE

## 2022-10-27 PROCEDURE — 1090F PRES/ABSN URINE INCON ASSESS: CPT | Performed by: PAIN MEDICINE

## 2022-10-27 PROCEDURE — G8484 FLU IMMUNIZE NO ADMIN: HCPCS | Performed by: PAIN MEDICINE

## 2022-10-27 RX ORDER — ACETAMINOPHEN AND CODEINE PHOSPHATE 300; 30 MG/1; MG/1
1 TABLET ORAL DAILY PRN
Qty: 30 TABLET | Refills: 0 | Status: SHIPPED | OUTPATIENT
Start: 2022-10-27 | End: 2022-11-26

## 2022-10-27 NOTE — PROGRESS NOTES
[address]  [address] [facility], [address] [place] [address] Yovany  [phone]    Follow up Note      Clara Wang     Date of Visit:  10/27/2022    CC:  Patient presents for follow up   Chief Complaint   Patient presents with    Follow Up After Procedure      Fluoroscopic guided therapeutic lumbar epidural steroid injection at the L3-4 level (# 1). HPI:    Follow up mid back and low back pain with complaints of worsening mid back pain  Appropriate analgesia with current medications regimen: N/A. Change in quality of symptoms:no. Medication side effects:not applicable . Recent diagnostic testing:none  Recent interventional procedures:LESI L3-4 with around 80% improvement in her pain. She has not been on anticoagulation medications to include none. The patient  has not been on herbal supplements. The patient is diabetic. Imaging:  Thoracic spine MRI 2022:    1. Mild chronic compression fracture deformity of the T8 vertebral body. No   acute fracture. 2. Mild exaggeration of the thoracic kyphosis. 3. Small disc protrusions at T6-7 and T10-11. No central canal stenosis. 4. Mild neural foraminal stenoses at multiple levels, resulting from facet   hypertrophic changes. Thoracic spine Xray 2022. Compression of T9  Mild spondylosis T11-12      Lumbar spine MRI 2022:    1. No fracture or bony destructive lesion. 2. Moderate central canal stenosis at L3-4. Mild to moderate stenoses at   L4-5 and L5-S1.   3.  Multilevel neural foraminal stenoses, worst (severe) at the bilateral   L3-4 and right L4-5 levels. Moderate to severe neural foraminal stenoses at   the left L4-5 and bilateral L5-S1 levels. 4. Severe lateral recess stenoses at L3-4 bilaterally and at the right L5-S1   levels. 4     Previous treatments: medications. .         Potential Aberrant Drug-Related Behavior:    No    Urine Drug Screening:  None    OARRS report:  10/2022 consistent.     Opioid Agreement:  Renewal date:N/A    Past Medical History:   Diagnosis Date    Depression     Diabetes mellitus ([facility] [address]     Hyperlipidemia     Hypertension     Thyroid disease     Type 2 diabetes mellitus without complication ([facility]      Past Surgical History:   Procedure Laterality Date    CHOLECYSTECTOMY      COLONOSCOPY      EYE SURGERY      cataract both eyes    HYSTERECTOMY (CERVIX STATUS UNKNOWN)      JOINT REPLACEMENT Left     KNEE    PAIN MANAGEMENT PROCEDURE Bilateral 8/11/2022    BILATERAL THORACIC FACET MEDIAL BRANCH BLOCK AT T8, 9, 10 (CPT 29252) performed by Claribel Iglesias MD at [address] [place] [address] N/A 10/6/2022    EPIDURAL STEROID INJECTION L3-4 WITH 40 DEPO performed by Claribel Iglesias MD at [address] rotator    UPPER GASTROINTESTINAL ENDOSCOPY  5/15//12    toni     Prior to Admission medications    Medication Sig Start Date End Date Taking? Authorizing Provider   FLUoxetine (PROZAC) 40 MG capsule Take 1 capsule by mouth nightly 10/26/22  Yes Estephanie Sheth MD   Ultra Thin Lancets 31G MISC 1 each by Does not apply route in the morning, at noon, and at bedtime 10/12/22  Yes Estephanie Sheth MD   blood glucose test strips (TRUE METRIX BLOOD GLUCOSE TEST) strip 1 each by In Vitro route 3 times daily As needed. 10/12/22  Yes Estephanie Sheth MD   TECHLITE PEN NEEDLES 31G X 5 MM MISC  9/16/22  Yes Historical Provider, MD   insulin glargine (LANTUS) 100 UNIT/ML injection vial Pt is taking 25 units at this time HS.   Patient taking differently: Pt is taking 20 units at HS. 10/11/22  Yes Estephanie Sheth MD   glimepiride (AMARYL) 2 MG tablet Take 2 tablets by mouth every morning 10/11/22  Yes Estephanie Sheth MD   levothyroxine (SYNTHROID) 75 MCG tablet Take 1 tablet by mouth daily 9/14/22  Yes Estephanie Sheth MD   losartan (COZAAR) 100 MG tablet Take 1 tablet by mouth daily 9/14/22  Yes Estephanie Sheth MD   metoprolol succinate (TOPROL XL) 50 MG extended release tablet Take 1 tablet by mouth daily 9/14/22  Yes Ayaka Stewart MD   pantoprazole (PROTONIX) 40 MG tablet Take 1 tablet by mouth daily 9/14/22  Yes Ayaka Stewart MD   aspirin 81 MG chewable tablet Take 81 mg by mouth daily   Yes Historical Provider, MD   traZODone (DESYREL) 50 MG tablet Take 50 mg by mouth nightly   Yes Historical Provider, MD   Multiple Vitamins-Minerals (THERAPEUTIC MULTIVITAMIN-MINERALS) tablet Take 1 tablet by mouth daily   Yes Historical Provider, MD   buPROPion (WELLBUTRIN XL) 300 MG extended release tablet Take 1 tablet by mouth every morning 6/14/22  Yes Ayaka Stewart MD   OLANZapine (ZYPREXA) 5 MG tablet Take 1 tablet by mouth nightly 6/14/22  Yes Ayaka Stewart MD   meloxicam (MOBIC) 15 MG tablet Take 1 tablet by mouth daily as needed for Pain 9/14/22 9/16/22  Ayaka Stewart MD     No Known Allergies    Social History     Socioeconomic History    Marital status:      Spouse name: Not on file    Number of children: Not on file    Years of education: Not on file    Highest education level: Not on file   Occupational History    Not on file   Tobacco Use    Smoking status: Never    Smokeless tobacco: Never   Vaping Use    Vaping Use: Not on file   Substance and Sexual Activity    Alcohol use: No     Comment: 1 cup of coffee    Drug use: No    Sexual activity: Not on file   Other Topics Concern    Not on file   Social History Narrative    Not on file     Social Determinants of Health     Financial Resource Strain: Low Risk     Difficulty of Paying Living Expenses: Not hard at all   Food Insecurity: No Food Insecurity    Worried About Running Out of Food in the Last Year: Never true    Ran Out of Food in the Last Year: Never true   Transportation Needs: Not on file   Physical Activity: Not on file   Stress: Not on file   Social Connections: Not on file   Intimate Partner Violence: Not on file   Housing Stability: Not on file     Family History   Problem Relation Age of Onset    Cancer Mother ESOPHAGEAL     Stroke Mother     Cancer Father         KIDNEY     Coronary Art Dis Brother      REVIEW OF SYSTEMS:     Delia Maldonado denies fever/chills, chest pain, shortness of breath, new bowel or bladder complaints. All other review of systems was negative. PHYSICAL EXAMINATION:      /76   Pulse 79   Temp 98.8 °F (37.1 °C) (Infrared)   Resp 16   Ht 5' 4\" (1.626 m)   Wt 138 lb (62.6 kg)   SpO2 96%   BMI 23.69 kg/m²     General:       General appearance:   elderly, pleasant and well-hydrated. , in moderate discomfort and A & O x3  Build:Overweight     HEENT:     Head:normocephalic and atraumatic  Sclera: icterus absent,     Lungs:     Breathing:Breathing Pattern: regular, no distress     Abdomen:     Shape:non-distended and normal  Tenderness:none     Thoracic spine:     Spine inspection:exaggerated kyphosis   pationPal:tenderness paravertebral muscles and midline tenderness. T6-8/facet tenderness bilaterally  Range of motion:not tested    Lumbar spine:    Spine inspection:normal   CVA tenderness:No   Palpation:tenderness paravertebral muscles, facet loading, left, right, positive, and tenderness. Range of motion:not tested. Musculoskeletal:     Trigger points in Paraveteral:absent bilaterally  Jiang's:negative right, negative left  SLR:negative right, negative left, sitting     Extremities:     Tremors:None bilaterally upper and lower  Range of motion: pain with internal rotation of hips negative. Intact:Yes  Edema:Normal     Knee:      Inspection:Left knee replaced     Neurological:     Sensory:normal to light touch bilateral upper and lower extremities  Motor:                           Right Quadriceps4+/5          Left Quadriceps4+/5           Right Gastrocnemius4+/5    Left Gastrocnemius4+/5  Right Ant Tibialis4+/5  Left Ant Tibialis4+/5  Reflexes:    Right Quadriceps reflex1+  Left Quadriceps reflexNOT DONE  Right Achilles reflex1+  Left Achilles reflex0  [address]     Dermatology: Skin:no unusual rashes and no skin lesions     Impression:  Mid back pain  Plan:  Follow up on her mid and low back pain with complaints of worsening mid back pain. Patient is s/p LESI L3-4 with more than 80% improvement in her pain, will repeat as needed. Discussed repeat bilateral T8,9,10, patient will think about it. Will start patient on Tylenol# 3 QD PRN for moderate to severe pain. OARRS report reviewed 10/2022. Patient encouraged to stay active and to lose weight. Treatment plan discussed with the patient including procedure side effects. We discussed with the patient that combining opioids, benzodiazepines, alcohol, illicit drugs or sleep aids increases the risk of respiratory depression including death. We discussed that these medications may cause drowsiness, sedation or dizziness and have counseled the patient not to drive or operate machinery. We have discussed that these medications will be prescribed only by one provider. We have discussed with the patient about age related risk factors and have thoroughly discussed the importance of taking these medications as prescribed. The patient verbalizes understanding. mily Nielsen M.D.

## 2022-10-27 NOTE — PROGRESS NOTES
Do you currently have any of the following:    Fever: No  Headache:  No  Cough: No  Shortness of breath: No  Exposed to anyone with these symptoms: No                                                                                                                Omero Montanez presents to the [facility] on 10/27/2022. Ho Moravian is complaining of pain in her mid back. . The pain is constant. The pain is described as aching and throbbing. Pain is rated on her best day at a 7, on her worst day at a 10, and on average at a 8 on the VAS scale. She took her last dose of  nothing at this time. Miesha Rangel does not have issues with constipation. Any procedures since your last visit: Yes, with 100 % relief a couple of weeks, and now it is hurting again. She was able to move around a lot better, no pain. Now she is using a heating pad    She is not on NSAIDS and  is  on anticoagulation medications to include ASA and is managed by Lucy Sam MD  .     Pacemaker or defibrillator: No Physician managing device is NA. Medication Contract and Consent for Opioid Use Documents Filed        No documents found                       /76   Pulse 79   Temp 98.8 °F (37.1 °C) (Infrared)   Resp 16   Ht 5' 4\" (1.626 m)   Wt 138 lb (62.6 kg)   SpO2 96%   BMI 23.69 kg/m²      No LMP recorded. Patient has had a hysterectomy.

## 2022-11-02 ENCOUNTER — OFFICE VISIT (OUTPATIENT)
Dept: PRIMARY CARE CLINIC | Age: 82
End: 2022-11-02
Payer: MEDICARE

## 2022-11-02 VITALS
HEART RATE: 74 BPM | HEIGHT: 64 IN | SYSTOLIC BLOOD PRESSURE: 130 MMHG | TEMPERATURE: 97.4 F | DIASTOLIC BLOOD PRESSURE: 64 MMHG | OXYGEN SATURATION: 96 % | BODY MASS INDEX: 23.69 KG/M2

## 2022-11-02 DIAGNOSIS — Z79.4 TYPE 2 DIABETES MELLITUS WITH OTHER SPECIFIED COMPLICATION, WITH LONG-TERM CURRENT USE OF INSULIN (HCC): ICD-10-CM

## 2022-11-02 DIAGNOSIS — E11.649 NOCTURNAL HYPOGLYCEMIA IN PATIENT WITH TYPE 2 DIABETES MELLITUS (HCC): Primary | ICD-10-CM

## 2022-11-02 DIAGNOSIS — E11.65 HYPERGLYCEMIA DUE TO DIABETES MELLITUS (HCC): ICD-10-CM

## 2022-11-02 DIAGNOSIS — E11.69 TYPE 2 DIABETES MELLITUS WITH OTHER SPECIFIED COMPLICATION, WITH LONG-TERM CURRENT USE OF INSULIN (HCC): ICD-10-CM

## 2022-11-02 PROCEDURE — 99213 OFFICE O/P EST LOW 20 MIN: CPT | Performed by: INTERNAL MEDICINE

## 2022-11-02 PROCEDURE — G8484 FLU IMMUNIZE NO ADMIN: HCPCS | Performed by: INTERNAL MEDICINE

## 2022-11-02 PROCEDURE — 1036F TOBACCO NON-USER: CPT | Performed by: INTERNAL MEDICINE

## 2022-11-02 PROCEDURE — 3078F DIAST BP <80 MM HG: CPT | Performed by: INTERNAL MEDICINE

## 2022-11-02 PROCEDURE — G8399 PT W/DXA RESULTS DOCUMENT: HCPCS | Performed by: INTERNAL MEDICINE

## 2022-11-02 PROCEDURE — 1123F ACP DISCUSS/DSCN MKR DOCD: CPT | Performed by: INTERNAL MEDICINE

## 2022-11-02 PROCEDURE — G8427 DOCREV CUR MEDS BY ELIG CLIN: HCPCS | Performed by: INTERNAL MEDICINE

## 2022-11-02 PROCEDURE — 3046F HEMOGLOBIN A1C LEVEL >9.0%: CPT | Performed by: INTERNAL MEDICINE

## 2022-11-02 PROCEDURE — G8420 CALC BMI NORM PARAMETERS: HCPCS | Performed by: INTERNAL MEDICINE

## 2022-11-02 PROCEDURE — 3074F SYST BP LT 130 MM HG: CPT | Performed by: INTERNAL MEDICINE

## 2022-11-02 PROCEDURE — 1090F PRES/ABSN URINE INCON ASSESS: CPT | Performed by: INTERNAL MEDICINE

## 2022-11-02 NOTE — PROGRESS NOTES
Chief Complaint   Patient presents with    Diabetes     Pt is here as a 1 week F/U, and states her self glucose this am was 187. This pt. Feels she is running lower blood sugars in the afternoon and has had no more hypoglycemia episodes throughout the night. HPI:  Patient is here for follow-up of diabetes mellitus and hypoglycemic episodes. Insulin was switched to morning dosing and this has been helping her no more hypoglycemia. Her fasting blood sugars are around 1 20-1 50 but still her postprandial is in the 250-300 range. This has been only for 1 week and she is still adjusting her diet accordingly. She feels better. She denies any abdominal pain nausea or vomiting. .    Past Medical History, Surgical History, and Family History has been reviewed and updated.     Review of Systems:  Constitutional:  No fever, no fatigue, no chills, no headaches, no weight change  Dermatology:  No rash, no mole, no dry or sensitive skin  ENT:  No cough, no sore throat, no sinus pain, no runny nose, no ear pain  Cardiology:  No chest pain, no palpitations, no leg edema, no shortness of breath, no PND  Gastroenterology:  No dysphagia, no abdominal pain, no nausea, no vomiting, no constipation, no diarrhea, no heartburn  Musculoskeletal:  No joint pain, no leg cramps, no back pain, no muscle aches  Respiratory:  No shortness of breath, no orthopnea, no wheezing, no JOSE, no hemoptysis  Urology:  No blood in the urine, no urinary frequency, no urinary incontinence, no urinary urgency, no nocturia, no dysuria    Vitals:    11/02/22 1125   BP: 130/64   Pulse: 74   Temp: 97.4 °F (36.3 °C)   TempSrc: Temporal   SpO2: 96%   Height: 5' 4\" (1.626 m)       General:  Patient alert and oriented x 3, NAD, pleasant  HEENT:  Atraumatic, normocephalic, PERRLA, EOMI, clear conjunctiva, TMs clear, nose-clear, throat - no erythema  Neck:  Supple, no goiter, no carotid bruits, no LAD  Lungs:  CTA   Heart:  RRR, no murmurs, gallops or 32 U/L Final     GFR Non-   Date Value Ref Range Status   09/16/2022 43 >=60 mL/min/1.73 Final     Comment:     Chronic Kidney Disease: less than 60 ml/min/1.73 sq.m. Kidney Failure: less than 15 ml/min/1.73 sq.m. Results valid for patients 18 years and older. GFR    Date Value Ref Range Status   09/16/2022 52  Final        FLUORO FOR SURGICAL PROCEDURES    Result Date: 10/6/2022  Radiology exam is complete. No Radiologist dictation. Please follow up with ordering provider. Assessment/Plan:      Outpatient Encounter Medications as of 11/2/2022   Medication Sig Dispense Refill    acetaminophen-codeine (TYLENOL/CODEINE #3) 300-30 MG per tablet Take 1 tablet by mouth daily as needed for Pain for up to 30 days. 30 tablet 0    FLUoxetine (PROZAC) 40 MG capsule Take 1 capsule by mouth nightly 90 capsule 0    Ultra Thin Lancets 31G MISC 1 each by Does not apply route in the morning, at noon, and at bedtime 300 each 1    blood glucose test strips (TRUE METRIX BLOOD GLUCOSE TEST) strip 1 each by In Vitro route 3 times daily As needed. 300 each 1    TECHLITE PEN NEEDLES 31G X 5 MM MISC       insulin glargine (LANTUS) 100 UNIT/ML injection vial Pt is taking 25 units at this time HS.  (Patient taking differently: Inject 20 Units into the skin every morning (before breakfast)) 1 each 0    glimepiride (AMARYL) 2 MG tablet Take 2 tablets by mouth every morning 30 tablet 3    levothyroxine (SYNTHROID) 75 MCG tablet Take 1 tablet by mouth daily 90 tablet 0    losartan (COZAAR) 100 MG tablet Take 1 tablet by mouth daily 90 tablet 0    metoprolol succinate (TOPROL XL) 50 MG extended release tablet Take 1 tablet by mouth daily 90 tablet 0    pantoprazole (PROTONIX) 40 MG tablet Take 1 tablet by mouth daily 90 tablet 0    aspirin 81 MG chewable tablet Take 81 mg by mouth daily      traZODone (DESYREL) 50 MG tablet Take 50 mg by mouth nightly      Multiple Vitamins-Minerals (THERAPEUTIC MULTIVITAMIN-MINERALS) tablet Take 1 tablet by mouth daily      buPROPion (WELLBUTRIN XL) 300 MG extended release tablet Take 1 tablet by mouth every morning 90 tablet 0    OLANZapine (ZYPREXA) 5 MG tablet Take 1 tablet by mouth nightly 90 tablet 0    [DISCONTINUED] meloxicam (MOBIC) 15 MG tablet Take 1 tablet by mouth daily as needed for Pain 90 tablet 0     Facility-Administered Encounter Medications as of 11/2/2022   Medication Dose Route Frequency Provider Last Rate Last Admin    insulin regular (HUMULIN R;NOVOLIN R) injection 10 Units  10 Units SubCUTAneous Once Sully Head MD            Mainor Guaman was seen today for diabetes. Diagnoses and all orders for this visit:    Nocturnal hypoglycemia in patient with type 2 diabetes mellitus ([address]    Type 2 diabetes mellitus with other specified complication, with long-term current use of insulin ([address]    Hyperglycemia due to diabetes mellitus ([facility] [address]       There are no Patient Instructions on file for this visit. On this date 11/2/2022 I have spent 25 minutes reviewing previous notes, test results and face to face with the patient discussing the diagnosis and importance of compliance with the treatment plan as well as documenting on the day of the visit.       Lety Leonard MD   11/2/22

## 2022-11-14 ENCOUNTER — TELEPHONE (OUTPATIENT)
Dept: PRIMARY CARE CLINIC | Age: 82
End: 2022-11-14

## 2022-11-14 NOTE — TELEPHONE ENCOUNTER
Patient states she does not use the vial diabetic medication she only uses a pen- could not give me a name

## 2022-11-15 RX ORDER — INSULIN GLARGINE 100 [IU]/ML
20 INJECTION, SOLUTION SUBCUTANEOUS NIGHTLY
Qty: 5 ADJUSTABLE DOSE PRE-FILLED PEN SYRINGE | Refills: 0 | Status: SHIPPED | OUTPATIENT
Start: 2022-11-15

## 2022-11-15 NOTE — TELEPHONE ENCOUNTER
Pt daughter called again- states her mother is \"out\" of medication    Please send pen into her pharmacy

## 2022-12-01 ENCOUNTER — OFFICE VISIT (OUTPATIENT)
Dept: PAIN MANAGEMENT | Age: 82
End: 2022-12-01
Payer: MEDICARE

## 2022-12-01 VITALS
DIASTOLIC BLOOD PRESSURE: 86 MMHG | OXYGEN SATURATION: 98 % | RESPIRATION RATE: 16 BRPM | HEIGHT: 65 IN | HEART RATE: 70 BPM | WEIGHT: 138 LBS | BODY MASS INDEX: 22.99 KG/M2 | SYSTOLIC BLOOD PRESSURE: 142 MMHG

## 2022-12-01 DIAGNOSIS — G89.4 CHRONIC PAIN SYNDROME: Primary | ICD-10-CM

## 2022-12-01 DIAGNOSIS — M50.90 CERVICAL DISC DISORDER: ICD-10-CM

## 2022-12-01 DIAGNOSIS — M47.814 THORACIC SPONDYLOSIS: ICD-10-CM

## 2022-12-01 DIAGNOSIS — M47.812 CERVICAL SPONDYLOSIS: ICD-10-CM

## 2022-12-01 DIAGNOSIS — S22.000A COMPRESSION FRACTURE OF THORACIC VERTEBRA, UNSPECIFIED THORACIC VERTEBRAL LEVEL, INITIAL ENCOUNTER (HCC): ICD-10-CM

## 2022-12-01 DIAGNOSIS — M47.812 CERVICAL FACET JOINT SYNDROME: ICD-10-CM

## 2022-12-01 DIAGNOSIS — M51.9 THORACIC DISC DISEASE: ICD-10-CM

## 2022-12-01 DIAGNOSIS — M47.894 THORACIC FACET JOINT SYNDROME: ICD-10-CM

## 2022-12-01 DIAGNOSIS — M48.061 SPINAL STENOSIS OF LUMBAR REGION WITHOUT NEUROGENIC CLAUDICATION: ICD-10-CM

## 2022-12-01 PROCEDURE — 1123F ACP DISCUSS/DSCN MKR DOCD: CPT | Performed by: PAIN MEDICINE

## 2022-12-01 PROCEDURE — 1036F TOBACCO NON-USER: CPT | Performed by: PAIN MEDICINE

## 2022-12-01 PROCEDURE — G8484 FLU IMMUNIZE NO ADMIN: HCPCS | Performed by: PAIN MEDICINE

## 2022-12-01 PROCEDURE — G8399 PT W/DXA RESULTS DOCUMENT: HCPCS | Performed by: PAIN MEDICINE

## 2022-12-01 PROCEDURE — 99213 OFFICE O/P EST LOW 20 MIN: CPT

## 2022-12-01 PROCEDURE — 3078F DIAST BP <80 MM HG: CPT | Performed by: PAIN MEDICINE

## 2022-12-01 PROCEDURE — 1090F PRES/ABSN URINE INCON ASSESS: CPT | Performed by: PAIN MEDICINE

## 2022-12-01 PROCEDURE — 3074F SYST BP LT 130 MM HG: CPT | Performed by: PAIN MEDICINE

## 2022-12-01 PROCEDURE — 99213 OFFICE O/P EST LOW 20 MIN: CPT | Performed by: PAIN MEDICINE

## 2022-12-01 PROCEDURE — G8420 CALC BMI NORM PARAMETERS: HCPCS | Performed by: PAIN MEDICINE

## 2022-12-01 PROCEDURE — G8427 DOCREV CUR MEDS BY ELIG CLIN: HCPCS | Performed by: PAIN MEDICINE

## 2022-12-01 RX ORDER — SODIUM CHLORIDE 0.9 % (FLUSH) 0.9 %
5-40 SYRINGE (ML) INJECTION EVERY 12 HOURS SCHEDULED
OUTPATIENT
Start: 2022-12-01

## 2022-12-01 RX ORDER — SODIUM CHLORIDE 9 MG/ML
INJECTION, SOLUTION INTRAVENOUS PRN
OUTPATIENT
Start: 2022-12-01

## 2022-12-01 RX ORDER — SODIUM CHLORIDE 0.9 % (FLUSH) 0.9 %
5-40 SYRINGE (ML) INJECTION PRN
OUTPATIENT
Start: 2022-12-01

## 2022-12-01 NOTE — PROGRESS NOTES
Do you currently have any of the following:    Fever: No  Headache:  No  Cough: No  Shortness of breath: No  Exposed to anyone with these symptoms: January Zhengradha Lovell presents to the [address] [address] on 12/1/2022. Sakshi Napier is complaining of pain in her lower back. . The pain is constant. The pain is described as throbbing. Pain is rated on her best day at a 0, on her worst day at a 9, and on average at a 6 on the VAS scale. She took her last dose of Tylenol with codeine . Sakshi Napier does not have issues with constipation. Any procedures since your last visit: No,    She is not on NSAIDS and  is not on anticoagulation medications to include none and is managed by NA. Pacemaker or defibrillator: No Physician managing device is NA. Medication Contract and Consent for Opioid Use Documents Filed        No documents found                       BP (!) 142/86   Pulse 70   Resp 16   Ht 5' 4.5\" (1.638 m)   Wt 138 lb (62.6 kg)   SpO2 98%   BMI 23.32 kg/m²      No LMP recorded. Patient has had a hysterectomy.

## 2022-12-01 NOTE — PROGRESS NOTES
[address] [facility], [address] [place] [address] Y[place]  [phone]    Follow up Note      Tracey Wang     Date of Visit:  12/1/2022    CC:  Patient presents for follow up   Chief Complaint   Patient presents with    Lower Back Pain       HPI:    Follow up mid back and low back pain with complaints of worsening mid back pain  Appropriate analgesia with current medications regimen: N/A. Change in quality of symptoms:no. Medication side effects:not applicable . Recent diagnostic testing:none  Recent interventional procedures:none    She has not been on anticoagulation medications to include none. The patient  has not been on herbal supplements. The patient is diabetic. Imaging:  Thoracic spine MRI 2022:    1. Mild chronic compression fracture deformity of the T8 vertebral body. No   acute fracture. 2. Mild exaggeration of the thoracic kyphosis. 3. Small disc protrusions at T6-7 and T10-11. No central canal stenosis. 4. Mild neural foraminal stenoses at multiple levels, resulting from facet   hypertrophic changes. Thoracic spine Xray 2022. Compression of T9  Mild spondylosis T11-12      Lumbar spine MRI 2022:    1. No fracture or bony destructive lesion. 2. Moderate central canal stenosis at L3-4. Mild to moderate stenoses at   L4-5 and L5-S1.   3.  Multilevel neural foraminal stenoses, worst (severe) at the bilateral   L3-4 and right L4-5 levels. Moderate to severe neural foraminal stenoses at   the left L4-5 and bilateral L5-S1 levels. 4. Severe lateral recess stenoses at L3-4 bilaterally and at the right L5-S1   levels. 4     Previous treatments: medications. .         Potential Aberrant Drug-Related Behavior:    No    Urine Drug Screening:  None    OARRS report:  12/2022 consistent.     Opioid Agreement:  Renewal date:N/A    Past Medical History:   Diagnosis Date    Depression     Diabetes mellitus ([facility] [address]     Hyperlipidemia     Hypertension Thyroid disease     Type 2 diabetes mellitus without complication (HCC)      Past Surgical History:   Procedure Laterality Date    CHOLECYSTECTOMY      COLONOSCOPY      EYE SURGERY      cataract both eyes    HYSTERECTOMY (CERVIX STATUS UNKNOWN)      JOINT REPLACEMENT Left     KNEE    PAIN MANAGEMENT PROCEDURE Bilateral 8/11/2022    BILATERAL THORACIC FACET MEDIAL BRANCH BLOCK AT T8, 9, 10 (CPT 12217) performed by Chelsea Resendiz MD at [facility] N/A 10/6/2022    EPIDURAL STEROID INJECTION L3-4 WITH 40 DEPO performed by Chelsea Resendiz MD at [address]    UPPER GASTROINTESTINAL ENDOSCOPY  5/15//12    toni     Prior to Admission medications    Medication Sig Start Date End Date Taking? Authorizing Provider   insulin glargine (LANTUS SOLOSTAR) 100 UNIT/ML injection pen Inject 20 Units into the skin nightly 11/15/22  Yes Lawrence Damian MD   FLUoxetine (PROZAC) 40 MG capsule Take 1 capsule by mouth nightly 10/26/22  Yes Lawrence Damian MD   Ultra Thin Lancets 31G MISC 1 each by Does not apply route in the morning, at noon, and at bedtime 10/12/22  Yes Lawrence Damian MD   blood glucose test strips (TRUE METRIX BLOOD GLUCOSE TEST) strip 1 each by In Vitro route 3 times daily As needed.  10/12/22  Yes Lawrence Damian MD   TECHLITE PEN NEEDLES 31G X 5 MM MISC  9/16/22  Yes Historical Provider, MD   glimepiride (AMARYL) 2 MG tablet Take 2 tablets by mouth every morning 10/11/22  Yes Lawrence Damian MD   levothyroxine (SYNTHROID) 75 MCG tablet Take 1 tablet by mouth daily 9/14/22  Yes Lawrence Damian MD   losartan (COZAAR) 100 MG tablet Take 1 tablet by mouth daily 9/14/22  Yes Lawrence Damian MD   metoprolol succinate (TOPROL XL) 50 MG extended release tablet Take 1 tablet by mouth daily 9/14/22  Yes Lawrence Damian MD   pantoprazole (PROTONIX) 40 MG tablet Take 1 tablet by mouth daily 9/14/22  Yes Lawrence Damian MD   aspirin 81 MG chewable tablet Take 81 mg by mouth daily   Yes Historical Provider, MD   traZODone (DESYREL) 50 MG tablet Take 50 mg by mouth nightly   Yes Historical Provider, MD   Multiple Vitamins-Minerals (THERAPEUTIC MULTIVITAMIN-MINERALS) tablet Take 1 tablet by mouth daily   Yes Historical Provider, MD   buPROPion (WELLBUTRIN XL) 300 MG extended release tablet Take 1 tablet by mouth every morning 6/14/22  Yes Adline Due, MD   OLANZapine (ZYPREXA) 5 MG tablet Take 1 tablet by mouth nightly 6/14/22  Yes Adline Due, MD   meloxicam (MOBIC) 15 MG tablet Take 1 tablet by mouth daily as needed for Pain 9/14/22 9/16/22  Adline Due, MD     No Known Allergies    Social History     Socioeconomic History    Marital status:      Spouse name: Not on file    Number of children: Not on file    Years of education: Not on file    Highest education level: Not on file   Occupational History    Not on file   Tobacco Use    Smoking status: Never    Smokeless tobacco: Never   Vaping Use    Vaping Use: Not on file   Substance and Sexual Activity    Alcohol use: No     Comment: 1 cup of coffee    Drug use: No    Sexual activity: Not on file   Other Topics Concern    Not on file   Social History Narrative    Not on file     Social Determinants of Health     Financial Resource Strain: Low Risk     Difficulty of Paying Living Expenses: Not hard at all   Food Insecurity: No Food Insecurity    Worried About Running Out of Food in the Last Year: Never true    Ran Out of Food in the Last Year: Never true   Transportation Needs: Not on file   Physical Activity: Not on file   Stress: Not on file   Social Connections: Not on file   Intimate Partner Violence: Not on file   Housing Stability: Not on file     Family History   Problem Relation Age of Onset    Cancer Mother         ESOPHAGEAL     Stroke Mother     Cancer Father         KIDNEY     Coronary Art Dis Brother      REVIEW OF SYSTEMS:     [facility] denies fever/chills, chest pain, shortness of breath, new bowel or bladder complaints. All other review of systems was negative. PHYSICAL EXAMINATION:      BP (!) 142/86   Pulse 70   Resp 16   Ht 5' 4.5\" (1.638 m)   Wt 138 lb (62.6 kg)   SpO2 98%   BMI 23.32 kg/m²     General:       General appearance:   elderly, pleasant and well-hydrated. , in moderate discomfort and A & O x3  Build:Overweight     HEENT:     Head:normocephalic and atraumatic  Sclera: icterus absent,     Lungs:     Breathing:Breathing Pattern: regular, no distress     Abdomen:     Shape:non-distended and normal  Tenderness:none     Thoracic spine:     Spine inspection:exaggerated kyphosis   pationPal:tenderness paravertebral muscles and midline tenderness. T6-8/facet tenderness bilaterally  Range of motion:not tested    Lumbar spine:    Spine inspection:normal   CVA tenderness:No   Palpation:tenderness paravertebral muscles, facet loading, left, right, positive, and tenderness. Range of motion:not tested. Musculoskeletal:     Trigger points in Paraveteral:absent bilaterally  Jiang's:negative right, negative left  SLR:negative right, negative left, sitting     Extremities:     Tremors:None bilaterally upper and lower  Range of motion: pain with internal rotation of hips negative. Intact:Yes  Edema:Normal     Knee: Inspection:Left knee replaced     Neurological:     Sensory:normal to light touch bilateral upper and lower extremities  Motor:                           Right Quadriceps4+/5          Left Quadriceps4+/5           Right Gastrocnemius4+/5    Left Gastrocnemius4+/5  Right Ant Tibialis4+/5  Left Ant Tibialis4+/5  Reflexes:    Right Quadriceps reflex1+  Left Quadriceps reflexNOT DONE  Right Achilles reflex1+  Left Achilles reflex0  [address]     Dermatology:     Skin:no unusual rashes and no skin lesions     Impression:  Mid back pain  Good outcome with LESI L3-4  Plan:  Follow up on her mid and low back pain with complaints of worsening mid back pain.   Reviewed thoracic spine MRI.  Discussed repeat bilateral T8,9,10 MBB, patient agrees. Discontinue Tylenol# 3 QD PRN for moderate to severe pain. OARRS report reviewed 11/2022. Patient encouraged to stay active and to lose weight. Treatment plan discussed with the patient including procedure side effects. We discussed with the patient that combining opioids, benzodiazepines, alcohol, illicit drugs or sleep aids increases the risk of respiratory depression including death. We discussed that these medications may cause drowsiness, sedation or dizziness and have counseled the patient not to drive or operate machinery. We have discussed that these medications will be prescribed only by one provider. We have discussed with the patient about age related risk factors and have thoroughly discussed the importance of taking these medications as prescribed. The patient verbalizes understanding. mily Santizo M.D.

## 2022-12-02 ENCOUNTER — TELEPHONE (OUTPATIENT)
Dept: PAIN MANAGEMENT | Age: 82
End: 2022-12-02

## 2022-12-02 NOTE — TELEPHONE ENCOUNTER
Call to Taylor Torres to ask her about  her relief from her first procedure. She states that she had NO pain for the first 3 days. She felt better and was able to move around better, but now the pain is back and she would like to have the procedure again because she got such great relief.     Amanda Ashley RN  Pain Management

## 2022-12-05 ENCOUNTER — OFFICE VISIT (OUTPATIENT)
Dept: PRIMARY CARE CLINIC | Age: 82
End: 2022-12-05
Payer: MEDICARE

## 2022-12-05 VITALS
HEART RATE: 65 BPM | DIASTOLIC BLOOD PRESSURE: 80 MMHG | HEIGHT: 65 IN | SYSTOLIC BLOOD PRESSURE: 138 MMHG | OXYGEN SATURATION: 96 % | TEMPERATURE: 96.9 F | BODY MASS INDEX: 23.32 KG/M2

## 2022-12-05 DIAGNOSIS — E08.65 DIABETES MELLITUS DUE TO UNDERLYING CONDITION, UNCONTROLLED, WITH HYPERGLYCEMIA, WITHOUT LONG-TERM CURRENT USE OF INSULIN (HCC): ICD-10-CM

## 2022-12-05 DIAGNOSIS — E06.3 AUTOIMMUNE HYPOTHYROIDISM: Primary | ICD-10-CM

## 2022-12-05 PROCEDURE — 1123F ACP DISCUSS/DSCN MKR DOCD: CPT | Performed by: INTERNAL MEDICINE

## 2022-12-05 PROCEDURE — 99213 OFFICE O/P EST LOW 20 MIN: CPT | Performed by: INTERNAL MEDICINE

## 2022-12-05 PROCEDURE — 1090F PRES/ABSN URINE INCON ASSESS: CPT | Performed by: INTERNAL MEDICINE

## 2022-12-05 PROCEDURE — G8484 FLU IMMUNIZE NO ADMIN: HCPCS | Performed by: INTERNAL MEDICINE

## 2022-12-05 PROCEDURE — 3074F SYST BP LT 130 MM HG: CPT | Performed by: INTERNAL MEDICINE

## 2022-12-05 PROCEDURE — G8399 PT W/DXA RESULTS DOCUMENT: HCPCS | Performed by: INTERNAL MEDICINE

## 2022-12-05 PROCEDURE — 3078F DIAST BP <80 MM HG: CPT | Performed by: INTERNAL MEDICINE

## 2022-12-05 PROCEDURE — 1036F TOBACCO NON-USER: CPT | Performed by: INTERNAL MEDICINE

## 2022-12-05 PROCEDURE — G8420 CALC BMI NORM PARAMETERS: HCPCS | Performed by: INTERNAL MEDICINE

## 2022-12-05 PROCEDURE — G8427 DOCREV CUR MEDS BY ELIG CLIN: HCPCS | Performed by: INTERNAL MEDICINE

## 2022-12-05 RX ORDER — INSULIN GLARGINE 100 [IU]/ML
20 INJECTION, SOLUTION SUBCUTANEOUS
Qty: 10 ML | Refills: 3
Start: 2022-12-05

## 2022-12-05 RX ORDER — GLIMEPIRIDE 4 MG/1
TABLET ORAL
Qty: 90 TABLET | Refills: 0 | Status: SHIPPED | OUTPATIENT
Start: 2022-12-05

## 2022-12-05 NOTE — PROGRESS NOTES
Chief Complaint   Patient presents with    1 Month Follow-Up     Pt is a 1 month F/U, and states she is feeling good at this time. Daily blood sugars in evening have been running 301-364 for the past few days. Self glucose this am of 249. Pt is due to receive another Epidural soon, and states this does help her back. HPI:  Patient is here for follow-up of diabetes mellitus hypothyroidism hypertension and hyperlipidemia. Unfortunately patient was taking only the glimepiride 2 mg daily with breakfast although she was counseled last visit to increase the dose to 4 mg but she got confused about it. Fasting blood sugars are running in the 90s to 140s but postprandial blood sugar is in the 200 range. Patient denied any more episodes of hypoglycemia. She could not afford the Lantus pen so she switched to the Lantus vials and she claims that her daughter is helping her with that. Blood work was discussed with patient appears within reasonable range except for mild hyperlipidemia and obviously hyperglycemia. Renal function is stable  . Patient complains of decreased appetite and she is losing interest to cook for herself she just eats Potpies that are already made and she was advised to start doing chicken salads and easy recipes for herself and get interested in eating healthy. There is no weight loss over the course of the last 2-month that is noticeable    We will increase her Amaryl to 4 mg with lunch and she was instructed about that and we will follow her blood work in the next 2-month. She was instructed to keep a blood sugar log. .    Past Medical History, Surgical History, and Family History has been reviewed and updated.     Review of Systems:  Constitutional:  No fever, no fatigue, no chills, no headaches, no weight change  Dermatology:  No rash, no mole, no dry or sensitive skin  ENT:  No cough, no sore throat, no sinus pain, no runny nose, no ear pain  Cardiology:  No chest pain, no palpitations, no leg edema, no shortness of breath, no PND  Gastroenterology:  No dysphagia, no abdominal pain, no nausea, no vomiting, no constipation, no diarrhea, no heartburn  Musculoskeletal:  No joint pain, no leg cramps, no back pain, no muscle aches  Respiratory:  No shortness of breath, no orthopnea, no wheezing, no JOSE, no hemoptysis  Urology:  No blood in the urine, no urinary frequency, no urinary incontinence, no urinary urgency, no nocturia, no dysuria    Vitals:    12/05/22 1115   BP: 138/80   Pulse: 65   Temp: 96.9 °F (36.1 °C)   TempSrc: Temporal   SpO2: 96%   Height: 5' 4.5\" (1.638 m)       General:  Patient alert and oriented x 3, NAD, pleasant  HEENT:  Atraumatic, normocephalic, PERRLA, EOMI, clear conjunctiva, TMs clear, nose-clear, throat - no erythema  Neck:  Supple, no goiter, no carotid bruits, no LAD  Lungs:  CTA   Heart:  RRR, no murmurs, gallops or rubs  Abdomen:  Soft/nt/nd, + bowel sounds  Lymph node examination: unremarkable  Neurological exam : unremarkable  Extremities:  No clubbing, cyanosis or edema  Skin: unremarkable    Hemoglobin A1C   Date Value Ref Range Status   09/14/2022 15.0 % Final     Cholesterol, Total   Date Value Ref Range Status   09/07/2022 231 (H) 0 - 199 mg/dL Final     Triglycerides   Date Value Ref Range Status   09/07/2022 198 (H) 0 - 149 mg/dL Final     HDL   Date Value Ref Range Status   09/07/2022 53 >40 mg/dL Final     LDL Calculated   Date Value Ref Range Status   09/07/2022 138 (H) 0 - 99 mg/dL Final     VLDL Cholesterol Calculated   Date Value Ref Range Status   09/07/2022 40 mg/dL Final     VLDL   Date Value Ref Range Status   02/26/2021 18 mg/dL Final     Chol/HDL Ratio   Date Value Ref Range Status   02/26/2021 3.1  Final     Sodium   Date Value Ref Range Status   09/16/2022 139 132 - 146 mmol/L Final     Potassium   Date Value Ref Range Status   09/16/2022 4.8 3.5 - 5.0 mmol/L Final     Chloride   Date Value Ref Range Status   09/16/2022 108 (H) 98 - 107 mmol/L Final     CO2   Date Value Ref Range Status   09/16/2022 24 22 - 29 mmol/L Final     BUN   Date Value Ref Range Status   09/16/2022 14 6 - 23 mg/dL Final     Creatinine   Date Value Ref Range Status   09/16/2022 1.2 (H) 0.5 - 1.0 mg/dL Final     Glucose   Date Value Ref Range Status   10/11/2022 304 mg/dL Final     Calcium   Date Value Ref Range Status   09/16/2022 9.2 8.6 - 10.2 mg/dL Final     Total Protein   Date Value Ref Range Status   09/16/2022 6.0 (L) 6.4 - 8.3 g/dL Final     Albumin   Date Value Ref Range Status   09/16/2022 3.4 (L) 3.5 - 5.2 g/dL Final     Total Bilirubin   Date Value Ref Range Status   09/16/2022 <0.2 0.0 - 1.2 mg/dL Final     Alkaline Phosphatase   Date Value Ref Range Status   09/16/2022 98 35 - 104 U/L Final     AST   Date Value Ref Range Status   09/16/2022 19 0 - 31 U/L Final     ALT   Date Value Ref Range Status   09/16/2022 18 0 - 32 U/L Final     GFR Non-   Date Value Ref Range Status   09/16/2022 43 >=60 mL/min/1.73 Final     Comment:     Chronic Kidney Disease: less than 60 ml/min/1.73 sq.m. Kidney Failure: less than 15 ml/min/1.73 sq.m. Results valid for patients 18 years and older. GFR    Date Value Ref Range Status   09/16/2022 52  Final        No results found. Assessment/Plan:      Outpatient Encounter Medications as of 12/5/2022   Medication Sig Dispense Refill    glimepiride (AMARYL) 4 MG tablet Use 4 mg daily at lunch time 90 tablet 0    LANTUS 100 UNIT/ML injection vial Inject 20 Units into the skin every morning (before breakfast) 10 mL 3    FLUoxetine (PROZAC) 40 MG capsule Take 1 capsule by mouth nightly 90 capsule 0    Ultra Thin Lancets 31G MISC 1 each by Does not apply route in the morning, at noon, and at bedtime 300 each 1    blood glucose test strips (TRUE METRIX BLOOD GLUCOSE TEST) strip 1 each by In Vitro route 3 times daily As needed.  300 each 1    levothyroxine (SYNTHROID) 75 MCG tablet Take 1 tablet by mouth daily 90 tablet 0    losartan (COZAAR) 100 MG tablet Take 1 tablet by mouth daily 90 tablet 0    metoprolol succinate (TOPROL XL) 50 MG extended release tablet Take 1 tablet by mouth daily 90 tablet 0    pantoprazole (PROTONIX) 40 MG tablet Take 1 tablet by mouth daily 90 tablet 0    aspirin 81 MG chewable tablet Take 81 mg by mouth daily      traZODone (DESYREL) 50 MG tablet Take 50 mg by mouth nightly      Multiple Vitamins-Minerals (THERAPEUTIC MULTIVITAMIN-MINERALS) tablet Take 1 tablet by mouth daily      buPROPion (WELLBUTRIN XL) 300 MG extended release tablet Take 1 tablet by mouth every morning 90 tablet 0    OLANZapine (ZYPREXA) 5 MG tablet Take 1 tablet by mouth nightly 90 tablet 0    [DISCONTINUED] insulin glargine (LANTUS SOLOSTAR) 100 UNIT/ML injection pen Inject 20 Units into the skin nightly 5 Adjustable Dose Pre-filled Pen Syringe 0    TECHLITE PEN NEEDLES 31G X 5 MM MISC  (Patient not taking: Reported on 12/5/2022)      [DISCONTINUED] glimepiride (AMARYL) 2 MG tablet Take 2 tablets by mouth every morning 30 tablet 3    [DISCONTINUED] meloxicam (MOBIC) 15 MG tablet Take 1 tablet by mouth daily as needed for Pain 90 tablet 0     Facility-Administered Encounter Medications as of 12/5/2022   Medication Dose Route Frequency Provider Last Rate Last Admin    insulin regular (HUMULIN R;NOVOLIN R) injection 10 Units  10 Units SubCUTAneous Once MD Nadiya Arzate Cera was seen today for 1 month follow-up. Diagnoses and all orders for this visit:    Autoimmune hypothyroidism  -     TSH; Future    Diabetes mellitus due to underlying condition, uncontrolled, with hyperglycemia, without long-term current use of insulin ([facility])  -     glimepiride (AMARYL) 4 MG tablet; Use 4 mg daily at lunch time  -     Comprehensive Metabolic Panel;  Future    Other orders  -     LANTUS 100 UNIT/ML injection vial; Inject 20 Units into the skin every morning (before breakfast)     Increase glimepiride to 4 mg daily with lunchtime  Keep a blood sugar log    There are no Patient Instructions on file for this visit. On this date 12/5/2022 I have spent 25 minutes reviewing previous notes, test results and face to face with the patient discussing the diagnosis and importance of compliance with the treatment plan as well as documenting on the day of the visit.       Parisa Lambert MD   12/5/22

## 2022-12-07 ENCOUNTER — TELEPHONE (OUTPATIENT)
Dept: PAIN MANAGEMENT | Age: 82
End: 2022-12-07

## 2022-12-07 NOTE — TELEPHONE ENCOUNTER
Call to Pancho Rogers to ask her about physical therapy. Pancho Rogers states that she has done PT in the past, and was at one time doing exercises at home. She said that she used to walk everyday and stretch but she has been having trouble walking and is afraid that she will fall if she tries to do the exercises, so she has for the mean time quit the home exercise program.  She said she can barely walk from her house to her neighbors house. She is very afraid of falling. She has severe pain in her lower back that is constant.         Farzana Grace RN  Pain Management

## 2022-12-08 ENCOUNTER — TELEPHONE (OUTPATIENT)
Dept: PAIN MANAGEMENT | Age: 82
End: 2022-12-08

## 2022-12-08 NOTE — TELEPHONE ENCOUNTER
Call to [address] that procedure was approved for 12/12/2022 and that [facility] should call her a few days before for the pre op call and after 3:00 PM the business day before with the arrival time. Vika Six that she did not need to hold the aspirin but she said that she had stopped it herself. Instructed to call office back if any questions. Mason Shields verbalized understanding.     Electronically signed by Aneta Lew RN on 12/8/2022 at 1:02 PM

## 2022-12-12 ENCOUNTER — HOSPITAL ENCOUNTER (OUTPATIENT)
Dept: OPERATING ROOM | Age: 82
Setting detail: OUTPATIENT SURGERY
Discharge: HOME | End: 2022-12-12
Attending: PAIN MEDICINE
Payer: MEDICARE

## 2022-12-12 ENCOUNTER — HOSPITAL ENCOUNTER (OUTPATIENT)
Age: 82
Setting detail: OUTPATIENT SURGERY
Discharge: HOME | End: 2022-12-12
Attending: PAIN MEDICINE | Admitting: PAIN MEDICINE
Payer: MEDICARE

## 2022-12-12 VITALS
WEIGHT: 140 LBS | OXYGEN SATURATION: 98 % | HEIGHT: 64 IN | BODY MASS INDEX: 23.9 KG/M2 | SYSTOLIC BLOOD PRESSURE: 168 MMHG | HEART RATE: 68 BPM | DIASTOLIC BLOOD PRESSURE: 72 MMHG | RESPIRATION RATE: 20 BRPM

## 2022-12-12 DIAGNOSIS — M47.814 OSTEOARTHRITIS OF THORACIC SPINE, UNSPECIFIED SPINAL OSTEOARTHRITIS COMPLICATION STATUS: ICD-10-CM

## 2022-12-12 PROCEDURE — 3600000005 HC SURGERY LEVEL 5 BASE: Performed by: PAIN MEDICINE

## 2022-12-12 PROCEDURE — 64491 INJ PARAVERT F JNT C/T 2 LEV: CPT | Performed by: PAIN MEDICINE

## 2022-12-12 PROCEDURE — 2500000003 HC RX 250 WO HCPCS: Performed by: PAIN MEDICINE

## 2022-12-12 PROCEDURE — 64490 INJ PARAVERT F JNT C/T 1 LEV: CPT | Performed by: PAIN MEDICINE

## 2022-12-12 PROCEDURE — 2709999900 HC NON-CHARGEABLE SUPPLY: Performed by: PAIN MEDICINE

## 2022-12-12 PROCEDURE — 6360000002 HC RX W HCPCS: Performed by: PAIN MEDICINE

## 2022-12-12 PROCEDURE — 7100000010 HC PHASE II RECOVERY - FIRST 15 MIN: Performed by: PAIN MEDICINE

## 2022-12-12 PROCEDURE — 7100000011 HC PHASE II RECOVERY - ADDTL 15 MIN: Performed by: PAIN MEDICINE

## 2022-12-12 PROCEDURE — 3209999900 FLUORO FOR SURGICAL PROCEDURES

## 2022-12-12 RX ORDER — SODIUM CHLORIDE 9 MG/ML
INJECTION, SOLUTION INTRAVENOUS PRN
Status: DISCONTINUED | OUTPATIENT
Start: 2022-12-12 | End: 2022-12-12 | Stop reason: HOSPADM

## 2022-12-12 RX ORDER — SODIUM CHLORIDE 0.9 % (FLUSH) 0.9 %
5-40 SYRINGE (ML) INJECTION EVERY 12 HOURS SCHEDULED
Status: DISCONTINUED | OUTPATIENT
Start: 2022-12-12 | End: 2022-12-12 | Stop reason: HOSPADM

## 2022-12-12 RX ORDER — LIDOCAINE HYDROCHLORIDE 5 MG/ML
INJECTION, SOLUTION INFILTRATION; INTRAVENOUS PRN
Status: DISCONTINUED | OUTPATIENT
Start: 2022-12-12 | End: 2022-12-12 | Stop reason: ALTCHOICE

## 2022-12-12 RX ORDER — SODIUM CHLORIDE 0.9 % (FLUSH) 0.9 %
5-40 SYRINGE (ML) INJECTION PRN
Status: DISCONTINUED | OUTPATIENT
Start: 2022-12-12 | End: 2022-12-12 | Stop reason: HOSPADM

## 2022-12-12 ASSESSMENT — PAIN - FUNCTIONAL ASSESSMENT: PAIN_FUNCTIONAL_ASSESSMENT: 0-10

## 2022-12-12 ASSESSMENT — PAIN SCALES - GENERAL
PAINLEVEL_OUTOF10: 0
PAINLEVEL_OUTOF10: 0

## 2022-12-12 NOTE — TELEPHONE ENCOUNTER
----- Message from Heena Jefersonclarissa sent at 12/12/2022  1:07 PM EST -----  Subject: Refill Request    QUESTIONS  Name of Medication? LANTUS 100 UNIT/ML injection vial  Patient-reported dosage and instructions? daily  How many days do you have left? 0  Preferred Pharmacy? [facility] #[id number]  Pharmacy phone number (if available)? [phone]  ---------------------------------------------------------------------------  --------------  CALL BACK INFO  What is the best way for the office to contact you? OK to leave message on   voicemail  Preferred Call Back Phone Number? [phone]  ---------------------------------------------------------------------------  --------------  SCRIPT ANSWERS  Relationship to Patient?  Self

## 2022-12-12 NOTE — DISCHARGE INSTRUCTIONS
[facility]) Pain Management Department  [phone]   Post-Pain Block/ Radiofrequency Home Going Instructions    1-Go home, rest for the remainder of the day    2-Please do not lift over 20 pounds the day of the injection    3-If you received sedation No: alcohol, driving, operating lawn mowers, plows, tractors or other dangerous equipment until next morning. Do not make important decisions or sign legal documents for 24 hours. You may experience light headedness, dizziness, nausea or sleepiness after sedation. Do not stay alone. A responsible adult must be with you for 24 hours. You could be nauseated from the medications you have received. Your IV site may be sore and bruised. 4-No dietary restrictions     5-Resume all medications the same day, blood thinners to be resumed 24 hours after injection    6-Keep the surgical site clean and dry, you may shower the next morning and remove the      dressing. 7- No sitz baths, tub baths or hot tubs/swimming for 24 hours. 8- If you have any pain at the injection site(s), application of an ice pack to the area should be       helpful, 20 minutes on/20 minutes off for next 48 hours. 9- [facility] if you develop. Fever greater than 100.4 F  Have bleeding or drainage from the puncture site  Have progressive Leg/arm numbness and or weakness  Loss of control of bowel and or bladder (wet/soil yourself)  Severe headache with inability to lift head    10-You may return to work the next day            Infection After Surgery: Care Instructions  Overview  After surgery, an infection is always possible. It doesn't mean that the surgery didn't go well. Because an infection can be serious, your doctor has taken steps to manage it. Your doctor checked the infection and cleaned it if necessary. Your doctor may have made an opening in the area so that the pus can drain out.  You may have gauze in the cut so that the area will stay open and keep draining. You may need antibiotics. You will need to follow up with your doctor to make sure the infection has gone away. Follow-up care is a key part of your treatment and safety. Be sure to make and go to all appointments, and call your doctor if you are having problems. It's also a good idea to know your test results and keep a list of the medicines you take. How can you care for yourself at home? Make sure your surgeon knows about the infection, especially if you saw another doctor about your symptoms. If your doctor prescribed antibiotics, take them as directed. Do not stop taking them just because you feel better. You need to take the full course of antibiotics. Ask your doctor if you can take an over-the-counter pain medicine, such as acetaminophen (Tylenol), ibuprofen (Advil, Motrin), or naproxen (Aleve). Be safe with medicines. Read and follow all instructions on the label. Do not take two or more pain medicines at the same time unless the doctor told you to. Many pain medicines have acetaminophen, which is Tylenol. Too much acetaminophen (Tylenol) can be harmful. Prop up the area on a pillow anytime you sit or lie down during the next 3 days. Try to keep it above the level of your heart. This will help reduce swelling. Keep the skin clean and dry. You may have a bandage over the cut (incision). A bandage helps the incision heal and protects it. Your doctor will tell you how to take care of this. Keep it clean and dry. You may have drainage from the wound. If your doctor told you how to care for your incision, follow your doctor's instructions. If you did not get instructions, follow this general advice:  Wash around the incision with clean water 2 times a day. Don't use hydrogen peroxide or alcohol, which can slow healing. When should you call for help? Call your doctor now or seek immediate medical care if:    You have signs that your infection is getting worse, such as:   Increased pain, swelling, warmth, or redness in the area. Red streaks leading from the area. Pus draining from the wound. A new or higher fever. Watch closely for changes in your health, and be sure to contact your doctor if you have any problems. Where can you learn more? Go to http://www.woods.com/ and enter C340 to learn more about \"Infection After Surgery: Care Instructions. \"  Current as of: January 20, 2022               Content Version: 13.5  © 2006-2022 [employer]wise, [employer]. Care instructions adapted under license by [facility] If you have questions about a medical condition or this instruction, always ask your healthcare professional. [address] any warranty or liability for your use of this information.

## 2022-12-12 NOTE — OP NOTE
2022    Patient: Ellwood Aschoff  :  1940  Age:  79 y.o. Sex:  female     PRE-OPERATIVE DIAGNOSIS: Bilateral Thoracic spondylosis, thoracic facet syndrome. POST-OPERATIVE DIAGNOSIS: Same. PROCEDURE:  # 2 Fluoroscopic guided thoracic facet medial branch blocks at levelsT8,9,10  Bilateral.    SURGEON:  BRIAN Reyna M.D. ANESTHESIA: Local    ESTIMATED BLOOD LOSS: None.  ______________________________________________________________________  BRIEF HISTORY:  Ellwood Aschoff comes in today for 2 Fluoroscopic guided thoracic facet medial branch blocks at levels T8,9,10 Bilateral. The potential complications of this procedure were discussed with her again today. She has elected to undergo the aforementioned procedure. Wendie complete History & Physical examination were reviewed in depth, a copy of which is in the chart. DESCRIPTION OF PROCEDURE:    After confirming written and informed consent, a time-out was performed and Wendie name and date of birth, the procedure to be performed as well as the plan for the location of the needle insertion were confirmed. The patient was brought into the procedure room and placed in the prone position on the fluoroscopy table. Standard monitors were placed and vital signs were observed throughout the procedure. The area of the thoracic spine was prepped with chloraprep  and draped in a sterile manner. AP fluoroscopy was used to identify and myles the junction of the transverse process and the pedicle at the targeted levels. The skin and subcutaneous tissues in these identified areas were anesthetized with 0.5% lidocaine. A 22 # gauge 3 1/2 spinal needle was advanced toward the targeted points until bone was contacted. Satisfactory needle placement was confirmed by AP and oblique projections. At this point and after negative aspiration was confirmed.  A solution of 0.25% Marcaine with 40 Depomedrol 1 ml was then injected and distributed equally at each level.    Disposition the patient tolerated the procedure well and there were no complications . Vital signs remained stable throughout the procedure. The patient was escorted to the recovery area where they remained until discharge and written discharge instructions for the procedure were given. Plan: Junior Espinal will return to our pain management center as scheduled.      Audelia Hatch MD

## 2022-12-12 NOTE — H&P
[facility]  [address] [facility], [address] [place] [address]  [phone]    Procedure History & Physical      Mickeal Roosevelt Wang     HPI:    Patient  is here for axial mid back pain for bilateral T8,9,10 MBB  Labs/imaging studies reviewed   All question and concerns addressed including R/B/A associated with the procedure    Past Medical History:   Diagnosis Date    Depression     Diabetes mellitus ([facility] [address]     Hyperlipidemia     Hypertension     Thyroid disease     Type 2 diabetes mellitus without complication ([facility] [address]        Past Surgical History:   Procedure Laterality Date    CHOLECYSTECTOMY      COLONOSCOPY      EYE SURGERY      cataract both eyes    HYSTERECTOMY (CERVIX STATUS UNKNOWN)      JOINT REPLACEMENT Left     KNEE    PAIN MANAGEMENT PROCEDURE Bilateral 8/11/2022    BILATERAL THORACIC FACET MEDIAL BRANCH BLOCK AT T8, 9, 10 (CPT Z1452296) performed by Jeff Gregorio MD at [address] [place] [address] N/A 10/6/2022    EPIDURAL STEROID INJECTION L3-4 WITH 40 DEPO performed by Jeff Gregorio MD at [address]    UPPER GASTROINTESTINAL ENDOSCOPY  5/15//12    toni       Prior to Admission medications    Medication Sig Start Date End Date Taking? Authorizing Provider   glimepiride (AMARYL) 4 MG tablet Use 4 mg daily at lunch time 12/5/22   Rory Ruth MD   LANTUS 100 UNIT/ML injection vial Inject 20 Units into the skin every morning (before breakfast) 12/5/22   Rory Ruth MD   FLUoxetine (PROZAC) 40 MG capsule Take 1 capsule by mouth nightly 10/26/22   Rory Ruth MD   Ultra Thin Lancets 31G MISC 1 each by Does not apply route in the morning, at noon, and at bedtime 10/12/22   Rory Ruth MD   blood glucose test strips (TRUE METRIX BLOOD GLUCOSE TEST) strip 1 each by In Vitro route 3 times daily As needed.  10/12/22   Rory Ruth MD   TECHLITE PEN NEEDLES 31G X 5 MM MISC  9/16/22   Historical Provider, MD   levothyroxine (SYNTHROID) 75 MCG tablet Take 1 tablet by mouth daily 9/14/22   Willie Culp MD   losartan (COZAAR) 100 MG tablet Take 1 tablet by mouth daily 9/14/22   Willie Culp MD   metoprolol succinate (TOPROL XL) 50 MG extended release tablet Take 1 tablet by mouth daily 9/14/22   iWllie Culp MD   pantoprazole (PROTONIX) 40 MG tablet Take 1 tablet by mouth daily 9/14/22   Willie Culp MD   aspirin 81 MG chewable tablet Take 81 mg by mouth daily    Historical Provider, MD   traZODone (DESYREL) 50 MG tablet Take 50 mg by mouth nightly    Historical Provider, MD   Multiple Vitamins-Minerals (THERAPEUTIC MULTIVITAMIN-MINERALS) tablet Take 1 tablet by mouth daily    Historical Provider, MD   meloxicam (MOBIC) 15 MG tablet Take 1 tablet by mouth daily as needed for Pain 9/14/22 9/16/22  Willie Culp MD   buPROPion (WELLBUTRIN XL) 300 MG extended release tablet Take 1 tablet by mouth every morning 6/14/22   Willie Culp MD   OLANZapine (ZYPREXA) 5 MG tablet Take 1 tablet by mouth nightly 6/14/22   Willie Culp MD       No Known Allergies    Social History     Socioeconomic History    Marital status:      Spouse name: Not on file    Number of children: Not on file    Years of education: Not on file    Highest education level: Not on file   Occupational History    Not on file   Tobacco Use    Smoking status: Never    Smokeless tobacco: Never   Vaping Use    Vaping Use: Not on file   Substance and Sexual Activity    Alcohol use: No     Comment: 1 cup of coffee    Drug use: No    Sexual activity: Not on file   Other Topics Concern    Not on file   Social History Narrative    Not on file     Social Determinants of Health     Financial Resource Strain: Low Risk     Difficulty of Paying Living Expenses: Not hard at all   Food Insecurity: No Food Insecurity    Worried About Running Out of Food in the Last Year: Never true    Ran Out of Food in the Last Year: Never true   Transportation Needs: Not on file   Physical

## 2022-12-15 RX ORDER — INSULIN GLARGINE 100 [IU]/ML
20 INJECTION, SOLUTION SUBCUTANEOUS
Qty: 10 ML | Refills: 3 | OUTPATIENT
Start: 2022-12-15

## 2022-12-20 ENCOUNTER — OFFICE VISIT (OUTPATIENT)
Dept: PODIATRY | Age: 82
End: 2022-12-20

## 2022-12-20 VITALS — BODY MASS INDEX: 23.32 KG/M2 | HEIGHT: 65 IN | WEIGHT: 140 LBS

## 2022-12-20 DIAGNOSIS — I73.9 PVD (PERIPHERAL VASCULAR DISEASE) (HCC): ICD-10-CM

## 2022-12-20 DIAGNOSIS — M79.674 PAIN OF TOE OF RIGHT FOOT: ICD-10-CM

## 2022-12-20 DIAGNOSIS — R26.2 DIFFICULTY WALKING: ICD-10-CM

## 2022-12-20 DIAGNOSIS — M79.675 PAIN OF TOE OF LEFT FOOT: ICD-10-CM

## 2022-12-20 DIAGNOSIS — E11.51 TYPE II DIABETES MELLITUS WITH PERIPHERAL CIRCULATORY DISORDER (HCC): ICD-10-CM

## 2022-12-20 DIAGNOSIS — B35.1 ONYCHOMYCOSIS: Primary | ICD-10-CM

## 2022-12-20 RX ORDER — INSULIN GLARGINE 100 [IU]/ML
20 INJECTION, SOLUTION SUBCUTANEOUS
Qty: 10 ML | Refills: 3 | Status: SHIPPED | OUTPATIENT
Start: 2022-12-20

## 2022-12-20 NOTE — PROGRESS NOTES
Patient in today for nail care. Patient does not have any complaints of pain at this time.  Patient's PCP is Shaji Wick MD date of last ov 12/5/22        Royce Vega LPN

## 2022-12-20 NOTE — PROGRESS NOTES
22     Calvin Cope    : 1940  Sex: female  Age: 79 y.o. Subjective: The patient is seen today for evaluation regarding diabetic foot evaluation and mycotic nail care. No other complaints noted. Chief Complaint   Patient presents with    Nail Problem     Nail care       Current Medications:    Current Outpatient Medications:     glimepiride (AMARYL) 4 MG tablet, Use 4 mg daily at lunch time, Disp: 90 tablet, Rfl: 0    LANTUS 100 UNIT/ML injection vial, Inject 20 Units into the skin every morning (before breakfast), Disp: 10 mL, Rfl: 3    FLUoxetine (PROZAC) 40 MG capsule, Take 1 capsule by mouth nightly, Disp: 90 capsule, Rfl: 0    Ultra Thin Lancets 31G MISC, 1 each by Does not apply route in the morning, at noon, and at bedtime, Disp: 300 each, Rfl: 1    blood glucose test strips (TRUE METRIX BLOOD GLUCOSE TEST) strip, 1 each by In Vitro route 3 times daily As needed. , Disp: 300 each, Rfl: 1    TECHLITE PEN NEEDLES 31G X 5 MM MISC, , Disp: , Rfl:     levothyroxine (SYNTHROID) 75 MCG tablet, Take 1 tablet by mouth daily, Disp: 90 tablet, Rfl: 0    losartan (COZAAR) 100 MG tablet, Take 1 tablet by mouth daily, Disp: 90 tablet, Rfl: 0    metoprolol succinate (TOPROL XL) 50 MG extended release tablet, Take 1 tablet by mouth daily, Disp: 90 tablet, Rfl: 0    pantoprazole (PROTONIX) 40 MG tablet, Take 1 tablet by mouth daily, Disp: 90 tablet, Rfl: 0    aspirin 81 MG chewable tablet, Take 81 mg by mouth daily, Disp: , Rfl:     traZODone (DESYREL) 50 MG tablet, Take 50 mg by mouth nightly, Disp: , Rfl:     Multiple Vitamins-Minerals (THERAPEUTIC MULTIVITAMIN-MINERALS) tablet, Take 1 tablet by mouth daily, Disp: , Rfl:     buPROPion (WELLBUTRIN XL) 300 MG extended release tablet, Take 1 tablet by mouth every morning, Disp: 90 tablet, Rfl: 0    OLANZapine (ZYPREXA) 5 MG tablet, Take 1 tablet by mouth nightly, Disp: 90 tablet, Rfl: 0    Allergies:  No Known Allergies    Past Surgical History: Procedure Laterality Date    CHOLECYSTECTOMY      COLONOSCOPY      EYE SURGERY      cataract both eyes    HYSTERECTOMY (CERVIX STATUS UNKNOWN)      JOINT REPLACEMENT Left     KNEE    PAIN MANAGEMENT PROCEDURE Bilateral 8/11/2022    BILATERAL THORACIC FACET MEDIAL BRANCH BLOCK AT T8, 9, 10 (CPT 66823) performed by Marybel Lopez MD at [address] [place] [address] N/A 10/6/2022    EPIDURAL STEROID INJECTION L3-4 WITH 40 DEPO performed by Marybel Lopez MD at [address] [place] [address] Bilateral 12/12/2022    BILATERAL T8,9,10 MEDIAL BRANCH BLOCK performed by Marybel Lopez MD at [address] rotator    UPPER GASTROINTESTINAL ENDOSCOPY  5/15//12    toni     Past Medical History:   Diagnosis Date    Depression     Diabetes mellitus ([address]     Hyperlipidemia     Hypertension     Thyroid disease     Type 2 diabetes mellitus without complication ([address]        Vitals:    12/20/22 1101   Weight: 140 lb (63.5 kg)   Height: 5' 4.5\" (1.638 m)       Exam:  Pedal pulses diminished to palpation bilateral foot. At this time the nail/s 1, 5 right foot and nail/s 1, 5 left foot are noted to be thickened, dystrophic and discolored with subungual debris present. Tenderness noted to palpation. Minimal hair growth is noted to both lower extremities. Edema noted with both varicosities and stasis skin changes present bilaterally. Coolness is noted to the digital regions to palpation. Capillary fill time delayed digital areas bilateral foot. No heel fissuring or macerations of the web spaces. No plantar calluses and/or ulcerative areas are noted. Patient is having difficulty with gait/walking. Plan Per Assessment  [address] [place] [address]  was seen today for nail problem.     Diagnoses and all orders for this visit:    Onychomycosis    Pain of toe of right foot    Pain of toe of left foot    PVD (peripheral vascular disease) ([facility] [address]    Type II diabetes mellitus with peripheral circulatory disorder (HCC)    Difficulty walking        1. Evaluation and Management  2. Manual and electrical debridement of the mycotic nails was performed for thickness and length to prevent injection and/or ulceration. 3. Discussed additional diabetic lower extremity care techniques with patient today. 4. It was discussed in detail with the patient proper caring for the vascular compromised foot. The fact that they have compromised blood flow put the patient at risk for infection/gangrene/amputation. The patient should not walk barefoot. Shoe gear should fit properly and socks should be worn with shoes. If any skin lesions are noted, they are instructed to contact the office immediately. 5. We will see the patient back at a later date for continued podiatric management and care. Patient was advised to call the office with any questions or concerns prior to their next appointment if needed. Seen By:    Clarence Bueno DPM    Electronically signed by Clarence Bueno DPM on 12/20/2022 at 11:24 AM      This note was created using voice recognition software. The note was reviewed however may contain grammatical errors.

## 2022-12-28 DIAGNOSIS — R91.1 SOLID NODULE OF LUNG 6 MM TO 8 MM IN DIAMETER: ICD-10-CM

## 2023-01-05 ENCOUNTER — OFFICE VISIT (OUTPATIENT)
Dept: PAIN MANAGEMENT | Age: 83
End: 2023-01-05
Payer: MEDICARE

## 2023-01-05 VITALS
HEART RATE: 70 BPM | WEIGHT: 140 LBS | SYSTOLIC BLOOD PRESSURE: 138 MMHG | RESPIRATION RATE: 18 BRPM | OXYGEN SATURATION: 95 % | HEIGHT: 64 IN | BODY MASS INDEX: 23.9 KG/M2 | DIASTOLIC BLOOD PRESSURE: 70 MMHG | TEMPERATURE: 96.1 F

## 2023-01-05 DIAGNOSIS — M51.9 THORACIC DISC DISEASE: ICD-10-CM

## 2023-01-05 DIAGNOSIS — M47.894 THORACIC FACET JOINT SYNDROME: ICD-10-CM

## 2023-01-05 DIAGNOSIS — M48.061 SPINAL STENOSIS OF LUMBAR REGION WITHOUT NEUROGENIC CLAUDICATION: ICD-10-CM

## 2023-01-05 DIAGNOSIS — G89.4 CHRONIC PAIN SYNDROME: Primary | ICD-10-CM

## 2023-01-05 DIAGNOSIS — M47.812 CERVICAL FACET JOINT SYNDROME: ICD-10-CM

## 2023-01-05 DIAGNOSIS — S22.000A COMPRESSION FRACTURE OF THORACIC VERTEBRA, UNSPECIFIED THORACIC VERTEBRAL LEVEL, INITIAL ENCOUNTER (HCC): ICD-10-CM

## 2023-01-05 DIAGNOSIS — M47.812 CERVICAL SPONDYLOSIS: ICD-10-CM

## 2023-01-05 DIAGNOSIS — M50.90 CERVICAL DISC DISORDER: ICD-10-CM

## 2023-01-05 DIAGNOSIS — M47.814 THORACIC SPONDYLOSIS: ICD-10-CM

## 2023-01-05 PROCEDURE — 3078F DIAST BP <80 MM HG: CPT | Performed by: PAIN MEDICINE

## 2023-01-05 PROCEDURE — 99213 OFFICE O/P EST LOW 20 MIN: CPT | Performed by: PAIN MEDICINE

## 2023-01-05 PROCEDURE — G8427 DOCREV CUR MEDS BY ELIG CLIN: HCPCS | Performed by: PAIN MEDICINE

## 2023-01-05 PROCEDURE — 1090F PRES/ABSN URINE INCON ASSESS: CPT | Performed by: PAIN MEDICINE

## 2023-01-05 PROCEDURE — G8420 CALC BMI NORM PARAMETERS: HCPCS | Performed by: PAIN MEDICINE

## 2023-01-05 PROCEDURE — G8484 FLU IMMUNIZE NO ADMIN: HCPCS | Performed by: PAIN MEDICINE

## 2023-01-05 PROCEDURE — 1123F ACP DISCUSS/DSCN MKR DOCD: CPT | Performed by: PAIN MEDICINE

## 2023-01-05 PROCEDURE — 1036F TOBACCO NON-USER: CPT | Performed by: PAIN MEDICINE

## 2023-01-05 PROCEDURE — 3075F SYST BP GE 130 - 139MM HG: CPT | Performed by: PAIN MEDICINE

## 2023-01-05 PROCEDURE — G8399 PT W/DXA RESULTS DOCUMENT: HCPCS | Performed by: PAIN MEDICINE

## 2023-01-05 NOTE — PROGRESS NOTES
[address]  [address] [facility], [address] [place] [address] Yovany  [phone]    Follow up Note      Mata Wang     Date of Visit:  1/5/2023    CC:  Patient presents for follow up   Chief Complaint   Patient presents with    Back Pain       HPI:    Follow up mid back and low back pain with no acute issues. Appropriate analgesia with current medications regimen: N/A. Change in quality of symptoms:no. Medication side effects:not applicable . Recent diagnostic testing:none  Recent interventional procedures:Repeat bilateral T8,9,10 with less than expected response    She has not been on anticoagulation medications to include none. The patient  has not been on herbal supplements. The patient is diabetic. Imaging:  Thoracic spine MRI 2022:    1. Mild chronic compression fracture deformity of the T8 vertebral body. No   acute fracture. 2. Mild exaggeration of the thoracic kyphosis. 3. Small disc protrusions at T6-7 and T10-11. No central canal stenosis. 4. Mild neural foraminal stenoses at multiple levels, resulting from facet   hypertrophic changes. Thoracic spine Xray 2022. Compression of T9  Mild spondylosis T11-12      Lumbar spine MRI 2022:    1. No fracture or bony destructive lesion. 2. Moderate central canal stenosis at L3-4. Mild to moderate stenoses at   L4-5 and L5-S1.   3.  Multilevel neural foraminal stenoses, worst (severe) at the bilateral   L3-4 and right L4-5 levels. Moderate to severe neural foraminal stenoses at   the left L4-5 and bilateral L5-S1 levels. 4. Severe lateral recess stenoses at L3-4 bilaterally and at the right L5-S1   levels. 4     Previous treatments: medications. .         Potential Aberrant Drug-Related Behavior:    No    Urine Drug Screening:  None    OARRS report:  01/2023 consistent.     Opioid Agreement:  Renewal date:N/A    Past Medical History:   Diagnosis Date    Depression     Diabetes mellitus ([facility] [address] Hyperlipidemia     Hypertension     Thyroid disease     Type 2 diabetes mellitus without complication (HCC)      Past Surgical History:   Procedure Laterality Date    CHOLECYSTECTOMY      COLONOSCOPY      EYE SURGERY      cataract both eyes    HYSTERECTOMY (CERVIX STATUS UNKNOWN)      JOINT REPLACEMENT Left     KNEE    PAIN MANAGEMENT PROCEDURE Bilateral 8/11/2022    BILATERAL THORACIC FACET MEDIAL BRANCH BLOCK AT T8, 9, 10 (CPT 78720) performed by Anam Zuniga MD at [address] [place] [address] N/A 10/6/2022    EPIDURAL STEROID INJECTION L3-4 WITH 40 DEPO performed by Anam Zuniga MD at [address] [place] [address] Bilateral 12/12/2022    BILATERAL T8,9,10 MEDIAL BRANCH BLOCK performed by Anam Zuniga MD at [address]    UPPER GASTROINTESTINAL ENDOSCOPY  5/15//12    toni     Prior to Admission medications    Medication Sig Start Date End Date Taking? Authorizing Provider   LANTUS 100 UNIT/ML injection vial Inject 20 Units into the skin every morning (before breakfast) 12/20/22  Yes Naty Liang MD   glimepiride (AMARYL) 4 MG tablet Use 4 mg daily at lunch time 12/5/22  Yes Naty Liang MD   FLUoxetine (PROZAC) 40 MG capsule Take 1 capsule by mouth nightly 10/26/22  Yes Naty Liang MD   Ultra Thin Lancets 31G MISC 1 each by Does not apply route in the morning, at noon, and at bedtime 10/12/22  Yes Naty Liang MD   blood glucose test strips (TRUE METRIX BLOOD GLUCOSE TEST) strip 1 each by In Vitro route 3 times daily As needed.  10/12/22  Yes Naty Liang MD   TECHLITE PEN NEEDLES 31G X 5 MM MISC  9/16/22  Yes Historical Provider, MD   levothyroxine (SYNTHROID) 75 MCG tablet Take 1 tablet by mouth daily 9/14/22  Yes Naty Liang MD   losartan (COZAAR) 100 MG tablet Take 1 tablet by mouth daily 9/14/22  Yes Naty Liang MD   metoprolol succinate (TOPROL XL) 50 MG extended release tablet Take 1 tablet by mouth daily 9/14/22  Yes Linda Albert MD   pantoprazole (PROTONIX) 40 MG tablet Take 1 tablet by mouth daily 9/14/22  Yes Linda Albert MD   traZODone (DESYREL) 50 MG tablet Take 50 mg by mouth nightly   Yes Historical Provider, MD   Multiple Vitamins-Minerals (THERAPEUTIC MULTIVITAMIN-MINERALS) tablet Take 1 tablet by mouth daily   Yes Historical Provider, MD   buPROPion (WELLBUTRIN XL) 300 MG extended release tablet Take 1 tablet by mouth every morning 6/14/22  Yes Linda Albert MD   OLANZapine (ZYPREXA) 5 MG tablet Take 1 tablet by mouth nightly 6/14/22  Yes Linda Albert MD   aspirin 81 MG chewable tablet Take 81 mg by mouth daily  Patient not taking: Reported on 1/5/2023    Historical Provider, MD   meloxicam (MOBIC) 15 MG tablet Take 1 tablet by mouth daily as needed for Pain 9/14/22 9/16/22  Linda Albert MD     No Known Allergies    Social History     Socioeconomic History    Marital status:      Spouse name: Not on file    Number of children: Not on file    Years of education: Not on file    Highest education level: Not on file   Occupational History    Not on file   Tobacco Use    Smoking status: Never    Smokeless tobacco: Never   Vaping Use    Vaping Use: Not on file   Substance and Sexual Activity    Alcohol use: No     Comment: 1 cup of coffee    Drug use: No    Sexual activity: Not on file   Other Topics Concern    Not on file   Social History Narrative    Not on file     Social Determinants of Health     Financial Resource Strain: Low Risk     Difficulty of Paying Living Expenses: Not hard at all   Food Insecurity: No Food Insecurity    Worried About Running Out of Food in the Last Year: Never true    Ran Out of Food in the Last Year: Never true   Transportation Needs: Not on file   Physical Activity: Not on file   Stress: Not on file   Social Connections: Not on file   Intimate Partner Violence: Not on file   Housing Stability: Not on file     Family History   Problem Relation Age of Onset    Cancer Mother ESOPHAGEAL     Stroke Mother     Cancer Father         KIDNEY     Coronary Art Dis Brother      REVIEW OF SYSTEMS:     Paris Vargas denies fever/chills, chest pain, shortness of breath, new bowel or bladder complaints. All other review of systems was negative. PHYSICAL EXAMINATION:      /70   Pulse 70   Temp (!) 96.1 °F (35.6 °C) (Infrared)   Resp 18   Ht 5' 4\" (1.626 m)   Wt 140 lb (63.5 kg)   SpO2 95%   BMI 24.03 kg/m²     General:       General appearance:   elderly, pleasant and well-hydrated. , in moderate discomfort and A & O x3  Build:Overweight     HEENT:     Head:normocephalic and atraumatic  Sclera: icterus absent,     Lungs:     Breathing:Breathing Pattern: regular, no distress     Abdomen:     Shape:non-distended and normal  Tenderness:none     Thoracic spine:     Spine inspection:exaggerated kyphosis   pationPal:tenderness paravertebral muscles and midline tenderness. T6-8/facet tenderness bilaterally  Range of motion:not tested    Lumbar spine:    Spine inspection:normal   CVA tenderness:No   Palpation:tenderness paravertebral muscles, facet loading, left, right, positive, and tenderness. Range of motion:not tested. Musculoskeletal:     Trigger points in Paraveteral:absent bilaterally  Jiang's:negative right, negative left  SLR:negative right, negative left, sitting     Extremities:     Tremors:None bilaterally upper and lower  Range of motion: pain with internal rotation of hips negative. Intact:Yes  Edema:Normal     Knee:      Inspection:Left knee replaced     Neurological:     Sensory:normal to light touch bilateral upper and lower extremities  Motor:                           Right Quadriceps4+/5          Left Quadriceps4+/5           Right Gastrocnemius4+/5    Left Gastrocnemius4+/5  Right Ant Tibialis4+/5  Left Ant Tibialis4+/5  Reflexes:    Right Quadriceps reflex1+  Left Quadriceps reflexNOT DONE  Right Achilles reflex1+  Left Achilles reflex0  Gait:antalgic Dermatology:     Skin:no unusual rashes and no skin lesions     Impression:  Mid back pain  Good outcome with LESI L3-4  Failed Tylenol#3   Plan:  Follow up on her mid and low back pain with no acute issues. Patient is s/p repeat bilateral T8,9,10 MBB with less than expected response, not a candidate for RFA. Currently on Mobic 15 mg. Patient given samples for Lidocaine patches. OARRS report reviewed 11/2022. Patient encouraged to stay active and to lose weight. Treatment plan discussed with the patient including medications side effects. We discussed with the patient that combining opioids, benzodiazepines, alcohol, illicit drugs or sleep aids increases the risk of respiratory depression including death. We discussed that these medications may cause drowsiness, sedation or dizziness and have counseled the patient not to drive or operate machinery. We have discussed that these medications will be prescribed only by one provider. We have discussed with the patient about age related risk factors and have thoroughly discussed the importance of taking these medications as prescribed. The patient verbalizes understanding. ccrefhuaning kelby Odom M.D.

## 2023-01-05 NOTE — PROGRESS NOTES
Do you currently have any of the following:    Fever: No  Headache:  No  Cough: No  Shortness of breath: No  Exposed to anyone with these symptoms: No                                                                                                                Milton Jin presents to the [facility] on 1/5/2023. Brad Nava is complaining of pain in her back. The pain is constant. The pain is described as aching and sharp. Pain is rated on her best day at a 5, on her worst day at a 10, and on average at a 7 on the VAS scale. She took her last dose of  pt does not take pain medication  . Brad Nava does not have issues with constipation. Any procedures since your last visit: Yes, with 0 % relief. She is  on NSAIDS and  is not on anticoagulation medications to include none and is managed by NA. Pacemaker or defibrillator: No Physician managing device is NA. Medication Contract and Consent for Opioid Use Documents Filed        No documents found                       /70   Pulse 70   Temp (!) 96.1 °F (35.6 °C) (Infrared)   Resp 18   Ht 5' 4\" (1.626 m)   Wt 140 lb (63.5 kg)   SpO2 95%   BMI 24.03 kg/m²      No LMP recorded. Patient has had a hysterectomy.

## 2023-01-09 ENCOUNTER — TELEPHONE (OUTPATIENT)
Dept: PAIN MANAGEMENT | Age: 83
End: 2023-01-09

## 2023-01-09 RX ORDER — LIDOCAINE 50 MG/G
1 PATCH TOPICAL DAILY
Qty: 30 PATCH | Refills: 1 | Status: SHIPPED | OUTPATIENT
Start: 2023-01-09 | End: 2023-03-10

## 2023-01-22 DIAGNOSIS — I10 PRIMARY HYPERTENSION: ICD-10-CM

## 2023-01-23 RX ORDER — METOPROLOL SUCCINATE 50 MG/1
50 TABLET, EXTENDED RELEASE ORAL DAILY
Qty: 90 TABLET | Refills: 0 | Status: SHIPPED | OUTPATIENT
Start: 2023-01-23

## 2023-01-23 RX ORDER — PANTOPRAZOLE SODIUM 40 MG/1
TABLET, DELAYED RELEASE ORAL
Qty: 90 TABLET | Refills: 0 | Status: SHIPPED | OUTPATIENT
Start: 2023-01-23

## 2023-01-23 RX ORDER — LOSARTAN POTASSIUM 100 MG/1
TABLET ORAL
Qty: 90 TABLET | Refills: 0 | Status: SHIPPED | OUTPATIENT
Start: 2023-01-23

## 2023-01-23 RX ORDER — LEVOTHYROXINE SODIUM 0.07 MG/1
TABLET ORAL
Qty: 90 TABLET | Refills: 0 | Status: SHIPPED | OUTPATIENT
Start: 2023-01-23

## 2023-02-02 DIAGNOSIS — E08.65 DIABETES MELLITUS DUE TO UNDERLYING CONDITION, UNCONTROLLED, WITH HYPERGLYCEMIA, WITHOUT LONG-TERM CURRENT USE OF INSULIN (HCC): ICD-10-CM

## 2023-02-02 DIAGNOSIS — E06.3 AUTOIMMUNE HYPOTHYROIDISM: ICD-10-CM

## 2023-02-02 LAB
ALBUMIN SERPL-MCNC: 4 G/DL (ref 3.5–5.2)
ALP BLD-CCNC: 102 U/L (ref 35–104)
ALT SERPL-CCNC: 28 U/L (ref 0–32)
ANION GAP SERPL CALCULATED.3IONS-SCNC: 5 MMOL/L (ref 7–16)
AST SERPL-CCNC: 29 U/L (ref 0–31)
BILIRUB SERPL-MCNC: 0.2 MG/DL (ref 0–1.2)
BUN BLDV-MCNC: 26 MG/DL (ref 6–23)
CALCIUM SERPL-MCNC: 9.2 MG/DL (ref 8.6–10.2)
CHLORIDE BLD-SCNC: 106 MMOL/L (ref 98–107)
CO2: 29 MMOL/L (ref 22–29)
CREAT SERPL-MCNC: 1.3 MG/DL (ref 0.5–1)
GFR SERPL CREATININE-BSD FRML MDRD: 41 ML/MIN/1.73
GLUCOSE BLD-MCNC: 109 MG/DL (ref 74–99)
POTASSIUM SERPL-SCNC: 5 MMOL/L (ref 3.5–5)
SODIUM BLD-SCNC: 140 MMOL/L (ref 132–146)
TOTAL PROTEIN: 7 G/DL (ref 6.4–8.3)
TSH SERPL DL<=0.05 MIU/L-ACNC: 5.06 UIU/ML (ref 0.27–4.2)

## 2023-02-13 ENCOUNTER — OFFICE VISIT (OUTPATIENT)
Dept: PRIMARY CARE CLINIC | Age: 83
End: 2023-02-13

## 2023-02-13 VITALS
HEART RATE: 72 BPM | DIASTOLIC BLOOD PRESSURE: 74 MMHG | OXYGEN SATURATION: 97 % | WEIGHT: 140 LBS | HEIGHT: 64 IN | SYSTOLIC BLOOD PRESSURE: 134 MMHG | TEMPERATURE: 97.3 F | BODY MASS INDEX: 23.9 KG/M2

## 2023-02-13 DIAGNOSIS — F32.1 MODERATE MAJOR DEPRESSION (HCC): ICD-10-CM

## 2023-02-13 DIAGNOSIS — M48.061 SPINAL STENOSIS OF LUMBAR REGION WITHOUT NEUROGENIC CLAUDICATION: ICD-10-CM

## 2023-02-13 DIAGNOSIS — E11.22 TYPE 2 DIABETES MELLITUS WITH STAGE 3B CHRONIC KIDNEY DISEASE, WITH LONG-TERM CURRENT USE OF INSULIN (HCC): Primary | ICD-10-CM

## 2023-02-13 DIAGNOSIS — R91.1 SOLID NODULE OF LUNG 6 MM TO 8 MM IN DIAMETER: ICD-10-CM

## 2023-02-13 DIAGNOSIS — G89.4 CHRONIC PAIN SYNDROME: ICD-10-CM

## 2023-02-13 DIAGNOSIS — I27.20 PULMONARY HYPERTENSION (HCC): ICD-10-CM

## 2023-02-13 DIAGNOSIS — E06.3 AUTOIMMUNE HYPOTHYROIDISM: ICD-10-CM

## 2023-02-13 DIAGNOSIS — M47.814 THORACIC SPONDYLOSIS: ICD-10-CM

## 2023-02-13 DIAGNOSIS — M85.80 OSTEOPENIA OF THE ELDERLY: ICD-10-CM

## 2023-02-13 DIAGNOSIS — Z79.4 TYPE 2 DIABETES MELLITUS WITH STAGE 3B CHRONIC KIDNEY DISEASE, WITH LONG-TERM CURRENT USE OF INSULIN (HCC): Primary | ICD-10-CM

## 2023-02-13 DIAGNOSIS — N18.32 TYPE 2 DIABETES MELLITUS WITH STAGE 3B CHRONIC KIDNEY DISEASE, WITH LONG-TERM CURRENT USE OF INSULIN (HCC): Primary | ICD-10-CM

## 2023-02-13 PROBLEM — E11.29 TYPE 2 DIABETES MELLITUS WITH KIDNEY COMPLICATION, WITH LONG-TERM CURRENT USE OF INSULIN (HCC): Status: ACTIVE | Noted: 2023-02-13

## 2023-02-13 LAB — HBA1C MFR BLD: 9.2 %

## 2023-02-13 RX ORDER — FLUOXETINE HYDROCHLORIDE 40 MG/1
40 CAPSULE ORAL NIGHTLY
Qty: 90 CAPSULE | Refills: 0 | Status: CANCELLED | OUTPATIENT
Start: 2023-02-13

## 2023-02-13 RX ORDER — LEVOTHYROXINE SODIUM 88 UG/1
88 TABLET ORAL DAILY
Qty: 90 TABLET | Refills: 0 | Status: SHIPPED | OUTPATIENT
Start: 2023-02-13

## 2023-02-13 RX ORDER — INSULIN GLARGINE 100 [IU]/ML
20 INJECTION, SOLUTION SUBCUTANEOUS
Qty: 10 ML | Refills: 3 | Status: SHIPPED | OUTPATIENT
Start: 2023-02-13

## 2023-02-13 RX ORDER — GLIMEPIRIDE 2 MG/1
TABLET ORAL
COMMUNITY
Start: 2023-02-07 | End: 2023-02-13 | Stop reason: SDUPTHER

## 2023-02-13 RX ORDER — CALCIUM CITRATE/VITAMIN D3 200MG-6.25
1 TABLET ORAL 3 TIMES DAILY
Qty: 300 EACH | Refills: 1 | Status: SHIPPED | OUTPATIENT
Start: 2023-02-13

## 2023-02-13 RX ORDER — PEN NEEDLE, DIABETIC 29 G X1/2"
NEEDLE, DISPOSABLE MISCELLANEOUS
Qty: 100 EACH | Refills: 0 | Status: SHIPPED | OUTPATIENT
Start: 2023-02-13

## 2023-02-13 RX ORDER — BUPROPION HYDROCHLORIDE 300 MG/1
300 TABLET ORAL EVERY MORNING
Qty: 90 TABLET | Refills: 0 | Status: CANCELLED | OUTPATIENT
Start: 2023-02-13

## 2023-02-13 RX ORDER — OLANZAPINE 5 MG/1
5 TABLET ORAL NIGHTLY
Qty: 90 TABLET | Refills: 0 | Status: CANCELLED | OUTPATIENT
Start: 2023-02-13

## 2023-02-13 RX ORDER — GLIMEPIRIDE 4 MG/1
4 TABLET ORAL
Qty: 90 TABLET | Refills: 0 | Status: SHIPPED | OUTPATIENT
Start: 2023-02-13

## 2023-02-13 SDOH — ECONOMIC STABILITY: FOOD INSECURITY: WITHIN THE PAST 12 MONTHS, THE FOOD YOU BOUGHT JUST DIDN'T LAST AND YOU DIDN'T HAVE MONEY TO GET MORE.: NEVER TRUE

## 2023-02-13 SDOH — ECONOMIC STABILITY: HOUSING INSECURITY
IN THE LAST 12 MONTHS, WAS THERE A TIME WHEN YOU DID NOT HAVE A STEADY PLACE TO SLEEP OR SLEPT IN A SHELTER (INCLUDING NOW)?: NO

## 2023-02-13 SDOH — ECONOMIC STABILITY: INCOME INSECURITY: HOW HARD IS IT FOR YOU TO PAY FOR THE VERY BASICS LIKE FOOD, HOUSING, MEDICAL CARE, AND HEATING?: NOT HARD AT ALL

## 2023-02-13 SDOH — ECONOMIC STABILITY: FOOD INSECURITY: WITHIN THE PAST 12 MONTHS, YOU WORRIED THAT YOUR FOOD WOULD RUN OUT BEFORE YOU GOT MONEY TO BUY MORE.: NEVER TRUE

## 2023-02-13 ASSESSMENT — PATIENT HEALTH QUESTIONNAIRE - PHQ9
SUM OF ALL RESPONSES TO PHQ QUESTIONS 1-9: 0
8. MOVING OR SPEAKING SO SLOWLY THAT OTHER PEOPLE COULD HAVE NOTICED. OR THE OPPOSITE, BEING SO FIGETY OR RESTLESS THAT YOU HAVE BEEN MOVING AROUND A LOT MORE THAN USUAL: 0
6. FEELING BAD ABOUT YOURSELF - OR THAT YOU ARE A FAILURE OR HAVE LET YOURSELF OR YOUR FAMILY DOWN: 0
1. LITTLE INTEREST OR PLEASURE IN DOING THINGS: 0
SUM OF ALL RESPONSES TO PHQ QUESTIONS 1-9: 0
7. TROUBLE CONCENTRATING ON THINGS, SUCH AS READING THE NEWSPAPER OR WATCHING TELEVISION: 0
5. POOR APPETITE OR OVEREATING: 0
SUM OF ALL RESPONSES TO PHQ QUESTIONS 1-9: 0
9. THOUGHTS THAT YOU WOULD BE BETTER OFF DEAD, OR OF HURTING YOURSELF: 0
SUM OF ALL RESPONSES TO PHQ9 QUESTIONS 1 & 2: 0
SUM OF ALL RESPONSES TO PHQ QUESTIONS 1-9: 0
4. FEELING TIRED OR HAVING LITTLE ENERGY: 0
3. TROUBLE FALLING OR STAYING ASLEEP: 0
10. IF YOU CHECKED OFF ANY PROBLEMS, HOW DIFFICULT HAVE THESE PROBLEMS MADE IT FOR YOU TO DO YOUR WORK, TAKE CARE OF THINGS AT HOME, OR GET ALONG WITH OTHER PEOPLE: 0
2. FEELING DOWN, DEPRESSED OR HOPELESS: 0

## 2023-02-13 NOTE — PROGRESS NOTES
Chief Complaint   Patient presents with    Follow-up     Pt is here as a 2 month F/U, and states she has had 3 injections for her back recently that have not helped her. She is c/o #6 back pain daily. HPI:  Patient is here for follow-up of diabetes mellitus hypertension and hypothyroidism and chronic low back pain. .  Patient is doing well but complaining about chronic low back pain for which she has received 3 epidural injections through pain clinic but according to her is not helping that much and she has been prescribed some local patches and that has been helping her a little bit. Blood work was discussed with patient. Creatinine is 1.3. Fasting blood sugar was 103 but hemoglobin A1c is 9.2 which is much improved in the 1 previously from September which was 15.4 at that time. We will increase her glimepiride to 4 mg daily and continue the insulin the same. She denies any hypoglycemic episodes. Also her TSH was mildly elevated and we will adjust the Synthroid dose accordingly    CT scan of the lung was also discussed with patient and it shows the same 6 mm lung nodule without any change since 2018 no lymphadenopathy positive hiatal hernia. She denies any shortness of breath chest pains abdominal pains nausea vomiting or diarrhea     Past Medical History, Surgical History, and Family History has been reviewed and updated.     Review of Systems:  Constitutional:  No fever, no fatigue, no chills, no headaches, no weight change  Dermatology:  No rash, no mole, no dry or sensitive skin  ENT:  No cough, no sore throat, no sinus pain, no runny nose, no ear pain  Cardiology:  No chest pain, no palpitations, no leg edema, no shortness of breath, no PND  Gastroenterology:  No dysphagia, no abdominal pain, no nausea, no vomiting, no constipation, no diarrhea, no heartburn  Musculoskeletal:  No joint pain, no leg cramps, no back pain, no muscle aches  Respiratory:  No shortness of breath, no orthopnea, no wheezing, no JOSE, no hemoptysis  Urology:  No blood in the urine, no urinary frequency, no urinary incontinence, no urinary urgency, no nocturia, no dysuria    Vitals:    02/13/23 1209   BP: 134/74   Pulse: 72   Temp: 97.3 °F (36.3 °C)   TempSrc: Temporal   SpO2: 97%   Weight: 140 lb (63.5 kg)   Height: 5' 4\" (1.626 m)       General:  Patient alert and oriented x 3, NAD, pleasant, thoracic kyphosis  HEENT:  Atraumatic, normocephalic, PERRLA, EOMI, clear conjunctiva, TMs clear, nose-clear, throat - no erythema  Neck:  Supple, no goiter, no carotid bruits, no LAD  Lungs:  CTA   Heart:  RRR, no murmurs, gallops or rubs  Abdomen:  Soft/nt/nd, + bowel sounds  Lymph node examination: unremarkable  Neurological exam : unremarkable  Extremities:  No clubbing, cyanosis or edema  Skin: unremarkable    Hemoglobin A1C   Date Value Ref Range Status   09/14/2022 15.0 % Final     Cholesterol, Total   Date Value Ref Range Status   09/07/2022 231 (H) 0 - 199 mg/dL Final     Triglycerides   Date Value Ref Range Status   09/07/2022 198 (H) 0 - 149 mg/dL Final     HDL   Date Value Ref Range Status   09/07/2022 53 >40 mg/dL Final     LDL Calculated   Date Value Ref Range Status   09/07/2022 138 (H) 0 - 99 mg/dL Final     VLDL Cholesterol Calculated   Date Value Ref Range Status   09/07/2022 40 mg/dL Final     VLDL   Date Value Ref Range Status   02/26/2021 18 mg/dL Final     Chol/HDL Ratio   Date Value Ref Range Status   02/26/2021 3.1  Final     Sodium   Date Value Ref Range Status   02/02/2023 140 132 - 146 mmol/L Final     Potassium   Date Value Ref Range Status   02/02/2023 5.0 3.5 - 5.0 mmol/L Final     Chloride   Date Value Ref Range Status   02/02/2023 106 98 - 107 mmol/L Final     CO2   Date Value Ref Range Status   02/02/2023 29 22 - 29 mmol/L Final     BUN   Date Value Ref Range Status   02/02/2023 26 (H) 6 - 23 mg/dL Final     Creatinine   Date Value Ref Range Status   02/02/2023 1.3 (H) 0.5 - 1.0 mg/dL Final     Glucose Date Value Ref Range Status   02/02/2023 109 (H) 74 - 99 mg/dL Final     Calcium   Date Value Ref Range Status   02/02/2023 9.2 8.6 - 10.2 mg/dL Final     Total Protein   Date Value Ref Range Status   02/02/2023 7.0 6.4 - 8.3 g/dL Final     Albumin   Date Value Ref Range Status   02/02/2023 4.0 3.5 - 5.2 g/dL Final     Total Bilirubin   Date Value Ref Range Status   02/02/2023 0.2 0.0 - 1.2 mg/dL Final     Alkaline Phosphatase   Date Value Ref Range Status   02/02/2023 102 35 - 104 U/L Final     AST   Date Value Ref Range Status   02/02/2023 29 0 - 31 U/L Final     ALT   Date Value Ref Range Status   02/02/2023 28 0 - 32 U/L Final     Est, Glom Filt Rate   Date Value Ref Range Status   02/02/2023 41 >=60 mL/min/1.73 Final     Comment:     Pediatric calculator link  [email or web address] org/professionals/kdoqi/gfr_calculatorped  Effective Oct 3, 2022  These results are not intended for use in patients  <25years of age. eGFR results are calculated without  a race factor using the 2021 CKD-EPI equation. Careful  clinical correlation is recommended, particularly when  comparing to results calculated using previous equations. The CKD-EPI equation is less accurate in patients with  extremes of muscle mass, extra-renal metabolism of  creatinine, excessive creatinine ingestion, or following  therapy that affects renal tubular secretion. GFR    Date Value Ref Range Status   09/16/2022 52  Final        No results found. Assessment/Plan:      Outpatient Encounter Medications as of 2/13/2023   Medication Sig Dispense Refill    blood glucose test strips (TRUE METRIX BLOOD GLUCOSE TEST) strip 1 each by In Vitro route 3 times daily As needed.  300 each 1    LANTUS 100 UNIT/ML injection vial Inject 20 Units into the skin every morning (before breakfast) 10 mL 3    levothyroxine (SYNTHROID) 88 MCG tablet Take 1 tablet by mouth Daily 90 tablet 0    glimepiride (AMARYL) 4 MG tablet Take 1 tablet by mouth every morning (before breakfast) 90 tablet 0    pantoprazole (PROTONIX) 40 MG tablet take 1 tablet by mouth once daily 90 tablet 0    losartan (COZAAR) 100 MG tablet take 1 tablet by mouth once daily 90 tablet 0    metoprolol succinate (TOPROL XL) 50 MG extended release tablet Take 1 tablet by mouth daily 90 tablet 0    lidocaine (LIDODERM) 5 % Place 1 patch onto the skin daily 12 hours on, 12 hours off. 30 patch 1    FLUoxetine (PROZAC) 40 MG capsule Take 1 capsule by mouth nightly 90 capsule 0    Ultra Thin Lancets 31G MISC 1 each by Does not apply route in the morning, at noon, and at bedtime 300 each 1    TECHLITE PEN NEEDLES 31G X 5 MM MISC       traZODone (DESYREL) 50 MG tablet Take 50 mg by mouth nightly      Multiple Vitamins-Minerals (THERAPEUTIC MULTIVITAMIN-MINERALS) tablet Take 1 tablet by mouth daily      buPROPion (WELLBUTRIN XL) 300 MG extended release tablet Take 1 tablet by mouth every morning 90 tablet 0    OLANZapine (ZYPREXA) 5 MG tablet Take 1 tablet by mouth nightly 90 tablet 0    [DISCONTINUED] glimepiride (AMARYL) 2 MG tablet take 1 tablet by mouth every morning      [DISCONTINUED] levothyroxine (SYNTHROID) 75 MCG tablet take 1 tablet by mouth once daily 90 tablet 0    [DISCONTINUED] LANTUS 100 UNIT/ML injection vial Inject 20 Units into the skin every morning (before breakfast) 10 mL 3    [DISCONTINUED] glimepiride (AMARYL) 4 MG tablet Use 4 mg daily at lunch time 90 tablet 0    [DISCONTINUED] blood glucose test strips (TRUE METRIX BLOOD GLUCOSE TEST) strip 1 each by In Vitro route 3 times daily As needed. 300 each 1    aspirin 81 MG chewable tablet Take 81 mg by mouth daily (Patient not taking: Reported on 2/13/2023)      [DISCONTINUED] meloxicam (MOBIC) 15 MG tablet Take 1 tablet by mouth daily as needed for Pain 90 tablet 0     Facility-Administered Encounter Medications as of 2/13/2023   Medication Dose Route Frequency Provider Last Rate Last Admin    insulin regular (HUMULIN R;NOVOLIN  R) injection 10 Units  10 Units SubCUTAneous Once Caitlyn Eller MD            Dora Paris was seen today for follow-up. Diagnoses and all orders for this visit:    Type 2 diabetes mellitus with stage 3b chronic kidney disease, with long-term current use of insulin (HCC)    Chronic pain syndrome    Spinal stenosis of lumbar region without neurogenic claudication    Autoimmune hypothyroidism  -     TSH; Future    Pulmonary hypertension (HCC)    Moderate major depression (HCC)    Osteopenia of the elderly    Thoracic spondylosis    Solid nodule of lung 6 mm to 8 mm in diameter    Other orders  -     blood glucose test strips (TRUE METRIX BLOOD GLUCOSE TEST) strip; 1 each by In Vitro route 3 times daily As needed. -     LANTUS 100 UNIT/ML injection vial; Inject 20 Units into the skin every morning (before breakfast)  -     Comprehensive Metabolic Panel; Future  -     levothyroxine (SYNTHROID) 88 MCG tablet; Take 1 tablet by mouth Daily  -     glimepiride (AMARYL) 4 MG tablet; Take 1 tablet by mouth every morning (before breakfast)     Increase glimepiride to 4 mg daily  Increase Synthroid to 88 mcg daily  Continue current medications  There are no Patient Instructions on file for this visit. On this date 2/13/2023 I have spent 32 minutes reviewing previous notes, test results and face to face with the patient discussing the diagnosis and importance of compliance with the treatment plan as well as documenting on the day of the visit.       Tory Neves MD   2/13/23

## 2023-02-21 ENCOUNTER — OFFICE VISIT (OUTPATIENT)
Dept: PODIATRY | Age: 83
End: 2023-02-21
Payer: MEDICARE

## 2023-02-21 VITALS — HEIGHT: 65 IN | BODY MASS INDEX: 23.32 KG/M2 | WEIGHT: 140 LBS

## 2023-02-21 DIAGNOSIS — I73.9 PVD (PERIPHERAL VASCULAR DISEASE) (HCC): ICD-10-CM

## 2023-02-21 DIAGNOSIS — E11.51 TYPE II DIABETES MELLITUS WITH PERIPHERAL CIRCULATORY DISORDER (HCC): ICD-10-CM

## 2023-02-21 DIAGNOSIS — R26.2 DIFFICULTY WALKING: ICD-10-CM

## 2023-02-21 DIAGNOSIS — B35.1 ONYCHOMYCOSIS: Primary | ICD-10-CM

## 2023-02-21 DIAGNOSIS — M79.675 PAIN OF TOE OF LEFT FOOT: ICD-10-CM

## 2023-02-21 DIAGNOSIS — M79.674 PAIN OF TOE OF RIGHT FOOT: ICD-10-CM

## 2023-02-21 PROCEDURE — 11720 DEBRIDE NAIL 1-5: CPT | Performed by: PODIATRIST

## 2023-02-21 PROCEDURE — 99999 PR OFFICE/OUTPT VISIT,PROCEDURE ONLY: CPT | Performed by: PODIATRIST

## 2023-02-21 NOTE — PROGRESS NOTES
23     Cecile Wing    : 1940  Sex: female  Age: 79 y.o. Subjective: The patient is seen today for evaluation regarding diabetic foot evaluation and mycotic nail care. No other complaints noted. Chief Complaint   Patient presents with    Nail Problem     Nail care       Current Medications:    Current Outpatient Medications:     BD INSULIN SYRINGE U/F 31G X 5/16\" 1 ML MISC, use once daily, Disp: 100 each, Rfl: 0    blood glucose test strips (TRUE METRIX BLOOD GLUCOSE TEST) strip, 1 each by In Vitro route 3 times daily As needed. , Disp: 300 each, Rfl: 1    LANTUS 100 UNIT/ML injection vial, Inject 20 Units into the skin every morning (before breakfast), Disp: 10 mL, Rfl: 3    levothyroxine (SYNTHROID) 88 MCG tablet, Take 1 tablet by mouth Daily, Disp: 90 tablet, Rfl: 0    glimepiride (AMARYL) 4 MG tablet, Take 1 tablet by mouth every morning (before breakfast), Disp: 90 tablet, Rfl: 0    pantoprazole (PROTONIX) 40 MG tablet, take 1 tablet by mouth once daily, Disp: 90 tablet, Rfl: 0    losartan (COZAAR) 100 MG tablet, take 1 tablet by mouth once daily, Disp: 90 tablet, Rfl: 0    metoprolol succinate (TOPROL XL) 50 MG extended release tablet, Take 1 tablet by mouth daily, Disp: 90 tablet, Rfl: 0    lidocaine (LIDODERM) 5 %, Place 1 patch onto the skin daily 12 hours on, 12 hours off., Disp: 30 patch, Rfl: 1    FLUoxetine (PROZAC) 40 MG capsule, Take 1 capsule by mouth nightly, Disp: 90 capsule, Rfl: 0    Ultra Thin Lancets 31G MISC, 1 each by Does not apply route in the morning, at noon, and at bedtime, Disp: 300 each, Rfl: 1    TECHLITE PEN NEEDLES 31G X 5 MM MISC, , Disp: , Rfl:     traZODone (DESYREL) 50 MG tablet, Take 50 mg by mouth nightly, Disp: , Rfl:     Multiple Vitamins-Minerals (THERAPEUTIC MULTIVITAMIN-MINERALS) tablet, Take 1 tablet by mouth daily, Disp: , Rfl:     buPROPion (WELLBUTRIN XL) 300 MG extended release tablet, Take 1 tablet by mouth every morning, Disp: 90 tablet, Rfl: 0    OLANZapine (ZYPREXA) 5 MG tablet, Take 1 tablet by mouth nightly, Disp: 90 tablet, Rfl: 0    Allergies:  No Known Allergies    Past Surgical History:   Procedure Laterality Date    CHOLECYSTECTOMY      COLONOSCOPY      EYE SURGERY      cataract both eyes    HYSTERECTOMY (CERVIX STATUS UNKNOWN)      JOINT REPLACEMENT Left     KNEE    PAIN MANAGEMENT PROCEDURE Bilateral 8/11/2022    BILATERAL THORACIC FACET MEDIAL BRANCH BLOCK AT T8, 9, 10 (CPT 04060) performed by Keith Simmons MD at [address] [place] [address] N/A 10/6/2022    EPIDURAL STEROID INJECTION L3-4 WITH 40 DEPO performed by Keith Simmons MD at [address] [place] [address] Bilateral 12/12/2022    BILATERAL T8,9,10 MEDIAL BRANCH BLOCK performed by Keith Simmons MD at [address]    UPPER GASTROINTESTINAL ENDOSCOPY  5/15//12    toni     Past Medical History:   Diagnosis Date    Depression     Diabetes mellitus ([facility] [address]     Hyperlipidemia     Hypertension     Thyroid disease     Type 2 diabetes mellitus without complication ([facility] [address]        Vitals:    02/21/23 1041   Weight: 140 lb (63.5 kg)   Height: 5' 4.5\" (1.638 m)       Exam:  Pedal pulses diminished to palpation bilateral foot. At this time the nail/s 1, 5 right foot and nail/s 1, 5 left foot are noted to be thickened, dystrophic and discolored with subungual debris present. Tenderness noted to palpation. Minimal hair growth is noted to both lower extremities. Edema noted with both varicosities and stasis skin changes present bilaterally. Coolness is noted to the digital regions to palpation. Capillary fill time delayed digital areas bilateral foot. No heel fissuring or macerations of the web spaces. No plantar calluses and/or ulcerative areas are noted. Patient is having difficulty with gait/walking. Plan Per Assessment  Nadiya Jordan was seen today for nail problem.     Diagnoses and all orders for this visit:    Onychomycosis    Pain of toe of right foot    Pain of toe of left foot    PVD (peripheral vascular disease) (HCC)    Type II diabetes mellitus with peripheral circulatory disorder (HCC)    Difficulty walking        1. Evaluation and Management  2. Manual and electrical debridement of the mycotic nails was performed for thickness and length to prevent injection and/or ulceration. 3. Discussed additional diabetic lower extremity care techniques with patient today. 4. It was discussed in detail with the patient proper caring for the vascular compromised foot. The fact that they have compromised blood flow put the patient at risk for infection/gangrene/amputation. The patient should not walk barefoot. Shoe gear should fit properly and socks should be worn with shoes. If any skin lesions are noted, they are instructed to contact the office immediately. 5. We will see the patient back at a later date for continued podiatric management and care. Patient was advised to call the office with any questions or concerns prior to their next appointment if needed. Seen By:    Heath Sofia DPM    Electronically signed by Heath oSfia DPM on 2/21/2023 at 10:52 AM      This note was created using voice recognition software. The note was reviewed however may contain grammatical errors.

## 2023-02-21 NOTE — PROGRESS NOTES
Patient in today for nail care. Patient does not have any complaints of pain at this time.  Patient's PCP is Mika Ann MD date of last ov 2/13/23        Patience Vanegas LPN

## 2023-03-21 ENCOUNTER — OFFICE VISIT (OUTPATIENT)
Dept: PRIMARY CARE CLINIC | Age: 83
End: 2023-03-21

## 2023-03-21 VITALS
HEIGHT: 65 IN | DIASTOLIC BLOOD PRESSURE: 68 MMHG | SYSTOLIC BLOOD PRESSURE: 108 MMHG | WEIGHT: 146.8 LBS | TEMPERATURE: 97.1 F | BODY MASS INDEX: 24.46 KG/M2 | OXYGEN SATURATION: 94 % | HEART RATE: 78 BPM

## 2023-03-21 DIAGNOSIS — I47.1 SUPRAVENTRICULAR TACHYCARDIA (HCC): ICD-10-CM

## 2023-03-21 DIAGNOSIS — I48.92 ATRIAL FLUTTER, UNSPECIFIED TYPE (HCC): Primary | ICD-10-CM

## 2023-03-21 DIAGNOSIS — I10 PRIMARY HYPERTENSION: ICD-10-CM

## 2023-03-21 DIAGNOSIS — E78.2 MIXED HYPERLIPIDEMIA: ICD-10-CM

## 2023-03-21 DIAGNOSIS — N18.32 TYPE 2 DIABETES MELLITUS WITH STAGE 3B CHRONIC KIDNEY DISEASE, WITH LONG-TERM CURRENT USE OF INSULIN (HCC): ICD-10-CM

## 2023-03-21 DIAGNOSIS — Z79.4 TYPE 2 DIABETES MELLITUS WITH STAGE 3B CHRONIC KIDNEY DISEASE, WITH LONG-TERM CURRENT USE OF INSULIN (HCC): ICD-10-CM

## 2023-03-21 DIAGNOSIS — Z09 HOSPITAL DISCHARGE FOLLOW-UP: ICD-10-CM

## 2023-03-21 DIAGNOSIS — E11.22 TYPE 2 DIABETES MELLITUS WITH STAGE 3B CHRONIC KIDNEY DISEASE, WITH LONG-TERM CURRENT USE OF INSULIN (HCC): ICD-10-CM

## 2023-03-21 DIAGNOSIS — E06.3 AUTOIMMUNE HYPOTHYROIDISM: ICD-10-CM

## 2023-03-21 DIAGNOSIS — E08.65 DIABETES MELLITUS DUE TO UNDERLYING CONDITION, UNCONTROLLED, WITH HYPERGLYCEMIA, WITHOUT LONG-TERM CURRENT USE OF INSULIN (HCC): ICD-10-CM

## 2023-03-21 PROBLEM — I47.10 SUPRAVENTRICULAR TACHYCARDIA: Status: ACTIVE | Noted: 2023-03-21

## 2023-03-21 RX ORDER — INSULIN GLARGINE 100 [IU]/ML
20 INJECTION, SOLUTION SUBCUTANEOUS
Qty: 10 ML | Refills: 3 | Status: SHIPPED | OUTPATIENT
Start: 2023-03-21

## 2023-03-21 NOTE — PROGRESS NOTES
Post-Discharge Transitional Care Management Progress Note      Ines Wang   YOB: 1940    Date of Office Visit:  3/21/2023  Date of Hospital Admission: 3/13/2023  Date of Hospital Discharge: 3/13/2023    Care management risk score Rising risk (score 2-5) and Complex Care (Scores >=6): No Risk Score On File     Non face to face  following discharge, date last encounter closed (first attempt may have been earlier): *No documented post hospital discharge outreach found in the last 14 days *No documented post hospital discharge outreach found in the last 14 days    Call initiated 2 business days of discharge: *No response recorded in the last 14 days    ASSESSMENT/PLAN:   Atrial flutter, unspecified type ([facility] [address]  -     EKG 12 Lead  Type 2 diabetes mellitus with stage 3b chronic kidney disease, with long-term current use of insulin ([facility])  -     LANTUS 100 UNIT/ML injection vial; Inject 20 Units into the skin every morning (before breakfast), Disp-10 mL, R-3, [facility] discharge follow-up  -     SC DISCHARGE MEDS RECONCILED W/ CURRENT OUTPATIENT MED LIST  -     SC DISCHARGE MEDS RECONCILED W/ CURRENT OUTPATIENT MED LIST  Primary hypertension  Diabetes mellitus due to underlying condition, uncontrolled, with hyperglycemia, without long-term current use of insulin ([facility] [address]  Autoimmune hypothyroidism  Mixed hyperlipidemia  Supraventricular tachycardia [facility])      Medical Decision Making: high complexity  No follow-ups on file. On this date 3/21/2023 I have spent 50 minutes reviewing previous notes, test results and face to face with the patient discussing the diagnosis and importance of compliance with the treatment plan as well as documenting on the day of the visit. Subjective:   HPI:  Follow up of Hospital problems/diagnosis(es):   Patient is here today to follow-up on emergency room visit about a week ago when she felt generally weak.   According to her daughter she had pulled few of her

## 2023-04-10 RX ORDER — GLIMEPIRIDE 2 MG/1
TABLET ORAL
Qty: 30 TABLET | Refills: 2 | OUTPATIENT
Start: 2023-04-10

## 2023-04-16 DIAGNOSIS — I10 PRIMARY HYPERTENSION: ICD-10-CM

## 2023-04-17 RX ORDER — PANTOPRAZOLE SODIUM 40 MG/1
TABLET, DELAYED RELEASE ORAL
Qty: 90 TABLET | Refills: 0 | Status: SHIPPED | OUTPATIENT
Start: 2023-04-17

## 2023-04-17 RX ORDER — LOSARTAN POTASSIUM 100 MG/1
TABLET ORAL
Qty: 90 TABLET | Refills: 0 | Status: SHIPPED | OUTPATIENT
Start: 2023-04-17

## 2023-04-25 ENCOUNTER — OFFICE VISIT (OUTPATIENT)
Dept: PODIATRY | Age: 83
End: 2023-04-25
Payer: MEDICARE

## 2023-04-25 VITALS — WEIGHT: 140 LBS | HEIGHT: 64 IN | BODY MASS INDEX: 23.9 KG/M2

## 2023-04-25 DIAGNOSIS — B35.1 ONYCHOMYCOSIS: Primary | ICD-10-CM

## 2023-04-25 DIAGNOSIS — E11.51 TYPE II DIABETES MELLITUS WITH PERIPHERAL CIRCULATORY DISORDER (HCC): ICD-10-CM

## 2023-04-25 DIAGNOSIS — I73.9 PVD (PERIPHERAL VASCULAR DISEASE) (HCC): ICD-10-CM

## 2023-04-25 DIAGNOSIS — M79.675 PAIN OF TOE OF LEFT FOOT: ICD-10-CM

## 2023-04-25 DIAGNOSIS — R26.2 DIFFICULTY WALKING: ICD-10-CM

## 2023-04-25 DIAGNOSIS — M79.674 PAIN OF TOE OF RIGHT FOOT: ICD-10-CM

## 2023-04-25 PROCEDURE — 11720 DEBRIDE NAIL 1-5: CPT | Performed by: PODIATRIST

## 2023-04-25 PROCEDURE — 99999 PR OFFICE/OUTPT VISIT,PROCEDURE ONLY: CPT | Performed by: PODIATRIST

## 2023-04-25 NOTE — PROGRESS NOTES
Patient in today for nail care. Patient does not have any complaints of pain at this time. Patient's PCP is Jonathon Esposito MD date of last ov 04/16/2023.    Higinio Calderón LPN
disease) [facility]    Type II diabetes mellitus with peripheral circulatory disorder (HCC)    Difficulty walking        1. Evaluation and Management  2. Manual and electrical debridement of the mycotic nails was performed for thickness and length to prevent injection and/or ulceration. 3. Discussed additional diabetic lower extremity care techniques with patient today. 4. We did discuss importance of shoe gear and foot inspection to prevent potential issues. 5. We will see the patient back at a later date for continued podiatric management and care. Patient was advised to call the office with any questions or concerns prior to their next appointment if needed. Seen By:    Mili Madrid DPM    Electronically signed by Mili Madrid DPM on 4/25/2023 at 12:31 PM      This note was created using voice recognition software. The note was reviewed however may contain grammatical errors.

## 2023-05-07 DIAGNOSIS — N18.32 TYPE 2 DIABETES MELLITUS WITH STAGE 3B CHRONIC KIDNEY DISEASE, WITH LONG-TERM CURRENT USE OF INSULIN (HCC): ICD-10-CM

## 2023-05-07 DIAGNOSIS — Z79.4 TYPE 2 DIABETES MELLITUS WITH STAGE 3B CHRONIC KIDNEY DISEASE, WITH LONG-TERM CURRENT USE OF INSULIN (HCC): ICD-10-CM

## 2023-05-07 DIAGNOSIS — E06.3 AUTOIMMUNE HYPOTHYROIDISM: ICD-10-CM

## 2023-05-07 DIAGNOSIS — E11.22 TYPE 2 DIABETES MELLITUS WITH STAGE 3B CHRONIC KIDNEY DISEASE, WITH LONG-TERM CURRENT USE OF INSULIN (HCC): ICD-10-CM

## 2023-05-09 RX ORDER — LEVOTHYROXINE SODIUM 88 UG/1
TABLET ORAL
Qty: 90 TABLET | Refills: 0 | Status: SHIPPED | OUTPATIENT
Start: 2023-05-09

## 2023-05-09 RX ORDER — GLIMEPIRIDE 4 MG/1
4 TABLET ORAL
Qty: 90 TABLET | Refills: 0 | Status: SHIPPED | OUTPATIENT
Start: 2023-05-09

## 2023-05-09 RX ORDER — SYRINGE-NEEDLE,INSULIN,0.5 ML 27GX1/2"
1 SYRINGE, EMPTY DISPOSABLE MISCELLANEOUS DAILY
Qty: 100 EACH | Refills: 0 | Status: SHIPPED | OUTPATIENT
Start: 2023-05-09

## 2023-05-25 DIAGNOSIS — Z79.4 TYPE 2 DIABETES MELLITUS WITH STAGE 3B CHRONIC KIDNEY DISEASE, WITH LONG-TERM CURRENT USE OF INSULIN (HCC): ICD-10-CM

## 2023-05-25 DIAGNOSIS — E11.22 TYPE 2 DIABETES MELLITUS WITH STAGE 3B CHRONIC KIDNEY DISEASE, WITH LONG-TERM CURRENT USE OF INSULIN (HCC): ICD-10-CM

## 2023-05-25 DIAGNOSIS — N18.32 TYPE 2 DIABETES MELLITUS WITH STAGE 3B CHRONIC KIDNEY DISEASE, WITH LONG-TERM CURRENT USE OF INSULIN (HCC): ICD-10-CM

## 2023-05-25 DIAGNOSIS — E06.3 AUTOIMMUNE HYPOTHYROIDISM: ICD-10-CM

## 2023-05-25 LAB
ALBUMIN SERPL-MCNC: 4 G/DL (ref 3.5–5.2)
ALP SERPL-CCNC: 101 U/L (ref 35–104)
ALT SERPL-CCNC: 11 U/L (ref 0–32)
ANION GAP SERPL CALCULATED.3IONS-SCNC: 11 MMOL/L (ref 7–16)
AST SERPL-CCNC: 16 U/L (ref 0–31)
BILIRUB SERPL-MCNC: 0.2 MG/DL (ref 0–1.2)
BUN SERPL-MCNC: 22 MG/DL (ref 6–23)
CALCIUM SERPL-MCNC: 9.7 MG/DL (ref 8.6–10.2)
CHLORIDE SERPL-SCNC: 105 MMOL/L (ref 98–107)
CO2 SERPL-SCNC: 23 MMOL/L (ref 22–29)
CREAT SERPL-MCNC: 1.1 MG/DL (ref 0.5–1)
GLUCOSE SERPL-MCNC: 112 MG/DL (ref 74–99)
POTASSIUM SERPL-SCNC: 4.1 MMOL/L (ref 3.5–5)
PROT SERPL-MCNC: 6.9 G/DL (ref 6.4–8.3)
SODIUM SERPL-SCNC: 139 MMOL/L (ref 132–146)
TSH SERPL-MCNC: <0.01 UIU/ML (ref 0.27–4.2)

## 2023-05-28 RX ORDER — LEVOTHYROXINE SODIUM 50 MCG
50 TABLET ORAL DAILY
Qty: 30 TABLET | Refills: 3
Start: 2023-05-28

## 2023-05-30 ENCOUNTER — OFFICE VISIT (OUTPATIENT)
Dept: PRIMARY CARE CLINIC | Age: 83
End: 2023-05-30
Payer: MEDICARE

## 2023-05-30 VITALS
TEMPERATURE: 97.1 F | SYSTOLIC BLOOD PRESSURE: 128 MMHG | BODY MASS INDEX: 24.03 KG/M2 | DIASTOLIC BLOOD PRESSURE: 70 MMHG | OXYGEN SATURATION: 96 % | HEART RATE: 88 BPM | HEIGHT: 64 IN

## 2023-05-30 DIAGNOSIS — Z79.4 TYPE 2 DIABETES MELLITUS WITH STAGE 3B CHRONIC KIDNEY DISEASE, WITH LONG-TERM CURRENT USE OF INSULIN (HCC): Primary | ICD-10-CM

## 2023-05-30 DIAGNOSIS — F32.1 MODERATE MAJOR DEPRESSION (HCC): ICD-10-CM

## 2023-05-30 DIAGNOSIS — N18.32 TYPE 2 DIABETES MELLITUS WITH STAGE 3B CHRONIC KIDNEY DISEASE, WITH LONG-TERM CURRENT USE OF INSULIN (HCC): Primary | ICD-10-CM

## 2023-05-30 DIAGNOSIS — E06.3 AUTOIMMUNE HYPOTHYROIDISM: ICD-10-CM

## 2023-05-30 DIAGNOSIS — I10 PRIMARY HYPERTENSION: ICD-10-CM

## 2023-05-30 DIAGNOSIS — E11.22 TYPE 2 DIABETES MELLITUS WITH STAGE 3B CHRONIC KIDNEY DISEASE, WITH LONG-TERM CURRENT USE OF INSULIN (HCC): Primary | ICD-10-CM

## 2023-05-30 DIAGNOSIS — E78.2 MIXED HYPERLIPIDEMIA: ICD-10-CM

## 2023-05-30 LAB — HBA1C MFR BLD: 7.7 %

## 2023-05-30 PROCEDURE — 83036 HEMOGLOBIN GLYCOSYLATED A1C: CPT | Performed by: INTERNAL MEDICINE

## 2023-05-30 PROCEDURE — 3078F DIAST BP <80 MM HG: CPT | Performed by: INTERNAL MEDICINE

## 2023-05-30 PROCEDURE — 1090F PRES/ABSN URINE INCON ASSESS: CPT | Performed by: INTERNAL MEDICINE

## 2023-05-30 PROCEDURE — 3074F SYST BP LT 130 MM HG: CPT | Performed by: INTERNAL MEDICINE

## 2023-05-30 PROCEDURE — 1123F ACP DISCUSS/DSCN MKR DOCD: CPT | Performed by: INTERNAL MEDICINE

## 2023-05-30 PROCEDURE — G8420 CALC BMI NORM PARAMETERS: HCPCS | Performed by: INTERNAL MEDICINE

## 2023-05-30 PROCEDURE — G8399 PT W/DXA RESULTS DOCUMENT: HCPCS | Performed by: INTERNAL MEDICINE

## 2023-05-30 PROCEDURE — G8427 DOCREV CUR MEDS BY ELIG CLIN: HCPCS | Performed by: INTERNAL MEDICINE

## 2023-05-30 PROCEDURE — 3046F HEMOGLOBIN A1C LEVEL >9.0%: CPT | Performed by: INTERNAL MEDICINE

## 2023-05-30 PROCEDURE — 1036F TOBACCO NON-USER: CPT | Performed by: INTERNAL MEDICINE

## 2023-05-30 PROCEDURE — 99214 OFFICE O/P EST MOD 30 MIN: CPT | Performed by: INTERNAL MEDICINE

## 2023-05-30 RX ORDER — ALCOHOL ANTISEPTIC PADS
1 PADS, MEDICATED (EA) TOPICAL 3 TIMES DAILY
Qty: 300 EACH | Refills: 1 | Status: SHIPPED | OUTPATIENT
Start: 2023-05-30

## 2023-05-30 RX ORDER — CALCIUM CITRATE/VITAMIN D3 200MG-6.25
1 TABLET ORAL 3 TIMES DAILY
Qty: 300 EACH | Refills: 1 | Status: SHIPPED | OUTPATIENT
Start: 2023-05-30

## 2023-05-30 RX ORDER — BUPROPION HYDROCHLORIDE 300 MG/1
300 TABLET ORAL EVERY MORNING
Qty: 90 TABLET | Refills: 0 | Status: SHIPPED | OUTPATIENT
Start: 2023-05-30

## 2023-05-30 RX ORDER — INSULIN GLARGINE 100 [IU]/ML
20 INJECTION, SOLUTION SUBCUTANEOUS
Qty: 10 ML | Refills: 3 | Status: SHIPPED | OUTPATIENT
Start: 2023-05-30

## 2023-05-30 RX ORDER — METOPROLOL SUCCINATE 50 MG/1
50 TABLET, EXTENDED RELEASE ORAL DAILY
Qty: 90 TABLET | Refills: 0 | Status: SHIPPED | OUTPATIENT
Start: 2023-05-30

## 2023-05-30 RX ORDER — LOSARTAN POTASSIUM 100 MG/1
100 TABLET ORAL DAILY
Qty: 90 TABLET | Refills: 0 | Status: SHIPPED | OUTPATIENT
Start: 2023-05-30

## 2023-05-30 RX ORDER — FLUOXETINE HYDROCHLORIDE 40 MG/1
40 CAPSULE ORAL NIGHTLY
Qty: 90 CAPSULE | Refills: 0 | Status: SHIPPED | OUTPATIENT
Start: 2023-05-30

## 2023-05-30 RX ORDER — PANTOPRAZOLE SODIUM 40 MG/1
40 TABLET, DELAYED RELEASE ORAL DAILY
Qty: 90 TABLET | Refills: 0 | Status: SHIPPED | OUTPATIENT
Start: 2023-05-30

## 2023-05-30 RX ORDER — OLANZAPINE 5 MG/1
5 TABLET ORAL NIGHTLY
Qty: 90 TABLET | Refills: 0 | Status: CANCELLED | OUTPATIENT
Start: 2023-05-30

## 2023-05-30 NOTE — PROGRESS NOTES
05/25/2023 6.9 6.4 - 8.3 g/dL Final     Albumin   Date Value Ref Range Status   05/25/2023 4.0 3.5 - 5.2 g/dL Final     Total Bilirubin   Date Value Ref Range Status   05/25/2023 0.2 0.0 - 1.2 mg/dL Final     Alkaline Phosphatase   Date Value Ref Range Status   05/25/2023 101 35 - 104 U/L Final     AST   Date Value Ref Range Status   05/25/2023 16 0 - 31 U/L Final     ALT   Date Value Ref Range Status   05/25/2023 11 0 - 32 U/L Final     Est, Glom Filt Rate   Date Value Ref Range Status   05/25/2023 50 >=60 mL/min/1.73 Final     Comment:     Pediatric calculator link  [employer]. org/professionals/kdoqi/gfr_calculatorped  Effective Oct 3, 2022  These results are not intended for use in patients  <25years of age. eGFR results are calculated without  a race factor using the 2021 CKD-EPI equation. Careful  clinical correlation is recommended, particularly when  comparing to results calculated using previous equations. The CKD-EPI equation is less accurate in patients with  extremes of muscle mass, extra-renal metabolism of  creatinine, excessive creatinine ingestion, or following  therapy that affects renal tubular secretion. GFR    Date Value Ref Range Status   09/16/2022 52  Final        No results found. Assessment/Plan:      Outpatient Encounter Medications as of 5/30/2023   Medication Sig Dispense Refill    buPROPion (WELLBUTRIN XL) 300 MG extended release tablet Take 1 tablet by mouth every morning 90 tablet 0    blood glucose test strips (TRUE METRIX BLOOD GLUCOSE TEST) strip 1 each by In Vitro route 3 times daily As needed.  300 each 1    FLUoxetine (PROZAC) 40 MG capsule Take 1 capsule by mouth nightly 90 capsule 0    LANTUS 100 UNIT/ML injection vial Inject 20 Units into the skin every morning (before breakfast) 10 mL 3    losartan (COZAAR) 100 MG tablet Take 1 tablet by mouth daily 90 tablet 0    metoprolol succinate (TOPROL XL) 50 MG extended release tablet Take 1

## 2023-06-27 ENCOUNTER — PROCEDURE VISIT (OUTPATIENT)
Dept: PODIATRY | Age: 83
End: 2023-06-27
Payer: MEDICARE

## 2023-06-27 VITALS — BODY MASS INDEX: 25.61 KG/M2 | HEIGHT: 64 IN | WEIGHT: 150 LBS

## 2023-06-27 DIAGNOSIS — M79.674 PAIN OF TOE OF RIGHT FOOT: ICD-10-CM

## 2023-06-27 DIAGNOSIS — B35.1 ONYCHOMYCOSIS: Primary | ICD-10-CM

## 2023-06-27 DIAGNOSIS — R26.2 DIFFICULTY WALKING: ICD-10-CM

## 2023-06-27 DIAGNOSIS — I73.9 PVD (PERIPHERAL VASCULAR DISEASE) (HCC): ICD-10-CM

## 2023-06-27 DIAGNOSIS — M79.675 PAIN OF TOE OF LEFT FOOT: ICD-10-CM

## 2023-06-27 DIAGNOSIS — E11.51 TYPE II DIABETES MELLITUS WITH PERIPHERAL CIRCULATORY DISORDER (HCC): ICD-10-CM

## 2023-06-27 PROCEDURE — 11720 DEBRIDE NAIL 1-5: CPT | Performed by: PODIATRIST

## 2023-07-24 ENCOUNTER — TELEPHONE (OUTPATIENT)
Dept: PRIMARY CARE CLINIC | Age: 83
End: 2023-07-24

## 2023-07-24 NOTE — TELEPHONE ENCOUNTER
Sheriewaldo Sangeetha (daughter) called and stated patient has a lot of back pain and would like to know if there is something the phy can give her for pain.

## 2023-07-25 NOTE — TELEPHONE ENCOUNTER
Pt's daughter contacted at home and advised that her mother can take Tylenol for the back discomfort or contact Pain Management for a suggestion.

## 2023-08-10 DIAGNOSIS — E06.3 AUTOIMMUNE HYPOTHYROIDISM: ICD-10-CM

## 2023-08-10 RX ORDER — LEVOTHYROXINE SODIUM 88 UG/1
TABLET ORAL
Qty: 90 TABLET | Refills: 0 | OUTPATIENT
Start: 2023-08-10

## 2023-08-17 ENCOUNTER — TELEPHONE (OUTPATIENT)
Dept: PRIMARY CARE CLINIC | Age: 83
End: 2023-08-17

## 2023-08-17 DIAGNOSIS — Z79.4 TYPE 2 DIABETES MELLITUS WITH STAGE 3B CHRONIC KIDNEY DISEASE, WITH LONG-TERM CURRENT USE OF INSULIN (HCC): ICD-10-CM

## 2023-08-17 DIAGNOSIS — N18.32 TYPE 2 DIABETES MELLITUS WITH STAGE 3B CHRONIC KIDNEY DISEASE, WITH LONG-TERM CURRENT USE OF INSULIN (HCC): ICD-10-CM

## 2023-08-17 DIAGNOSIS — I10 PRIMARY HYPERTENSION: ICD-10-CM

## 2023-08-17 DIAGNOSIS — E11.22 TYPE 2 DIABETES MELLITUS WITH STAGE 3B CHRONIC KIDNEY DISEASE, WITH LONG-TERM CURRENT USE OF INSULIN (HCC): ICD-10-CM

## 2023-08-21 RX ORDER — SYRINGE AND NEEDLE,INSULIN,1ML 31 GX5/16"
SYRINGE, EMPTY DISPOSABLE MISCELLANEOUS
Qty: 100 EACH | Refills: 0 | Status: SHIPPED | OUTPATIENT
Start: 2023-08-21

## 2023-08-31 RX ORDER — OLANZAPINE 5 MG/1
5 TABLET ORAL NIGHTLY
Qty: 90 TABLET | Refills: 0 | Status: CANCELLED | OUTPATIENT
Start: 2023-08-31

## 2023-08-31 RX ORDER — GLIMEPIRIDE 4 MG/1
4 TABLET ORAL
Qty: 90 TABLET | Refills: 0 | Status: SHIPPED | OUTPATIENT
Start: 2023-08-31

## 2023-08-31 RX ORDER — PANTOPRAZOLE SODIUM 40 MG/1
40 TABLET, DELAYED RELEASE ORAL DAILY
Qty: 90 TABLET | Refills: 0 | Status: SHIPPED | OUTPATIENT
Start: 2023-08-31

## 2023-08-31 RX ORDER — METOPROLOL SUCCINATE 50 MG/1
50 TABLET, EXTENDED RELEASE ORAL DAILY
Qty: 90 TABLET | Refills: 0 | Status: SHIPPED | OUTPATIENT
Start: 2023-08-31

## 2023-08-31 RX ORDER — LOSARTAN POTASSIUM 100 MG/1
100 TABLET ORAL DAILY
Qty: 90 TABLET | Refills: 0 | Status: SHIPPED | OUTPATIENT
Start: 2023-08-31

## 2023-09-07 ENCOUNTER — OFFICE VISIT (OUTPATIENT)
Dept: PRIMARY CARE CLINIC | Age: 83
End: 2023-09-07
Payer: MEDICARE

## 2023-09-07 VITALS
BODY MASS INDEX: 25.75 KG/M2 | HEIGHT: 64 IN | SYSTOLIC BLOOD PRESSURE: 122 MMHG | OXYGEN SATURATION: 98 % | HEART RATE: 78 BPM | DIASTOLIC BLOOD PRESSURE: 76 MMHG | TEMPERATURE: 97.1 F

## 2023-09-07 DIAGNOSIS — E78.2 MIXED HYPERLIPIDEMIA: ICD-10-CM

## 2023-09-07 DIAGNOSIS — M51.9 THORACIC DISC DISEASE: ICD-10-CM

## 2023-09-07 DIAGNOSIS — R29.6 FALL IN ELDERLY PATIENT: ICD-10-CM

## 2023-09-07 DIAGNOSIS — I10 PRIMARY HYPERTENSION: ICD-10-CM

## 2023-09-07 DIAGNOSIS — E06.3 AUTOIMMUNE HYPOTHYROIDISM: ICD-10-CM

## 2023-09-07 DIAGNOSIS — M54.16 LUMBAR RADICULOPATHY: ICD-10-CM

## 2023-09-07 DIAGNOSIS — E11.22 TYPE 2 DIABETES MELLITUS WITH STAGE 3B CHRONIC KIDNEY DISEASE, WITH LONG-TERM CURRENT USE OF INSULIN (HCC): Primary | ICD-10-CM

## 2023-09-07 DIAGNOSIS — N18.32 TYPE 2 DIABETES MELLITUS WITH STAGE 3B CHRONIC KIDNEY DISEASE, WITH LONG-TERM CURRENT USE OF INSULIN (HCC): Primary | ICD-10-CM

## 2023-09-07 DIAGNOSIS — I36.1 NONRHEUMATIC TRICUSPID VALVE REGURGITATION: ICD-10-CM

## 2023-09-07 DIAGNOSIS — Z79.4 TYPE 2 DIABETES MELLITUS WITH STAGE 3B CHRONIC KIDNEY DISEASE, WITH LONG-TERM CURRENT USE OF INSULIN (HCC): Primary | ICD-10-CM

## 2023-09-07 LAB
ALBUMIN SERPL-MCNC: 4.2 G/DL (ref 3.5–5.2)
ALP BLD-CCNC: 91 U/L (ref 35–104)
ALT SERPL-CCNC: 18 U/L (ref 0–32)
ANION GAP SERPL CALCULATED.3IONS-SCNC: 12 MMOL/L (ref 7–16)
AST SERPL-CCNC: 20 U/L (ref 0–31)
BILIRUB SERPL-MCNC: 0.3 MG/DL (ref 0–1.2)
BUN BLDV-MCNC: 22 MG/DL (ref 6–23)
CALCIUM SERPL-MCNC: 9 MG/DL (ref 8.6–10.2)
CHLORIDE BLD-SCNC: 105 MMOL/L (ref 98–107)
CHOLESTEROL: 183 MG/DL
CO2: 23 MMOL/L (ref 22–29)
CREAT SERPL-MCNC: 1.4 MG/DL (ref 0.5–1)
GFR SERPL CREATININE-BSD FRML MDRD: 38 ML/MIN/1.73M2
GLUCOSE BLD-MCNC: 79 MG/DL (ref 74–99)
HDLC SERPL-MCNC: 53 MG/DL
LDL CHOLESTEROL: 114 MG/DL
POTASSIUM SERPL-SCNC: 4.5 MMOL/L (ref 3.5–5)
SODIUM BLD-SCNC: 140 MMOL/L (ref 132–146)
TOTAL PROTEIN: 6.9 G/DL (ref 6.4–8.3)
TRIGL SERPL-MCNC: 82 MG/DL
TSH SERPL DL<=0.05 MIU/L-ACNC: 0.8 UIU/ML (ref 0.27–4.2)
VLDLC SERPL CALC-MCNC: 16 MG/DL

## 2023-09-07 PROCEDURE — 3078F DIAST BP <80 MM HG: CPT | Performed by: INTERNAL MEDICINE

## 2023-09-07 PROCEDURE — 3051F HG A1C>EQUAL 7.0%<8.0%: CPT | Performed by: INTERNAL MEDICINE

## 2023-09-07 PROCEDURE — G8427 DOCREV CUR MEDS BY ELIG CLIN: HCPCS | Performed by: INTERNAL MEDICINE

## 2023-09-07 PROCEDURE — 1123F ACP DISCUSS/DSCN MKR DOCD: CPT | Performed by: INTERNAL MEDICINE

## 2023-09-07 PROCEDURE — 1090F PRES/ABSN URINE INCON ASSESS: CPT | Performed by: INTERNAL MEDICINE

## 2023-09-07 PROCEDURE — 3074F SYST BP LT 130 MM HG: CPT | Performed by: INTERNAL MEDICINE

## 2023-09-07 PROCEDURE — 83036 HEMOGLOBIN GLYCOSYLATED A1C: CPT | Performed by: INTERNAL MEDICINE

## 2023-09-07 PROCEDURE — G8419 CALC BMI OUT NRM PARAM NOF/U: HCPCS | Performed by: INTERNAL MEDICINE

## 2023-09-07 PROCEDURE — 99214 OFFICE O/P EST MOD 30 MIN: CPT | Performed by: INTERNAL MEDICINE

## 2023-09-07 PROCEDURE — G8399 PT W/DXA RESULTS DOCUMENT: HCPCS | Performed by: INTERNAL MEDICINE

## 2023-09-07 PROCEDURE — 1036F TOBACCO NON-USER: CPT | Performed by: INTERNAL MEDICINE

## 2023-09-07 RX ORDER — GLUCOSAMINE SULFATE 500 MG
TABLET ORAL
COMMUNITY
Start: 2023-07-10

## 2023-09-07 RX ORDER — BUPROPION HYDROCHLORIDE 300 MG/1
300 TABLET ORAL EVERY MORNING
Qty: 90 TABLET | Refills: 0 | Status: SHIPPED | OUTPATIENT
Start: 2023-09-07

## 2023-09-07 RX ORDER — FLUOXETINE HYDROCHLORIDE 40 MG/1
40 CAPSULE ORAL NIGHTLY
Qty: 90 CAPSULE | Refills: 0 | Status: SHIPPED | OUTPATIENT
Start: 2023-09-07

## 2023-09-07 RX ORDER — OLANZAPINE 5 MG/1
5 TABLET ORAL NIGHTLY
Qty: 90 TABLET | Refills: 0 | Status: SHIPPED | OUTPATIENT
Start: 2023-09-07

## 2023-09-07 RX ORDER — LEVOTHYROXINE SODIUM 50 MCG
50 TABLET ORAL DAILY
Qty: 30 TABLET | Refills: 3 | Status: SHIPPED | OUTPATIENT
Start: 2023-09-07

## 2023-09-07 RX ORDER — INSULIN GLARGINE 100 [IU]/ML
20 INJECTION, SOLUTION SUBCUTANEOUS
Qty: 10 ML | Refills: 3 | Status: SHIPPED | OUTPATIENT
Start: 2023-09-07

## 2023-09-07 NOTE — PROGRESS NOTES
for this visit:    Type 2 diabetes mellitus with stage 3b chronic kidney disease, with long-term current use of insulin (HCC)  -     LANTUS 100 UNIT/ML injection vial; Inject 20 Units into the skin every morning (before breakfast)  -     POCT glycosylated hemoglobin (Hb A1C)    Fall in elderly patient    Primary hypertension  -     Comprehensive Metabolic Panel; Future  -     CBC with Auto Differential; Future    Mixed hyperlipidemia  -     Lipid Panel; Future  -     Comprehensive Metabolic Panel; Future    Autoimmune hypothyroidism  -     TSH; Future    Nonrheumatic tricuspid valve regurgitation    Lumbar radiculopathy    Thoracic disc disease    Other orders  -     buPROPion (WELLBUTRIN XL) 300 MG extended release tablet; Take 1 tablet by mouth every morning  -     FLUoxetine (PROZAC) 40 MG capsule; Take 1 capsule by mouth nightly  -     OLANZapine (ZYPREXA) 5 MG tablet; Take 1 tablet by mouth nightly  -     SYNTHROID 50 MCG tablet; Take 1 tablet by mouth Daily         There are no Patient Instructions on file for this visit. On this date 9/7/2023 I have spent 30 minutes reviewing previous notes, test results and face to face with the patient discussing the diagnosis and importance of compliance with the treatment plan as well as documenting on the day of the visit.       Romario Dickson MD   9/7/23

## 2023-09-11 LAB — HBA1C MFR BLD: 8.1 %

## 2023-09-19 ENCOUNTER — OFFICE VISIT (OUTPATIENT)
Dept: PODIATRY | Age: 83
End: 2023-09-19
Payer: MEDICARE

## 2023-09-19 VITALS — WEIGHT: 150 LBS | HEIGHT: 64 IN | BODY MASS INDEX: 25.61 KG/M2

## 2023-09-19 DIAGNOSIS — I87.2 VENOUS INSUFFICIENCY (CHRONIC) (PERIPHERAL): ICD-10-CM

## 2023-09-19 DIAGNOSIS — B35.1 ONYCHOMYCOSIS: Primary | ICD-10-CM

## 2023-09-19 DIAGNOSIS — R26.2 DIFFICULTY WALKING: ICD-10-CM

## 2023-09-19 DIAGNOSIS — M79.675 PAIN OF TOE OF LEFT FOOT: ICD-10-CM

## 2023-09-19 DIAGNOSIS — M79.674 PAIN OF TOE OF RIGHT FOOT: ICD-10-CM

## 2023-09-19 DIAGNOSIS — I73.9 PVD (PERIPHERAL VASCULAR DISEASE) (HCC): ICD-10-CM

## 2023-09-19 DIAGNOSIS — E11.51 TYPE II DIABETES MELLITUS WITH PERIPHERAL CIRCULATORY DISORDER (HCC): ICD-10-CM

## 2023-09-19 PROCEDURE — 11720 DEBRIDE NAIL 1-5: CPT | Performed by: PODIATRIST

## 2023-09-19 PROCEDURE — 99999 PR OFFICE/OUTPT VISIT,PROCEDURE ONLY: CPT | Performed by: PODIATRIST

## 2023-09-19 NOTE — PROGRESS NOTES
23     Agnes Smiley    : 1940  Sex: female  Age: 79 y.o. Subjective: The patient is seen today for evaluation regarding diabetic foot evaluation and mycotic nail care. No other complaints noted. Chief Complaint   Patient presents with    Nail Problem     Nail care           Current Medications:    Current Outpatient Medications:     RA MELATONIN 3-2 MG TABS, take 1 tablet by mouth at bedtime TWO WEEKS ON ONE WEEK OFF, Disp: , Rfl:     buPROPion (WELLBUTRIN XL) 300 MG extended release tablet, Take 1 tablet by mouth every morning, Disp: 90 tablet, Rfl: 0    FLUoxetine (PROZAC) 40 MG capsule, Take 1 capsule by mouth nightly, Disp: 90 capsule, Rfl: 0    LANTUS 100 UNIT/ML injection vial, Inject 20 Units into the skin every morning (before breakfast), Disp: 10 mL, Rfl: 3    OLANZapine (ZYPREXA) 5 MG tablet, Take 1 tablet by mouth nightly, Disp: 90 tablet, Rfl: 0    SYNTHROID 50 MCG tablet, Take 1 tablet by mouth Daily, Disp: 30 tablet, Rfl: 3    glimepiride (AMARYL) 4 MG tablet, Take 1 tablet by mouth every morning (before breakfast), Disp: 90 tablet, Rfl: 0    losartan (COZAAR) 100 MG tablet, Take 1 tablet by mouth daily, Disp: 90 tablet, Rfl: 0    metoprolol succinate (TOPROL XL) 50 MG extended release tablet, Take 1 tablet by mouth daily, Disp: 90 tablet, Rfl: 0    pantoprazole (PROTONIX) 40 MG tablet, Take 1 tablet by mouth daily, Disp: 90 tablet, Rfl: 0    DROPLET INSULIN SYRINGE 31G X 5/16\" 1 ML MISC, use as directed once daily, Disp: 100 each, Rfl: 0    blood glucose test strips (TRUE METRIX BLOOD GLUCOSE TEST) strip, 1 each by In Vitro route 3 times daily As needed. , Disp: 300 each, Rfl: 1    Ultra Thin Lancets 31G MISC, 1 each by Does not apply route in the morning, at noon, and at bedtime, Disp: 300 each, Rfl: 1    traZODone (DESYREL) 50 MG tablet, Take 1 tablet by mouth nightly, Disp: , Rfl:     Multiple Vitamins-Minerals (THERAPEUTIC MULTIVITAMIN-MINERALS) tablet, Take 1 tablet by mouth

## 2023-09-19 NOTE — PROGRESS NOTES
Patient in today for nail care. Patient does not have any complaints of pain at this time.  Patient's PCP is Karen Emerson MD date of last ov 9/7/23        Curt Garcia LPN

## 2023-11-23 DIAGNOSIS — I10 PRIMARY HYPERTENSION: ICD-10-CM

## 2023-11-25 RX ORDER — METOPROLOL SUCCINATE 50 MG/1
50 TABLET, EXTENDED RELEASE ORAL DAILY
Qty: 90 TABLET | Refills: 0 | Status: SHIPPED | OUTPATIENT
Start: 2023-11-25

## 2023-12-05 ENCOUNTER — PROCEDURE VISIT (OUTPATIENT)
Dept: PODIATRY | Age: 83
End: 2023-12-05
Payer: MEDICARE

## 2023-12-05 VITALS — WEIGHT: 150 LBS | HEIGHT: 64 IN | BODY MASS INDEX: 25.61 KG/M2

## 2023-12-05 DIAGNOSIS — E06.3 AUTOIMMUNE HYPOTHYROIDISM: ICD-10-CM

## 2023-12-05 DIAGNOSIS — I87.2 VENOUS INSUFFICIENCY (CHRONIC) (PERIPHERAL): ICD-10-CM

## 2023-12-05 DIAGNOSIS — R26.2 DIFFICULTY WALKING: ICD-10-CM

## 2023-12-05 DIAGNOSIS — B35.1 ONYCHOMYCOSIS: Primary | ICD-10-CM

## 2023-12-05 DIAGNOSIS — M79.675 PAIN OF TOE OF LEFT FOOT: ICD-10-CM

## 2023-12-05 DIAGNOSIS — E78.2 MIXED HYPERLIPIDEMIA: ICD-10-CM

## 2023-12-05 DIAGNOSIS — I73.9 PVD (PERIPHERAL VASCULAR DISEASE) (HCC): ICD-10-CM

## 2023-12-05 DIAGNOSIS — M79.674 PAIN OF TOE OF RIGHT FOOT: ICD-10-CM

## 2023-12-05 DIAGNOSIS — E11.51 TYPE II DIABETES MELLITUS WITH PERIPHERAL CIRCULATORY DISORDER (HCC): ICD-10-CM

## 2023-12-05 DIAGNOSIS — I10 PRIMARY HYPERTENSION: ICD-10-CM

## 2023-12-05 LAB
ABSOLUTE IMMATURE GRANULOCYTE: <0.03 K/UL (ref 0–0.58)
BASOPHILS ABSOLUTE: 0.08 K/UL (ref 0–0.2)
BASOPHILS RELATIVE PERCENT: 1 % (ref 0–2)
EOSINOPHILS ABSOLUTE: 0.13 K/UL (ref 0.05–0.5)
EOSINOPHILS RELATIVE PERCENT: 2 % (ref 0–6)
HCT VFR BLD CALC: 36.2 % (ref 34–48)
HEMOGLOBIN: 11.3 G/DL (ref 11.5–15.5)
IMMATURE GRANULOCYTES: 0 % (ref 0–5)
LYMPHOCYTES ABSOLUTE: 1.97 K/UL (ref 1.5–4)
LYMPHOCYTES RELATIVE PERCENT: 29 % (ref 20–42)
MCH RBC QN AUTO: 26 PG (ref 26–35)
MCHC RBC AUTO-ENTMCNC: 31.2 G/DL (ref 32–34.5)
MCV RBC AUTO: 83.4 FL (ref 80–99.9)
MONOCYTES ABSOLUTE: 0.53 K/UL (ref 0.1–0.95)
MONOCYTES RELATIVE PERCENT: 8 % (ref 2–12)
NEUTROPHILS ABSOLUTE: 4.04 K/UL (ref 1.8–7.3)
NEUTROPHILS RELATIVE PERCENT: 60 % (ref 43–80)
PDW BLD-RTO: 18.1 % (ref 11.5–15)
PLATELET # BLD: 332 K/UL (ref 130–450)
PMV BLD AUTO: 9.9 FL (ref 7–12)
RBC # BLD: 4.34 M/UL (ref 3.5–5.5)
WBC # BLD: 6.8 K/UL (ref 4.5–11.5)

## 2023-12-05 PROCEDURE — 99999 PR OFFICE/OUTPT VISIT,PROCEDURE ONLY: CPT | Performed by: PODIATRIST

## 2023-12-05 PROCEDURE — 11720 DEBRIDE NAIL 1-5: CPT | Performed by: PODIATRIST

## 2023-12-05 NOTE — PROGRESS NOTES
Patient in today for nail care. Patient does not have any complaints of pain at this time.  Patient's PCP is Marco Stein MD date of last ov 9/7/23          Jerica Hickey LPN

## 2023-12-05 NOTE — PROGRESS NOTES
23     Lyla Blandon    : 1940  Sex: female  Age: 79 y.o. Subjective: The patient is seen today for evaluation regarding diabetic foot evaluation and mycotic nail care. No other complaints noted. Chief Complaint   Patient presents with    Nail Problem     Nail care       Current Medications:    Current Outpatient Medications:     metoprolol succinate (TOPROL XL) 50 MG extended release tablet, take 1 tablet by mouth daily, Disp: 90 tablet, Rfl: 0    RA MELATONIN 3-2 MG TABS, take 1 tablet by mouth at bedtime TWO WEEKS ON ONE WEEK OFF, Disp: , Rfl:     buPROPion (WELLBUTRIN XL) 300 MG extended release tablet, Take 1 tablet by mouth every morning, Disp: 90 tablet, Rfl: 0    FLUoxetine (PROZAC) 40 MG capsule, Take 1 capsule by mouth nightly, Disp: 90 capsule, Rfl: 0    LANTUS 100 UNIT/ML injection vial, Inject 20 Units into the skin every morning (before breakfast), Disp: 10 mL, Rfl: 3    OLANZapine (ZYPREXA) 5 MG tablet, Take 1 tablet by mouth nightly, Disp: 90 tablet, Rfl: 0    SYNTHROID 50 MCG tablet, Take 1 tablet by mouth Daily, Disp: 30 tablet, Rfl: 3    glimepiride (AMARYL) 4 MG tablet, Take 1 tablet by mouth every morning (before breakfast), Disp: 90 tablet, Rfl: 0    losartan (COZAAR) 100 MG tablet, Take 1 tablet by mouth daily, Disp: 90 tablet, Rfl: 0    pantoprazole (PROTONIX) 40 MG tablet, Take 1 tablet by mouth daily, Disp: 90 tablet, Rfl: 0    DROPLET INSULIN SYRINGE 31G X 516\" 1 ML MISC, use as directed once daily, Disp: 100 each, Rfl: 0    blood glucose test strips (TRUE METRIX BLOOD GLUCOSE TEST) strip, 1 each by In Vitro route 3 times daily As needed. , Disp: 300 each, Rfl: 1    Ultra Thin Lancets 31G MISC, 1 each by Does not apply route in the morning, at noon, and at bedtime, Disp: 300 each, Rfl: 1    traZODone (DESYREL) 50 MG tablet, Take 1 tablet by mouth nightly, Disp: , Rfl:     Multiple Vitamins-Minerals (THERAPEUTIC MULTIVITAMIN-MINERALS) tablet, Take 1 tablet by mouth

## 2023-12-06 LAB
ALBUMIN SERPL-MCNC: 4.2 G/DL (ref 3.5–5.2)
ALP BLD-CCNC: 88 U/L (ref 35–104)
ALT SERPL-CCNC: 14 U/L (ref 0–32)
ANION GAP SERPL CALCULATED.3IONS-SCNC: 16 MMOL/L (ref 7–16)
AST SERPL-CCNC: 18 U/L (ref 0–31)
BILIRUB SERPL-MCNC: 0.3 MG/DL (ref 0–1.2)
BUN BLDV-MCNC: 23 MG/DL (ref 6–23)
CALCIUM SERPL-MCNC: 9.9 MG/DL (ref 8.6–10.2)
CHLORIDE BLD-SCNC: 105 MMOL/L (ref 98–107)
CHOLESTEROL: 182 MG/DL
CO2: 17 MMOL/L (ref 22–29)
CREAT SERPL-MCNC: 1.3 MG/DL (ref 0.5–1)
GFR SERPL CREATININE-BSD FRML MDRD: 40 ML/MIN/1.73M2
GLUCOSE BLD-MCNC: 144 MG/DL (ref 74–99)
HDLC SERPL-MCNC: 54 MG/DL
LDL CHOLESTEROL: 108 MG/DL
POTASSIUM SERPL-SCNC: 4.5 MMOL/L (ref 3.5–5)
SODIUM BLD-SCNC: 138 MMOL/L (ref 132–146)
TOTAL PROTEIN: 7.3 G/DL (ref 6.4–8.3)
TRIGL SERPL-MCNC: 98 MG/DL
TSH SERPL DL<=0.05 MIU/L-ACNC: 7.98 UIU/ML (ref 0.27–4.2)
VLDLC SERPL CALC-MCNC: 20 MG/DL

## 2023-12-06 RX ORDER — SYRINGE AND NEEDLE,INSULIN,1ML 31 GX5/16"
SYRINGE, EMPTY DISPOSABLE MISCELLANEOUS
Qty: 100 EACH | Refills: 0 | Status: SHIPPED | OUTPATIENT
Start: 2023-12-06

## 2023-12-07 ENCOUNTER — OFFICE VISIT (OUTPATIENT)
Dept: PRIMARY CARE CLINIC | Age: 83
End: 2023-12-07

## 2023-12-07 VITALS
BODY MASS INDEX: 25.75 KG/M2 | SYSTOLIC BLOOD PRESSURE: 138 MMHG | TEMPERATURE: 97.4 F | HEART RATE: 84 BPM | HEIGHT: 64 IN | OXYGEN SATURATION: 99 % | DIASTOLIC BLOOD PRESSURE: 80 MMHG

## 2023-12-07 DIAGNOSIS — I10 PRIMARY HYPERTENSION: ICD-10-CM

## 2023-12-07 DIAGNOSIS — F32.0 CURRENT MILD EPISODE OF MAJOR DEPRESSIVE DISORDER, UNSPECIFIED WHETHER RECURRENT (HCC): ICD-10-CM

## 2023-12-07 DIAGNOSIS — G89.29 CHRONIC MIDLINE THORACIC BACK PAIN: ICD-10-CM

## 2023-12-07 DIAGNOSIS — E78.2 MIXED HYPERLIPIDEMIA: ICD-10-CM

## 2023-12-07 DIAGNOSIS — F32.1 MODERATE MAJOR DEPRESSION (HCC): ICD-10-CM

## 2023-12-07 DIAGNOSIS — M48.061 SPINAL STENOSIS OF LUMBAR REGION WITHOUT NEUROGENIC CLAUDICATION: ICD-10-CM

## 2023-12-07 DIAGNOSIS — R06.09 DYSPNEA ON EXERTION: ICD-10-CM

## 2023-12-07 DIAGNOSIS — M54.6 CHRONIC MIDLINE THORACIC BACK PAIN: ICD-10-CM

## 2023-12-07 DIAGNOSIS — E08.65 DIABETES MELLITUS DUE TO UNDERLYING CONDITION, UNCONTROLLED, WITH HYPERGLYCEMIA, WITHOUT LONG-TERM CURRENT USE OF INSULIN (HCC): Primary | ICD-10-CM

## 2023-12-07 DIAGNOSIS — E06.3 AUTOIMMUNE HYPOTHYROIDISM: ICD-10-CM

## 2023-12-07 DIAGNOSIS — M54.16 LUMBAR RADICULOPATHY: ICD-10-CM

## 2023-12-07 RX ORDER — BUPROPION HYDROCHLORIDE 300 MG/1
300 TABLET ORAL EVERY MORNING
Qty: 90 TABLET | Refills: 0 | Status: SHIPPED | OUTPATIENT
Start: 2023-12-07

## 2023-12-07 RX ORDER — LOSARTAN POTASSIUM 100 MG/1
100 TABLET ORAL DAILY
Qty: 90 TABLET | Refills: 0 | Status: SHIPPED | OUTPATIENT
Start: 2023-12-07

## 2023-12-07 RX ORDER — INSULIN GLARGINE 100 [IU]/ML
20 INJECTION, SOLUTION SUBCUTANEOUS
Qty: 10 ML | Refills: 3 | Status: SHIPPED | OUTPATIENT
Start: 2023-12-07

## 2023-12-07 RX ORDER — LEVOTHYROXINE SODIUM 50 MCG
50 TABLET ORAL DAILY
Qty: 90 TABLET | Refills: 0 | Status: CANCELLED | OUTPATIENT
Start: 2023-12-07

## 2023-12-07 RX ORDER — OLANZAPINE 5 MG/1
5 TABLET ORAL NIGHTLY
Qty: 90 TABLET | Refills: 0 | Status: SHIPPED | OUTPATIENT
Start: 2023-12-07

## 2023-12-07 RX ORDER — PANTOPRAZOLE SODIUM 40 MG/1
40 TABLET, DELAYED RELEASE ORAL DAILY
Qty: 90 TABLET | Refills: 0 | Status: SHIPPED | OUTPATIENT
Start: 2023-12-07

## 2023-12-07 RX ORDER — GLIMEPIRIDE 4 MG/1
4 TABLET ORAL
Qty: 90 TABLET | Refills: 0 | Status: SHIPPED | OUTPATIENT
Start: 2023-12-07

## 2023-12-07 RX ORDER — LEVOTHYROXINE SODIUM 75 MCG
75 TABLET ORAL DAILY
Qty: 90 TABLET | Refills: 0
Start: 2023-12-07

## 2023-12-07 RX ORDER — MULTIVIT WITH MINERALS/LUTEIN
3 TABLET ORAL NIGHTLY PRN
COMMUNITY
Start: 2023-10-09

## 2023-12-07 RX ORDER — FLUOXETINE HYDROCHLORIDE 40 MG/1
40 CAPSULE ORAL NIGHTLY
Qty: 90 CAPSULE | Refills: 0 | Status: SHIPPED | OUTPATIENT
Start: 2023-12-07

## 2023-12-07 NOTE — PROGRESS NOTES
Chief Complaint   Patient presents with    3 Month Follow-Up     Pt is a routine visit with labs on file from 12/5 for review. Too early for an A1C, and pt instructed to stop next week as a POC Test. Pt is C/o becoming short of breath upon exertion with back discomfort and tires out easily. HPI:  Patient is here for follow-up of diabetes mellitus hypertension hyperlipidemia major depression anxiety and chronic lumbar stenosis and midthoracic pain. She is doing fairly okay, compliant with medications. Blood work was discussed with patient all appears within reasonable range except for abnormal TSH we will adjust the Synthroid dose to increase it to 75 mcg daily. .  Patient complains of excessive tiredness and fatigue lately and loss of energy specially on moderate exertion. She also complains about shortness of breath with mild activity. She also has significant mid and lower back pain and she is seeing pain clinic and had received 3 epidural injections but it does not seem that this has helped a lot. 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 Past Medical History, Surgical History, and Family History has been reviewed and updated.     Review of Systems:  Constitutional:  No fever, no fatigue, no chills, no headaches, no weight change  Dermatology:  No rash, no mole, no dry or sensitive skin  ENT:  No cough, no sore throat, no sinus pain, no runny nose, no ear pain  Cardiology:  No chest pain, no palpitations, no leg edema, no shortness of breath, no PND  Gastroenterology:  No dysphagia, no abdominal pain, no nausea, no vomiting, no constipation, no diarrhea, no heartburn  Musculoskeletal:  No joint pain, no leg cramps, no back pain, no muscle aches  Respiratory:  No shortness of breath, no orthopnea, no wheezing, no JOSE, no hemoptysis  Urology:  No blood in the urine, no urinary frequency, no urinary incontinence, no urinary urgency, no nocturia, no

## 2024-01-04 ENCOUNTER — APPOINTMENT (OUTPATIENT)
Dept: GENERAL RADIOLOGY | Age: 84
DRG: 566 | End: 2024-01-04
Payer: MEDICARE

## 2024-01-04 ENCOUNTER — HOSPITAL ENCOUNTER (INPATIENT)
Age: 84
LOS: 3 days | Discharge: HOME HEALTH | DRG: 566 | End: 2024-01-08
Attending: STUDENT IN AN ORGANIZED HEALTH CARE EDUCATION/TRAINING PROGRAM | Admitting: INTERNAL MEDICINE
Payer: MEDICARE

## 2024-01-04 DIAGNOSIS — R79.89 ELEVATED BRAIN NATRIURETIC PEPTIDE (BNP) LEVEL: ICD-10-CM

## 2024-01-04 DIAGNOSIS — S22.20XA CLOSED FRACTURE OF STERNUM, UNSPECIFIED PORTION OF STERNUM, INITIAL ENCOUNTER: Primary | ICD-10-CM

## 2024-01-04 DIAGNOSIS — S22.20XA STERNAL FRACTURE WITH RETROSTERNAL CONTUSION, CLOSED, INITIAL ENCOUNTER: ICD-10-CM

## 2024-01-04 DIAGNOSIS — R73.9 HYPERGLYCEMIA: ICD-10-CM

## 2024-01-04 LAB
ALBUMIN SERPL-MCNC: 4.1 G/DL (ref 3.5–5.2)
ALP SERPL-CCNC: 140 U/L (ref 35–104)
ALT SERPL-CCNC: 23 U/L (ref 0–32)
ANION GAP SERPL CALCULATED.3IONS-SCNC: 11 MMOL/L (ref 7–16)
AST SERPL-CCNC: 22 U/L (ref 0–31)
BASOPHILS # BLD: 0.02 K/UL (ref 0–0.2)
BASOPHILS NFR BLD: 0 % (ref 0–2)
BILIRUB SERPL-MCNC: 0.2 MG/DL (ref 0–1.2)
BNP SERPL-MCNC: 1544 PG/ML (ref 0–450)
BUN SERPL-MCNC: 26 MG/DL (ref 6–23)
CALCIUM SERPL-MCNC: 9.5 MG/DL (ref 8.6–10.2)
CHLORIDE SERPL-SCNC: 97 MMOL/L (ref 98–107)
CO2 SERPL-SCNC: 21 MMOL/L (ref 22–29)
CREAT SERPL-MCNC: 1.3 MG/DL (ref 0.5–1)
EOSINOPHIL # BLD: 0.02 K/UL (ref 0.05–0.5)
EOSINOPHILS RELATIVE PERCENT: 0 % (ref 0–6)
ERYTHROCYTE [DISTWIDTH] IN BLOOD BY AUTOMATED COUNT: 17.6 % (ref 11.5–15)
GFR SERPL CREATININE-BSD FRML MDRD: 43 ML/MIN/1.73M2
GLUCOSE SERPL-MCNC: 453 MG/DL (ref 74–99)
HCT VFR BLD AUTO: 35.6 % (ref 34–48)
HGB BLD-MCNC: 11.2 G/DL (ref 11.5–15.5)
IMM GRANULOCYTES # BLD AUTO: 0.04 K/UL (ref 0–0.58)
IMM GRANULOCYTES NFR BLD: 0 % (ref 0–5)
LYMPHOCYTES NFR BLD: 1.81 K/UL (ref 1.5–4)
LYMPHOCYTES RELATIVE PERCENT: 20 % (ref 20–42)
MCH RBC QN AUTO: 26.7 PG (ref 26–35)
MCHC RBC AUTO-ENTMCNC: 31.5 G/DL (ref 32–34.5)
MCV RBC AUTO: 85 FL (ref 80–99.9)
MONOCYTES NFR BLD: 0.76 K/UL (ref 0.1–0.95)
MONOCYTES NFR BLD: 8 % (ref 2–12)
NEUTROPHILS NFR BLD: 71 % (ref 43–80)
NEUTS SEG NFR BLD: 6.54 K/UL (ref 1.8–7.3)
PH VENOUS: 7.35 (ref 7.35–7.45)
PLATELET # BLD AUTO: 324 K/UL (ref 130–450)
PMV BLD AUTO: 9.1 FL (ref 7–12)
POTASSIUM SERPL-SCNC: 5.3 MMOL/L (ref 3.5–5)
PROT SERPL-MCNC: 7.3 G/DL (ref 6.4–8.3)
RBC # BLD AUTO: 4.19 M/UL (ref 3.5–5.5)
SODIUM SERPL-SCNC: 129 MMOL/L (ref 132–146)
TROPONIN I SERPL HS-MCNC: 23 NG/L (ref 0–9)
WBC OTHER # BLD: 9.2 K/UL (ref 4.5–11.5)

## 2024-01-04 PROCEDURE — 93005 ELECTROCARDIOGRAM TRACING: CPT | Performed by: STUDENT IN AN ORGANIZED HEALTH CARE EDUCATION/TRAINING PROGRAM

## 2024-01-04 PROCEDURE — 99285 EMERGENCY DEPT VISIT HI MDM: CPT

## 2024-01-04 PROCEDURE — 6360000002 HC RX W HCPCS: Performed by: STUDENT IN AN ORGANIZED HEALTH CARE EDUCATION/TRAINING PROGRAM

## 2024-01-04 PROCEDURE — 85025 COMPLETE CBC W/AUTO DIFF WBC: CPT

## 2024-01-04 PROCEDURE — 83880 ASSAY OF NATRIURETIC PEPTIDE: CPT

## 2024-01-04 PROCEDURE — 96374 THER/PROPH/DIAG INJ IV PUSH: CPT

## 2024-01-04 PROCEDURE — 36415 COLL VENOUS BLD VENIPUNCTURE: CPT

## 2024-01-04 PROCEDURE — 81001 URINALYSIS AUTO W/SCOPE: CPT

## 2024-01-04 PROCEDURE — 96375 TX/PRO/DX INJ NEW DRUG ADDON: CPT

## 2024-01-04 PROCEDURE — 80053 COMPREHEN METABOLIC PANEL: CPT

## 2024-01-04 PROCEDURE — 71045 X-RAY EXAM CHEST 1 VIEW: CPT

## 2024-01-04 PROCEDURE — 84484 ASSAY OF TROPONIN QUANT: CPT

## 2024-01-04 PROCEDURE — 82010 KETONE BODYS QUAN: CPT

## 2024-01-04 PROCEDURE — 83036 HEMOGLOBIN GLYCOSYLATED A1C: CPT

## 2024-01-04 PROCEDURE — 82800 BLOOD PH: CPT

## 2024-01-04 PROCEDURE — 2580000003 HC RX 258: Performed by: STUDENT IN AN ORGANIZED HEALTH CARE EDUCATION/TRAINING PROGRAM

## 2024-01-04 RX ORDER — 0.9 % SODIUM CHLORIDE 0.9 %
1000 INTRAVENOUS SOLUTION INTRAVENOUS ONCE
Status: COMPLETED | OUTPATIENT
Start: 2024-01-04 | End: 2024-01-05

## 2024-01-04 RX ORDER — FENTANYL CITRATE 0.05 MG/ML
25 INJECTION, SOLUTION INTRAMUSCULAR; INTRAVENOUS ONCE
Status: COMPLETED | OUTPATIENT
Start: 2024-01-04 | End: 2024-01-04

## 2024-01-04 RX ADMIN — FENTANYL CITRATE 25 MCG: 0.05 INJECTION, SOLUTION INTRAMUSCULAR; INTRAVENOUS at 23:07

## 2024-01-04 RX ADMIN — SODIUM CHLORIDE 1000 ML: 9 INJECTION, SOLUTION INTRAVENOUS at 23:10

## 2024-01-04 ASSESSMENT — ENCOUNTER SYMPTOMS
PHOTOPHOBIA: 0
DIARRHEA: 0
NAUSEA: 0
CHEST TIGHTNESS: 0
VOMITING: 0
SHORTNESS OF BREATH: 0
ABDOMINAL PAIN: 0
ABDOMINAL DISTENTION: 0
COUGH: 0

## 2024-01-04 ASSESSMENT — PAIN DESCRIPTION - LOCATION
LOCATION: CHEST

## 2024-01-04 ASSESSMENT — PAIN SCALES - GENERAL
PAINLEVEL_OUTOF10: 5
PAINLEVEL_OUTOF10: 9
PAINLEVEL_OUTOF10: 9

## 2024-01-04 ASSESSMENT — PAIN - FUNCTIONAL ASSESSMENT: PAIN_FUNCTIONAL_ASSESSMENT: 0-10

## 2024-01-04 ASSESSMENT — LIFESTYLE VARIABLES
HOW OFTEN DO YOU HAVE A DRINK CONTAINING ALCOHOL: NEVER
HOW MANY STANDARD DRINKS CONTAINING ALCOHOL DO YOU HAVE ON A TYPICAL DAY: PATIENT DOES NOT DRINK

## 2024-01-05 ENCOUNTER — APPOINTMENT (OUTPATIENT)
Dept: CT IMAGING | Age: 84
DRG: 566 | End: 2024-01-05
Payer: MEDICARE

## 2024-01-05 PROBLEM — S22.20XA STERNAL FRACTURE WITH RETROSTERNAL CONTUSION, CLOSED, INITIAL ENCOUNTER: Status: ACTIVE | Noted: 2024-01-05

## 2024-01-05 LAB
B-OH-BUTYR SERPL-MCNC: 0.08 MMOL/L (ref 0.02–0.27)
BACTERIA URNS QL MICRO: ABNORMAL
BILIRUB UR QL STRIP: NEGATIVE
CLARITY UR: CLEAR
COLOR UR: YELLOW
EKG ATRIAL RATE: 65 BPM
EKG P AXIS: 23 DEGREES
EKG P-R INTERVAL: 140 MS
EKG Q-T INTERVAL: 408 MS
EKG QRS DURATION: 90 MS
EKG QTC CALCULATION (BAZETT): 424 MS
EKG R AXIS: 17 DEGREES
EKG T AXIS: 92 DEGREES
EKG VENTRICULAR RATE: 65 BPM
EPI CELLS #/AREA URNS HPF: ABNORMAL /HPF
GLUCOSE BLD-MCNC: 126 MG/DL (ref 74–99)
GLUCOSE BLD-MCNC: 147 MG/DL (ref 74–99)
GLUCOSE BLD-MCNC: 172 MG/DL (ref 74–99)
GLUCOSE BLD-MCNC: 207 MG/DL (ref 74–99)
GLUCOSE BLD-MCNC: 332 MG/DL (ref 74–99)
GLUCOSE BLD-MCNC: 370 MG/DL (ref 74–99)
GLUCOSE BLD-MCNC: 98 MG/DL (ref 74–99)
GLUCOSE UR STRIP-MCNC: >=1000 MG/DL
HBA1C MFR BLD: 9.1 % (ref 4–5.6)
HGB UR QL STRIP.AUTO: ABNORMAL
KETONES UR STRIP-MCNC: NEGATIVE MG/DL
LEUKOCYTE ESTERASE UR QL STRIP: NEGATIVE
NITRITE UR QL STRIP: NEGATIVE
PH UR STRIP: 6 [PH] (ref 5–9)
PROT UR STRIP-MCNC: NEGATIVE MG/DL
RBC #/AREA URNS HPF: ABNORMAL /HPF
SP GR UR STRIP: 1.01 (ref 1–1.03)
TROPONIN I SERPL HS-MCNC: 22 NG/L (ref 0–9)
UROBILINOGEN UR STRIP-ACNC: 0.2 EU/DL (ref 0–1)
WBC #/AREA URNS HPF: ABNORMAL /HPF

## 2024-01-05 PROCEDURE — 6360000002 HC RX W HCPCS: Performed by: INTERNAL MEDICINE

## 2024-01-05 PROCEDURE — 82962 GLUCOSE BLOOD TEST: CPT

## 2024-01-05 PROCEDURE — 2580000003 HC RX 258: Performed by: INTERNAL MEDICINE

## 2024-01-05 PROCEDURE — 1200000000 HC SEMI PRIVATE

## 2024-01-05 PROCEDURE — 84484 ASSAY OF TROPONIN QUANT: CPT

## 2024-01-05 PROCEDURE — 6360000002 HC RX W HCPCS: Performed by: STUDENT IN AN ORGANIZED HEALTH CARE EDUCATION/TRAINING PROGRAM

## 2024-01-05 PROCEDURE — 2580000003 HC RX 258: Performed by: STUDENT IN AN ORGANIZED HEALTH CARE EDUCATION/TRAINING PROGRAM

## 2024-01-05 PROCEDURE — 6360000004 HC RX CONTRAST MEDICATION: Performed by: RADIOLOGY

## 2024-01-05 PROCEDURE — 6370000000 HC RX 637 (ALT 250 FOR IP): Performed by: INTERNAL MEDICINE

## 2024-01-05 PROCEDURE — 6370000000 HC RX 637 (ALT 250 FOR IP): Performed by: STUDENT IN AN ORGANIZED HEALTH CARE EDUCATION/TRAINING PROGRAM

## 2024-01-05 PROCEDURE — 93010 ELECTROCARDIOGRAM REPORT: CPT | Performed by: INTERNAL MEDICINE

## 2024-01-05 PROCEDURE — 97161 PT EVAL LOW COMPLEX 20 MIN: CPT | Performed by: PHYSICAL THERAPIST

## 2024-01-05 PROCEDURE — 97530 THERAPEUTIC ACTIVITIES: CPT | Performed by: PHYSICAL THERAPIST

## 2024-01-05 PROCEDURE — 71275 CT ANGIOGRAPHY CHEST: CPT

## 2024-01-05 RX ORDER — MAGNESIUM SULFATE IN WATER 40 MG/ML
2000 INJECTION, SOLUTION INTRAVENOUS PRN
Status: DISCONTINUED | OUTPATIENT
Start: 2024-01-05 | End: 2024-01-08 | Stop reason: HOSPADM

## 2024-01-05 RX ORDER — LEVOTHYROXINE SODIUM 0.07 MG/1
75 TABLET ORAL DAILY
Status: DISCONTINUED | OUTPATIENT
Start: 2024-01-05 | End: 2024-01-08 | Stop reason: HOSPADM

## 2024-01-05 RX ORDER — TRAZODONE HYDROCHLORIDE 50 MG/1
50 TABLET ORAL NIGHTLY
Status: DISCONTINUED | OUTPATIENT
Start: 2024-01-05 | End: 2024-01-08 | Stop reason: HOSPADM

## 2024-01-05 RX ORDER — ONDANSETRON 2 MG/ML
4 INJECTION INTRAMUSCULAR; INTRAVENOUS EVERY 6 HOURS PRN
Status: DISCONTINUED | OUTPATIENT
Start: 2024-01-05 | End: 2024-01-08 | Stop reason: HOSPADM

## 2024-01-05 RX ORDER — PANTOPRAZOLE SODIUM 40 MG/1
40 TABLET, DELAYED RELEASE ORAL DAILY
Status: DISCONTINUED | OUTPATIENT
Start: 2024-01-05 | End: 2024-01-08 | Stop reason: HOSPADM

## 2024-01-05 RX ORDER — LOSARTAN POTASSIUM 100 MG/1
100 TABLET ORAL DAILY
Status: DISCONTINUED | OUTPATIENT
Start: 2024-01-05 | End: 2024-01-08 | Stop reason: HOSPADM

## 2024-01-05 RX ORDER — HYDROCODONE BITARTRATE AND ACETAMINOPHEN 5; 325 MG/1; MG/1
1 TABLET ORAL EVERY 6 HOURS PRN
Status: DISCONTINUED | OUTPATIENT
Start: 2024-01-05 | End: 2024-01-08 | Stop reason: HOSPADM

## 2024-01-05 RX ORDER — MORPHINE SULFATE 4 MG/ML
4 INJECTION, SOLUTION INTRAMUSCULAR; INTRAVENOUS ONCE
Status: COMPLETED | OUTPATIENT
Start: 2024-01-05 | End: 2024-01-05

## 2024-01-05 RX ORDER — METOPROLOL SUCCINATE 50 MG/1
50 TABLET, EXTENDED RELEASE ORAL DAILY
Status: DISCONTINUED | OUTPATIENT
Start: 2024-01-05 | End: 2024-01-08 | Stop reason: HOSPADM

## 2024-01-05 RX ORDER — HYDROCODONE BITARTRATE AND ACETAMINOPHEN 5; 325 MG/1; MG/1
1 TABLET ORAL ONCE
Status: COMPLETED | OUTPATIENT
Start: 2024-01-05 | End: 2024-01-05

## 2024-01-05 RX ORDER — HYDROCODONE BITARTRATE AND ACETAMINOPHEN 5; 325 MG/1; MG/1
1 TABLET ORAL EVERY 6 HOURS PRN
Qty: 16 TABLET | Refills: 0 | Status: SHIPPED | OUTPATIENT
Start: 2024-01-05 | End: 2024-01-08 | Stop reason: HOSPADM

## 2024-01-05 RX ORDER — GLIPIZIDE 5 MG/1
10 TABLET ORAL
Status: DISCONTINUED | OUTPATIENT
Start: 2024-01-05 | End: 2024-01-08 | Stop reason: HOSPADM

## 2024-01-05 RX ORDER — MORPHINE SULFATE 2 MG/ML
2 INJECTION, SOLUTION INTRAMUSCULAR; INTRAVENOUS EVERY 4 HOURS PRN
Status: DISCONTINUED | OUTPATIENT
Start: 2024-01-05 | End: 2024-01-08 | Stop reason: HOSPADM

## 2024-01-05 RX ORDER — DEXTROSE MONOHYDRATE 100 MG/ML
INJECTION, SOLUTION INTRAVENOUS CONTINUOUS PRN
Status: DISCONTINUED | OUTPATIENT
Start: 2024-01-05 | End: 2024-01-08 | Stop reason: HOSPADM

## 2024-01-05 RX ORDER — OLANZAPINE 5 MG/1
5 TABLET ORAL NIGHTLY
Status: DISCONTINUED | OUTPATIENT
Start: 2024-01-05 | End: 2024-01-08 | Stop reason: HOSPADM

## 2024-01-05 RX ORDER — ENOXAPARIN SODIUM 100 MG/ML
40 INJECTION SUBCUTANEOUS DAILY
Status: DISCONTINUED | OUTPATIENT
Start: 2024-01-05 | End: 2024-01-08 | Stop reason: HOSPADM

## 2024-01-05 RX ORDER — SODIUM CHLORIDE 9 MG/ML
INJECTION, SOLUTION INTRAVENOUS CONTINUOUS
Status: DISCONTINUED | OUTPATIENT
Start: 2024-01-05 | End: 2024-01-08 | Stop reason: HOSPADM

## 2024-01-05 RX ORDER — ALBUTEROL SULFATE 2.5 MG/3ML
2.5 SOLUTION RESPIRATORY (INHALATION) EVERY 6 HOURS PRN
Status: DISCONTINUED | OUTPATIENT
Start: 2024-01-05 | End: 2024-01-07

## 2024-01-05 RX ORDER — ORPHENADRINE CITRATE 100 MG/1
100 TABLET, EXTENDED RELEASE ORAL 2 TIMES DAILY
Status: DISCONTINUED | OUTPATIENT
Start: 2024-01-05 | End: 2024-01-08 | Stop reason: HOSPADM

## 2024-01-05 RX ORDER — POTASSIUM CHLORIDE 20 MEQ/1
40 TABLET, EXTENDED RELEASE ORAL PRN
Status: DISCONTINUED | OUTPATIENT
Start: 2024-01-05 | End: 2024-01-08 | Stop reason: HOSPADM

## 2024-01-05 RX ORDER — POTASSIUM CHLORIDE 7.45 MG/ML
10 INJECTION INTRAVENOUS PRN
Status: DISCONTINUED | OUTPATIENT
Start: 2024-01-05 | End: 2024-01-08 | Stop reason: HOSPADM

## 2024-01-05 RX ORDER — HYDRALAZINE HYDROCHLORIDE 20 MG/ML
10 INJECTION INTRAMUSCULAR; INTRAVENOUS ONCE
Status: COMPLETED | OUTPATIENT
Start: 2024-01-05 | End: 2024-01-05

## 2024-01-05 RX ORDER — BUPROPION HYDROCHLORIDE 150 MG/1
300 TABLET ORAL EVERY MORNING
Status: DISCONTINUED | OUTPATIENT
Start: 2024-01-05 | End: 2024-01-08 | Stop reason: HOSPADM

## 2024-01-05 RX ORDER — GLUCAGON 1 MG/ML
1 KIT INJECTION PRN
Status: DISCONTINUED | OUTPATIENT
Start: 2024-01-05 | End: 2024-01-08 | Stop reason: HOSPADM

## 2024-01-05 RX ORDER — INSULIN LISPRO 100 [IU]/ML
0-8 INJECTION, SOLUTION INTRAVENOUS; SUBCUTANEOUS
Status: DISCONTINUED | OUTPATIENT
Start: 2024-01-05 | End: 2024-01-08 | Stop reason: HOSPADM

## 2024-01-05 RX ORDER — LANOLIN ALCOHOL/MO/W.PET/CERES
3 CREAM (GRAM) TOPICAL NIGHTLY PRN
Status: DISCONTINUED | OUTPATIENT
Start: 2024-01-05 | End: 2024-01-08 | Stop reason: HOSPADM

## 2024-01-05 RX ORDER — INSULIN GLARGINE 100 [IU]/ML
20 INJECTION, SOLUTION SUBCUTANEOUS
Status: DISCONTINUED | OUTPATIENT
Start: 2024-01-05 | End: 2024-01-08 | Stop reason: HOSPADM

## 2024-01-05 RX ORDER — FLUOXETINE HYDROCHLORIDE 20 MG/1
40 CAPSULE ORAL NIGHTLY
Status: DISCONTINUED | OUTPATIENT
Start: 2024-01-05 | End: 2024-01-08 | Stop reason: HOSPADM

## 2024-01-05 RX ORDER — INSULIN LISPRO 100 [IU]/ML
0-4 INJECTION, SOLUTION INTRAVENOUS; SUBCUTANEOUS NIGHTLY
Status: DISCONTINUED | OUTPATIENT
Start: 2024-01-05 | End: 2024-01-08 | Stop reason: HOSPADM

## 2024-01-05 RX ADMIN — DICLOFENAC SODIUM 2 G: 10 GEL TOPICAL at 10:14

## 2024-01-05 RX ADMIN — BUPROPION HYDROCHLORIDE 300 MG: 150 TABLET, EXTENDED RELEASE ORAL at 10:14

## 2024-01-05 RX ADMIN — GLIPIZIDE 10 MG: 5 TABLET ORAL at 10:14

## 2024-01-05 RX ADMIN — ONDANSETRON 4 MG: 2 INJECTION INTRAMUSCULAR; INTRAVENOUS at 14:03

## 2024-01-05 RX ADMIN — ORPHENADRINE CITRATE 100 MG: 100 TABLET, EXTENDED RELEASE ORAL at 08:11

## 2024-01-05 RX ADMIN — INSULIN GLARGINE 20 UNITS: 100 INJECTION, SOLUTION SUBCUTANEOUS at 10:15

## 2024-01-05 RX ADMIN — MORPHINE SULFATE 4 MG: 4 INJECTION, SOLUTION INTRAMUSCULAR; INTRAVENOUS at 06:36

## 2024-01-05 RX ADMIN — PANTOPRAZOLE SODIUM 40 MG: 40 TABLET, DELAYED RELEASE ORAL at 08:11

## 2024-01-05 RX ADMIN — SODIUM CHLORIDE: 9 INJECTION, SOLUTION INTRAVENOUS at 10:25

## 2024-01-05 RX ADMIN — ENOXAPARIN SODIUM 40 MG: 100 INJECTION SUBCUTANEOUS at 10:13

## 2024-01-05 RX ADMIN — HYDROCODONE BITARTRATE AND ACETAMINOPHEN 1 TABLET: 5; 325 TABLET ORAL at 02:06

## 2024-01-05 RX ADMIN — OLANZAPINE 5 MG: 5 TABLET, FILM COATED ORAL at 21:20

## 2024-01-05 RX ADMIN — MORPHINE SULFATE 2 MG: 2 INJECTION, SOLUTION INTRAMUSCULAR; INTRAVENOUS at 18:36

## 2024-01-05 RX ADMIN — LEVOTHYROXINE SODIUM 75 MCG: 0.05 TABLET ORAL at 08:11

## 2024-01-05 RX ADMIN — INSULIN HUMAN 4 UNITS: 100 INJECTION, SOLUTION PARENTERAL at 03:29

## 2024-01-05 RX ADMIN — FLUOXETINE HYDROCHLORIDE 40 MG: 20 CAPSULE ORAL at 21:20

## 2024-01-05 RX ADMIN — SODIUM ZIRCONIUM CYCLOSILICATE 10 G: 10 POWDER, FOR SUSPENSION ORAL at 08:11

## 2024-01-05 RX ADMIN — METOPROLOL SUCCINATE 50 MG: 50 TABLET, EXTENDED RELEASE ORAL at 10:13

## 2024-01-05 RX ADMIN — HYDRALAZINE HYDROCHLORIDE 10 MG: 20 INJECTION, SOLUTION INTRAMUSCULAR; INTRAVENOUS at 06:01

## 2024-01-05 RX ADMIN — HYDROCODONE BITARTRATE AND ACETAMINOPHEN 1 TABLET: 5; 325 TABLET ORAL at 05:26

## 2024-01-05 RX ADMIN — IOPAMIDOL 80 ML: 755 INJECTION, SOLUTION INTRAVENOUS at 02:46

## 2024-01-05 RX ADMIN — ORPHENADRINE CITRATE 100 MG: 100 TABLET, EXTENDED RELEASE ORAL at 21:19

## 2024-01-05 RX ADMIN — Medication 3 MG: at 21:20

## 2024-01-05 RX ADMIN — LOSARTAN POTASSIUM 100 MG: 100 TABLET, FILM COATED ORAL at 10:15

## 2024-01-05 RX ADMIN — HYDROCODONE BITARTRATE AND ACETAMINOPHEN 1 TABLET: 5; 325 TABLET ORAL at 12:13

## 2024-01-05 RX ADMIN — SODIUM CHLORIDE 1000 ML: 9 INJECTION, SOLUTION INTRAVENOUS at 02:07

## 2024-01-05 RX ADMIN — MORPHINE SULFATE 2 MG: 2 INJECTION, SOLUTION INTRAMUSCULAR; INTRAVENOUS at 14:03

## 2024-01-05 ASSESSMENT — PAIN DESCRIPTION - LOCATION
LOCATION: CHEST
LOCATION: CHEST

## 2024-01-05 ASSESSMENT — PAIN SCALES - GENERAL
PAINLEVEL_OUTOF10: 9
PAINLEVEL_OUTOF10: 7
PAINLEVEL_OUTOF10: 7
PAINLEVEL_OUTOF10: 8
PAINLEVEL_OUTOF10: 8

## 2024-01-05 NOTE — ED NOTES
Pt was given incentive spirometer and patient education was provided. Pt verbalized her understanding and was given written instructions on how to use it as well. Pt was able to get the indicator to 1250 mL.

## 2024-01-05 NOTE — PLAN OF CARE
Problem: Discharge Planning  Goal: Discharge to home or other facility with appropriate resources  Outcome: Progressing  Flowsheets (Taken 1/5/2024 1203)  Discharge to home or other facility with appropriate resources: Identify barriers to discharge with patient and caregiver     Problem: Pain  Goal: Verbalizes/displays adequate comfort level or baseline comfort level  Outcome: Progressing     Problem: Safety - Adult  Goal: Free from fall injury  Outcome: Progressing

## 2024-01-05 NOTE — ED NOTES
Attempted to call report to 3rd floor. Floor stated that they are full and that they will notify the bed coordinator.

## 2024-01-05 NOTE — H&P
file     Unstable Housing in the Last Year: No       ROS:  General:   Admits to generalized malaise and fatigue.    Psychological:   Denies anxiety, disorientation or hallucinations    ENT:    Denies epistaxis, headaches, vertigo or visual changes    Cardiovascular:   Denies any chest pain, irregular heartbeats, or palpitations. No paroxysmal nocturnal dyspnea.    Respiratory:   Admits to significant sternal pain particular with deep inspiration as to be expected.  No coughing or sputum production.  No hemoptysis.    Gastrointestinal:   Denies nausea, vomiting, diarrhea, or constipation.  Denies any abdominal pain.  Denies change in bowel habits or stools.      Genito-Urinary:    Denies any urgency, frequency, hematuria.  Voiding without difficulty.    Musculoskeletal:   Denies joint pain, joint stiffness, joint swelling or muscle pain    Neurology:    Denies any headache or focal neurological deficits. No weakness or paresthesia.    Derm:    Denies any rashes, ulcers, or excoriations.  Denies bruising.      Extremities:   Denies any lower extremity swelling or edema.      PHYSICAL EXAM:  VITALS:  Vitals:    01/05/24 0642   BP: (!) 164/82   Pulse: 67   Resp: 18   Temp:    SpO2: 98%         CONSTITUTIONAL:    Awake, alert, cooperative, no apparent distress, and appears stated age    EYES:    PERRL, EOMI, sclera clear, conjunctiva normal    ENT:    Normocephalic, atraumatic, sinuses nontender on palpation. External ears without lesions. Oral pharynx with moist mucus membranes.  Tonsils without erythema or exudates.    NECK:    Supple, symmetrical, trachea midline, no adenopathy, thyroid symmetric, not enlarged and no tenderness, skin normal, no bruits, no JVD    HEMATOLOGIC/LYMPHATICS:    No cervical lymphadenopathy and no supraclavicular lymphadenopathy    LUNGS:    Symmetric. No increased work of breathing, good air exchange, clear to auscultation bilaterally, no wheezes, rhonchi, or rales,     CARDIOVASCULAR:

## 2024-01-05 NOTE — DISCHARGE INSTRUCTIONS
Your information:  Name: Kylee Wang  : 1940    Your instructions:    Discharge home with home care.  They will call you prior to their first visit.  Follow up with primary care provider and specialists as directed.    Signs and symptoms to look out for:   Call 911 anytime you think you may need emergency care. For example, call if:    You passed out (lost consciousness).     You have severe trouble breathing.     You have a fast or irregular heartbeat.   Call your doctor now or seek immediate medical care if:    You have new or worse trouble breathing.     Your pain gets worse.     You are dizzy or lightheaded, or you feel like you may faint.     You have a fever.     You have a new cough or your cough gets worse.   Watch closely for changes in your health, and be sure to contact your doctor if:    Your pain does not get better as expected.     What to do after you leave the hospital:    Recommended diet: regular diet    Recommended activity: activity as tolerated    The following personal items were collected during your admission and were returned to you:    Belongings  Dental Appliances: Partials  Vision - Corrective Lenses: Eyeglasses  Hearing Aid: None  Clothing: Jacket/Coat, Pants, Shirt, Footwear, At bedside  Jewelry: Watch  Body Piercings Removed: N/A  Electronic Devices: Cell Phone  Weapons (Notify Protective Services/Security): None  Other Valuables: At home  Home Medications: None  Valuables Given To: Patient  Provide Name(s) of Who Valuable(s) Were Given To: pt  Responsible person(s) in the waiting room: pt  Patient approves for provider to speak to responsible person post operatively: Yes    Information obtained by:  By signing below, I understand that if any problems occur once I leave the hospital I am to contact Dr. Maldonado.  I understand and acknowledge receipt of the instructions indicated above.

## 2024-01-05 NOTE — ED PROVIDER NOTES
Chest wall: Tenderness present.   Abdominal:      General: Bowel sounds are normal. There is no distension.      Palpations: Abdomen is soft.      Tenderness: There is no abdominal tenderness. There is no guarding or rebound.   Musculoskeletal:      Cervical back: Normal range of motion and neck supple. No rigidity or tenderness. No muscular tenderness.      Right lower leg: No edema.      Left lower leg: No edema.   Skin:     General: Skin is warm and dry.      Capillary Refill: Capillary refill takes less than 2 seconds.      Coloration: Skin is not pale.      Findings: No erythema or rash.   Neurological:      Mental Status: She is alert and oriented to person, place, and time.   Psychiatric:         Mood and Affect: Mood normal.          Procedures     MDM  Number of Diagnoses or Management Options  Closed fracture of sternum, unspecified portion of sternum, initial encounter  Elevated brain natriuretic peptide (BNP) level  Hyperglycemia  Diagnosis management comments: Kylee Wang is an 83-year-old female present emergency department concern for sharp chest pain and hyperglycemia.  Vital signs were reviewed patient was hypertensive with a blood pressure of 189/77, afebrile  Patient had reproducible pain on exam  CTA reviewed significant for sternal deformity, patient re-evaluated and reported that before symptoms started she was lifting small weights she felt pop in her chest at that time denies direct trauma  Patient was given IV and PO pain medication, patient continues to have pain and is unable to ambulate and care for herself due to pain  Patient will be admitted   Lab work was reviewed and interpreted, troponin stable, normal renal function, patient hyperglycemic, not in DKA    History provided by patient and ems  Co morbidities include dm, sternal fracture  Differential diagnosis includes not limited to acs, pna, dka, hyperglycemia, electrolyte abnormality  Social determents of health include

## 2024-01-06 LAB
25(OH)D3 SERPL-MCNC: 32.6 NG/ML (ref 30–100)
ANION GAP SERPL CALCULATED.3IONS-SCNC: 8 MMOL/L (ref 7–16)
BASOPHILS # BLD: 0.03 K/UL (ref 0–0.2)
BASOPHILS NFR BLD: 0 % (ref 0–2)
BUN SERPL-MCNC: 19 MG/DL (ref 6–23)
CALCIUM SERPL-MCNC: 9 MG/DL (ref 8.6–10.2)
CHLORIDE SERPL-SCNC: 107 MMOL/L (ref 98–107)
CO2 SERPL-SCNC: 23 MMOL/L (ref 22–29)
CREAT SERPL-MCNC: 1.2 MG/DL (ref 0.5–1)
EOSINOPHIL # BLD: 0.14 K/UL (ref 0.05–0.5)
EOSINOPHILS RELATIVE PERCENT: 1 % (ref 0–6)
ERYTHROCYTE [DISTWIDTH] IN BLOOD BY AUTOMATED COUNT: 17.8 % (ref 11.5–15)
GFR SERPL CREATININE-BSD FRML MDRD: 45 ML/MIN/1.73M2
GLUCOSE BLD-MCNC: 103 MG/DL (ref 74–99)
GLUCOSE BLD-MCNC: 107 MG/DL (ref 74–99)
GLUCOSE BLD-MCNC: 164 MG/DL (ref 74–99)
GLUCOSE BLD-MCNC: 99 MG/DL (ref 74–99)
GLUCOSE SERPL-MCNC: 120 MG/DL (ref 74–99)
HCT VFR BLD AUTO: 34.9 % (ref 34–48)
HGB BLD-MCNC: 10.7 G/DL (ref 11.5–15.5)
IMM GRANULOCYTES # BLD AUTO: 0.04 K/UL (ref 0–0.58)
IMM GRANULOCYTES NFR BLD: 0 % (ref 0–5)
LYMPHOCYTES NFR BLD: 1.92 K/UL (ref 1.5–4)
LYMPHOCYTES RELATIVE PERCENT: 17 % (ref 20–42)
MAGNESIUM SERPL-MCNC: 1.7 MG/DL (ref 1.6–2.6)
MCH RBC QN AUTO: 26.7 PG (ref 26–35)
MCHC RBC AUTO-ENTMCNC: 30.7 G/DL (ref 32–34.5)
MCV RBC AUTO: 87 FL (ref 80–99.9)
MONOCYTES NFR BLD: 1.01 K/UL (ref 0.1–0.95)
MONOCYTES NFR BLD: 9 % (ref 2–12)
NEUTROPHILS NFR BLD: 72 % (ref 43–80)
NEUTS SEG NFR BLD: 8 K/UL (ref 1.8–7.3)
PHOSPHATE SERPL-MCNC: 3 MG/DL (ref 2.5–4.5)
PLATELET # BLD AUTO: 333 K/UL (ref 130–450)
PMV BLD AUTO: 9.3 FL (ref 7–12)
POTASSIUM SERPL-SCNC: 4.3 MMOL/L (ref 3.5–5)
RBC # BLD AUTO: 4.01 M/UL (ref 3.5–5.5)
SODIUM SERPL-SCNC: 138 MMOL/L (ref 132–146)
TSH SERPL DL<=0.05 MIU/L-ACNC: 3.08 UIU/ML (ref 0.27–4.2)
WBC OTHER # BLD: 11.1 K/UL (ref 4.5–11.5)

## 2024-01-06 PROCEDURE — 94640 AIRWAY INHALATION TREATMENT: CPT

## 2024-01-06 PROCEDURE — 85025 COMPLETE CBC W/AUTO DIFF WBC: CPT

## 2024-01-06 PROCEDURE — 84443 ASSAY THYROID STIM HORMONE: CPT

## 2024-01-06 PROCEDURE — 83735 ASSAY OF MAGNESIUM: CPT

## 2024-01-06 PROCEDURE — 6360000002 HC RX W HCPCS: Performed by: INTERNAL MEDICINE

## 2024-01-06 PROCEDURE — 84100 ASSAY OF PHOSPHORUS: CPT

## 2024-01-06 PROCEDURE — 82306 VITAMIN D 25 HYDROXY: CPT

## 2024-01-06 PROCEDURE — 82962 GLUCOSE BLOOD TEST: CPT

## 2024-01-06 PROCEDURE — 80048 BASIC METABOLIC PNL TOTAL CA: CPT

## 2024-01-06 PROCEDURE — 1200000000 HC SEMI PRIVATE

## 2024-01-06 PROCEDURE — 36415 COLL VENOUS BLD VENIPUNCTURE: CPT

## 2024-01-06 PROCEDURE — 2700000000 HC OXYGEN THERAPY PER DAY

## 2024-01-06 PROCEDURE — 6370000000 HC RX 637 (ALT 250 FOR IP): Performed by: INTERNAL MEDICINE

## 2024-01-06 PROCEDURE — 99223 1ST HOSP IP/OBS HIGH 75: CPT | Performed by: SURGERY

## 2024-01-06 RX ADMIN — TRAZODONE HYDROCHLORIDE 50 MG: 50 TABLET ORAL at 22:01

## 2024-01-06 RX ADMIN — HYDROCODONE BITARTRATE AND ACETAMINOPHEN 1 TABLET: 5; 325 TABLET ORAL at 22:01

## 2024-01-06 RX ADMIN — METOPROLOL SUCCINATE 50 MG: 50 TABLET, EXTENDED RELEASE ORAL at 09:45

## 2024-01-06 RX ADMIN — ENOXAPARIN SODIUM 40 MG: 100 INJECTION SUBCUTANEOUS at 09:45

## 2024-01-06 RX ADMIN — PANTOPRAZOLE SODIUM 40 MG: 40 TABLET, DELAYED RELEASE ORAL at 09:45

## 2024-01-06 RX ADMIN — ORPHENADRINE CITRATE 100 MG: 100 TABLET, EXTENDED RELEASE ORAL at 09:45

## 2024-01-06 RX ADMIN — LEVOTHYROXINE SODIUM 75 MCG: 0.05 TABLET ORAL at 06:19

## 2024-01-06 RX ADMIN — ALBUTEROL SULFATE 2.5 MG: 2.5 SOLUTION RESPIRATORY (INHALATION) at 17:13

## 2024-01-06 RX ADMIN — OLANZAPINE 5 MG: 5 TABLET, FILM COATED ORAL at 22:01

## 2024-01-06 RX ADMIN — ORPHENADRINE CITRATE 100 MG: 100 TABLET, EXTENDED RELEASE ORAL at 22:02

## 2024-01-06 RX ADMIN — FLUOXETINE HYDROCHLORIDE 40 MG: 20 CAPSULE ORAL at 22:01

## 2024-01-06 RX ADMIN — Medication 3 MG: at 22:02

## 2024-01-06 RX ADMIN — LOSARTAN POTASSIUM 100 MG: 100 TABLET, FILM COATED ORAL at 09:45

## 2024-01-06 RX ADMIN — BUPROPION HYDROCHLORIDE 300 MG: 150 TABLET, EXTENDED RELEASE ORAL at 09:45

## 2024-01-06 RX ADMIN — MORPHINE SULFATE 2 MG: 2 INJECTION, SOLUTION INTRAMUSCULAR; INTRAVENOUS at 18:05

## 2024-01-06 RX ADMIN — GLIPIZIDE 10 MG: 5 TABLET ORAL at 06:19

## 2024-01-06 RX ADMIN — DICLOFENAC SODIUM 2 G: 10 GEL TOPICAL at 22:03

## 2024-01-06 RX ADMIN — HYDROCODONE BITARTRATE AND ACETAMINOPHEN 1 TABLET: 5; 325 TABLET ORAL at 15:52

## 2024-01-06 RX ADMIN — DICLOFENAC SODIUM 2 G: 10 GEL TOPICAL at 12:00

## 2024-01-06 ASSESSMENT — PAIN SCALES - GENERAL
PAINLEVEL_OUTOF10: 7
PAINLEVEL_OUTOF10: 5
PAINLEVEL_OUTOF10: 8

## 2024-01-06 ASSESSMENT — PAIN - FUNCTIONAL ASSESSMENT: PAIN_FUNCTIONAL_ASSESSMENT: PREVENTS OR INTERFERES SOME ACTIVE ACTIVITIES AND ADLS

## 2024-01-06 ASSESSMENT — PAIN DESCRIPTION - DESCRIPTORS: DESCRIPTORS: TENDER;SORE

## 2024-01-06 NOTE — ACP (ADVANCE CARE PLANNING)
Advance Care Planning   Healthcare Decision Maker:    Primary Decision Maker: Vickie Ham - Child - [phone]    Click here to complete Healthcare Decision Makers including selection of the Healthcare Decision Maker Relationship (ie \"Primary\").

## 2024-01-06 NOTE — PLAN OF CARE
Problem: Discharge Planning  Goal: Discharge to home or other facility with appropriate resources  1/6/2024 0009 by Cristiana Nunes RN  Outcome: Progressing  1/5/2024 1823 by Marcia Mcintyre RN  Outcome: Progressing  Flowsheets (Taken 1/5/2024 1203)  Discharge to home or other facility with appropriate resources: Identify barriers to discharge with patient and caregiver     Problem: Pain  Goal: Verbalizes/displays adequate comfort level or baseline comfort level  1/6/2024 0009 by Cristiana Nunes, RN  Outcome: Progressing  1/5/2024 1823 by Marcia Mcintyre, RN  Outcome: Progressing     Problem: Safety - Adult  Goal: Free from fall injury  1/6/2024 0009 by Cristiana Nunes RN  Outcome: Progressing  1/5/2024 1823 by Marcia Mcintyre, RN  Outcome: Progressing

## 2024-01-06 NOTE — CARE COORDINATION
Social Work:    Patient admitted from home due to sternal pain she has been  experiencing after exercising the end of December.  Social service met with Mrs. Wang and spoke with her daughter Vickie via phone. Social service explained her role and discussed discharge planning.  Mrs. Wang resides in her  2-story home with 2- entry steps w/ handrails. Her bedroom and full-bathroom, standard commode, 3-1- commode, tub/shower is located on the main floor. Mrs. Wang used a cane and bath stool prior to admission. She also has a nebulizer and walker f needed.  She is currently active with [facility] and goes to O.P. therapy at [facility].  Social work advised patient & daughter about therapy score and discussed skilled rehab., however, Vickie declined \"nursing home\" and advised social service that she will care for patient at home.  Social work explained skilled rehab coverage under insurance vs nursing home and Vickie felt that home would be fine.  Social work advised about obtaining therapy with [facility] until patient fully recovers and Vickie agreed. Social work notified Maria A at [facility] of patient's admission and plans to return home with continued [facility] for nursing and therapy.  (Updated orders needed)    Electronically signed by ATIYA Bynum on 1/6/2024 at 3:17 PM

## 2024-01-06 NOTE — CONSULTS
TRAUMA HISTORY & PHYSICAL      PRIMARY SURVEY        CHIEF COMPLAINT:  fall  Patient trauma was fall after loosing footing in bathroom and hit chest on sink. Timing is constant with pain, radiation to mid chest, alleviated by nothing and started post trauma and severity is 2-8/10  AIRWAY:   Airway normal  EMS ETT Absent   Noisy respirations Absent   Retractions: Absent   Vomiting/bleeding: Absent     BREATHING:    Midaxillary breath sound left:  Present  Midaxillary breath sound right: Present  Cough sound intensity:  Good  Respiratory rate: wnl  FiO2: RA    CIRCULATION:   Femerol pulse rate: normal  Femerol pulse intensity: present  Palpebral conjunctiva: Red       Patient Vitals for the past 8 hrs:   BP Temp Temp src Pulse Resp SpO2 Weight   01/06/24 0945 (!) 143/76 -- -- 76 -- -- --   01/06/24 0615 -- -- -- -- -- 94 % --   01/06/24 0600 (!) 133/57 97.9 °F (36.6 °C) Oral 77 15 -- 74.8 kg (164 lb 12.8 oz)       FAST EXAM: n/a    Central Nervous System    GCS Initial 15 minutes   Eye  Motor  Verbal 4 - Opens eyes on own  6 - Follows simple motor commands  5 - Alert and oriented 4 - Opens eyes on own  6 - Follows simple motor commands  5 - Alert and oriented     Neuromuscular blockade: No  Pupil size:  Left 4 mm    Right 4 mm  Pupil reaction: Yes  Wiggles fingers: Left Yes Right Yes  Wiggles toes: Left Yes    Right Yes    Hand grasp:   Left normal       Right normal  Plantar flexion: Left normal     Right normal  Loss of consciousness: No:     History Obtained From:  patient    Past Medical History:   Diagnosis Date    Depression     Diabetes mellitus (HCC)     Hyperlipidemia     Hypertension     Thyroid disease     Type 2 diabetes mellitus without complication (HCC)          Past Surgical History:   Procedure Laterality Date    CHOLECYSTECTOMY      COLONOSCOPY      EYE SURGERY      cataract both eyes    HYSTERECTOMY (CERVIX STATUS UNKNOWN)      JOINT REPLACEMENT Left     KNEE    PAIN MANAGEMENT PROCEDURE Bilateral

## 2024-01-06 NOTE — PLAN OF CARE
Problem: Discharge Planning  Goal: Discharge to home or other facility with appropriate resources  1/6/2024 1121 by Marcia Mcintyre RN  Outcome: Progressing  1/6/2024 0009 by Cristiana Nunes RN  Outcome: Progressing     Problem: Pain  Goal: Verbalizes/displays adequate comfort level or baseline comfort level  1/6/2024 1121 by Marcia Mcintyre RN  Outcome: Progressing  1/6/2024 0009 by Cristiana Nunes RN  Outcome: Progressing     Problem: Safety - Adult  Goal: Free from fall injury  1/6/2024 1121 by Marcia Mcintyre RN  Outcome: Progressing  1/6/2024 0009 by Cristiana Nunes RN  Outcome: Progressing

## 2024-01-07 ENCOUNTER — APPOINTMENT (OUTPATIENT)
Dept: GENERAL RADIOLOGY | Age: 84
DRG: 566 | End: 2024-01-07
Payer: MEDICARE

## 2024-01-07 LAB
ANION GAP SERPL CALCULATED.3IONS-SCNC: 13 MMOL/L (ref 7–16)
BASOPHILS # BLD: 0 K/UL (ref 0–0.2)
BASOPHILS NFR BLD: 0 % (ref 0–2)
BUN SERPL-MCNC: 14 MG/DL (ref 6–23)
CALCIUM SERPL-MCNC: 8.8 MG/DL (ref 8.6–10.2)
CHLORIDE SERPL-SCNC: 103 MMOL/L (ref 98–107)
CO2 SERPL-SCNC: 21 MMOL/L (ref 22–29)
CREAT SERPL-MCNC: 1 MG/DL (ref 0.5–1)
EOSINOPHIL # BLD: 0 K/UL (ref 0.05–0.5)
EOSINOPHILS RELATIVE PERCENT: 0 % (ref 0–6)
ERYTHROCYTE [DISTWIDTH] IN BLOOD BY AUTOMATED COUNT: 17.4 % (ref 11.5–15)
GFR SERPL CREATININE-BSD FRML MDRD: 59 ML/MIN/1.73M2
GLUCOSE BLD-MCNC: 190 MG/DL (ref 74–99)
GLUCOSE BLD-MCNC: 201 MG/DL (ref 74–99)
GLUCOSE BLD-MCNC: 243 MG/DL (ref 74–99)
GLUCOSE BLD-MCNC: 284 MG/DL (ref 74–99)
GLUCOSE SERPL-MCNC: 169 MG/DL (ref 74–99)
HCT VFR BLD AUTO: 34.5 % (ref 34–48)
HGB BLD-MCNC: 10.7 G/DL (ref 11.5–15.5)
LYMPHOCYTES NFR BLD: 1.56 K/UL (ref 1.5–4)
LYMPHOCYTES RELATIVE PERCENT: 9 % (ref 20–42)
MCH RBC QN AUTO: 27 PG (ref 26–35)
MCHC RBC AUTO-ENTMCNC: 31 G/DL (ref 32–34.5)
MCV RBC AUTO: 87.1 FL (ref 80–99.9)
MONOCYTES NFR BLD: 13 % (ref 2–12)
MONOCYTES NFR BLD: 2.34 K/UL (ref 0.1–0.95)
NEUTROPHILS NFR BLD: 78 % (ref 43–80)
NEUTS SEG NFR BLD: 13.9 K/UL (ref 1.8–7.3)
PLATELET CONFIRMATION: NORMAL
PLATELET, FLUORESCENCE: 256 K/UL (ref 130–450)
PMV BLD AUTO: 10.3 FL (ref 7–12)
POTASSIUM SERPL-SCNC: 4 MMOL/L (ref 3.5–5)
RBC # BLD AUTO: 3.96 M/UL (ref 3.5–5.5)
RBC # BLD: ABNORMAL 10*6/UL
SODIUM SERPL-SCNC: 137 MMOL/L (ref 132–146)
WBC OTHER # BLD: 17.8 K/UL (ref 4.5–11.5)

## 2024-01-07 PROCEDURE — 71045 X-RAY EXAM CHEST 1 VIEW: CPT

## 2024-01-07 PROCEDURE — 80048 BASIC METABOLIC PNL TOTAL CA: CPT

## 2024-01-07 PROCEDURE — 6360000002 HC RX W HCPCS: Performed by: INTERNAL MEDICINE

## 2024-01-07 PROCEDURE — 85025 COMPLETE CBC W/AUTO DIFF WBC: CPT

## 2024-01-07 PROCEDURE — 2580000003 HC RX 258: Performed by: INTERNAL MEDICINE

## 2024-01-07 PROCEDURE — 6360000002 HC RX W HCPCS

## 2024-01-07 PROCEDURE — 2580000003 HC RX 258

## 2024-01-07 PROCEDURE — 6370000000 HC RX 637 (ALT 250 FOR IP): Performed by: INTERNAL MEDICINE

## 2024-01-07 PROCEDURE — 82962 GLUCOSE BLOOD TEST: CPT

## 2024-01-07 PROCEDURE — 1200000000 HC SEMI PRIVATE

## 2024-01-07 PROCEDURE — 94640 AIRWAY INHALATION TREATMENT: CPT

## 2024-01-07 PROCEDURE — 36415 COLL VENOUS BLD VENIPUNCTURE: CPT

## 2024-01-07 RX ORDER — ALBUTEROL SULFATE 2.5 MG/3ML
2.5 SOLUTION RESPIRATORY (INHALATION)
Status: DISCONTINUED | OUTPATIENT
Start: 2024-01-07 | End: 2024-01-08 | Stop reason: HOSPADM

## 2024-01-07 RX ADMIN — OLANZAPINE 5 MG: 5 TABLET, FILM COATED ORAL at 20:29

## 2024-01-07 RX ADMIN — INSULIN LISPRO 2 UNITS: 100 INJECTION, SOLUTION INTRAVENOUS; SUBCUTANEOUS at 16:56

## 2024-01-07 RX ADMIN — ENOXAPARIN SODIUM 40 MG: 100 INJECTION SUBCUTANEOUS at 08:11

## 2024-01-07 RX ADMIN — GLIPIZIDE 10 MG: 5 TABLET ORAL at 06:31

## 2024-01-07 RX ADMIN — ALBUTEROL SULFATE 2.5 MG: 2.5 SOLUTION RESPIRATORY (INHALATION) at 14:49

## 2024-01-07 RX ADMIN — ALBUTEROL SULFATE 2.5 MG: 2.5 SOLUTION RESPIRATORY (INHALATION) at 18:41

## 2024-01-07 RX ADMIN — Medication 3 MG: at 20:29

## 2024-01-07 RX ADMIN — LOSARTAN POTASSIUM 100 MG: 100 TABLET, FILM COATED ORAL at 08:11

## 2024-01-07 RX ADMIN — DICLOFENAC SODIUM 2 G: 10 GEL TOPICAL at 08:14

## 2024-01-07 RX ADMIN — HYDROCODONE BITARTRATE AND ACETAMINOPHEN 1 TABLET: 5; 325 TABLET ORAL at 14:52

## 2024-01-07 RX ADMIN — FLUOXETINE HYDROCHLORIDE 40 MG: 20 CAPSULE ORAL at 20:29

## 2024-01-07 RX ADMIN — PANTOPRAZOLE SODIUM 40 MG: 40 TABLET, DELAYED RELEASE ORAL at 07:10

## 2024-01-07 RX ADMIN — DICLOFENAC SODIUM 2 G: 10 GEL TOPICAL at 20:32

## 2024-01-07 RX ADMIN — ORPHENADRINE CITRATE 100 MG: 100 TABLET, EXTENDED RELEASE ORAL at 08:11

## 2024-01-07 RX ADMIN — ORPHENADRINE CITRATE 100 MG: 100 TABLET, EXTENDED RELEASE ORAL at 20:29

## 2024-01-07 RX ADMIN — TRAZODONE HYDROCHLORIDE 50 MG: 50 TABLET ORAL at 20:29

## 2024-01-07 RX ADMIN — BUPROPION HYDROCHLORIDE 300 MG: 150 TABLET, EXTENDED RELEASE ORAL at 08:11

## 2024-01-07 RX ADMIN — METOPROLOL SUCCINATE 50 MG: 50 TABLET, EXTENDED RELEASE ORAL at 08:11

## 2024-01-07 RX ADMIN — WATER 1000 MG: 1 INJECTION INTRAMUSCULAR; INTRAVENOUS; SUBCUTANEOUS at 12:11

## 2024-01-07 RX ADMIN — MORPHINE SULFATE 2 MG: 2 INJECTION, SOLUTION INTRAMUSCULAR; INTRAVENOUS at 16:56

## 2024-01-07 RX ADMIN — HYDROCODONE BITARTRATE AND ACETAMINOPHEN 1 TABLET: 5; 325 TABLET ORAL at 08:11

## 2024-01-07 RX ADMIN — SODIUM CHLORIDE: 9 INJECTION, SOLUTION INTRAVENOUS at 08:20

## 2024-01-07 RX ADMIN — LEVOTHYROXINE SODIUM 75 MCG: 0.05 TABLET ORAL at 06:37

## 2024-01-07 RX ADMIN — INSULIN LISPRO 2 UNITS: 100 INJECTION, SOLUTION INTRAVENOUS; SUBCUTANEOUS at 12:11

## 2024-01-07 ASSESSMENT — PAIN SCALES - GENERAL
PAINLEVEL_OUTOF10: 7
PAINLEVEL_OUTOF10: 8

## 2024-01-07 NOTE — PLAN OF CARE
Problem: Discharge Planning  Goal: Discharge to home or other facility with appropriate resources  1/7/2024 1014 by Marcia Mcintyre RN  Outcome: Progressing  1/7/2024 0211 by Grace Sam RN  Outcome: Progressing     Problem: Pain  Goal: Verbalizes/displays adequate comfort level or baseline comfort level  1/7/2024 1014 by Marcia Mcintyre RN  Outcome: Progressing  1/7/2024 0211 by Grace Sam, RN  Outcome: Progressing     Problem: Safety - Adult  Goal: Free from fall injury  1/7/2024 1014 by Marcia Mcintyre RN  Outcome: Progressing  1/7/2024 0211 by Grace Sam, RN  Outcome: Progressing

## 2024-01-08 VITALS
WEIGHT: 164.8 LBS | HEART RATE: 65 BPM | HEIGHT: 64 IN | OXYGEN SATURATION: 90 % | TEMPERATURE: 97.2 F | RESPIRATION RATE: 18 BRPM | BODY MASS INDEX: 28.13 KG/M2 | DIASTOLIC BLOOD PRESSURE: 64 MMHG | SYSTOLIC BLOOD PRESSURE: 138 MMHG

## 2024-01-08 LAB
ANION GAP SERPL CALCULATED.3IONS-SCNC: 9 MMOL/L (ref 7–16)
BASOPHILS # BLD: 0.02 K/UL (ref 0–0.2)
BASOPHILS NFR BLD: 0 % (ref 0–2)
BUN SERPL-MCNC: 15 MG/DL (ref 6–23)
CALCIUM SERPL-MCNC: 8.6 MG/DL (ref 8.6–10.2)
CHLORIDE SERPL-SCNC: 108 MMOL/L (ref 98–107)
CO2 SERPL-SCNC: 22 MMOL/L (ref 22–29)
CREAT SERPL-MCNC: 1 MG/DL (ref 0.5–1)
EOSINOPHIL # BLD: 0.19 K/UL (ref 0.05–0.5)
EOSINOPHILS RELATIVE PERCENT: 2 % (ref 0–6)
ERYTHROCYTE [DISTWIDTH] IN BLOOD BY AUTOMATED COUNT: 17.2 % (ref 11.5–15)
GFR SERPL CREATININE-BSD FRML MDRD: 55 ML/MIN/1.73M2
GLUCOSE BLD-MCNC: 182 MG/DL (ref 74–99)
GLUCOSE BLD-MCNC: 205 MG/DL (ref 74–99)
GLUCOSE BLD-MCNC: 304 MG/DL (ref 74–99)
GLUCOSE SERPL-MCNC: 167 MG/DL (ref 74–99)
HCT VFR BLD AUTO: 31.2 % (ref 34–48)
HGB BLD-MCNC: 9.7 G/DL (ref 11.5–15.5)
IMM GRANULOCYTES # BLD AUTO: 0.07 K/UL (ref 0–0.58)
IMM GRANULOCYTES NFR BLD: 1 % (ref 0–5)
LYMPHOCYTES NFR BLD: 1.9 K/UL (ref 1.5–4)
LYMPHOCYTES RELATIVE PERCENT: 18 % (ref 20–42)
MCH RBC QN AUTO: 26.6 PG (ref 26–35)
MCHC RBC AUTO-ENTMCNC: 31.1 G/DL (ref 32–34.5)
MCV RBC AUTO: 85.7 FL (ref 80–99.9)
MONOCYTES NFR BLD: 1.18 K/UL (ref 0.1–0.95)
MONOCYTES NFR BLD: 11 % (ref 2–12)
NEUTROPHILS NFR BLD: 69 % (ref 43–80)
NEUTS SEG NFR BLD: 7.52 K/UL (ref 1.8–7.3)
PLATELET # BLD AUTO: 295 K/UL (ref 130–450)
PMV BLD AUTO: 9.4 FL (ref 7–12)
POTASSIUM SERPL-SCNC: 3.6 MMOL/L (ref 3.5–5)
RBC # BLD AUTO: 3.64 M/UL (ref 3.5–5.5)
SODIUM SERPL-SCNC: 139 MMOL/L (ref 132–146)
WBC OTHER # BLD: 10.9 K/UL (ref 4.5–11.5)

## 2024-01-08 PROCEDURE — 6370000000 HC RX 637 (ALT 250 FOR IP): Performed by: INTERNAL MEDICINE

## 2024-01-08 PROCEDURE — 94640 AIRWAY INHALATION TREATMENT: CPT

## 2024-01-08 PROCEDURE — 80048 BASIC METABOLIC PNL TOTAL CA: CPT

## 2024-01-08 PROCEDURE — 6360000002 HC RX W HCPCS: Performed by: INTERNAL MEDICINE

## 2024-01-08 PROCEDURE — 2580000003 HC RX 258: Performed by: INTERNAL MEDICINE

## 2024-01-08 PROCEDURE — 97110 THERAPEUTIC EXERCISES: CPT

## 2024-01-08 PROCEDURE — 36415 COLL VENOUS BLD VENIPUNCTURE: CPT

## 2024-01-08 PROCEDURE — 85025 COMPLETE CBC W/AUTO DIFF WBC: CPT

## 2024-01-08 PROCEDURE — 82962 GLUCOSE BLOOD TEST: CPT

## 2024-01-08 PROCEDURE — 2580000003 HC RX 258

## 2024-01-08 PROCEDURE — 6360000002 HC RX W HCPCS

## 2024-01-08 PROCEDURE — 97165 OT EVAL LOW COMPLEX 30 MIN: CPT

## 2024-01-08 PROCEDURE — 97535 SELF CARE MNGMENT TRAINING: CPT

## 2024-01-08 RX ORDER — HYDROCODONE BITARTRATE AND ACETAMINOPHEN 5; 325 MG/1; MG/1
1 TABLET ORAL EVERY 6 HOURS PRN
Qty: 42 TABLET | Refills: 0 | Status: SHIPPED | OUTPATIENT
Start: 2024-01-08 | End: 2024-01-15

## 2024-01-08 RX ORDER — ORPHENADRINE CITRATE 100 MG/1
100 TABLET, EXTENDED RELEASE ORAL 2 TIMES DAILY
Qty: 14 TABLET | Refills: 0 | Status: SHIPPED | OUTPATIENT
Start: 2024-01-08 | End: 2024-01-15

## 2024-01-08 RX ADMIN — WATER 1000 MG: 1 INJECTION INTRAMUSCULAR; INTRAVENOUS; SUBCUTANEOUS at 11:38

## 2024-01-08 RX ADMIN — ENOXAPARIN SODIUM 40 MG: 100 INJECTION SUBCUTANEOUS at 08:58

## 2024-01-08 RX ADMIN — GLIPIZIDE 10 MG: 5 TABLET ORAL at 05:41

## 2024-01-08 RX ADMIN — PANTOPRAZOLE SODIUM 40 MG: 40 TABLET, DELAYED RELEASE ORAL at 08:57

## 2024-01-08 RX ADMIN — HYDROCODONE BITARTRATE AND ACETAMINOPHEN 1 TABLET: 5; 325 TABLET ORAL at 16:41

## 2024-01-08 RX ADMIN — HYDROCODONE BITARTRATE AND ACETAMINOPHEN 1 TABLET: 5; 325 TABLET ORAL at 09:06

## 2024-01-08 RX ADMIN — ALBUTEROL SULFATE 2.5 MG: 2.5 SOLUTION RESPIRATORY (INHALATION) at 09:46

## 2024-01-08 RX ADMIN — LOSARTAN POTASSIUM 100 MG: 100 TABLET, FILM COATED ORAL at 08:57

## 2024-01-08 RX ADMIN — INSULIN LISPRO 6 UNITS: 100 INJECTION, SOLUTION INTRAVENOUS; SUBCUTANEOUS at 11:34

## 2024-01-08 RX ADMIN — LEVOTHYROXINE SODIUM 75 MCG: 0.05 TABLET ORAL at 05:42

## 2024-01-08 RX ADMIN — SODIUM CHLORIDE: 9 INJECTION, SOLUTION INTRAVENOUS at 00:47

## 2024-01-08 RX ADMIN — METOPROLOL SUCCINATE 50 MG: 50 TABLET, EXTENDED RELEASE ORAL at 08:57

## 2024-01-08 RX ADMIN — INSULIN LISPRO 2 UNITS: 100 INJECTION, SOLUTION INTRAVENOUS; SUBCUTANEOUS at 16:43

## 2024-01-08 RX ADMIN — MORPHINE SULFATE 2 MG: 2 INJECTION, SOLUTION INTRAMUSCULAR; INTRAVENOUS at 11:38

## 2024-01-08 RX ADMIN — ALBUTEROL SULFATE 2.5 MG: 2.5 SOLUTION RESPIRATORY (INHALATION) at 12:48

## 2024-01-08 RX ADMIN — HYDROCODONE BITARTRATE AND ACETAMINOPHEN 1 TABLET: 5; 325 TABLET ORAL at 00:44

## 2024-01-08 RX ADMIN — ALBUTEROL SULFATE 2.5 MG: 2.5 SOLUTION RESPIRATORY (INHALATION) at 06:23

## 2024-01-08 RX ADMIN — DICLOFENAC SODIUM 2 G: 10 GEL TOPICAL at 09:02

## 2024-01-08 RX ADMIN — BUPROPION HYDROCHLORIDE 300 MG: 150 TABLET, EXTENDED RELEASE ORAL at 08:57

## 2024-01-08 RX ADMIN — INSULIN GLARGINE 20 UNITS: 100 INJECTION, SOLUTION SUBCUTANEOUS at 08:58

## 2024-01-08 RX ADMIN — ORPHENADRINE CITRATE 100 MG: 100 TABLET, EXTENDED RELEASE ORAL at 09:09

## 2024-01-08 ASSESSMENT — PAIN DESCRIPTION - LOCATION
LOCATION: STERNUM
LOCATION: BACK;CHEST
LOCATION: STERNUM
LOCATION: STERNUM

## 2024-01-08 ASSESSMENT — PAIN DESCRIPTION - DESCRIPTORS
DESCRIPTORS: ACHING;SORE;TENDER
DESCRIPTORS: ACHING;THROBBING
DESCRIPTORS: ACHING;SHOOTING;SHARP
DESCRIPTORS: TENDER;SORE;DISCOMFORT

## 2024-01-08 ASSESSMENT — PAIN SCALES - GENERAL
PAINLEVEL_OUTOF10: 9
PAINLEVEL_OUTOF10: 8
PAINLEVEL_OUTOF10: 0

## 2024-01-08 ASSESSMENT — PAIN DESCRIPTION - ORIENTATION: ORIENTATION: MID

## 2024-01-08 ASSESSMENT — PAIN - FUNCTIONAL ASSESSMENT: PAIN_FUNCTIONAL_ASSESSMENT: PREVENTS OR INTERFERES SOME ACTIVE ACTIVITIES AND ADLS

## 2024-01-08 NOTE — PROGRESS NOTES
4 Eyes Skin Assessment     NAME:  Kylee Wang  YOB: 1940  MEDICAL RECORD NUMBER:  [id number]    The patient is being assessed for  Admission    I agree that at least one RN has performed a thorough Head to Toe Skin Assessment on the patient. ALL assessment sites listed below have been assessed.      Areas assessed by both nurses:    Head, Face, Ears, Shoulders, Back, Chest, Arms, Elbows, Hands, Sacrum. Buttock, Coccyx, Ischium, Legs. Feet and Heels, and Under Medical Devices         Does the Patient have a Wound? No noted wound(s)       Efrain Prevention initiated by RN: No  Wound Care Orders initiated by RN: No    Pressure Injury (Stage 3,4, Unstageable, DTI, NWPT, and Complex wounds) if present, place Wound referral order by RN under : No    New Ostomies, if present place, Ostomy referral order under : No     Nurse 1 eSignature: Electronically signed by Marcia Mcintyre RN on 1/5/24 at 6:21 PM EST    **SHARE this note so that the co-signing nurse can place an eSignature**    Nurse 2 eSignature: Electronically signed by Carmenza Jacobson RN on 1/5/24 at 6:21 PM EST   
Internal Medicine Progress Note    [facility]    Cuate Christianson D.O., LORIEI.                    Jose Jones D.O., ULISSES.                             Shawn Caraballo D.O.       Desirae Bonilla, MSN, APRN, NP-C  Aren Zaldivar, MSN, APRN-CNP  Augustus Swartz, MSN, APRN-CNP     Primary Care Physician: Roxie Maldonado MD   Admitting Physician:  Jose Jones DO  Admission date and time: 1/4/2024  9:59 PM    Room:  [address] [place], [place] [address]  Admitting diagnosis: Hyperglycemia [R73.9]  Elevated brain natriuretic peptide (BNP) level [R79.89]  Sternal fracture with retrosternal contusion, closed, initial encounter [S22.20XA]  Closed fracture of sternum, unspecified portion of sternum, initial encounter [S22.20XA]    Patient Name: Kylee Wang  MRN: [id number]    Date of Service: 1/7/2024     Subjective:  Kylee is a 83 y.o. female who was seen and examined today,1/7/2024, at the bedside.  Patient is complaining of some midsternal pain today.  She states it continue to hurt with a deep breath and lungs appear somewhat diminished.  Have recommended continuing pain medication and will repeat chest x-ray    No family present during my examination.    Review of System:   Constitutional:   Denies fever or chills, weight loss or gain, fatigue or malaise.  HEENT:   Denies ear pain, sore throat, sinus or eye problems.  Cardiovascular:   Denies any chest pain, irregular heartbeats, or palpitations.   Respiratory:   Denies shortness of breath, coughing, sputum production, hemoptysis, or wheezing.  Complain of chest pain over sternal area  Gastrointestinal:   Denies nausea, vomiting, diarrhea, or constipation.  Denies any abdominal pain.  Genitourinary:    Denies any urgency, frequency, hematuria. Voiding  without difficulty.  Extremities:   Denies lower extremity swelling, edema or cyanosis.   Neurology:    Denies any headache or focal neurological deficits, Denies generalized weakness or memory difficulty.   Psch: 
Name: Kylee Wang  : 1940  MRN: [id number]    Date: 2024    Benefits of immediately proceeding with Radiology exam outweigh the risks and therefore the following is being waived:      [] Pregnancy test    [] Protocol for Iodine allergy    [] MRI questionnaire    [x] BUN/Creatinine        Melania Machado MD    
OCCUPATIONAL THERAPY INITIAL EVALUATION    [facility]  [address] [place]. [place]        Date:2024                                                  Patient Name: Kylee Wang    MRN: [id number]    : 1940    Room: [address] [place] [address]      Evaluating OT: Jayda Chu OTR/L; [id number]     Referring Provider and Specific Provider Orders/Date:      24 0715  OT eval and treat  (PT/OT CONSULT PANEL)  ONE TIME   Jose Jones, DO            Placement Recommendation: [facility]OT        Diagnosis:   1. Closed fracture of sternum, unspecified portion of sternum, initial encounter    2. Hyperglycemia    3. Elevated brain natriuretic peptide (BNP) level    4. Sternal fracture         Surgery: none       Pertinent Medical History:       Past Medical History:   Diagnosis Date    Depression     Diabetes mellitus (HCC)     Hyperlipidemia     Hypertension     Thyroid disease     Type 2 diabetes mellitus without complication (HCC)          Past Surgical History:   Procedure Laterality Date    CHOLECYSTECTOMY      COLONOSCOPY      EYE SURGERY      cataract both eyes    HYSTERECTOMY (CERVIX STATUS UNKNOWN)      JOINT REPLACEMENT Left     KNEE    PAIN MANAGEMENT PROCEDURE Bilateral 2022    BILATERAL THORACIC FACET MEDIAL BRANCH BLOCK AT T8, 9, 10 (CPT 95637) performed by Ozzy aHro MD at [facility] OR    PAIN MANAGEMENT PROCEDURE N/A 10/6/2022    EPIDURAL STEROID INJECTION L3-4 WITH 40 DEPO performed by Ozzy Haro MD at [facility] OR    PAIN MANAGEMENT PROCEDURE Bilateral 2022    BILATERAL T8,9,10 MEDIAL BRANCH BLOCK performed by Ozzy Haro MD at [facility] OR    SHOULDER SURGERY  rt rotator    UPPER GASTROINTESTINAL ENDOSCOPY  5/15//12    toni      Precautions:  Fall Risk, sternal fx     CTA PULMONARY W CONTRAST 24  No evidence for acute pulmonary embolism.   Small to moderate hiatal hernia.   Deformity of the mid sternum.  Acute or subacute 
Physical Therapy Treatment Note/Plan of Care    Room #:  0314/0314-01  Patient Name: Kylee Wang  YOB: 1940  MRN: [id number]     Date of Service: 1/8/2024     Tentative placement recommendation: Subacute unless patient meets goals then Home Health Physical Therapy  Equipment recommendation: Patient has needed equipment       Evaluating Physical Therapist: Shravan Medina PT Lic.  #[id number]      Specific Provider Orders/Date/Referring Provider :  01/05/24 0715    PT eval and treat  Start:  01/05/24 0715,   End:  01/05/24 0715,   ONE TIME,   Standing Count:  1 Occurrences,   R       Jose Jones DO    Admitting Diagnosis:   Hyperglycemia [R73.9]  Elevated brain natriuretic peptide (BNP) level [R79.89]  Sternal fracture with retrosternal contusion, closed, initial encounter [S22.20XA]  Closed fracture of sternum, unspecified portion of sternum, initial encounter [S22.20XA]       Surgery: none  Visit Diagnoses         Codes    Closed fracture of sternum, unspecified portion of sternum, initial encounter    -  Primary S22.20XA    Hyperglycemia     R73.9    Elevated brain natriuretic peptide (BNP) level     R79.89            Patient Active Problem List   Diagnosis    Hypertension    Dyspnea on exertion    Osteopenia of the elderly    Gastroesophageal reflux disease without esophagitis    Autoimmune hypothyroidism    Moderate major depression (HCC)    Nonrheumatic tricuspid valve regurgitation    Hypochromic microcytic anemia    Hyperlipidemia    Depression    Pulmonary hypertension (HCC)    Chronic midline thoracic back pain    Onychomycosis    Pain of toe of left foot    Solid nodule of lung 6 mm to 8 mm in diameter    Fall in elderly patient    Acute midline thoracic back pain    Drug-induced diarrhea    Compression fracture of thoracic vertebra (HCC)    Thoracic facet joint syndrome    Thoracic spondylosis    Thoracic disc disease    Cervical spondylosis    Cervical disc disorder    Cervical facet joint 
instructing and counseling the patient. Time I spent with the family or surrogate(s) is included only if the patient was incapable of providing the necessary information or participating in medical decisions. I also discussed the differential diagnosis and all of the proposed management plans with the patient and individuals accompanying the patient.    Cuate Christianson DO, F.A.C.O.I.  1/6/2024  3:12 PM    
session  %     Patient education  Patient educated on role of Physical Therapy, risks of immobility, safety and plan of care and  importance of mobility while in hospital      Patient response to education:   Pt verbalized understanding Pt demonstrated skill Pt requires further education in this area   Yes Partial Yes      Treatment:  Patient practiced and was instructed/facilitated in the following treatment: Patient   Sat edge of bed 5 minutes with Independent to increase dynamic sitting balance and activity tolerance. Stood and ambulated in room. RTB     Therapeutic Exercises:  not performed  x   reps.       At end of session, patient in bed with alarm call light and phone within reach,  all lines and tubes intact, nursing notified.      Patient would benefit from continued skilled Physical Therapy to improve functional independence and quality of life.         Patient's/ family goals   home    Time in  1515  Time out  1545    Total Treatment Time  10 minutes    Evaluation time includes thorough review of current medical information, gathering information on past medical history/social history and prior level of function, completion of standardized testing/informal observation of tasks, assessment of data, and development of Plan of care and goals.     CPT codes:  Low Complexity PT evaluation (38496)  Therapeutic activities (46419)   10 minutes  1 unit(s)    Shravan Medina, PT

## 2024-01-08 NOTE — DISCHARGE SUMMARY
Internal Medicine Progress Note     [facility]     Cuate Christianson D.O., AMARA Jones D.O., AMARA Caraballo D.O.     Desirae Bonilla, MSN, APRN, NP-C  Aren Zaldivar, MSN, APRN-CNP  Augustus Swartz, MSN, APRN, NP-C       Internal Medicine  Discharge Summary    NAME: Kylee Wang  :  1940  MRN:  [id number]  PCP:Roxie Maldonado MD  ADMITTED: 2024      DISCHARGED: 24    ADMITTING PHYSICIAN: Jose Jones DO    CONSULTANT(S):   IP CONSULT TO GENERAL SURGERY  IP CONSULT TO SOCIAL WORK  IP CONSULT TO SOCIAL WORK     ADMITTING DIAGNOSIS:   Hyperglycemia [R73.9]  Elevated brain natriuretic peptide (BNP) level [R79.89]  Sternal fracture with retrosternal contusion, closed, initial encounter [S22.20XA]  Closed fracture of sternum, unspecified portion of sternum, initial encounter [S22.20XA]     DISCHARGE DIAGNOSES:   Sternal fracture  Hypertensive urgency  Insulin-dependent diabetes mellitus type 2 with marked hyperglycemia  Hypothyroidism  Generalized anxiety and depression  Normochromic normocytic anemia    BRIEF HISTORY OF PRESENT ILLNESS:   Kylee is a an 83-year-old female who presented to [facility] emergency department for the evaluation of intractable sternal pain.  She states that she was exercising on  and felt a pop near her sternal region.  This resulted in significant pain and she has been delaying seeing a physician attempting to control the symptoms at home.  Unfortunately, symptoms progressed to a point where she was no longer capable of completing activities of daily living.  She presented to the hospital where she was found to have suffered a sternal fracture.  She was too weak to ambulate and it was determined she will require admission to the hospital for possible consideration of skilled nursing facility placement.  She was also markedly hypertensive on presentation with mild renal insufficiency and

## 2024-01-08 NOTE — CARE COORDINATION
CM note: Discharge order noted.  Pt was active with [facility] prior to admission.  Added therapy at home to [facility] orders and notified liaison of pending discharge today.

## 2024-01-09 ENCOUNTER — CARE COORDINATION (OUTPATIENT)
Dept: CARE COORDINATION | Age: 84
End: 2024-01-09

## 2024-01-09 DIAGNOSIS — S22.20XA STERNAL FRACTURE WITH RETROSTERNAL CONTUSION, CLOSED, INITIAL ENCOUNTER: Primary | ICD-10-CM

## 2024-01-09 PROCEDURE — 1111F DSCHRG MED/CURRENT MED MERGE: CPT | Performed by: INTERNAL MEDICINE

## 2024-01-09 NOTE — CARE COORDINATION
CM note: 1/9/2024  10:06 AM  Received a message from [facility] jacky Saha stating patient has not been active with them since 2022.  She requested CM check with [facility] as patient has been active with them since then and they too have a liaison Maria A.  It's possible weekend SW confused companies when documenting her notifications.  [facility] liaison states patient was not active with them and they did not have any communication on this patient.  CM requested liaison review and see if able to accept patient for tomorrow as patient has already discharged.

## 2024-01-09 NOTE — CARE COORDINATION
Care Transitions Initial Follow Up Call    Call within 2 business days of discharge: Yes    Patient Current Location:  Home: [address] [place] [address]    Care Transition Nurse contacted the family by telephone to perform post hospital discharge assessment. Verified name and  with family as identifiers. Provided introduction to self, and explanation of the Care Transition Nurse role.     Patient: Kylee Wang Patient : 1940   MRN: <[id number]>  Reason for Admission: 2024 - 2024 [facility] IP. Sternal fracture with retrosternal contusion during exercise.  Discharge Date: 24 RARS: Readmission Risk Score: 13.4  NR  CT    [facility] sos 1/10/24    Hosp FU  12:45  Schedule an appointment with Colby Garcia MD (General Surgery) in 2 weeks     START taking:  diclofenac sodium (VOLTAREN)  HYDROcodone-acetaminophen (NORCO)  orphenadrine (NORFLEX)  STOP taking:  meloxicam 15 MG tablet (MOBIC)  Last Discharge Facility       Date Complaint Diagnosis Description Type Department Provider    24 Chest Pain; Hyperglycemia Closed fracture of sternum, unspecified portion of sternum, initial encounter ... ED to Hosp-Admission (Discharged) (ADMITTED) [facility] SURG Jose Jones DO; Vince Machado...            Was this an external facility discharge? No Discharge Facility: [facility]    Challenges to be reviewed by the provider   Additional needs identified to be addressed with provider: No  none               Method of communication with provider: none.    CTN spoke w/ dtr/Vickie. States pt has some sternal pain w/cough and rising up to stand. Advised to use firm pillow support to hug during cough/sneeze/laugh/rising from chair, v/u. Has incentive spirometer and dtr enc daily use. Reviewed to use walker/cane support for falls prevention, v/u. Pt is active and showered today. States she ate before BS check and was 250 or 258. Instructed to take FBS first AM before

## 2024-01-17 ENCOUNTER — CARE COORDINATION (OUTPATIENT)
Dept: CARE COORDINATION | Age: 84
End: 2024-01-17

## 2024-01-17 NOTE — CARE COORDINATION
Care Transitions Follow Up Call    Patient Current Location:  [address] [address] [facility] [facility] [address]    Care Transition Nurse contacted the patient by telephone to follow up after admission.  Verified name and  with patient as identifiers.    Patient: Kylee Wang  Patient : 1940   MRN: [id number]  Reason for Admission:  2024 - 2024 [facility] IP. Sternal fracture with retrosternal contusion during exercise.  Discharge Date: 24 RARS: Readmission Risk Score: 13.4  NR  CT     [facility] sos 1/10/24     Hosp FU  12:45  Colby Garcia MD (General Surgery)  10:45    Needs to be reviewed by the provider   Additional needs identified to be addressed with provider: No  none             Method of communication with provider: none.    CTN spoke w/ Kylee. Pt reports chest/sternal pain is rated 5/10. Uses firm pillow for pressure support for rising/cough/sneeze. She uses ice for reduced swelling/comfort and shares she may try her heating pad to see if comforting for bruising/muscle. States she is resting well at night. No cough w/ congestion concerns. No resp concerns. She is using incentive spirometer BID. She is up and active w/ freq rest periods. Current [facility]. No home or med refill needs. States intention of attending her appts tomorrow.     Addressed changes since last contact:  none  Discussed follow-up appointments. If no appointment was previously scheduled, appointment scheduling offered: Yes.   Is follow up appointment scheduled within 7 days of discharge? Pending.    Follow Up  Future Appointments   Date Time Provider Department Center   2024 10:45 AM Colby Garcia MD [facility]   2024 12:45 PM Roxie Maldonado MD [facility]   2024 11:00 AM Colby Medrano Jr., DPSOHEILA ATWOODRUBIN POD [facility]   3/6/2024 11:15 AM Roxie Maldonado MD [facility]     External follow up appointment(s):     Care Transition Nurse reviewed red flags

## 2024-01-18 ENCOUNTER — OFFICE VISIT (OUTPATIENT)
Dept: PRIMARY CARE CLINIC | Age: 84
End: 2024-01-18

## 2024-01-18 VITALS
OXYGEN SATURATION: 97 % | BODY MASS INDEX: 28.29 KG/M2 | DIASTOLIC BLOOD PRESSURE: 72 MMHG | TEMPERATURE: 97.1 F | HEART RATE: 72 BPM | SYSTOLIC BLOOD PRESSURE: 130 MMHG | HEIGHT: 64 IN

## 2024-01-18 DIAGNOSIS — E11.22 TYPE 2 DIABETES MELLITUS WITH STAGE 3B CHRONIC KIDNEY DISEASE, WITH LONG-TERM CURRENT USE OF INSULIN (HCC): ICD-10-CM

## 2024-01-18 DIAGNOSIS — I27.20 PULMONARY HYPERTENSION (HCC): ICD-10-CM

## 2024-01-18 DIAGNOSIS — E08.65 DIABETES MELLITUS DUE TO UNDERLYING CONDITION, UNCONTROLLED, WITH HYPERGLYCEMIA, WITHOUT LONG-TERM CURRENT USE OF INSULIN (HCC): ICD-10-CM

## 2024-01-18 DIAGNOSIS — F32.1 MODERATE MAJOR DEPRESSION (HCC): ICD-10-CM

## 2024-01-18 DIAGNOSIS — Z09 HOSPITAL DISCHARGE FOLLOW-UP: Primary | ICD-10-CM

## 2024-01-18 DIAGNOSIS — Z79.4 TYPE 2 DIABETES MELLITUS WITH STAGE 3B CHRONIC KIDNEY DISEASE, WITH LONG-TERM CURRENT USE OF INSULIN (HCC): ICD-10-CM

## 2024-01-18 DIAGNOSIS — M81.0 SENILE OSTEOPOROSIS: ICD-10-CM

## 2024-01-18 DIAGNOSIS — N18.32 TYPE 2 DIABETES MELLITUS WITH STAGE 3B CHRONIC KIDNEY DISEASE, WITH LONG-TERM CURRENT USE OF INSULIN (HCC): ICD-10-CM

## 2024-01-18 PROBLEM — I48.92 ATRIAL FLUTTER (HCC): Status: RESOLVED | Noted: 2023-03-21 | Resolved: 2024-01-18

## 2024-01-18 RX ORDER — INSULIN GLARGINE 100 [IU]/ML
INJECTION, SOLUTION SUBCUTANEOUS
Qty: 10 ML | Refills: 3 | Status: SHIPPED | OUTPATIENT
Start: 2024-01-18

## 2024-01-18 RX ORDER — HYDROCODONE BITARTRATE AND ACETAMINOPHEN 5; 325 MG/1; MG/1
1 TABLET ORAL EVERY 6 HOURS PRN
COMMUNITY

## 2024-01-18 ASSESSMENT — PATIENT HEALTH QUESTIONNAIRE - PHQ9
4. FEELING TIRED OR HAVING LITTLE ENERGY: 0
SUM OF ALL RESPONSES TO PHQ QUESTIONS 1-9: 0
3. TROUBLE FALLING OR STAYING ASLEEP: 0
SUM OF ALL RESPONSES TO PHQ QUESTIONS 1-9: 0
1. LITTLE INTEREST OR PLEASURE IN DOING THINGS: 0
5. POOR APPETITE OR OVEREATING: 0
8. MOVING OR SPEAKING SO SLOWLY THAT OTHER PEOPLE COULD HAVE NOTICED. OR THE OPPOSITE, BEING SO FIGETY OR RESTLESS THAT YOU HAVE BEEN MOVING AROUND A LOT MORE THAN USUAL: 0
10. IF YOU CHECKED OFF ANY PROBLEMS, HOW DIFFICULT HAVE THESE PROBLEMS MADE IT FOR YOU TO DO YOUR WORK, TAKE CARE OF THINGS AT HOME, OR GET ALONG WITH OTHER PEOPLE: 0
SUM OF ALL RESPONSES TO PHQ9 QUESTIONS 1 & 2: 0
2. FEELING DOWN, DEPRESSED OR HOPELESS: 0
SUM OF ALL RESPONSES TO PHQ QUESTIONS 1-9: 0
9. THOUGHTS THAT YOU WOULD BE BETTER OFF DEAD, OR OF HURTING YOURSELF: 0
6. FEELING BAD ABOUT YOURSELF - OR THAT YOU ARE A FAILURE OR HAVE LET YOURSELF OR YOUR FAMILY DOWN: 0
SUM OF ALL RESPONSES TO PHQ QUESTIONS 1-9: 0
7. TROUBLE CONCENTRATING ON THINGS, SUCH AS READING THE NEWSPAPER OR WATCHING TELEVISION: 0

## 2024-01-18 NOTE — PROGRESS NOTES
Post-Discharge Transitional Care  Follow Up      Kylee Wang   YOB: 1940    Date of Office Visit:  1/18/2024  Date of Hospital Admission: 1/4/24  Date of Hospital Discharge: 1/8/24  Risk of hospital readmission (high >=14%. Medium >=10%) :Readmission Risk Score: 13.4      Care management risk score Rising risk (score 2-5) and Complex Care (Scores >=6): No Risk Score On File     Non face to face  following discharge, date last encounter closed (first attempt may have been earlier): 01/09/2024    Call initiated 2 business days of discharge: Yes    ASSESSMENT/PLAN:   Hospital discharge follow-up  -     SD DISCHARGE MEDS RECONCILED W/ CURRENT OUTPATIENT MED LIST  Senile osteoporosis  -     DEXA BONE DENSITY AXIAL SKELETON; Future  Pulmonary hypertension (HCC)  Moderate major depression (HCC)  Diabetes mellitus due to underlying condition, uncontrolled, with hyperglycemia, without long-term current use of insulin (HCC)  Type 2 diabetes mellitus with stage 3b chronic kidney disease, with long-term current use of insulin (HCC)    Sternal fracture  Hypertensive urgency  Insulin-dependent diabetes mellitus type 2 with marked hyperglycemia  Hypothyroidism  Generalized anxiety and depression  Normochromic normocytic anemia  Medical Decision Making: high complexity  Return in about 6 weeks (around 2/29/2024).    On this date 1/18/2024 I have spent 40  minutes reviewing previous notes, test results and face to face with the patient discussing the diagnosis and importance of compliance with the treatment plan as well as documenting on the day of the visit.       Subjective:   HPI:  Follow up of Hospital problems/diagnosis(es): Kylee is a an 83-year-old female who presented to [facility] emergency department for the evaluation of intractable sternal pain.  She states that she was exercising on December 26 and felt a pop near her sternal region.  This resulted in significant pain and she has been delaying

## 2024-01-24 ENCOUNTER — CARE COORDINATION (OUTPATIENT)
Dept: CARE COORDINATION | Age: 84
End: 2024-01-24

## 2024-01-24 NOTE — CARE COORDINATION
Care Transitions Follow Up Call    Patient Current Location:  Home: [address] [facility] [address]    Care Transition Nurse contacted the patient by telephone to follow up after admission.  Verified name and  with patient as identifiers.    Patient: Kylee Wang  Patient : 1940   MRN: [id number]  Reason for Admission:  2024 - 2024 [facility] IP. Sternal fracture with retrosternal contusion during exercise.  Discharge Date: 24 RARS: Readmission Risk Score: 13.4  NR  CT     [facility] sos 1/10/24     Hosp FU  12:45 (office /72, HR 72, Sat 97%) Attended.  Colby Garcia MD (General Surgery)  10:45. Canceled.    Needs to be reviewed by the provider   Additional needs identified to be addressed with provider: No  none             Method of communication with provider: none.    CTN spoke w/ Kylee.     Pt reports sternal discomfort rated 4/10. States she is working w/ [facility] and is up and active. No cough or congestion concerns. Continues to use walker and cane for support. No appetite or elimination concerns. Denies any home or med needs. States Gen Sx off told her no fu needed. Did attend PCP appt and no concerns. In good spirits.    Addressed changes since last contact:  none  Discussed follow-up appointments. If no appointment was previously scheduled, appointment scheduling offered: Yes.   Is follow up appointment scheduled within 7 days of discharge? No.    Follow Up  Future Appointments   Date Time Provider Department Center   2024 11:00 AM Colby Medrano Jr., DPM [facility]   2024 12:30 PM Roxie Maldonado MD [facility]   3/6/2024 11:15 AM Roxie Maldonado MD [facility]     External follow up appointment(s):     Care Transition Nurse reviewed red flags with patient and discussed any barriers to care and/or understanding of plan of care after discharge. Discussed appropriate site of care based on symptoms and

## 2024-02-06 ENCOUNTER — PROCEDURE VISIT (OUTPATIENT)
Dept: PODIATRY | Age: 84
End: 2024-02-06
Payer: MEDICARE

## 2024-02-06 VITALS — HEIGHT: 64 IN | WEIGHT: 164 LBS | BODY MASS INDEX: 28 KG/M2

## 2024-02-06 DIAGNOSIS — R26.2 DIFFICULTY WALKING: ICD-10-CM

## 2024-02-06 DIAGNOSIS — M79.674 PAIN OF TOE OF RIGHT FOOT: ICD-10-CM

## 2024-02-06 DIAGNOSIS — B35.1 ONYCHOMYCOSIS: Primary | ICD-10-CM

## 2024-02-06 DIAGNOSIS — I73.9 PVD (PERIPHERAL VASCULAR DISEASE) (HCC): ICD-10-CM

## 2024-02-06 DIAGNOSIS — E11.51 TYPE II DIABETES MELLITUS WITH PERIPHERAL CIRCULATORY DISORDER (HCC): ICD-10-CM

## 2024-02-06 DIAGNOSIS — I87.2 VENOUS INSUFFICIENCY (CHRONIC) (PERIPHERAL): ICD-10-CM

## 2024-02-06 DIAGNOSIS — M79.675 PAIN OF TOE OF LEFT FOOT: ICD-10-CM

## 2024-02-06 PROCEDURE — 11720 DEBRIDE NAIL 1-5: CPT | Performed by: PODIATRIST

## 2024-02-06 PROCEDURE — 99999 PR OFFICE/OUTPT VISIT,PROCEDURE ONLY: CPT | Performed by: PODIATRIST

## 2024-02-06 NOTE — PROGRESS NOTES
Patient in today for nail care. Patient does not have any complaints of pain at this time. Patient's PCP is Roxie Maldonado MD date of last ov 1/18/24          Meenakshi Velez LPN       
fissuring or macerations of the web spaces.  No plantar calluses and/or ulcerative areas are noted.  Patient is having difficulty with gait/walking.             Plan Per Assessment  Kylee was seen today for nail problem.    Diagnoses and all orders for this visit:    Onychomycosis    Pain of toe of right foot    Pain of toe of left foot    PVD (peripheral vascular disease) ([facility])    Type II diabetes mellitus with peripheral circulatory disorder ([facility])    Venous insufficiency (chronic) (peripheral)    Difficulty walking        1. Evaluation and Management  2. Manual and electrical debridement of the mycotic nails was performed for thickness and length to prevent injection and/or ulceration.  3. Discussed additional diabetic lower extremity care techniques with patient today.  4. We did discuss importance of shoe gear and foot inspection to prevent potential issues.  5. We will see the patient back at a later date for continued podiatric management and care.  Patient was advised to call the office with any questions or concerns prior to their next appointment if needed.        Seen By:    Colby Medrano Jr, DPM    Electronically signed by Colby Medrano Jr, DPM on 2/6/2024 at 11:22 AM

## 2024-02-19 ENCOUNTER — TELEPHONE (OUTPATIENT)
Dept: PRIMARY CARE CLINIC | Age: 84
End: 2024-02-19

## 2024-02-19 DIAGNOSIS — M48.061 SPINAL STENOSIS OF LUMBAR REGION WITHOUT NEUROGENIC CLAUDICATION: ICD-10-CM

## 2024-02-19 DIAGNOSIS — M47.814 THORACIC SPONDYLOSIS: ICD-10-CM

## 2024-02-19 DIAGNOSIS — M54.6 ACUTE MIDLINE THORACIC BACK PAIN: Primary | ICD-10-CM

## 2024-02-22 DIAGNOSIS — I10 PRIMARY HYPERTENSION: ICD-10-CM

## 2024-02-22 RX ORDER — METOPROLOL SUCCINATE 50 MG/1
50 TABLET, EXTENDED RELEASE ORAL DAILY
Qty: 90 TABLET | Refills: 0 | Status: SHIPPED | OUTPATIENT
Start: 2024-02-22

## 2024-02-26 ENCOUNTER — OFFICE VISIT (OUTPATIENT)
Dept: PRIMARY CARE CLINIC | Age: 84
End: 2024-02-26
Payer: MEDICARE

## 2024-02-26 VITALS
BODY MASS INDEX: 28.15 KG/M2 | SYSTOLIC BLOOD PRESSURE: 130 MMHG | HEIGHT: 64 IN | TEMPERATURE: 97.4 F | DIASTOLIC BLOOD PRESSURE: 78 MMHG | HEART RATE: 94 BPM | OXYGEN SATURATION: 92 %

## 2024-02-26 DIAGNOSIS — E06.3 AUTOIMMUNE HYPOTHYROIDISM: ICD-10-CM

## 2024-02-26 DIAGNOSIS — M54.6 CHRONIC MIDLINE THORACIC BACK PAIN: ICD-10-CM

## 2024-02-26 DIAGNOSIS — G89.29 CHRONIC LOW BACK PAIN WITH SCIATICA, SCIATICA LATERALITY UNSPECIFIED, UNSPECIFIED BACK PAIN LATERALITY: ICD-10-CM

## 2024-02-26 DIAGNOSIS — M54.40 CHRONIC LOW BACK PAIN WITH SCIATICA, SCIATICA LATERALITY UNSPECIFIED, UNSPECIFIED BACK PAIN LATERALITY: ICD-10-CM

## 2024-02-26 DIAGNOSIS — S22.20XA STERNAL FRACTURE WITH RETROSTERNAL CONTUSION, CLOSED, INITIAL ENCOUNTER: ICD-10-CM

## 2024-02-26 DIAGNOSIS — I27.20 PULMONARY HYPERTENSION (HCC): ICD-10-CM

## 2024-02-26 DIAGNOSIS — F32.1 MODERATE MAJOR DEPRESSION (HCC): ICD-10-CM

## 2024-02-26 DIAGNOSIS — M54.16 LUMBAR RADICULOPATHY: ICD-10-CM

## 2024-02-26 DIAGNOSIS — E11.22 TYPE 2 DIABETES MELLITUS WITH STAGE 3B CHRONIC KIDNEY DISEASE, WITH LONG-TERM CURRENT USE OF INSULIN (HCC): ICD-10-CM

## 2024-02-26 DIAGNOSIS — K21.9 GASTROESOPHAGEAL REFLUX DISEASE WITHOUT ESOPHAGITIS: ICD-10-CM

## 2024-02-26 DIAGNOSIS — Z79.4 TYPE 2 DIABETES MELLITUS WITH STAGE 3B CHRONIC KIDNEY DISEASE, WITH LONG-TERM CURRENT USE OF INSULIN (HCC): ICD-10-CM

## 2024-02-26 DIAGNOSIS — G89.29 CHRONIC MIDLINE THORACIC BACK PAIN: ICD-10-CM

## 2024-02-26 DIAGNOSIS — D64.9 ACUTE ON CHRONIC ANEMIA: Primary | ICD-10-CM

## 2024-02-26 DIAGNOSIS — I10 PRIMARY HYPERTENSION: ICD-10-CM

## 2024-02-26 DIAGNOSIS — M48.061 SPINAL STENOSIS OF LUMBAR REGION WITHOUT NEUROGENIC CLAUDICATION: ICD-10-CM

## 2024-02-26 DIAGNOSIS — N18.32 TYPE 2 DIABETES MELLITUS WITH STAGE 3B CHRONIC KIDNEY DISEASE, WITH LONG-TERM CURRENT USE OF INSULIN (HCC): ICD-10-CM

## 2024-02-26 PROCEDURE — 1090F PRES/ABSN URINE INCON ASSESS: CPT | Performed by: INTERNAL MEDICINE

## 2024-02-26 PROCEDURE — 3046F HEMOGLOBIN A1C LEVEL >9.0%: CPT | Performed by: INTERNAL MEDICINE

## 2024-02-26 PROCEDURE — 1123F ACP DISCUSS/DSCN MKR DOCD: CPT | Performed by: INTERNAL MEDICINE

## 2024-02-26 PROCEDURE — 1036F TOBACCO NON-USER: CPT | Performed by: INTERNAL MEDICINE

## 2024-02-26 PROCEDURE — G8427 DOCREV CUR MEDS BY ELIG CLIN: HCPCS | Performed by: INTERNAL MEDICINE

## 2024-02-26 PROCEDURE — G8484 FLU IMMUNIZE NO ADMIN: HCPCS | Performed by: INTERNAL MEDICINE

## 2024-02-26 PROCEDURE — 3078F DIAST BP <80 MM HG: CPT | Performed by: INTERNAL MEDICINE

## 2024-02-26 PROCEDURE — G8419 CALC BMI OUT NRM PARAM NOF/U: HCPCS | Performed by: INTERNAL MEDICINE

## 2024-02-26 PROCEDURE — 3075F SYST BP GE 130 - 139MM HG: CPT | Performed by: INTERNAL MEDICINE

## 2024-02-26 PROCEDURE — 99214 OFFICE O/P EST MOD 30 MIN: CPT | Performed by: INTERNAL MEDICINE

## 2024-02-26 PROCEDURE — G8399 PT W/DXA RESULTS DOCUMENT: HCPCS | Performed by: INTERNAL MEDICINE

## 2024-02-26 RX ORDER — PANTOPRAZOLE SODIUM 40 MG/1
40 TABLET, DELAYED RELEASE ORAL DAILY
Qty: 90 TABLET | Refills: 0 | Status: SHIPPED
Start: 2024-02-26 | End: 2024-02-28 | Stop reason: SDUPTHER

## 2024-02-26 RX ORDER — LEVOTHYROXINE SODIUM 75 MCG
75 TABLET ORAL DAILY
Qty: 90 TABLET | Refills: 0 | Status: SHIPPED
Start: 2024-02-26 | End: 2024-02-28 | Stop reason: SDUPTHER

## 2024-02-26 RX ORDER — INSULIN GLARGINE 100 [IU]/ML
INJECTION, SOLUTION SUBCUTANEOUS
Qty: 10 ML | Refills: 3 | Status: SHIPPED
Start: 2024-02-26 | End: 2024-02-28 | Stop reason: SDUPTHER

## 2024-02-26 RX ORDER — GLIMEPIRIDE 4 MG/1
4 TABLET ORAL
Qty: 90 TABLET | Refills: 0 | Status: SHIPPED
Start: 2024-02-26 | End: 2024-02-28 | Stop reason: SDUPTHER

## 2024-02-26 RX ORDER — FLUOXETINE HYDROCHLORIDE 40 MG/1
40 CAPSULE ORAL NIGHTLY
Qty: 90 CAPSULE | Refills: 0 | Status: SHIPPED
Start: 2024-02-26 | End: 2024-02-28 | Stop reason: SDUPTHER

## 2024-02-26 RX ORDER — SYRINGE-NEEDLE,INSULIN,0.5 ML 27GX1/2"
1 SYRINGE, EMPTY DISPOSABLE MISCELLANEOUS DAILY
Qty: 100 EACH | Refills: 0 | Status: SHIPPED
Start: 2024-02-26 | End: 2024-02-28 | Stop reason: SDUPTHER

## 2024-02-26 RX ORDER — CALCIUM CITRATE/VITAMIN D3 200MG-6.25
1 TABLET ORAL 3 TIMES DAILY
Qty: 300 EACH | Refills: 1 | Status: SHIPPED
Start: 2024-02-26 | End: 2024-02-28 | Stop reason: SDUPTHER

## 2024-02-26 RX ORDER — OLANZAPINE 5 MG/1
5 TABLET ORAL NIGHTLY
Qty: 90 TABLET | Refills: 0 | Status: CANCELLED | OUTPATIENT
Start: 2024-02-26

## 2024-02-26 RX ORDER — BUPROPION HYDROCHLORIDE 300 MG/1
300 TABLET ORAL EVERY MORNING
Qty: 90 TABLET | Refills: 0 | Status: SHIPPED
Start: 2024-02-26 | End: 2024-02-28 | Stop reason: SDUPTHER

## 2024-02-26 RX ORDER — GABAPENTIN 100 MG/1
100 CAPSULE ORAL 2 TIMES DAILY
Qty: 180 CAPSULE | Refills: 1 | Status: SHIPPED
Start: 2024-02-26 | End: 2024-02-28 | Stop reason: SDUPTHER

## 2024-02-26 RX ORDER — ALCOHOL ANTISEPTIC PADS
1 PADS, MEDICATED (EA) TOPICAL 3 TIMES DAILY
Qty: 300 EACH | Refills: 1 | Status: SHIPPED
Start: 2024-02-26 | End: 2024-02-28 | Stop reason: SDUPTHER

## 2024-02-26 RX ORDER — LOSARTAN POTASSIUM 100 MG/1
100 TABLET ORAL DAILY
Qty: 90 TABLET | Refills: 0 | Status: SHIPPED
Start: 2024-02-26 | End: 2024-02-28 | Stop reason: SDUPTHER

## 2024-02-26 SDOH — ECONOMIC STABILITY: INCOME INSECURITY: HOW HARD IS IT FOR YOU TO PAY FOR THE VERY BASICS LIKE FOOD, HOUSING, MEDICAL CARE, AND HEATING?: NOT HARD AT ALL

## 2024-02-26 SDOH — ECONOMIC STABILITY: FOOD INSECURITY: WITHIN THE PAST 12 MONTHS, YOU WORRIED THAT YOUR FOOD WOULD RUN OUT BEFORE YOU GOT MONEY TO BUY MORE.: NEVER TRUE

## 2024-02-26 SDOH — ECONOMIC STABILITY: FOOD INSECURITY: WITHIN THE PAST 12 MONTHS, THE FOOD YOU BOUGHT JUST DIDN'T LAST AND YOU DIDN'T HAVE MONEY TO GET MORE.: NEVER TRUE

## 2024-02-26 NOTE — PROGRESS NOTES
Chief Complaint   Patient presents with    Follow-up     Pt is a 6 week F/U, and states she is feeling good at this visit.        HPI:  Patient is here for follow-up of hypertension hyperlipidemia chronic low back pain anxiety and diabetes mellitus type 2 and hypothyroidism.  She is accompanied by her daughter.  She is doing well feeling a lot better specially with the sternal pain that she had for quite some time.  She has been compliant with medications..  Blood work was discussed with patient from January appears with elevated blood sugars and hyperglycemia and mild anemia that is new with a hemoglobin count of 9.7.  She denies using any NSAIDs she denies any bleeding from anywhere.  Patient complains of mild regurg of food and gurgling sensation specially when she drinks something questionable hiatal hernia.    Patient is also complaining about a lot of mid to low back pain..  She has been to pain clinic but she claims that the epidural injections she received has not helped her at all and she is still in quite significant pain.  We will try her on gabapentin sliding dose.  Will start up by 100 mg daily followed by 200 mg daily thereafter.  Will also follow her iron studies and ferritin to evaluate for the acute on chronic anemia and we will consider GI consult after the results are back..    Past Medical History, Surgical History, and Family History has been reviewed and updated.    Review of Systems:  Constitutional:  No fever, no fatigue, no chills, no headaches, no weight change  Dermatology:  No rash, no mole, no dry or sensitive skin  ENT:  No cough, no sore throat, no sinus pain, no runny nose, no ear pain  Cardiology:  No chest pain, no palpitations, no leg edema, no shortness of breath, no PND  Gastroenterology:  No dysphagia, no abdominal pain, no nausea, no vomiting, no constipation, no diarrhea, no heartburn  Musculoskeletal:  No joint pain, no leg cramps, no back pain, no muscle aches  Respiratory:

## 2024-02-28 DIAGNOSIS — N18.32 TYPE 2 DIABETES MELLITUS WITH STAGE 3B CHRONIC KIDNEY DISEASE, WITH LONG-TERM CURRENT USE OF INSULIN (HCC): ICD-10-CM

## 2024-02-28 DIAGNOSIS — E06.3 AUTOIMMUNE HYPOTHYROIDISM: ICD-10-CM

## 2024-02-28 DIAGNOSIS — Z79.4 TYPE 2 DIABETES MELLITUS WITH STAGE 3B CHRONIC KIDNEY DISEASE, WITH LONG-TERM CURRENT USE OF INSULIN (HCC): ICD-10-CM

## 2024-02-28 DIAGNOSIS — M54.40 CHRONIC LOW BACK PAIN WITH SCIATICA, SCIATICA LATERALITY UNSPECIFIED, UNSPECIFIED BACK PAIN LATERALITY: ICD-10-CM

## 2024-02-28 DIAGNOSIS — I10 PRIMARY HYPERTENSION: ICD-10-CM

## 2024-02-28 DIAGNOSIS — E11.22 TYPE 2 DIABETES MELLITUS WITH STAGE 3B CHRONIC KIDNEY DISEASE, WITH LONG-TERM CURRENT USE OF INSULIN (HCC): ICD-10-CM

## 2024-02-28 DIAGNOSIS — G89.29 CHRONIC LOW BACK PAIN WITH SCIATICA, SCIATICA LATERALITY UNSPECIFIED, UNSPECIFIED BACK PAIN LATERALITY: ICD-10-CM

## 2024-02-28 RX ORDER — INSULIN GLARGINE 100 [IU]/ML
INJECTION, SOLUTION SUBCUTANEOUS
Qty: 10 ML | Refills: 3 | Status: SHIPPED | OUTPATIENT
Start: 2024-02-28

## 2024-02-28 RX ORDER — GLIMEPIRIDE 4 MG/1
4 TABLET ORAL
Qty: 90 TABLET | Refills: 0 | Status: SHIPPED | OUTPATIENT
Start: 2024-02-28

## 2024-02-28 RX ORDER — METOPROLOL SUCCINATE 50 MG/1
50 TABLET, EXTENDED RELEASE ORAL DAILY
Qty: 90 TABLET | Refills: 0 | Status: SHIPPED | OUTPATIENT
Start: 2024-02-28

## 2024-02-28 RX ORDER — ALCOHOL ANTISEPTIC PADS
1 PADS, MEDICATED (EA) TOPICAL 3 TIMES DAILY
Qty: 300 EACH | Refills: 1 | Status: SHIPPED | OUTPATIENT
Start: 2024-02-28

## 2024-02-28 RX ORDER — TRAZODONE HYDROCHLORIDE 50 MG/1
50 TABLET ORAL NIGHTLY
Qty: 30 TABLET | Refills: 2 | Status: SHIPPED | OUTPATIENT
Start: 2024-02-28 | End: 2024-05-28

## 2024-02-28 RX ORDER — FLUOXETINE HYDROCHLORIDE 40 MG/1
40 CAPSULE ORAL NIGHTLY
Qty: 90 CAPSULE | Refills: 0 | Status: SHIPPED | OUTPATIENT
Start: 2024-02-28

## 2024-02-28 RX ORDER — GABAPENTIN 100 MG/1
100 CAPSULE ORAL 2 TIMES DAILY
Qty: 180 CAPSULE | Refills: 1 | Status: SHIPPED | OUTPATIENT
Start: 2024-02-28 | End: 2024-08-26

## 2024-02-28 RX ORDER — LOSARTAN POTASSIUM 100 MG/1
100 TABLET ORAL DAILY
Qty: 90 TABLET | Refills: 0 | Status: SHIPPED | OUTPATIENT
Start: 2024-02-28

## 2024-02-28 RX ORDER — PANTOPRAZOLE SODIUM 40 MG/1
40 TABLET, DELAYED RELEASE ORAL DAILY
Qty: 90 TABLET | Refills: 0 | Status: SHIPPED | OUTPATIENT
Start: 2024-02-28

## 2024-02-28 RX ORDER — BUPROPION HYDROCHLORIDE 300 MG/1
300 TABLET ORAL EVERY MORNING
Qty: 90 TABLET | Refills: 0 | Status: SHIPPED | OUTPATIENT
Start: 2024-02-28

## 2024-02-28 RX ORDER — LEVOTHYROXINE SODIUM 75 MCG
75 TABLET ORAL DAILY
Qty: 90 TABLET | Refills: 0 | Status: SHIPPED | OUTPATIENT
Start: 2024-02-28

## 2024-02-28 RX ORDER — CALCIUM CITRATE/VITAMIN D3 200MG-6.25
1 TABLET ORAL 3 TIMES DAILY
Qty: 300 EACH | Refills: 1 | Status: SHIPPED | OUTPATIENT
Start: 2024-02-28

## 2024-02-28 RX ORDER — SYRINGE-NEEDLE,INSULIN,0.5 ML 27GX1/2"
1 SYRINGE, EMPTY DISPOSABLE MISCELLANEOUS DAILY
Qty: 100 EACH | Refills: 0 | Status: SHIPPED | OUTPATIENT
Start: 2024-02-28

## 2024-02-28 NOTE — TELEPHONE ENCOUNTER
Patient called and wants refills, please print as [facility] did not get, they will need to be faxed

## 2024-02-29 ENCOUNTER — ENROLLMENT (OUTPATIENT)
Dept: PHARMACY | Facility: CLINIC | Age: 84
End: 2024-02-29

## 2024-03-06 ENCOUNTER — OFFICE VISIT (OUTPATIENT)
Dept: PRIMARY CARE CLINIC | Age: 84
End: 2024-03-06

## 2024-03-06 VITALS
TEMPERATURE: 97.5 F | OXYGEN SATURATION: 96 % | HEART RATE: 71 BPM | DIASTOLIC BLOOD PRESSURE: 74 MMHG | BODY MASS INDEX: 28.15 KG/M2 | SYSTOLIC BLOOD PRESSURE: 122 MMHG | HEIGHT: 64 IN

## 2024-03-06 DIAGNOSIS — M54.16 LUMBAR RADICULOPATHY: ICD-10-CM

## 2024-03-06 DIAGNOSIS — K21.9 GASTROESOPHAGEAL REFLUX DISEASE WITHOUT ESOPHAGITIS: ICD-10-CM

## 2024-03-06 DIAGNOSIS — M50.90 CERVICAL DISC DISORDER: ICD-10-CM

## 2024-03-06 DIAGNOSIS — E78.2 MIXED HYPERLIPIDEMIA: ICD-10-CM

## 2024-03-06 DIAGNOSIS — E08.65 DIABETES MELLITUS DUE TO UNDERLYING CONDITION, UNCONTROLLED, WITH HYPERGLYCEMIA, WITHOUT LONG-TERM CURRENT USE OF INSULIN (HCC): ICD-10-CM

## 2024-03-06 DIAGNOSIS — I10 PRIMARY HYPERTENSION: ICD-10-CM

## 2024-03-06 DIAGNOSIS — E06.3 AUTOIMMUNE HYPOTHYROIDISM: ICD-10-CM

## 2024-03-06 DIAGNOSIS — M51.9 THORACIC DISC DISEASE: ICD-10-CM

## 2024-03-06 DIAGNOSIS — T21.14XA SUPERFICIAL BURN OF BACK, INITIAL ENCOUNTER: ICD-10-CM

## 2024-03-06 DIAGNOSIS — I36.1 NONRHEUMATIC TRICUSPID VALVE REGURGITATION: ICD-10-CM

## 2024-03-06 DIAGNOSIS — Z00.00 INITIAL MEDICARE ANNUAL WELLNESS VISIT: Primary | ICD-10-CM

## 2024-03-06 ASSESSMENT — PATIENT HEALTH QUESTIONNAIRE - PHQ9
6. FEELING BAD ABOUT YOURSELF - OR THAT YOU ARE A FAILURE OR HAVE LET YOURSELF OR YOUR FAMILY DOWN: 0
4. FEELING TIRED OR HAVING LITTLE ENERGY: 0
SUM OF ALL RESPONSES TO PHQ9 QUESTIONS 1 & 2: 0
10. IF YOU CHECKED OFF ANY PROBLEMS, HOW DIFFICULT HAVE THESE PROBLEMS MADE IT FOR YOU TO DO YOUR WORK, TAKE CARE OF THINGS AT HOME, OR GET ALONG WITH OTHER PEOPLE: 0
5. POOR APPETITE OR OVEREATING: 0
9. THOUGHTS THAT YOU WOULD BE BETTER OFF DEAD, OR OF HURTING YOURSELF: 0
SUM OF ALL RESPONSES TO PHQ QUESTIONS 1-9: 0
8. MOVING OR SPEAKING SO SLOWLY THAT OTHER PEOPLE COULD HAVE NOTICED. OR THE OPPOSITE, BEING SO FIGETY OR RESTLESS THAT YOU HAVE BEEN MOVING AROUND A LOT MORE THAN USUAL: 0
1. LITTLE INTEREST OR PLEASURE IN DOING THINGS: 0
SUM OF ALL RESPONSES TO PHQ QUESTIONS 1-9: 0
7. TROUBLE CONCENTRATING ON THINGS, SUCH AS READING THE NEWSPAPER OR WATCHING TELEVISION: 0
SUM OF ALL RESPONSES TO PHQ QUESTIONS 1-9: 0
SUM OF ALL RESPONSES TO PHQ QUESTIONS 1-9: 0
2. FEELING DOWN, DEPRESSED OR HOPELESS: 0
3. TROUBLE FALLING OR STAYING ASLEEP: 0

## 2024-03-06 ASSESSMENT — LIFESTYLE VARIABLES
HOW MANY STANDARD DRINKS CONTAINING ALCOHOL DO YOU HAVE ON A TYPICAL DAY: PATIENT DOES NOT DRINK
HOW OFTEN DO YOU HAVE A DRINK CONTAINING ALCOHOL: NEVER

## 2024-03-06 NOTE — PROGRESS NOTES
Medicare Annual Wellness Visit    Kylee Wang is here for Medicare AWV and 3 Month Follow-Up    Assessment & Plan   Initial Medicare annual wellness visit  Primary hypertension  Nonrheumatic tricuspid valve regurgitation  Gastroesophageal reflux disease without esophagitis  Autoimmune hypothyroidism  Diabetes mellitus due to underlying condition, uncontrolled, with hyperglycemia, without long-term current use of insulin (HCC)  Lumbar radiculopathy  Cervical disc disorder  Thoracic disc disease  Mixed hyperlipidemia  Superficial burn of back, initial encounter    Recommendations for Preventive Services Due: see orders and patient instructions/AVS.  Recommended screening schedule for the next 5-10 years is provided to the patient in written form: see Patient Instructions/AVS.     Return for Medicare Annual Wellness Visit in 1 year.     Subjective   The following acute and/or chronic problems were also addressed today:  Patient is complaining today about some skin discoloration on the back and on exam she has first-degree burn of the whole back with mottling of the skin secondary to the heating pad that she has been applying to help with her back pain.  We will get her some Silvadene cream and she was advised not to use the heating pad anymore since she is not feeling it.    She has also tried the gabapentin twice a day and she felt dizzy and she was advised to start with 1 pill a day for 1 week then increase to twice a day thereafter.    Patient's complete Health Risk Assessment and screening values have been reviewed and are found in Flowsheets. The following problems were reviewed today and where indicated follow up appointments were made and/or referrals ordered.    Positive Risk Factor Screenings with Interventions:    Fall Risk:  Do you feel unsteady or are you worried about falling? : (!) yes  2 or more falls in past year?: no  Fall with injury in past year?: no     Interventions:    Reviewed medications, home

## 2024-03-06 NOTE — PATIENT INSTRUCTIONS
the bathroom with you.   Where can you learn more?  Go to [email or web address]/patientEd and enter G117 to learn more about \"Preventing Falls: Care Instructions.\"  Current as of: July 18, 2023               Content Version: 13.9  © 3253-5211 [employer].   Care instructions adapted under license by [employer]. If you have questions about a medical condition or this instruction, always ask your healthcare professional. [employer] disclaims any warranty or liability for your use of this information.           Learning About Activities of Daily Living  What are activities of daily living?     Activities of daily living (ADLs) are the basic self-care tasks you do every day. These include eating, bathing, dressing, and moving around.  As you age, and if you have health problems, you may find that it's harder to do some of these tasks. If so, your doctor can suggest ideas that may help.  To measure what kind of help you may need, your doctor will ask how well you are able to do ADLs. Let your doctor know if there are any tasks that you are having trouble doing. This is an important first step to getting help. And when you have the help you need, you can stay as independent as possible.  How will a doctor assess your ADLs?  Asking about ADLs is part of a routine health checkup your doctor will likely do as you age. Your health check might be done in a doctor's office, in your home, or at a hospital. The goal is to find out if you are having any problems that could make it hard to care for yourself or that make it unsafe for you to be on your own.  To measure your ADLs, your doctor will ask how hard it is for you to do routine tasks. Your doctor may also want to know if you have changed the way you do a task because of a health problem. Your doctor may watch how you:  Walk back and forth.  Keep your balance while you stand or walk.  Move from sitting to standing or from a bed to a

## 2024-03-20 ENCOUNTER — TELEPHONE (OUTPATIENT)
Dept: PHARMACY | Facility: CLINIC | Age: 84
End: 2024-03-20

## 2024-03-20 NOTE — TELEPHONE ENCOUNTER
Attempting to reach patient to review - unable to leave message. Letter sent to patient.    =======================================================   For Pharmacy Admin Tracking Only    Program: [facility]  CPA in place:  No  Gap Closed?: No   Time Spent (min): 15

## 2024-03-20 NOTE — TELEPHONE ENCOUNTER
[facility] CLINICAL PHARMACY REVIEW: RECENT FRACTURE    Kylee Wang is a 83 y.o. old White (non-) female patient who recently had a fracture of sternum (1/4/24 Hospital Encounter; \"was exercising on December 26 and felt a pop near her sternal region\")    Lab Results   Component Value Date    VITD25 32.6 01/06/2024      Lab Results   Component Value Date    CALCIUM 8.6 01/08/2024    PHOS 3.0 01/06/2024     estimated creatinine clearance is 41 mL/min (based on SCr of 1 mg/dL).    DEXA 5/17/21:  Osteopenia    FRAX-calculated 10-year fracture probability:   Per WHO calculator: major Osteoporotic = 31% and Hip = 12%    Assessment:   - DEXA ordered by PCP at 1/18/24 PCP hospital f/u appt; not yet scheduled by patient to completed    Considerations:  - Suggest obtaining DEXA as ordered by PCP - call to schedule your DEXA scan:  [facility] or [facility]: [phone] (option 1), or   [facility] ([facility]) [phone]    Attempting to reach patient to review - unable to leave message.    Willow Casillas, PharmD, BCACP  Population Health Pharmacist  [facility] Clinical Pharmacy  Department, toll free: [phone], option 1

## 2024-04-09 ENCOUNTER — PROCEDURE VISIT (OUTPATIENT)
Dept: PODIATRY | Age: 84
End: 2024-04-09
Payer: MEDICARE

## 2024-04-09 VITALS — WEIGHT: 164 LBS | HEIGHT: 64 IN | BODY MASS INDEX: 28 KG/M2

## 2024-04-09 DIAGNOSIS — I73.9 PVD (PERIPHERAL VASCULAR DISEASE) (HCC): ICD-10-CM

## 2024-04-09 DIAGNOSIS — M79.675 PAIN OF TOE OF LEFT FOOT: ICD-10-CM

## 2024-04-09 DIAGNOSIS — I87.2 VENOUS INSUFFICIENCY (CHRONIC) (PERIPHERAL): ICD-10-CM

## 2024-04-09 DIAGNOSIS — R26.2 DIFFICULTY WALKING: ICD-10-CM

## 2024-04-09 DIAGNOSIS — B35.1 ONYCHOMYCOSIS: Primary | ICD-10-CM

## 2024-04-09 DIAGNOSIS — M79.674 PAIN OF TOE OF RIGHT FOOT: ICD-10-CM

## 2024-04-09 DIAGNOSIS — E11.51 TYPE II DIABETES MELLITUS WITH PERIPHERAL CIRCULATORY DISORDER (HCC): ICD-10-CM

## 2024-04-09 PROCEDURE — 11720 DEBRIDE NAIL 1-5: CPT | Performed by: PODIATRIST

## 2024-04-09 PROCEDURE — 99999 PR OFFICE/OUTPT VISIT,PROCEDURE ONLY: CPT | Performed by: PODIATRIST

## 2024-04-09 NOTE — PROGRESS NOTES
24     Kylee Wang    : 1940  Sex: female  Age: 83 y.o.    Subjective:  The patient is seen today for evaluation regarding diabetic foot evaluation and mycotic nail care.  No other complaints noted.    Chief Complaint   Patient presents with    Nail Problem     Nail care       Current Medications:    Current Outpatient Medications:     silver sulfADIAZINE (SILVADENE) 1 % cream, Apply topically daily., Disp: 50 g, Rfl: 0    blood glucose test strips (TRUE METRIX BLOOD GLUCOSE TEST) strip, 1 each by In Vitro route 3 times daily As needed., Disp: 300 each, Rfl: 1    buPROPion (WELLBUTRIN XL) 300 MG extended release tablet, Take 1 tablet by mouth every morning, Disp: 90 tablet, Rfl: 0    diclofenac sodium (VOLTAREN) 1 % GEL, Apply 2 g topically 2 times daily, Disp: 4 g, Rfl: 0    FLUoxetine (PROZAC) 40 MG capsule, Take 1 capsule by mouth nightly, Disp: 90 capsule, Rfl: 0    gabapentin (NEURONTIN) 100 MG capsule, Take 1 capsule by mouth 2 times daily for 180 days. Intended supply: 90 days, Disp: 180 capsule, Rfl: 1    glimepiride (AMARYL) 4 MG tablet, Take 1 tablet by mouth every morning (before breakfast), Disp: 90 tablet, Rfl: 0    Insulin Syringe-Needle U-100 (DROPLET INSULIN SYRINGE) 31G X 5/16\" 1 ML MISC, Inject 1 each into the skin daily, Disp: 100 each, Rfl: 0    LANTUS 100 UNIT/ML injection vial, Use 28 U SC nightly, Disp: 10 mL, Rfl: 3    losartan (COZAAR) 100 MG tablet, Take 1 tablet by mouth daily, Disp: 90 tablet, Rfl: 0    metoprolol succinate (TOPROL XL) 50 MG extended release tablet, Take 1 tablet by mouth daily, Disp: 90 tablet, Rfl: 0    pantoprazole (PROTONIX) 40 MG tablet, Take 1 tablet by mouth daily, Disp: 90 tablet, Rfl: 0    SYNTHROID 75 MCG tablet, Take 1 tablet by mouth Daily, Disp: 90 tablet, Rfl: 0    traZODone (DESYREL) 50 MG tablet, Take 1 tablet by mouth nightly, Disp: 30 tablet, Rfl: 2    Ultra Thin Lancets 31G MISC, 1 each by Does not apply route in the morning, at noon, and

## 2024-04-09 NOTE — PROGRESS NOTES
Patient in today for nail care. Patient does not have any complaints of pain at this time. Patient's PCP is Roxie Maldonado MD date of last ov 3/6/24          Meenakshi Velez LPN

## 2024-04-17 ENCOUNTER — TELEPHONE (OUTPATIENT)
Dept: PRIMARY CARE CLINIC | Age: 84
End: 2024-04-17

## 2024-04-18 DIAGNOSIS — G89.29 CHRONIC LOW BACK PAIN WITH SCIATICA, SCIATICA LATERALITY UNSPECIFIED, UNSPECIFIED BACK PAIN LATERALITY: ICD-10-CM

## 2024-04-18 DIAGNOSIS — M54.40 CHRONIC LOW BACK PAIN WITH SCIATICA, SCIATICA LATERALITY UNSPECIFIED, UNSPECIFIED BACK PAIN LATERALITY: ICD-10-CM

## 2024-04-18 RX ORDER — GABAPENTIN 300 MG/1
300 CAPSULE ORAL 2 TIMES DAILY
Qty: 180 CAPSULE | Refills: 1 | Status: SHIPPED | OUTPATIENT
Start: 2024-04-18 | End: 2024-10-15

## 2024-04-22 NOTE — TELEPHONE ENCOUNTER
Pt contacted at home and advised that she can take her Gabapentin 3 times daily for pain if she would like, and patient states she does not take the Norco any longer.

## 2024-05-20 ENCOUNTER — TELEPHONE (OUTPATIENT)
Dept: PHARMACY | Facility: CLINIC | Age: 84
End: 2024-05-20

## 2024-05-20 NOTE — TELEPHONE ENCOUNTER
Call attempts continue to go straight to voicemail and mailbox full. Will send letter.    =======================================================   For Pharmacy Admin Tracking Only    Program: [facility]  CPA in place:  No  Gap Closed?: No   Time Spent (min): 10

## 2024-06-05 ENCOUNTER — OFFICE VISIT (OUTPATIENT)
Dept: PRIMARY CARE CLINIC | Age: 84
End: 2024-06-05

## 2024-06-05 VITALS
SYSTOLIC BLOOD PRESSURE: 120 MMHG | HEART RATE: 65 BPM | BODY MASS INDEX: 28.15 KG/M2 | DIASTOLIC BLOOD PRESSURE: 70 MMHG | TEMPERATURE: 97.1 F | HEIGHT: 64 IN | OXYGEN SATURATION: 94 %

## 2024-06-05 DIAGNOSIS — R53.1 GENERALIZED WEAKNESS: ICD-10-CM

## 2024-06-05 DIAGNOSIS — M47.894 THORACIC FACET JOINT SYNDROME: ICD-10-CM

## 2024-06-05 DIAGNOSIS — E11.22 TYPE 2 DIABETES MELLITUS WITH STAGE 3B CHRONIC KIDNEY DISEASE, WITH LONG-TERM CURRENT USE OF INSULIN (HCC): Primary | ICD-10-CM

## 2024-06-05 DIAGNOSIS — E08.65 DIABETES MELLITUS DUE TO UNDERLYING CONDITION, UNCONTROLLED, WITH HYPERGLYCEMIA, WITHOUT LONG-TERM CURRENT USE OF INSULIN (HCC): ICD-10-CM

## 2024-06-05 DIAGNOSIS — E06.3 AUTOIMMUNE HYPOTHYROIDISM: ICD-10-CM

## 2024-06-05 DIAGNOSIS — Z79.4 TYPE 2 DIABETES MELLITUS WITH STAGE 3B CHRONIC KIDNEY DISEASE, WITH LONG-TERM CURRENT USE OF INSULIN (HCC): Primary | ICD-10-CM

## 2024-06-05 DIAGNOSIS — I10 PRIMARY HYPERTENSION: ICD-10-CM

## 2024-06-05 DIAGNOSIS — G89.29 CHRONIC PAIN OF RIGHT KNEE: ICD-10-CM

## 2024-06-05 DIAGNOSIS — R29.6 FALL IN ELDERLY PATIENT: ICD-10-CM

## 2024-06-05 DIAGNOSIS — N18.32 TYPE 2 DIABETES MELLITUS WITH STAGE 3B CHRONIC KIDNEY DISEASE, WITH LONG-TERM CURRENT USE OF INSULIN (HCC): Primary | ICD-10-CM

## 2024-06-05 DIAGNOSIS — M25.561 CHRONIC PAIN OF RIGHT KNEE: ICD-10-CM

## 2024-06-05 LAB
CHP ED QC CHECK: NORMAL
GLUCOSE BLD-MCNC: 266 MG/DL
HBA1C MFR BLD: 9.3 %

## 2024-06-05 RX ORDER — INSULIN GLARGINE 100 [IU]/ML
INJECTION, SOLUTION SUBCUTANEOUS
Qty: 10 ML | Refills: 3 | Status: SHIPPED | OUTPATIENT
Start: 2024-06-05

## 2024-06-05 NOTE — PROGRESS NOTES
Chief Complaint   Patient presents with    Fatigue     Pt is an acute for weakness that started 2 weeks ago and pt has been in P/T twice weekly. Pt reports a fall in the home this morning with no injury reported. Pt is extremely fatigued at this time and is leaning to the left on the exam table.        HPI:  Patient is here accompanied by her daughter complaining of falling down this morning at home.  She claims that she lost her balance.  She feels very weak lately.  She is a very poor historian she cannot determine exactly what is going on but she is claiming that her legs have been really hurting specially on the right side all the way on the right lower extremity.  On exam she has moderate to severe osteoarthritis of the right knee.  She has a known history of lumbar spinal stenosis.  As a matter fact she is in physical therapy right now and she has 2 more sessions to go.  She claims that she has not changed any new medication or or increased any of the dosage.  She claims that she has been compliant with the medications and with her insulin and her blood sugar measurements.  Her blood sugar today is 266 and her hemoglobin A1c today is 9.3.    She appears to be leaning more towards the left side but that has been there for a while because of her severe thoracic and lumbar spondylosis.  She also have moderately severe kyphosis..    Vitals:    06/05/24 1000   BP: 120/70   Pulse: 65   Temp: 97.1 °F (36.2 °C)   TempSrc: Temporal   SpO2: 94%   Height: 1.626 m (5' 4\")       General:  Patient alert and oriented x 3, NAD, moderately severe kyphosis using a cane walked into the office  Refused to go to hospital in the morning after she fell  HEENT:  Atraumatic, normocephalic, PERRLA, EOMI, clear conjunctiva, TMs clear, nose-clear, throat - no erythema  Neck:  Supple, no goiter, no carotid bruits, no LAD  Lungs: Fairly CTA   Heart:  RRR, no murmurs, gallops or rubs  Abdomen:  Soft/nt/nd, + bowel sounds  Lymph node

## 2024-06-06 ENCOUNTER — APPOINTMENT (OUTPATIENT)
Dept: GENERAL RADIOLOGY | Age: 84
End: 2024-06-06
Payer: MEDICARE

## 2024-06-06 ENCOUNTER — HOSPITAL ENCOUNTER (EMERGENCY)
Age: 84
Discharge: HOME | End: 2024-06-06
Attending: STUDENT IN AN ORGANIZED HEALTH CARE EDUCATION/TRAINING PROGRAM
Payer: MEDICARE

## 2024-06-06 ENCOUNTER — TELEPHONE (OUTPATIENT)
Dept: PRIMARY CARE CLINIC | Age: 84
End: 2024-06-06

## 2024-06-06 ENCOUNTER — APPOINTMENT (OUTPATIENT)
Dept: CT IMAGING | Age: 84
End: 2024-06-06
Payer: MEDICARE

## 2024-06-06 VITALS
DIASTOLIC BLOOD PRESSURE: 88 MMHG | RESPIRATION RATE: 17 BRPM | WEIGHT: 150 LBS | BODY MASS INDEX: 25.75 KG/M2 | TEMPERATURE: 98.4 F | OXYGEN SATURATION: 95 % | SYSTOLIC BLOOD PRESSURE: 162 MMHG | HEART RATE: 61 BPM
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
## 2024-06-06 DIAGNOSIS — S42.001A CLOSED NONDISPLACED FRACTURE OF RIGHT CLAVICLE, UNSPECIFIED PART OF CLAVICLE, INITIAL ENCOUNTER: ICD-10-CM

## 2024-06-06 PROCEDURE — 72125 CT NECK SPINE W/O DYE: CPT

## 2024-06-06 PROCEDURE — 73560 X-RAY EXAM OF KNEE 1 OR 2: CPT

## 2024-06-06 PROCEDURE — 73030 X-RAY EXAM OF SHOULDER: CPT

## 2024-06-06 PROCEDURE — 99284 EMERGENCY DEPT VISIT MOD MDM: CPT

## 2024-06-06 PROCEDURE — 73562 X-RAY EXAM OF KNEE 3: CPT

## 2024-06-06 PROCEDURE — 70450 CT HEAD/BRAIN W/O DYE: CPT

## 2024-06-06 RX ORDER — METHOCARBAMOL 750 MG/1
750 TABLET, FILM COATED ORAL 4 TIMES DAILY
Qty: 40 TABLET | Refills: 0 | Status: SHIPPED | OUTPATIENT
Start: 2024-06-06 | End: 2024-06-16

## 2024-06-06 NOTE — ED PROVIDER NOTES
[facility] EMERGENCY DEPARTMENT  EMERGENCY DEPARTMENT ENCOUNTER    Pt Name: Kylee Wang  MRN: [id number]  Birthdate 1940  Date of evaluation: 6/6/2024  Provider: Stanislaw Villa MD  PCP: Roxie Maldonado MD  Note Started: 12:39 PM EDT 6/6/24    HPI     Patient is a 83 y.o. female presents with a chief complaint of   Chief Complaint   Patient presents with    Fall     Out of bed , hit R shoulder and R knee   .    Patient presents after a fall.  Patient states that she has chronic right knee pain.  Stated that she was rolling out of bed and fell on her right side.  Did not hit her head did not lose consciousness but pain in the right shoulder and the right knee.  Patient denies any fevers, chills, nausea, vomiting, abdominal pain.  No loss conscious.  Patient does not take any blood thinners.    Nursing Notes were all reviewed and agreed with or any disagreements were addressed in the HPI.    History From: Patient    Review of Systems   Pertinent positives and negatives as per HPI.     Physical Exam  Vitals and nursing note reviewed.   Constitutional:       Appearance: She is well-developed.   HENT:      Head: Normocephalic and atraumatic.   Eyes:      Conjunctiva/sclera: Conjunctivae normal.   Cardiovascular:      Rate and Rhythm: Normal rate and regular rhythm.      Heart sounds: Normal heart sounds. No murmur heard.  Pulmonary:      Effort: Pulmonary effort is normal. No respiratory distress.      Breath sounds: Normal breath sounds. No wheezing or rales.   Abdominal:      General: Bowel sounds are normal.      Palpations: Abdomen is soft.      Tenderness: There is no abdominal tenderness. There is no guarding or rebound.   Musculoskeletal:         General: Tenderness present.      Cervical back: Normal range of motion and neck supple.      Comments: Tenderness palpation to the right shoulder and right knee.  No crepitus.  No obvious bony deformity.  Pulses intact of the right

## 2024-06-06 NOTE — TELEPHONE ENCOUNTER
Patients daughter called states patient fell again and she cannot get her up to bring her in but does need to see physician- I advised patient to call 911 and have ems take her to hospital

## 2024-06-11 ENCOUNTER — OFFICE VISIT (OUTPATIENT)
Dept: PRIMARY CARE CLINIC | Age: 84
End: 2024-06-11

## 2024-06-11 VITALS
BODY MASS INDEX: 25.75 KG/M2 | OXYGEN SATURATION: 94 % | HEIGHT: 64 IN | HEART RATE: 74 BPM | DIASTOLIC BLOOD PRESSURE: 76 MMHG | SYSTOLIC BLOOD PRESSURE: 128 MMHG | TEMPERATURE: 97.3 F

## 2024-06-11 DIAGNOSIS — M47.812 CERVICAL SPONDYLOSIS: ICD-10-CM

## 2024-06-11 DIAGNOSIS — G89.4 CHRONIC PAIN SYNDROME: ICD-10-CM

## 2024-06-11 DIAGNOSIS — I10 PRIMARY HYPERTENSION: ICD-10-CM

## 2024-06-11 DIAGNOSIS — R29.6 FALL IN ELDERLY PATIENT: ICD-10-CM

## 2024-06-11 DIAGNOSIS — R29.6 RECURRENT FALLS: Primary | ICD-10-CM

## 2024-06-11 DIAGNOSIS — E06.3 AUTOIMMUNE HYPOTHYROIDISM: ICD-10-CM

## 2024-06-11 DIAGNOSIS — R53.1 GENERALIZED WEAKNESS: ICD-10-CM

## 2024-06-11 DIAGNOSIS — M54.16 LUMBAR RADICULOPATHY: ICD-10-CM

## 2024-06-11 DIAGNOSIS — S22.000S COMPRESSION FRACTURE OF THORACIC VERTEBRA, UNSPECIFIED THORACIC VERTEBRAL LEVEL, SEQUELA: ICD-10-CM

## 2024-06-11 DIAGNOSIS — E08.65 DIABETES MELLITUS DUE TO UNDERLYING CONDITION, UNCONTROLLED, WITH HYPERGLYCEMIA, WITHOUT LONG-TERM CURRENT USE OF INSULIN (HCC): ICD-10-CM

## 2024-06-11 NOTE — PROGRESS NOTES
Chief Complaint   Patient presents with    Follow-up     Pt is an ER F/U after sustaining a fall while getting out of bed and was evaluated in ER with x-rays on file for review. Pt's right clavicle is bruised with #8 pain.        HPI:  Patient is here for follow-up of emergency room visit status post fall on her right side fracturing her right clavicle.  Patient is accompanied today by her daughter.  According to her she was rolling in bed and reaching out to get her cane and all of a sudden she fell on her right side hurting her right shoulder and hitting her head.  She never lost consciousness.  EMS was called and she was taken to the hospital where they had a CT scan of the head and cervical spine done showed no hemorrhage or acute pathology but her x-ray of the right shoulder was suspicious for a nondisplaced clavicular fracture.  She was put in a sling and sent home.  According to the patient she has been very unsteady on her feet and she has been using the cane.  She has severe kyphosis so that is not helping her balance.  She is complaining about her right knee pain and swelling most of the time.  On exam she has a Baker's cyst and she has moderately severe osteoarthritis.  She is declining seeing orthopedic surgery at this point although she was counseled that this might be the reason why she keeps falling down.  We will consult home care for physical therapy occupational therapy and blood draws.      Past Medical History, Surgical History, and Family History has been reviewed and updated.    Review of Systems:  Constitutional:  No fever, no fatigue, no chills, no headaches, no weight change  Dermatology:  No rash, no mole, no dry or sensitive skin  ENT:  No cough, no sore throat, no sinus pain, no runny nose, no ear pain  Cardiology:  No chest pain, no palpitations, no leg edema, no shortness of breath, no PND  Gastroenterology:  No dysphagia, no abdominal pain, no nausea, no vomiting, no constipation, no

## 2024-06-18 ENCOUNTER — TELEPHONE (OUTPATIENT)
Dept: PRIMARY CARE CLINIC | Age: 84
End: 2024-06-18

## 2024-06-18 ENCOUNTER — OFFICE VISIT (OUTPATIENT)
Dept: PODIATRY | Age: 84
End: 2024-06-18
Payer: MEDICARE

## 2024-06-18 VITALS — HEIGHT: 64 IN | WEIGHT: 150 LBS | BODY MASS INDEX: 25.61 KG/M2

## 2024-06-18 DIAGNOSIS — I73.9 PVD (PERIPHERAL VASCULAR DISEASE) (HCC): ICD-10-CM

## 2024-06-18 DIAGNOSIS — M79.674 PAIN OF TOE OF RIGHT FOOT: ICD-10-CM

## 2024-06-18 DIAGNOSIS — B35.1 ONYCHOMYCOSIS: Primary | ICD-10-CM

## 2024-06-18 DIAGNOSIS — R26.2 DIFFICULTY WALKING: ICD-10-CM

## 2024-06-18 DIAGNOSIS — E11.51 TYPE II DIABETES MELLITUS WITH PERIPHERAL CIRCULATORY DISORDER (HCC): ICD-10-CM

## 2024-06-18 DIAGNOSIS — I87.2 VENOUS INSUFFICIENCY (CHRONIC) (PERIPHERAL): ICD-10-CM

## 2024-06-18 DIAGNOSIS — M79.675 PAIN OF TOE OF LEFT FOOT: ICD-10-CM

## 2024-06-18 PROCEDURE — 11721 DEBRIDE NAIL 6 OR MORE: CPT | Performed by: PODIATRIST

## 2024-06-18 PROCEDURE — 99999 PR OFFICE/OUTPT VISIT,PROCEDURE ONLY: CPT | Performed by: PODIATRIST

## 2024-06-18 RX ORDER — TRAZODONE HYDROCHLORIDE 50 MG/1
50 TABLET ORAL NIGHTLY
Qty: 30 TABLET | Refills: 2 | Status: SHIPPED | OUTPATIENT
Start: 2024-06-18 | End: 2024-09-16

## 2024-06-18 NOTE — PROGRESS NOTES
24     Kylee Wang    : 1940  Sex: female  Age: 83 y.o.    Subjective:  The patient is seen today for evaluation regarding diabetic foot evaluation and mycotic nail care.  No other complaints noted.    Chief Complaint   Patient presents with    Nail Problem     Nail care       Current Medications:    Current Outpatient Medications:     traZODone (DESYREL) 50 MG tablet, take 1 tablet by mouth nightly, Disp: 30 tablet, Rfl: 2    LANTUS 100 UNIT/ML injection vial, Use 28 U SC nightly, Disp: 10 mL, Rfl: 3    gabapentin (NEURONTIN) 300 MG capsule, Take 1 capsule by mouth 2 times daily for 180 days. Intended supply: 90 days, Disp: 180 capsule, Rfl: 1    silver sulfADIAZINE (SILVADENE) 1 % cream, Apply topically daily., Disp: 50 g, Rfl: 0    blood glucose test strips (TRUE METRIX BLOOD GLUCOSE TEST) strip, 1 each by In Vitro route 3 times daily As needed., Disp: 300 each, Rfl: 1    buPROPion (WELLBUTRIN XL) 300 MG extended release tablet, Take 1 tablet by mouth every morning, Disp: 90 tablet, Rfl: 0    diclofenac sodium (VOLTAREN) 1 % GEL, Apply 2 g topically 2 times daily, Disp: 4 g, Rfl: 0    FLUoxetine (PROZAC) 40 MG capsule, Take 1 capsule by mouth nightly, Disp: 90 capsule, Rfl: 0    glimepiride (AMARYL) 4 MG tablet, Take 1 tablet by mouth every morning (before breakfast), Disp: 90 tablet, Rfl: 0    Insulin Syringe-Needle U-100 (DROPLET INSULIN SYRINGE) 31G X 5/16\" 1 ML MISC, Inject 1 each into the skin daily, Disp: 100 each, Rfl: 0    losartan (COZAAR) 100 MG tablet, Take 1 tablet by mouth daily, Disp: 90 tablet, Rfl: 0    metoprolol succinate (TOPROL XL) 50 MG extended release tablet, Take 1 tablet by mouth daily, Disp: 90 tablet, Rfl: 0    pantoprazole (PROTONIX) 40 MG tablet, Take 1 tablet by mouth daily, Disp: 90 tablet, Rfl: 0    SYNTHROID 75 MCG tablet, Take 1 tablet by mouth Daily, Disp: 90 tablet, Rfl: 0    Ultra Thin Lancets 31G MISC, 1 each by Does not apply route in the morning, at noon, and

## 2024-06-18 NOTE — TELEPHONE ENCOUNTER
[facility] called stating they can do the pt/ot gait training but cannot do in home lab draws    Patient will have to have those done at lab

## 2024-06-18 NOTE — PROGRESS NOTES
Patient here for nail care. Patient has no new concerns at this time. Roxie Maldonado MD last visit 6/11/2024   Electronically signed by Mirella Granado LPN on 6/18/2024 at 11:13 AM

## 2024-06-19 RX ORDER — OLANZAPINE 5 MG/1
5 TABLET ORAL NIGHTLY
Qty: 90 TABLET | Refills: 0 | OUTPATIENT
Start: 2024-06-19

## 2024-06-19 RX ORDER — TRAZODONE HYDROCHLORIDE 50 MG/1
50 TABLET ORAL NIGHTLY
Qty: 30 TABLET | Refills: 2 | OUTPATIENT
Start: 2024-06-19 | End: 2024-09-17

## 2024-06-20 ENCOUNTER — TELEPHONE (OUTPATIENT)
Dept: PRIMARY CARE CLINIC | Age: 84
End: 2024-06-20

## 2024-06-20 NOTE — TELEPHONE ENCOUNTER
Leia from home health wants to know what patients level of  weight bearing and range of motion she has in right arm and advises that home blood draws cannot be done patient must go to lab

## 2024-07-01 ENCOUNTER — OFFICE VISIT (OUTPATIENT)
Dept: PRIMARY CARE CLINIC | Age: 84
End: 2024-07-01
Payer: MEDICARE

## 2024-07-01 VITALS
HEART RATE: 68 BPM | DIASTOLIC BLOOD PRESSURE: 78 MMHG | OXYGEN SATURATION: 92 % | TEMPERATURE: 97.3 F | SYSTOLIC BLOOD PRESSURE: 128 MMHG | HEIGHT: 64 IN | BODY MASS INDEX: 25.75 KG/M2

## 2024-07-01 DIAGNOSIS — I10 PRIMARY HYPERTENSION: ICD-10-CM

## 2024-07-01 DIAGNOSIS — F32.1 MODERATE MAJOR DEPRESSION (HCC): ICD-10-CM

## 2024-07-01 DIAGNOSIS — E08.65 DIABETES MELLITUS DUE TO UNDERLYING CONDITION, UNCONTROLLED, WITH HYPERGLYCEMIA, WITHOUT LONG-TERM CURRENT USE OF INSULIN (HCC): ICD-10-CM

## 2024-07-01 DIAGNOSIS — I36.1 NONRHEUMATIC TRICUSPID VALVE REGURGITATION: ICD-10-CM

## 2024-07-01 DIAGNOSIS — Z79.4 TYPE 2 DIABETES MELLITUS WITH STAGE 3B CHRONIC KIDNEY DISEASE, WITH LONG-TERM CURRENT USE OF INSULIN (HCC): Primary | ICD-10-CM

## 2024-07-01 DIAGNOSIS — E11.22 TYPE 2 DIABETES MELLITUS WITH STAGE 3B CHRONIC KIDNEY DISEASE, WITH LONG-TERM CURRENT USE OF INSULIN (HCC): Primary | ICD-10-CM

## 2024-07-01 DIAGNOSIS — K21.9 GASTROESOPHAGEAL REFLUX DISEASE WITHOUT ESOPHAGITIS: ICD-10-CM

## 2024-07-01 DIAGNOSIS — R09.89 CHOKING SENSATION: ICD-10-CM

## 2024-07-01 DIAGNOSIS — G89.4 CHRONIC PAIN SYNDROME: ICD-10-CM

## 2024-07-01 DIAGNOSIS — E78.2 MIXED HYPERLIPIDEMIA: ICD-10-CM

## 2024-07-01 DIAGNOSIS — R13.12 OROPHARYNGEAL DYSPHAGIA: ICD-10-CM

## 2024-07-01 DIAGNOSIS — E06.3 AUTOIMMUNE HYPOTHYROIDISM: ICD-10-CM

## 2024-07-01 DIAGNOSIS — N18.32 TYPE 2 DIABETES MELLITUS WITH STAGE 3B CHRONIC KIDNEY DISEASE, WITH LONG-TERM CURRENT USE OF INSULIN (HCC): Primary | ICD-10-CM

## 2024-07-01 PROBLEM — I47.10 SUPRAVENTRICULAR TACHYCARDIA (HCC): Status: RESOLVED | Noted: 2023-03-21 | Resolved: 2024-07-01

## 2024-07-01 PROCEDURE — G8427 DOCREV CUR MEDS BY ELIG CLIN: HCPCS | Performed by: INTERNAL MEDICINE

## 2024-07-01 PROCEDURE — 3074F SYST BP LT 130 MM HG: CPT | Performed by: INTERNAL MEDICINE

## 2024-07-01 PROCEDURE — G8419 CALC BMI OUT NRM PARAM NOF/U: HCPCS | Performed by: INTERNAL MEDICINE

## 2024-07-01 PROCEDURE — 1036F TOBACCO NON-USER: CPT | Performed by: INTERNAL MEDICINE

## 2024-07-01 PROCEDURE — G8399 PT W/DXA RESULTS DOCUMENT: HCPCS | Performed by: INTERNAL MEDICINE

## 2024-07-01 PROCEDURE — 3078F DIAST BP <80 MM HG: CPT | Performed by: INTERNAL MEDICINE

## 2024-07-01 PROCEDURE — 1123F ACP DISCUSS/DSCN MKR DOCD: CPT | Performed by: INTERNAL MEDICINE

## 2024-07-01 PROCEDURE — 99214 OFFICE O/P EST MOD 30 MIN: CPT | Performed by: INTERNAL MEDICINE

## 2024-07-01 PROCEDURE — 1090F PRES/ABSN URINE INCON ASSESS: CPT | Performed by: INTERNAL MEDICINE

## 2024-07-01 PROCEDURE — 3046F HEMOGLOBIN A1C LEVEL >9.0%: CPT | Performed by: INTERNAL MEDICINE

## 2024-07-01 RX ORDER — SYRINGE-NEEDLE,INSULIN,0.5 ML 27GX1/2"
1 SYRINGE, EMPTY DISPOSABLE MISCELLANEOUS DAILY
Qty: 100 EACH | Refills: 0 | Status: SHIPPED | OUTPATIENT
Start: 2024-07-01

## 2024-07-01 RX ORDER — METOPROLOL SUCCINATE 50 MG/1
50 TABLET, EXTENDED RELEASE ORAL DAILY
Qty: 90 TABLET | Refills: 0 | Status: SHIPPED | OUTPATIENT
Start: 2024-07-01

## 2024-07-01 RX ORDER — CALCIUM CITRATE/VITAMIN D3 200MG-6.25
1 TABLET ORAL 3 TIMES DAILY
Qty: 300 EACH | Refills: 1 | Status: SHIPPED | OUTPATIENT
Start: 2024-07-01

## 2024-07-01 RX ORDER — BUPROPION HYDROCHLORIDE 300 MG/1
300 TABLET ORAL EVERY MORNING
Qty: 90 TABLET | Refills: 0 | Status: SHIPPED | OUTPATIENT
Start: 2024-07-01

## 2024-07-01 RX ORDER — OLANZAPINE 5 MG/1
5 TABLET ORAL NIGHTLY
Qty: 90 TABLET | Refills: 0 | Status: SHIPPED | OUTPATIENT
Start: 2024-07-01

## 2024-07-01 RX ORDER — LOSARTAN POTASSIUM 100 MG/1
100 TABLET ORAL DAILY
Qty: 90 TABLET | Refills: 0 | Status: SHIPPED | OUTPATIENT
Start: 2024-07-01

## 2024-07-01 RX ORDER — FLUOXETINE HYDROCHLORIDE 40 MG/1
40 CAPSULE ORAL NIGHTLY
Qty: 90 CAPSULE | Refills: 0 | Status: SHIPPED | OUTPATIENT
Start: 2024-07-01

## 2024-07-01 RX ORDER — FAMOTIDINE 40 MG/1
40 TABLET, FILM COATED ORAL EVERY EVENING
Qty: 30 TABLET | Refills: 3 | Status: SHIPPED | OUTPATIENT
Start: 2024-07-01

## 2024-07-01 RX ORDER — LEVOTHYROXINE SODIUM 75 MCG
75 TABLET ORAL DAILY
Qty: 90 TABLET | Refills: 0 | Status: SHIPPED | OUTPATIENT
Start: 2024-07-01

## 2024-07-01 RX ORDER — GLIMEPIRIDE 4 MG/1
4 TABLET ORAL
Qty: 90 TABLET | Refills: 0 | Status: SHIPPED | OUTPATIENT
Start: 2024-07-01

## 2024-07-01 RX ORDER — PANTOPRAZOLE SODIUM 40 MG/1
40 TABLET, DELAYED RELEASE ORAL DAILY
Qty: 90 TABLET | Refills: 0 | Status: SHIPPED | OUTPATIENT
Start: 2024-07-01

## 2024-07-01 RX ORDER — PANTOPRAZOLE SODIUM 40 MG/1
40 TABLET, DELAYED RELEASE ORAL DAILY
Qty: 90 TABLET | Refills: 0 | Status: SHIPPED
Start: 2024-07-01 | End: 2024-07-01 | Stop reason: SDUPTHER

## 2024-07-01 NOTE — PROGRESS NOTES
Chief Complaint   Patient presents with    Follow-up     Pt is a F/U from her fall with a fracture of the right clavicle and is C/o right rib cage discomfort at this visit, and when she's speaking her voice fades out as she needs a breath.        HPI:  Patient is here for follow-up of right clavicular fracture, diabetes mellitus hypertension hyperlipidemia chronic pain syndrome.  Patient is accompanied by her daughter.  She is doing relatively okay feeling a little bit better with her right clavicular fracture.  She started physical therapy and that has been helping her.  Range of motion has improved to about 90 degrees abduction.    .  Patient complains of increased reflux lately especially with burping and sometimes feeling the acid the taste in her mouth.  She has been placed on pantoprazole a while ago but not sure if she is taking it regularly or not.  We encouraged patient to continue taking it and we will add Pepcid 40 mg nightly.    She is also complaining about running out of air after she talks a long time that could be secondary to her severe reflux disease.    Also she is complaining about shocking sensation specially on liquids that has been going on for the last few month.  She has no known CVA.  We will check swallow evaluation to evaluate for dysphagia.  .    Past Medical History, Surgical History, and Family History has been reviewed and updated.    Review of Systems:  Constitutional:  No fever, no fatigue, no chills, no headaches, no weight change  Dermatology:  No rash, no mole, no dry or sensitive skin  ENT:  No cough, no sore throat, no sinus pain, no runny nose, no ear pain  Cardiology:  No chest pain, no palpitations, no leg edema, no shortness of breath, no PND  Gastroenterology:  No dysphagia, no abdominal pain, no nausea, no vomiting, no constipation, no diarrhea, no heartburn  Musculoskeletal:  No joint pain, no leg cramps, no back pain, no muscle aches  Respiratory:  No shortness of

## 2024-07-08 ENCOUNTER — HOSPITAL ENCOUNTER (OUTPATIENT)
Dept: GENERAL RADIOLOGY | Age: 84
Discharge: HOME | End: 2024-07-10
Attending: INTERNAL MEDICINE
Payer: MEDICARE

## 2024-07-08 DIAGNOSIS — R09.89 CHOKING SENSATION: ICD-10-CM

## 2024-07-08 DIAGNOSIS — R13.12 OROPHARYNGEAL DYSPHAGIA: ICD-10-CM

## 2024-07-08 PROCEDURE — 92526 ORAL FUNCTION THERAPY: CPT | Performed by: SPEECH-LANGUAGE PATHOLOGIST

## 2024-07-08 PROCEDURE — 92611 MOTION FLUOROSCOPY/SWALLOW: CPT | Performed by: SPEECH-LANGUAGE PATHOLOGIST

## 2024-07-08 PROCEDURE — 74230 X-RAY XM SWLNG FUNCJ C+: CPT

## 2024-07-08 PROCEDURE — 2500000003 HC RX 250 WO HCPCS: Performed by: INTERNAL MEDICINE

## 2024-07-08 RX ADMIN — BARIUM SULFATE 45 ML: 0.81 POWDER, FOR SUSPENSION ORAL at 10:31

## 2024-07-08 RX ADMIN — BARIUM SULFATE 45 ML: 400 PASTE ORAL at 10:31

## 2024-07-08 RX ADMIN — BARIUM SULFATE 45 ML: 400 SUSPENSION ORAL at 10:31

## 2024-07-08 NOTE — PROGRESS NOTES
strategies during session..  No further dysphagia therapy warranted at this time due to independence with implementation of strategies and patient desire to address independently. .        [id number]  dysphagia tx    Dolores Bush MSCCC/SLP  Speech Language Pathologist  [phone]

## 2024-07-12 NOTE — RESULT ENCOUNTER NOTE
Pt contacted at home and advised of her swallowing study results and also advised to proceed with an Endoscopy. Pt's daughter states she does not want an Endoscopy at this time and will contact us for a referral if she would change her mind.

## 2024-07-23 RX ORDER — TRAZODONE HYDROCHLORIDE 50 MG/1
50 TABLET ORAL NIGHTLY
Qty: 30 TABLET | Refills: 2 | Status: SHIPPED | OUTPATIENT
Start: 2024-07-23 | End: 2024-10-21

## 2024-08-01 ENCOUNTER — TELEPHONE (OUTPATIENT)
Dept: PRIMARY CARE CLINIC | Age: 84
End: 2024-08-01

## 2024-08-01 DIAGNOSIS — N18.32 TYPE 2 DIABETES MELLITUS WITH STAGE 3B CHRONIC KIDNEY DISEASE, WITH LONG-TERM CURRENT USE OF INSULIN (HCC): ICD-10-CM

## 2024-08-01 DIAGNOSIS — Z79.4 TYPE 2 DIABETES MELLITUS WITH STAGE 3B CHRONIC KIDNEY DISEASE, WITH LONG-TERM CURRENT USE OF INSULIN (HCC): ICD-10-CM

## 2024-08-01 DIAGNOSIS — E11.22 TYPE 2 DIABETES MELLITUS WITH STAGE 3B CHRONIC KIDNEY DISEASE, WITH LONG-TERM CURRENT USE OF INSULIN (HCC): ICD-10-CM

## 2024-08-03 ENCOUNTER — APPOINTMENT (OUTPATIENT)
Dept: ULTRASOUND IMAGING | Age: 84
DRG: 149 | End: 2024-08-03
Payer: MEDICARE

## 2024-08-03 ENCOUNTER — HOSPITAL ENCOUNTER (INPATIENT)
Age: 84
LOS: 3 days | Discharge: NURSING, REHAB OR LONG-TERM CARE | DRG: 149 | End: 2024-08-06
Attending: STUDENT IN AN ORGANIZED HEALTH CARE EDUCATION/TRAINING PROGRAM | Admitting: INTERNAL MEDICINE
Payer: MEDICARE

## 2024-08-03 ENCOUNTER — APPOINTMENT (OUTPATIENT)
Age: 84
DRG: 149 | End: 2024-08-03
Payer: MEDICARE

## 2024-08-03 ENCOUNTER — APPOINTMENT (OUTPATIENT)
Dept: CT IMAGING | Age: 84
DRG: 149 | End: 2024-08-03
Payer: MEDICARE

## 2024-08-03 ENCOUNTER — APPOINTMENT (OUTPATIENT)
Dept: GENERAL RADIOLOGY | Age: 84
DRG: 149 | End: 2024-08-03
Payer: MEDICARE

## 2024-08-03 DIAGNOSIS — R42 DIZZINESS: Primary | ICD-10-CM

## 2024-08-03 LAB
ALBUMIN SERPL-MCNC: 3.7 G/DL (ref 3.5–5.2)
ALP SERPL-CCNC: 175 U/L (ref 35–104)
ALT SERPL-CCNC: 15 U/L (ref 0–32)
ANION GAP SERPL CALCULATED.3IONS-SCNC: 9 MMOL/L (ref 7–16)
AST SERPL-CCNC: 17 U/L (ref 0–31)
BACTERIA URNS QL MICRO: ABNORMAL
BASOPHILS # BLD: 0.06 K/UL (ref 0–0.2)
BASOPHILS NFR BLD: 1 % (ref 0–2)
BILIRUB SERPL-MCNC: 0.2 MG/DL (ref 0–1.2)
BILIRUB UR QL STRIP: NEGATIVE
BUN SERPL-MCNC: 24 MG/DL (ref 6–23)
CALCIUM SERPL-MCNC: 9.6 MG/DL (ref 8.6–10.2)
CHLORIDE SERPL-SCNC: 102 MMOL/L (ref 98–107)
CLARITY UR: ABNORMAL
CO2 SERPL-SCNC: 28 MMOL/L (ref 22–29)
COLOR UR: YELLOW
CREAT SERPL-MCNC: 1.2 MG/DL (ref 0.5–1)
EOSINOPHIL # BLD: 0.26 K/UL (ref 0.05–0.5)
EOSINOPHILS RELATIVE PERCENT: 3 % (ref 0–6)
ERYTHROCYTE [DISTWIDTH] IN BLOOD BY AUTOMATED COUNT: 16 % (ref 11.5–15)
GFR, ESTIMATED: 44 ML/MIN/1.73M2
GLUCOSE BLD-MCNC: 249 MG/DL (ref 74–99)
GLUCOSE BLD-MCNC: 74 MG/DL (ref 74–99)
GLUCOSE SERPL-MCNC: 82 MG/DL (ref 74–99)
GLUCOSE UR STRIP-MCNC: NEGATIVE MG/DL
HCT VFR BLD AUTO: 34.5 % (ref 34–48)
HGB UR QL STRIP.AUTO: NEGATIVE
IMM GRANULOCYTES # BLD AUTO: <0.03 K/UL (ref 0–0.58)
IMM GRANULOCYTES NFR BLD: 0 % (ref 0–5)
KETONES UR STRIP-MCNC: NEGATIVE MG/DL
LEUKOCYTE ESTERASE UR QL STRIP: ABNORMAL
LYMPHOCYTES NFR BLD: 2.43 K/UL (ref 1.5–4)
LYMPHOCYTES RELATIVE PERCENT: 30 % (ref 20–42)
MCH RBC QN AUTO: 25.7 PG (ref 26–35)
MCHC RBC AUTO-ENTMCNC: 31.3 G/DL (ref 32–34.5)
MCV RBC AUTO: 82.1 FL (ref 80–99.9)
MONOCYTES NFR BLD: 0.93 K/UL (ref 0.1–0.95)
MONOCYTES NFR BLD: 11 % (ref 2–12)
NEUTROPHILS NFR BLD: 55 % (ref 43–80)
NEUTS SEG NFR BLD: 4.51 K/UL (ref 1.8–7.3)
NITRITE UR QL STRIP: POSITIVE
PH UR STRIP: 6.5 [PH] (ref 5–9)
PLATELET # BLD AUTO: 287 K/UL (ref 130–450)
PMV BLD AUTO: 9.3 FL (ref 7–12)
POTASSIUM SERPL-SCNC: 4.2 MMOL/L (ref 3.5–5)
PROT SERPL-MCNC: 6.9 G/DL (ref 6.4–8.3)
PROT UR STRIP-MCNC: NEGATIVE MG/DL
RBC # BLD AUTO: 4.2 M/UL (ref 3.5–5.5)
RBC #/AREA URNS HPF: ABNORMAL /HPF
SODIUM SERPL-SCNC: 139 MMOL/L (ref 132–146)
SP GR UR STRIP: 1.01 (ref 1–1.03)
TROPONIN I SERPL HS-MCNC: 45 NG/L (ref 0–9)
TROPONIN I SERPL HS-MCNC: 53 NG/L (ref 0–9)
UROBILINOGEN UR STRIP-ACNC: 0.2 EU/DL (ref 0–1)
WBC #/AREA URNS HPF: ABNORMAL /HPF
WBC OTHER # BLD: 8.2 K/UL (ref 4.5–11.5)

## 2024-08-03 PROCEDURE — 82962 GLUCOSE BLOOD TEST: CPT

## 2024-08-03 PROCEDURE — 84484 ASSAY OF TROPONIN QUANT: CPT

## 2024-08-03 PROCEDURE — 6360000002 HC RX W HCPCS

## 2024-08-03 PROCEDURE — 71045 X-RAY EXAM CHEST 1 VIEW: CPT

## 2024-08-03 PROCEDURE — 93880 EXTRACRANIAL BILAT STUDY: CPT

## 2024-08-03 PROCEDURE — 87086 URINE CULTURE/COLONY COUNT: CPT

## 2024-08-03 PROCEDURE — 2580000003 HC RX 258

## 2024-08-03 PROCEDURE — 93306 TTE W/DOPPLER COMPLETE: CPT

## 2024-08-03 PROCEDURE — 72125 CT NECK SPINE W/O DYE: CPT

## 2024-08-03 PROCEDURE — 6370000000 HC RX 637 (ALT 250 FOR IP)

## 2024-08-03 PROCEDURE — 6360000002 HC RX W HCPCS: Performed by: INTERNAL MEDICINE

## 2024-08-03 PROCEDURE — 99285 EMERGENCY DEPT VISIT HI MDM: CPT

## 2024-08-03 PROCEDURE — 93005 ELECTROCARDIOGRAM TRACING: CPT | Performed by: STUDENT IN AN ORGANIZED HEALTH CARE EDUCATION/TRAINING PROGRAM

## 2024-08-03 PROCEDURE — 80053 COMPREHEN METABOLIC PANEL: CPT

## 2024-08-03 PROCEDURE — 6370000000 HC RX 637 (ALT 250 FOR IP): Performed by: INTERNAL MEDICINE

## 2024-08-03 PROCEDURE — 85025 COMPLETE CBC W/AUTO DIFF WBC: CPT

## 2024-08-03 PROCEDURE — 99223 1ST HOSP IP/OBS HIGH 75: CPT | Performed by: INTERNAL MEDICINE

## 2024-08-03 PROCEDURE — 81001 URINALYSIS AUTO W/SCOPE: CPT

## 2024-08-03 PROCEDURE — 70450 CT HEAD/BRAIN W/O DYE: CPT

## 2024-08-03 PROCEDURE — 2060000000 HC ICU INTERMEDIATE R&B

## 2024-08-03 PROCEDURE — 2580000003 HC RX 258: Performed by: STUDENT IN AN ORGANIZED HEALTH CARE EDUCATION/TRAINING PROGRAM

## 2024-08-03 PROCEDURE — 87088 URINE BACTERIA CULTURE: CPT

## 2024-08-03 PROCEDURE — 70486 CT MAXILLOFACIAL W/O DYE: CPT

## 2024-08-03 RX ORDER — M-VIT,TX,IRON,MINS/CALC/FOLIC 27MG-0.4MG
1 TABLET ORAL DAILY
Status: DISCONTINUED | OUTPATIENT
Start: 2024-08-03 | End: 2024-08-06 | Stop reason: HOSPADM

## 2024-08-03 RX ORDER — OLANZAPINE 5 MG/1
5 TABLET ORAL NIGHTLY
Status: DISCONTINUED | OUTPATIENT
Start: 2024-08-03 | End: 2024-08-06 | Stop reason: HOSPADM

## 2024-08-03 RX ORDER — INSULIN GLARGINE 100 [IU]/ML
18 INJECTION, SOLUTION SUBCUTANEOUS NIGHTLY
Status: DISCONTINUED | OUTPATIENT
Start: 2024-08-03 | End: 2024-08-04

## 2024-08-03 RX ORDER — FUROSEMIDE 10 MG/ML
20 INJECTION INTRAMUSCULAR; INTRAVENOUS 2 TIMES DAILY
Status: DISCONTINUED | OUTPATIENT
Start: 2024-08-03 | End: 2024-08-06 | Stop reason: HOSPADM

## 2024-08-03 RX ORDER — PANTOPRAZOLE SODIUM 40 MG/1
40 TABLET, DELAYED RELEASE ORAL
Status: DISCONTINUED | OUTPATIENT
Start: 2024-08-03 | End: 2024-08-06 | Stop reason: HOSPADM

## 2024-08-03 RX ORDER — LEVOTHYROXINE SODIUM 0.07 MG/1
75 TABLET ORAL DAILY
Status: DISCONTINUED | OUTPATIENT
Start: 2024-08-03 | End: 2024-08-06 | Stop reason: HOSPADM

## 2024-08-03 RX ORDER — SODIUM CHLORIDE 9 MG/ML
INJECTION, SOLUTION INTRAVENOUS CONTINUOUS
Status: DISCONTINUED | OUTPATIENT
Start: 2024-08-03 | End: 2024-08-03

## 2024-08-03 RX ORDER — ACETAMINOPHEN 325 MG/1
650 TABLET ORAL EVERY 6 HOURS PRN
Status: DISCONTINUED | OUTPATIENT
Start: 2024-08-03 | End: 2024-08-06 | Stop reason: HOSPADM

## 2024-08-03 RX ORDER — ENOXAPARIN SODIUM 100 MG/ML
40 INJECTION SUBCUTANEOUS DAILY
Status: DISCONTINUED | OUTPATIENT
Start: 2024-08-03 | End: 2024-08-06 | Stop reason: HOSPADM

## 2024-08-03 RX ORDER — BUPROPION HYDROCHLORIDE 150 MG/1
150 TABLET ORAL EVERY MORNING
Status: DISCONTINUED | OUTPATIENT
Start: 2024-08-03 | End: 2024-08-06 | Stop reason: HOSPADM

## 2024-08-03 RX ORDER — 0.9 % SODIUM CHLORIDE 0.9 %
1000 INTRAVENOUS SOLUTION INTRAVENOUS ONCE
Status: COMPLETED | OUTPATIENT
Start: 2024-08-03 | End: 2024-08-03

## 2024-08-03 RX ORDER — INSULIN LISPRO 100 [IU]/ML
0-4 INJECTION, SOLUTION INTRAVENOUS; SUBCUTANEOUS NIGHTLY
Status: DISCONTINUED | OUTPATIENT
Start: 2024-08-03 | End: 2024-08-06 | Stop reason: HOSPADM

## 2024-08-03 RX ORDER — METOPROLOL SUCCINATE 25 MG/1
25 TABLET, EXTENDED RELEASE ORAL DAILY
Status: DISCONTINUED | OUTPATIENT
Start: 2024-08-03 | End: 2024-08-06 | Stop reason: HOSPADM

## 2024-08-03 RX ORDER — LOSARTAN POTASSIUM 50 MG/1
100 TABLET ORAL DAILY
Status: DISCONTINUED | OUTPATIENT
Start: 2024-08-03 | End: 2024-08-06 | Stop reason: HOSPADM

## 2024-08-03 RX ORDER — GABAPENTIN 300 MG/1
300 CAPSULE ORAL 2 TIMES DAILY
Status: DISCONTINUED | OUTPATIENT
Start: 2024-08-03 | End: 2024-08-06 | Stop reason: HOSPADM

## 2024-08-03 RX ORDER — FLUOXETINE HYDROCHLORIDE 20 MG/1
40 CAPSULE ORAL NIGHTLY
Status: DISCONTINUED | OUTPATIENT
Start: 2024-08-03 | End: 2024-08-06 | Stop reason: HOSPADM

## 2024-08-03 RX ORDER — INSULIN LISPRO 100 [IU]/ML
0-8 INJECTION, SOLUTION INTRAVENOUS; SUBCUTANEOUS
Status: DISCONTINUED | OUTPATIENT
Start: 2024-08-03 | End: 2024-08-06 | Stop reason: HOSPADM

## 2024-08-03 RX ADMIN — FUROSEMIDE 20 MG: 10 INJECTION, SOLUTION INTRAMUSCULAR; INTRAVENOUS at 18:23

## 2024-08-03 RX ADMIN — METOPROLOL SUCCINATE 25 MG: 25 TABLET, EXTENDED RELEASE ORAL at 09:16

## 2024-08-03 RX ADMIN — GABAPENTIN 300 MG: 300 CAPSULE ORAL at 09:16

## 2024-08-03 RX ADMIN — BUPROPION HYDROCHLORIDE 150 MG: 150 TABLET, EXTENDED RELEASE ORAL at 10:25

## 2024-08-03 RX ADMIN — GABAPENTIN 300 MG: 300 CAPSULE ORAL at 20:57

## 2024-08-03 RX ADMIN — Medication 1 TABLET: at 09:16

## 2024-08-03 RX ADMIN — FLUOXETINE HYDROCHLORIDE 40 MG: 20 CAPSULE ORAL at 20:12

## 2024-08-03 RX ADMIN — WATER 1000 MG: 1 INJECTION INTRAMUSCULAR; INTRAVENOUS; SUBCUTANEOUS at 08:03

## 2024-08-03 RX ADMIN — SODIUM CHLORIDE 1000 ML: 9 INJECTION, SOLUTION INTRAVENOUS at 04:31

## 2024-08-03 RX ADMIN — OLANZAPINE 5 MG: 5 TABLET, FILM COATED ORAL at 20:12

## 2024-08-03 RX ADMIN — INSULIN GLARGINE 18 UNITS: 100 INJECTION, SOLUTION SUBCUTANEOUS at 20:32

## 2024-08-03 RX ADMIN — LEVOTHYROXINE SODIUM 75 MCG: 0.05 TABLET ORAL at 08:03

## 2024-08-03 RX ADMIN — LOSARTAN POTASSIUM 100 MG: 50 TABLET, FILM COATED ORAL at 09:16

## 2024-08-03 RX ADMIN — INSULIN LISPRO 6 UNITS: 100 INJECTION, SOLUTION INTRAVENOUS; SUBCUTANEOUS at 18:22

## 2024-08-03 RX ADMIN — ENOXAPARIN SODIUM 40 MG: 100 INJECTION SUBCUTANEOUS at 09:17

## 2024-08-03 RX ADMIN — SODIUM CHLORIDE: 9 INJECTION, SOLUTION INTRAVENOUS at 08:04

## 2024-08-03 RX ADMIN — PANTOPRAZOLE SODIUM 40 MG: 40 TABLET, DELAYED RELEASE ORAL at 08:03

## 2024-08-03 RX ADMIN — ACETAMINOPHEN 650 MG: 325 TABLET ORAL at 18:23

## 2024-08-03 RX ADMIN — FUROSEMIDE 20 MG: 10 INJECTION, SOLUTION INTRAMUSCULAR; INTRAVENOUS at 12:28

## 2024-08-03 ASSESSMENT — PAIN DESCRIPTION - ORIENTATION
ORIENTATION: RIGHT;LEFT
ORIENTATION: RIGHT;LEFT

## 2024-08-03 ASSESSMENT — PAIN DESCRIPTION - LOCATION
LOCATION: HEAD
LOCATION: NOSE

## 2024-08-03 ASSESSMENT — PAIN - FUNCTIONAL ASSESSMENT
PAIN_FUNCTIONAL_ASSESSMENT: PREVENTS OR INTERFERES SOME ACTIVE ACTIVITIES AND ADLS
PAIN_FUNCTIONAL_ASSESSMENT: PREVENTS OR INTERFERES SOME ACTIVE ACTIVITIES AND ADLS
PAIN_FUNCTIONAL_ASSESSMENT: NONE - DENIES PAIN

## 2024-08-03 ASSESSMENT — PAIN DESCRIPTION - PAIN TYPE: TYPE: ACUTE PAIN

## 2024-08-03 ASSESSMENT — PAIN DESCRIPTION - DESCRIPTORS
DESCRIPTORS: ACHING;THROBBING;SORE
DESCRIPTORS: ACHING;THROBBING

## 2024-08-03 ASSESSMENT — PAIN SCALES - GENERAL
PAINLEVEL_OUTOF10: 7
PAINLEVEL_OUTOF10: 7

## 2024-08-03 NOTE — CONSULTS
CHIEF COMPLAINT: Dizziness    HISTORY OF PRESENT ILLNESS: Patient is a 84 y.o. female seen at the request of Roxie Maldonado MD and followed at our office by Dr. An in the past.  She presents complaining of dizziness.  This has been going on for the last 12 hours.  She describes it as a spinning sensation.  Worsened by head movements.  Can also be worsened by positional changes.  No chest pains.  Not very active at baseline.  Had gait instability and lower extremity weakness  Lives with daughter.  Capable of ADLs.  I was consulted for further recommendations.  Pressures have been elevated on arrival in the ER.  Orthostatic vital signs been negative.  Has been compliant with medications at home.      Past Medical History:   Diagnosis Date    Depression     Diabetes mellitus ([facility])     Hyperlipidemia     Hypertension     Thyroid disease     Type 2 diabetes mellitus without complication ([facility])        Patient Active Problem List   Diagnosis    Hypertension    Dyspnea on exertion    Osteopenia of the elderly    Gastroesophageal reflux disease without esophagitis    Autoimmune hypothyroidism    Moderate major depression (HCC)    Nonrheumatic tricuspid valve regurgitation    Hypochromic microcytic anemia    Hyperlipidemia    Depression    Pulmonary hypertension (HCC)    Chronic midline thoracic back pain    Onychomycosis    Pain of toe of left foot    Solid nodule of lung 6 mm to 8 mm in diameter    Fall in elderly patient    Acute midline thoracic back pain    Drug-induced diarrhea    Compression fracture of thoracic vertebra (HCC)    Thoracic facet joint syndrome    Thoracic spondylosis    Thoracic disc disease    Cervical spondylosis    Cervical disc disorder    Cervical facet joint syndrome    Chronic pain syndrome    Spinal stenosis of lumbar region without neurogenic claudication    Diabetes mellitus due to underlying condition, uncontrolled, with hyperglycemia, without long-term current use of insulin (HCC)

## 2024-08-03 NOTE — ED NOTES
Pt provided snack and beverage, clean linens, hospital gown and pur wick at this time. Pt updated on change in \"ready bed\" situation. Pt aware that new bed assignment is pending at this time.

## 2024-08-03 NOTE — ED PROVIDER NOTES
require admission for further evaluation.    Left stress test reviewed by me from 8/19/2020 showing intermediate risk of acute ischemic events.      ED Course as of 08/03/24 0812   Sat Aug 03, 2024   0712 Spoke with Dr. Christianson will admit [FG]      ED Course User Index  [FG] Sunil Allen DO       --------------------------------------------- PAST HISTORY ---------------------------------------------  Past Medical History:  has a past medical history of Depression, Diabetes mellitus (HCC), Hyperlipidemia, Hypertension, Thyroid disease, and Type 2 diabetes mellitus without complication (HCC).    Past Surgical History:  has a past surgical history that includes Cholecystectomy; shoulder surgery (rt rotator); Colonoscopy; Hysterectomy; eye surgery; Upper gastrointestinal endoscopy (5/15//12); joint replacement (Left); Pain management procedure (Bilateral, 8/11/2022); Pain management procedure (N/A, 10/6/2022); and Pain management procedure (Bilateral, 12/12/2022).    Social History:  reports that she has never smoked. She has never used smokeless tobacco. She reports that she does not drink alcohol and does not use drugs.    Family History: family history includes Cancer in her father and mother; Coronary Art Dis in her brother; Stroke in her mother.     The patient’s home medications have been reviewed.    Allergies: Patient has no known allergies.    -------------------------------------------------- RESULTS -------------------------------------------------  Labs:  Results for orders placed or performed during the hospital encounter of 08/03/24   CBC with Auto Differential   Result Value Ref Range    WBC 8.2 4.5 - 11.5 k/uL    RBC 4.20 3.50 - 5.50 m/uL    Hemoglobin 10.8 (L) 11.5 - 15.5 g/dL    Hematocrit 34.5 34.0 - 48.0 %    MCV 82.1 80.0 - 99.9 fL    MCH 25.7 (L) 26.0 - 35.0 pg    MCHC 31.3 (L) 32.0 - 34.5 g/dL    RDW 16.0 (H) 11.5 - 15.0 %    Platelets 287 130 - 450 k/uL    MPV 9.3 7.0 - 12.0 fL    Neutrophils %

## 2024-08-03 NOTE — H&P
[address] [address]                           HISTORY & PHYSICAL      PATIENT NAME: SACHIN LIMON                : 1940  MED REC NO: [id number]                        ROOM: 0537  ACCOUNT NO: [id number]                       ADMIT DATE: 2024  PROVIDER: Cuate Christianson DO      CHIEF COMPLAINT AND HISTORY OF CHIEF COMPLAINT:  This is a pleasant 84-year-old white female who is admitted to [facility].  The patient presented to the hospital here and was subsequently admitted under the service of Dr. Roxie Maldonado.  Apparently, as well can be ascertained, the patient states she was doing relatively well.  She was in normal state of health yesterday.  Yesterday afternoon, approximately 4 o'clock, she was sitting outside on the swing with her dog reading a book.  The patient sat there for a while.  At that time, the patient apparently got up to go into the house, at which time she noticed that she was somewhat dizzy.  The patient does ambulate with the use of a cane and a walker.  The patient did go into the house.  She rested at that time.  Approximately 11 hours later, she still remained dizzy, at which time she apparently went to get up and had a fall.  The patient at that time called the paramedics, at which time she was brought into the hospital here.  The patient was seen and evaluated.  We noted at that time that she did hit her face.  Diagnostic workup at that time in the emergency room was performed, but the patient still had persistent dizziness.  The patient was seen in the emergency room at that time, at which time we did review her medication.  It was noted at that time that she did have a resting bradycardia noted at this time with slightly elevated troponin level.  Because of the above findings, the patient was admitted to the hospital at this time to the general telemetry floor and was admitted under

## 2024-08-03 NOTE — ED NOTES
Call to 5th floor, Rn states \"we are fully staffed right now, we have 4 pt each, we will call bed coordinator.\" ED charge notified.

## 2024-08-04 PROBLEM — N39.0 URINARY TRACT INFECTION: Status: ACTIVE | Noted: 2024-08-04

## 2024-08-04 LAB
25(OH)D3 SERPL-MCNC: 34.7 NG/ML (ref 30–100)
ALBUMIN SERPL-MCNC: 4 G/DL (ref 3.5–5.2)
ALP SERPL-CCNC: 177 U/L (ref 35–104)
ALT SERPL-CCNC: 14 U/L (ref 0–32)
ANION GAP SERPL CALCULATED.3IONS-SCNC: 15 MMOL/L (ref 7–16)
AST SERPL-CCNC: 16 U/L (ref 0–31)
BASOPHILS # BLD: 0.07 K/UL (ref 0–0.2)
BASOPHILS NFR BLD: 1 % (ref 0–2)
BILIRUB SERPL-MCNC: <0.2 MG/DL (ref 0–1.2)
BUN SERPL-MCNC: 24 MG/DL (ref 6–23)
CALCIUM SERPL-MCNC: 9.1 MG/DL (ref 8.6–10.2)
CHLORIDE SERPL-SCNC: 98 MMOL/L (ref 98–107)
CHOLEST SERPL-MCNC: 192 MG/DL
CO2 SERPL-SCNC: 25 MMOL/L (ref 22–29)
CREAT SERPL-MCNC: 1.5 MG/DL (ref 0.5–1)
ECHO AO ASC DIAM: 2.9 CM
ECHO AO ASCENDING AORTA INDEX: 1.61 CM/M2
ECHO AV AREA PEAK VELOCITY: 2.2 CM2
ECHO AV AREA VTI: 2.1 CM2
ECHO AV AREA/BSA PEAK VELOCITY: 1.2 CM2/M2
ECHO AV AREA/BSA VTI: 1.2 CM2/M2
ECHO AV CUSP MM: 1.9 CM
ECHO AV MEAN GRADIENT: 4 MMHG
ECHO AV MEAN VELOCITY: 0.9 M/S
ECHO AV PEAK GRADIENT: 8 MMHG
ECHO AV PEAK VELOCITY: 1.4 M/S
ECHO AV VELOCITY RATIO: 0.71
ECHO AV VTI: 31.8 CM
ECHO BSA: 1.82 M2
ECHO EST RA PRESSURE: 3 MMHG
ECHO LA DIAMETER INDEX: 1.78 CM/M2
ECHO LA DIAMETER: 3.2 CM
ECHO LA VOL A-L A2C: 52 ML (ref 22–52)
ECHO LA VOL A-L A4C: 49 ML (ref 22–52)
ECHO LA VOL BP: 50 ML (ref 22–52)
ECHO LA VOL MOD A2C: 52 ML (ref 22–52)
ECHO LA VOL MOD A4C: 47 ML (ref 22–52)
ECHO LA VOL/BSA BIPLANE: 28 ML/M2 (ref 16–34)
ECHO LA VOLUME AREA LENGTH: 51 ML
ECHO LA VOLUME INDEX A-L A2C: 29 ML/M2 (ref 16–34)
ECHO LA VOLUME INDEX A-L A4C: 27 ML/M2 (ref 16–34)
ECHO LA VOLUME INDEX AREA LENGTH: 28 ML/M2 (ref 16–34)
ECHO LA VOLUME INDEX MOD A2C: 29 ML/M2 (ref 16–34)
ECHO LA VOLUME INDEX MOD A4C: 26 ML/M2 (ref 16–34)
ECHO LV EDV A2C: 70 ML
ECHO LV EDV A4C: 80 ML
ECHO LV EDV BP: 76 ML (ref 56–104)
ECHO LV EDV INDEX A4C: 44 ML/M2
ECHO LV EDV INDEX BP: 42 ML/M2
ECHO LV EDV NDEX A2C: 39 ML/M2
ECHO LV EJECTION FRACTION A2C: 71 %
ECHO LV EJECTION FRACTION A4C: 66 %
ECHO LV EJECTION FRACTION BIPLANE: 69 % (ref 55–100)
ECHO LV ESV A2C: 21 ML
ECHO LV ESV A4C: 27 ML
ECHO LV ESV BP: 24 ML (ref 19–49)
ECHO LV ESV INDEX A2C: 12 ML/M2
ECHO LV ESV INDEX A4C: 15 ML/M2
ECHO LV ESV INDEX BP: 13 ML/M2
ECHO LV FRACTIONAL SHORTENING: 36 % (ref 28–44)
ECHO LV INTERNAL DIMENSION DIASTOLE INDEX: 2.5 CM/M2
ECHO LV INTERNAL DIMENSION DIASTOLIC: 4.5 CM (ref 3.9–5.3)
ECHO LV INTERNAL DIMENSION SYSTOLIC INDEX: 1.61 CM/M2
ECHO LV INTERNAL DIMENSION SYSTOLIC: 2.9 CM
ECHO LV ISOVOLUMETRIC RELAXATION TIME (IVRT): 83.7 MS
ECHO LV IVSD: 1.2 CM (ref 0.6–0.9)
ECHO LV IVSS: 1.5 CM
ECHO LV MASS 2D: 198.1 G (ref 67–162)
ECHO LV MASS INDEX 2D: 110.1 G/M2 (ref 43–95)
ECHO LV POSTERIOR WALL DIASTOLIC: 1.2 CM (ref 0.6–0.9)
ECHO LV POSTERIOR WALL SYSTOLIC: 1.5 CM
ECHO LV RELATIVE WALL THICKNESS RATIO: 0.53
ECHO LVOT AREA: 2.8 CM2
ECHO LVOT AV VTI INDEX: 0.69
ECHO LVOT DIAM: 1.9 CM
ECHO LVOT MEAN GRADIENT: 2 MMHG
ECHO LVOT PEAK GRADIENT: 4 MMHG
ECHO LVOT PEAK VELOCITY: 1 M/S
ECHO LVOT STROKE VOLUME INDEX: 34.6 ML/M2
ECHO LVOT SV: 62.3 ML
ECHO LVOT VTI: 22 CM
ECHO MV A VELOCITY: 0.77 M/S
ECHO MV AREA PHT: 3.9 CM2
ECHO MV AREA VTI: 2.2 CM2
ECHO MV E DECELERATION TIME (DT): 205.9 MS
ECHO MV E VELOCITY: 0.84 M/S
ECHO MV E/A RATIO: 1.09
ECHO MV LVOT VTI INDEX: 1.27
ECHO MV MAX VELOCITY: 0.9 M/S
ECHO MV MEAN GRADIENT: 1 MMHG
ECHO MV MEAN VELOCITY: 0.5 M/S
ECHO MV PEAK GRADIENT: 3 MMHG
ECHO MV PRESSURE HALF TIME (PHT): 55.7 MS
ECHO MV VTI: 28 CM
ECHO PULMONARY ARTERY SYSTOLIC PRESSURE (PASP): 39 MMHG
ECHO PV MAX VELOCITY: 0.7 M/S
ECHO PV MEAN GRADIENT: 1 MMHG
ECHO PV MEAN VELOCITY: 0.5 M/S
ECHO PV PEAK GRADIENT: 2 MMHG
ECHO PV VTI: 15 CM
ECHO PVEIN A DURATION: 110.4 MS
ECHO PVEIN A VELOCITY: 0.2 M/S
ECHO PVEIN PEAK D VELOCITY: 0.4 M/S
ECHO PVEIN PEAK S VELOCITY: 0.6 M/S
ECHO PVEIN S/D RATIO: 1.5
ECHO RIGHT VENTRICULAR SYSTOLIC PRESSURE (RVSP): 39 MMHG
ECHO RV INTERNAL DIMENSION: 1.9 CM
ECHO TV REGURGITANT MAX VELOCITY: 3.02 M/S
ECHO TV REGURGITANT PEAK GRADIENT: 37 MMHG
EKG ATRIAL RATE: 62 BPM
EKG P AXIS: 9 DEGREES
EKG P-R INTERVAL: 160 MS
EKG Q-T INTERVAL: 414 MS
EKG QRS DURATION: 90 MS
EKG QTC CALCULATION (BAZETT): 420 MS
EKG R AXIS: 93 DEGREES
EKG T AXIS: -8 DEGREES
EKG VENTRICULAR RATE: 62 BPM
EOSINOPHIL # BLD: 0.14 K/UL (ref 0.05–0.5)
EOSINOPHILS RELATIVE PERCENT: 2 % (ref 0–6)
FOLATE SERPL-MCNC: >20 NG/ML (ref 4.8–24.2)
GFR, ESTIMATED: 34 ML/MIN/1.73M2
GLUCOSE BLD-MCNC: 172 MG/DL (ref 74–99)
GLUCOSE BLD-MCNC: 236 MG/DL (ref 74–99)
GLUCOSE BLD-MCNC: 257 MG/DL (ref 74–99)
GLUCOSE BLD-MCNC: 315 MG/DL (ref 74–99)
GLUCOSE SERPL-MCNC: 342 MG/DL (ref 74–99)
HCT VFR BLD AUTO: 38.4 % (ref 34–48)
HDLC SERPL-MCNC: 54 MG/DL
HGB BLD-MCNC: 11.4 G/DL (ref 11.5–15.5)
IMM GRANULOCYTES # BLD AUTO: <0.03 K/UL (ref 0–0.58)
IMM GRANULOCYTES NFR BLD: 0 % (ref 0–5)
IRON SATN MFR SERPL: 22 % (ref 15–50)
IRON SERPL-MCNC: 77 UG/DL (ref 37–145)
LDLC SERPL CALC-MCNC: 113 MG/DL
LYMPHOCYTES NFR BLD: 1.77 K/UL (ref 1.5–4)
LYMPHOCYTES RELATIVE PERCENT: 24 % (ref 20–42)
MCH RBC QN AUTO: 24.7 PG (ref 26–35)
MCHC RBC AUTO-ENTMCNC: 29.7 G/DL (ref 32–34.5)
MCV RBC AUTO: 83.3 FL (ref 80–99.9)
MONOCYTES NFR BLD: 0.46 K/UL (ref 0.1–0.95)
MONOCYTES NFR BLD: 6 % (ref 2–12)
NEUTROPHILS NFR BLD: 67 % (ref 43–80)
NEUTS SEG NFR BLD: 5.04 K/UL (ref 1.8–7.3)
PLATELET # BLD AUTO: 307 K/UL (ref 130–450)
PMV BLD AUTO: 10 FL (ref 7–12)
POTASSIUM SERPL-SCNC: 4.5 MMOL/L (ref 3.5–5)
PROT SERPL-MCNC: 7 G/DL (ref 6.4–8.3)
RBC # BLD AUTO: 4.61 M/UL (ref 3.5–5.5)
SODIUM SERPL-SCNC: 138 MMOL/L (ref 132–146)
T4 FREE SERPL-MCNC: 1.2 NG/DL (ref 0.9–1.7)
TIBC SERPL-MCNC: 349 UG/DL (ref 250–450)
TRIGL SERPL-MCNC: 126 MG/DL
TROPONIN I SERPL HS-MCNC: 50 NG/L (ref 0–9)
TSH SERPL DL<=0.05 MIU/L-ACNC: 1.98 UIU/ML (ref 0.27–4.2)
VIT B12 SERPL-MCNC: 426 PG/ML (ref 211–946)
VLDLC SERPL CALC-MCNC: 25 MG/DL
WBC OTHER # BLD: 7.5 K/UL (ref 4.5–11.5)

## 2024-08-04 PROCEDURE — 6360000002 HC RX W HCPCS: Performed by: INTERNAL MEDICINE

## 2024-08-04 PROCEDURE — 6370000000 HC RX 637 (ALT 250 FOR IP): Performed by: INTERNAL MEDICINE

## 2024-08-04 PROCEDURE — 84443 ASSAY THYROID STIM HORMONE: CPT

## 2024-08-04 PROCEDURE — 80053 COMPREHEN METABOLIC PANEL: CPT

## 2024-08-04 PROCEDURE — 36415 COLL VENOUS BLD VENIPUNCTURE: CPT

## 2024-08-04 PROCEDURE — 82607 VITAMIN B-12: CPT

## 2024-08-04 PROCEDURE — 83540 ASSAY OF IRON: CPT

## 2024-08-04 PROCEDURE — 2580000003 HC RX 258

## 2024-08-04 PROCEDURE — 84439 ASSAY OF FREE THYROXINE: CPT

## 2024-08-04 PROCEDURE — 84484 ASSAY OF TROPONIN QUANT: CPT

## 2024-08-04 PROCEDURE — 99233 SBSQ HOSP IP/OBS HIGH 50: CPT | Performed by: INTERNAL MEDICINE

## 2024-08-04 PROCEDURE — 93306 TTE W/DOPPLER COMPLETE: CPT | Performed by: INTERNAL MEDICINE

## 2024-08-04 PROCEDURE — 2060000000 HC ICU INTERMEDIATE R&B

## 2024-08-04 PROCEDURE — 82746 ASSAY OF FOLIC ACID SERUM: CPT

## 2024-08-04 PROCEDURE — 83550 IRON BINDING TEST: CPT

## 2024-08-04 PROCEDURE — 6360000002 HC RX W HCPCS

## 2024-08-04 PROCEDURE — 82962 GLUCOSE BLOOD TEST: CPT

## 2024-08-04 PROCEDURE — 85025 COMPLETE CBC W/AUTO DIFF WBC: CPT

## 2024-08-04 PROCEDURE — 6370000000 HC RX 637 (ALT 250 FOR IP)

## 2024-08-04 PROCEDURE — 2580000003 HC RX 258: Performed by: INTERNAL MEDICINE

## 2024-08-04 PROCEDURE — 80061 LIPID PANEL: CPT

## 2024-08-04 PROCEDURE — 93010 ELECTROCARDIOGRAM REPORT: CPT | Performed by: INTERNAL MEDICINE

## 2024-08-04 PROCEDURE — 82306 VITAMIN D 25 HYDROXY: CPT

## 2024-08-04 RX ORDER — MECLIZINE HCL 12.5 MG/1
25 TABLET ORAL 2 TIMES DAILY
Status: DISCONTINUED | OUTPATIENT
Start: 2024-08-04 | End: 2024-08-04

## 2024-08-04 RX ORDER — MECLIZINE HCL 12.5 MG/1
25 TABLET ORAL 3 TIMES DAILY
Status: DISCONTINUED | OUTPATIENT
Start: 2024-08-04 | End: 2024-08-06 | Stop reason: HOSPADM

## 2024-08-04 RX ORDER — GLUCAGON 1 MG/ML
1 KIT INJECTION PRN
Status: DISCONTINUED | OUTPATIENT
Start: 2024-08-04 | End: 2024-08-06 | Stop reason: HOSPADM

## 2024-08-04 RX ORDER — SODIUM CHLORIDE 9 MG/ML
INJECTION, SOLUTION INTRAVENOUS CONTINUOUS
Status: DISCONTINUED | OUTPATIENT
Start: 2024-08-04 | End: 2024-08-06 | Stop reason: HOSPADM

## 2024-08-04 RX ORDER — INSULIN GLARGINE 100 [IU]/ML
22 INJECTION, SOLUTION SUBCUTANEOUS NIGHTLY
Status: DISCONTINUED | OUTPATIENT
Start: 2024-08-04 | End: 2024-08-06 | Stop reason: HOSPADM

## 2024-08-04 RX ORDER — DEXTROSE MONOHYDRATE 100 MG/ML
INJECTION, SOLUTION INTRAVENOUS CONTINUOUS PRN
Status: DISCONTINUED | OUTPATIENT
Start: 2024-08-04 | End: 2024-08-06 | Stop reason: HOSPADM

## 2024-08-04 RX ADMIN — MECLIZINE HYDROCHLORIDE 25 MG: 12.5 TABLET ORAL at 15:10

## 2024-08-04 RX ADMIN — LEVOTHYROXINE SODIUM 75 MCG: 0.05 TABLET ORAL at 08:14

## 2024-08-04 RX ADMIN — INSULIN LISPRO 2 UNITS: 100 INJECTION, SOLUTION INTRAVENOUS; SUBCUTANEOUS at 16:52

## 2024-08-04 RX ADMIN — ENOXAPARIN SODIUM 40 MG: 100 INJECTION SUBCUTANEOUS at 09:51

## 2024-08-04 RX ADMIN — FLUOXETINE HYDROCHLORIDE 40 MG: 20 CAPSULE ORAL at 21:54

## 2024-08-04 RX ADMIN — WATER 1000 MG: 1 INJECTION INTRAMUSCULAR; INTRAVENOUS; SUBCUTANEOUS at 08:15

## 2024-08-04 RX ADMIN — GABAPENTIN 300 MG: 300 CAPSULE ORAL at 09:51

## 2024-08-04 RX ADMIN — SODIUM CHLORIDE: 9 INJECTION, SOLUTION INTRAVENOUS at 12:06

## 2024-08-04 RX ADMIN — PANTOPRAZOLE SODIUM 40 MG: 40 TABLET, DELAYED RELEASE ORAL at 08:14

## 2024-08-04 RX ADMIN — LOSARTAN POTASSIUM 100 MG: 50 TABLET, FILM COATED ORAL at 09:51

## 2024-08-04 RX ADMIN — INSULIN LISPRO 6 UNITS: 100 INJECTION, SOLUTION INTRAVENOUS; SUBCUTANEOUS at 11:42

## 2024-08-04 RX ADMIN — MECLIZINE HYDROCHLORIDE 25 MG: 12.5 TABLET ORAL at 21:54

## 2024-08-04 RX ADMIN — FUROSEMIDE 20 MG: 10 INJECTION, SOLUTION INTRAMUSCULAR; INTRAVENOUS at 09:52

## 2024-08-04 RX ADMIN — OLANZAPINE 5 MG: 5 TABLET, FILM COATED ORAL at 21:54

## 2024-08-04 RX ADMIN — Medication 1 TABLET: at 09:51

## 2024-08-04 RX ADMIN — METOPROLOL SUCCINATE 25 MG: 25 TABLET, EXTENDED RELEASE ORAL at 09:51

## 2024-08-04 RX ADMIN — MECLIZINE HYDROCHLORIDE 25 MG: 12.5 TABLET ORAL at 11:42

## 2024-08-04 RX ADMIN — INSULIN GLARGINE 22 UNITS: 100 INJECTION, SOLUTION SUBCUTANEOUS at 21:55

## 2024-08-04 RX ADMIN — GABAPENTIN 300 MG: 300 CAPSULE ORAL at 21:54

## 2024-08-04 RX ADMIN — BUPROPION HYDROCHLORIDE 150 MG: 150 TABLET, EXTENDED RELEASE ORAL at 09:51

## 2024-08-04 ASSESSMENT — PAIN SCALES - GENERAL
PAINLEVEL_OUTOF10: 0
PAINLEVEL_OUTOF10: 0

## 2024-08-04 NOTE — FLOWSHEET NOTE
08/04/24 1322   Vitals   Blood Pressure Lying 124/60   Pulse Lying 67 PER MINUTE   Blood Pressure Sitting 116/59   Pulse Sitting 67 PER MINUTE   Blood Pressure Standing 109/58   Pulse Standing 80 PER MINUTE

## 2024-08-05 LAB
ALBUMIN SERPL-MCNC: 3.5 G/DL (ref 3.5–5.2)
ALP SERPL-CCNC: 162 U/L (ref 35–104)
ALT SERPL-CCNC: 11 U/L (ref 0–32)
ANION GAP SERPL CALCULATED.3IONS-SCNC: 8 MMOL/L (ref 7–16)
AST SERPL-CCNC: 14 U/L (ref 0–31)
BASOPHILS # BLD: 0.06 K/UL (ref 0–0.2)
BASOPHILS NFR BLD: 1 % (ref 0–2)
BILIRUB SERPL-MCNC: 0.2 MG/DL (ref 0–1.2)
BUN SERPL-MCNC: 29 MG/DL (ref 6–23)
CALCIUM SERPL-MCNC: 8.9 MG/DL (ref 8.6–10.2)
CHLORIDE SERPL-SCNC: 106 MMOL/L (ref 98–107)
CO2 SERPL-SCNC: 26 MMOL/L (ref 22–29)
CREAT SERPL-MCNC: 1.4 MG/DL (ref 0.5–1)
EOSINOPHIL # BLD: 0.26 K/UL (ref 0.05–0.5)
EOSINOPHILS RELATIVE PERCENT: 3 % (ref 0–6)
ERYTHROCYTE [DISTWIDTH] IN BLOOD BY AUTOMATED COUNT: 16.5 % (ref 11.5–15)
GFR, ESTIMATED: 37 ML/MIN/1.73M2
GLUCOSE BLD-MCNC: 108 MG/DL (ref 74–99)
GLUCOSE BLD-MCNC: 176 MG/DL (ref 74–99)
GLUCOSE BLD-MCNC: 188 MG/DL (ref 74–99)
GLUCOSE BLD-MCNC: 380 MG/DL (ref 74–99)
GLUCOSE BLD-MCNC: 421 MG/DL (ref 74–99)
GLUCOSE SERPL-MCNC: 204 MG/DL (ref 74–99)
HCT VFR BLD AUTO: 38.8 % (ref 34–48)
HGB BLD-MCNC: 11.5 G/DL (ref 11.5–15.5)
IMM GRANULOCYTES # BLD AUTO: <0.03 K/UL (ref 0–0.58)
IMM GRANULOCYTES NFR BLD: 0 % (ref 0–5)
LYMPHOCYTES NFR BLD: 2.17 K/UL (ref 1.5–4)
LYMPHOCYTES RELATIVE PERCENT: 28 % (ref 20–42)
MAGNESIUM SERPL-MCNC: 1.9 MG/DL (ref 1.6–2.6)
MCH RBC QN AUTO: 24.7 PG (ref 26–35)
MCHC RBC AUTO-ENTMCNC: 29.6 G/DL (ref 32–34.5)
MCV RBC AUTO: 83.4 FL (ref 80–99.9)
MICROORGANISM SPEC CULT: ABNORMAL
MONOCYTES NFR BLD: 0.8 K/UL (ref 0.1–0.95)
MONOCYTES NFR BLD: 10 % (ref 2–12)
NEUTROPHILS NFR BLD: 57 % (ref 43–80)
NEUTS SEG NFR BLD: 4.38 K/UL (ref 1.8–7.3)
PHOSPHATE SERPL-MCNC: 3.7 MG/DL (ref 2.5–4.5)
PLATELET # BLD AUTO: 292 K/UL (ref 130–450)
PMV BLD AUTO: 9.9 FL (ref 7–12)
POTASSIUM SERPL-SCNC: 4.4 MMOL/L (ref 3.5–5)
PROT SERPL-MCNC: 6.3 G/DL (ref 6.4–8.3)
RBC # BLD AUTO: 4.65 M/UL (ref 3.5–5.5)
SODIUM SERPL-SCNC: 140 MMOL/L (ref 132–146)
SPECIMEN DESCRIPTION: ABNORMAL
WBC OTHER # BLD: 7.7 K/UL (ref 4.5–11.5)

## 2024-08-05 PROCEDURE — 6370000000 HC RX 637 (ALT 250 FOR IP): Performed by: INTERNAL MEDICINE

## 2024-08-05 PROCEDURE — 2060000000 HC ICU INTERMEDIATE R&B

## 2024-08-05 PROCEDURE — 82962 GLUCOSE BLOOD TEST: CPT

## 2024-08-05 PROCEDURE — 97165 OT EVAL LOW COMPLEX 30 MIN: CPT

## 2024-08-05 PROCEDURE — 99233 SBSQ HOSP IP/OBS HIGH 50: CPT | Performed by: INTERNAL MEDICINE

## 2024-08-05 PROCEDURE — 36415 COLL VENOUS BLD VENIPUNCTURE: CPT

## 2024-08-05 PROCEDURE — 85025 COMPLETE CBC W/AUTO DIFF WBC: CPT

## 2024-08-05 PROCEDURE — 6370000000 HC RX 637 (ALT 250 FOR IP): Performed by: NURSE PRACTITIONER

## 2024-08-05 PROCEDURE — 97110 THERAPEUTIC EXERCISES: CPT | Performed by: PHYSICAL THERAPIST

## 2024-08-05 PROCEDURE — 80053 COMPREHEN METABOLIC PANEL: CPT

## 2024-08-05 PROCEDURE — 83735 ASSAY OF MAGNESIUM: CPT

## 2024-08-05 PROCEDURE — 6370000000 HC RX 637 (ALT 250 FOR IP)

## 2024-08-05 PROCEDURE — 84100 ASSAY OF PHOSPHORUS: CPT

## 2024-08-05 PROCEDURE — 97161 PT EVAL LOW COMPLEX 20 MIN: CPT | Performed by: PHYSICAL THERAPIST

## 2024-08-05 PROCEDURE — 6360000002 HC RX W HCPCS

## 2024-08-05 PROCEDURE — 2580000003 HC RX 258

## 2024-08-05 PROCEDURE — 97535 SELF CARE MNGMENT TRAINING: CPT

## 2024-08-05 RX ORDER — CEFDINIR 300 MG/1
300 CAPSULE ORAL 2 TIMES DAILY
Qty: 10 CAPSULE | Refills: 0 | Status: SHIPPED | OUTPATIENT
Start: 2024-08-05 | End: 2024-08-10

## 2024-08-05 RX ORDER — HYDRALAZINE HYDROCHLORIDE 25 MG/1
25 TABLET, FILM COATED ORAL EVERY 8 HOURS SCHEDULED
Status: DISCONTINUED | OUTPATIENT
Start: 2024-08-05 | End: 2024-08-06 | Stop reason: HOSPADM

## 2024-08-05 RX ORDER — BUPROPION HYDROCHLORIDE 150 MG/1
150 TABLET ORAL EVERY MORNING
Qty: 30 TABLET | Refills: 3 | Status: SHIPPED | OUTPATIENT
Start: 2024-08-06

## 2024-08-05 RX ORDER — HYDRALAZINE HYDROCHLORIDE 25 MG/1
25 TABLET, FILM COATED ORAL EVERY 8 HOURS SCHEDULED
Qty: 90 TABLET | Refills: 3 | Status: SHIPPED | OUTPATIENT
Start: 2024-08-05

## 2024-08-05 RX ORDER — MECLIZINE HYDROCHLORIDE 25 MG/1
25 TABLET ORAL 3 TIMES DAILY
Qty: 30 TABLET | Refills: 0 | Status: SHIPPED | OUTPATIENT
Start: 2024-08-05 | End: 2024-08-15

## 2024-08-05 RX ORDER — METOPROLOL SUCCINATE 25 MG/1
25 TABLET, EXTENDED RELEASE ORAL DAILY
Qty: 30 TABLET | Refills: 3 | Status: SHIPPED | OUTPATIENT
Start: 2024-08-06

## 2024-08-05 RX ORDER — INSULIN LISPRO 100 [IU]/ML
8 INJECTION, SOLUTION INTRAVENOUS; SUBCUTANEOUS ONCE
Status: COMPLETED | OUTPATIENT
Start: 2024-08-05 | End: 2024-08-05

## 2024-08-05 RX ADMIN — PANTOPRAZOLE SODIUM 40 MG: 40 TABLET, DELAYED RELEASE ORAL at 05:50

## 2024-08-05 RX ADMIN — OLANZAPINE 5 MG: 5 TABLET, FILM COATED ORAL at 20:40

## 2024-08-05 RX ADMIN — METOPROLOL SUCCINATE 25 MG: 25 TABLET, EXTENDED RELEASE ORAL at 08:50

## 2024-08-05 RX ADMIN — HYDRALAZINE HYDROCHLORIDE 25 MG: 25 TABLET ORAL at 20:48

## 2024-08-05 RX ADMIN — LOSARTAN POTASSIUM 100 MG: 50 TABLET, FILM COATED ORAL at 08:50

## 2024-08-05 RX ADMIN — GABAPENTIN 300 MG: 300 CAPSULE ORAL at 20:40

## 2024-08-05 RX ADMIN — WATER 1000 MG: 1 INJECTION INTRAMUSCULAR; INTRAVENOUS; SUBCUTANEOUS at 07:34

## 2024-08-05 RX ADMIN — MECLIZINE HYDROCHLORIDE 25 MG: 12.5 TABLET ORAL at 08:49

## 2024-08-05 RX ADMIN — Medication 1 TABLET: at 09:16

## 2024-08-05 RX ADMIN — FLUOXETINE HYDROCHLORIDE 40 MG: 20 CAPSULE ORAL at 20:40

## 2024-08-05 RX ADMIN — LEVOTHYROXINE SODIUM 75 MCG: 0.05 TABLET ORAL at 05:50

## 2024-08-05 RX ADMIN — BUPROPION HYDROCHLORIDE 150 MG: 150 TABLET, EXTENDED RELEASE ORAL at 09:16

## 2024-08-05 RX ADMIN — MECLIZINE HYDROCHLORIDE 25 MG: 12.5 TABLET ORAL at 15:11

## 2024-08-05 RX ADMIN — MECLIZINE HYDROCHLORIDE 25 MG: 12.5 TABLET ORAL at 20:40

## 2024-08-05 RX ADMIN — INSULIN LISPRO 8 UNITS: 100 INJECTION, SOLUTION INTRAVENOUS; SUBCUTANEOUS at 12:37

## 2024-08-05 RX ADMIN — GABAPENTIN 300 MG: 300 CAPSULE ORAL at 09:16

## 2024-08-05 RX ADMIN — ENOXAPARIN SODIUM 40 MG: 100 INJECTION SUBCUTANEOUS at 09:16

## 2024-08-05 RX ADMIN — INSULIN GLARGINE 22 UNITS: 100 INJECTION, SOLUTION SUBCUTANEOUS at 20:46

## 2024-08-05 RX ADMIN — HYDRALAZINE HYDROCHLORIDE 25 MG: 25 TABLET ORAL at 13:22

## 2024-08-05 RX ADMIN — INSULIN LISPRO 8 UNITS: 100 INJECTION, SOLUTION INTRAVENOUS; SUBCUTANEOUS at 13:22

## 2024-08-05 NOTE — DISCHARGE INSTR - COC
Continuity of Care Form    Patient Name: Kylee Wang   :  1940  MRN:  [id number]    Admit date:  8/3/2024  Discharge date:  2024    Code Status Order: Full Code   Advance Directives:     Admitting Physician:  Cuate Christianson DO  PCP: Roxie Maldonado MD    Discharging Nurse: [facility] Unit/Room#: 0537/0537-01  Discharging Unit Phone Number: [phone]    Emergency Contact:   Extended Emergency Contact Information  Primary Emergency Contact: Vickie Ham  Home Phone: [phone]  Mobile Phone: [phone]  Relation: Child  Preferred language: English   needed? No    Past Surgical History:  Past Surgical History:   Procedure Laterality Date    CHOLECYSTECTOMY      COLONOSCOPY      EYE SURGERY      cataract both eyes    HYSTERECTOMY (CERVIX STATUS UNKNOWN)      JOINT REPLACEMENT Left     KNEE    PAIN MANAGEMENT PROCEDURE Bilateral 2022    BILATERAL THORACIC FACET MEDIAL BRANCH BLOCK AT T8, 9, 10 (CPT 70639) performed by Ozzy Haro MD at [facility] OR    PAIN MANAGEMENT PROCEDURE N/A 10/6/2022    EPIDURAL STEROID INJECTION L3-4 WITH 40 DEPO performed by Ozzy Haro MD at [facility] OR    PAIN MANAGEMENT PROCEDURE Bilateral 2022    BILATERAL T8,9,10 MEDIAL BRANCH BLOCK performed by Ozzy Haro MD at [facility] OR    SHOULDER SURGERY  rt rotator    UPPER GASTROINTESTINAL ENDOSCOPY  5/15//12    toni       Immunization History:   Immunization History   Administered Date(s) Administered    COVID-19, PFIZER Bivalent, DO NOT Dilute, (age 12y+), IM, 30 mcg/0.3 mL 2022    COVID-19, PFIZER GRAY top, DO NOT Dilute, (age 12 y+), IM, 30 mcg/0.3 mL 2022    COVID-19, PFIZER PURPLE top, DILUTE for use, (age 12 y+), 30mcg/0.3mL 02/10/2021, 2021, 2021    Influenza, FLUAD, (age 65 y+), Adjuvanted, 0.5mL 2020, 2021    Influenza, FLUARIX, FLULAVAL, FLUZONE (age 6 mo+) AND AFLURIA, (age 3 y+), PF, 0.5mL 10/05/2017    Influenza,

## 2024-08-05 NOTE — PLAN OF CARE
Problem: Discharge Planning  Goal: Discharge to home or other facility with appropriate resources  8/5/2024 0021 by Liz Newsome, RN  Outcome: Progressing  Flowsheets (Taken 8/4/2024 2028)  Discharge to home or other facility with appropriate resources: Identify barriers to discharge with patient and caregiver  8/4/2024 1603 by Carroll Gutierrez, RN  Outcome: Progressing     Problem: Pain  Goal: Verbalizes/displays adequate comfort level or baseline comfort level  Outcome: Progressing     Problem: Safety - Adult  Goal: Free from fall injury  8/5/2024 0021 by Liz Newsome, RN  Outcome: Progressing  8/4/2024 1603 by Carroll Gutierrez, RN  Outcome: Progressing     Problem: Skin/Tissue Integrity  Goal: Absence of new skin breakdown  Description: 1.  Monitor for areas of redness and/or skin breakdown  2.  Assess vascular access sites hourly  3.  Every 4-6 hours minimum:  Change oxygen saturation probe site  4.  Every 4-6 hours:  If on nasal continuous positive airway pressure, respiratory therapy assess nares and determine need for appliance change or resting period.  Outcome: Progressing     Problem: Chronic Conditions and Co-morbidities  Goal: Patient's chronic conditions and co-morbidity symptoms are monitored and maintained or improved  Outcome: Progressing  Flowsheets (Taken 8/4/2024 2028)  Care Plan - Patient's Chronic Conditions and Co-Morbidity Symptoms are Monitored and Maintained or Improved: Monitor and assess patient's chronic conditions and comorbid symptoms for stability, deterioration, or improvement     Problem: ABCDS Injury Assessment  Goal: Absence of physical injury  Outcome: Progressing

## 2024-08-05 NOTE — CARE COORDINATION
Case Management Assessment  Initial Evaluation    Date/Time of Evaluation: 8/5/2024 5:23 PM  Assessment Completed by: Hina Adames RN    If patient is discharged prior to next notation, then this note serves as note for discharge by case management.    Patient Name: Kylee Wang                   YOB: 1940  Diagnosis: Dizziness [R42]                   Date / Time: 8/3/2024  3:42 AM    Patient Admission Status: Inpatient   Readmission Risk (Low < 19, Mod (19-27), High > 27): Readmission Risk Score: 15.1    Current PCP: Roxie Maldonado MD  PCP verified by CM? Yes (Roxie Maldonado)    Chart Reviewed: Yes      History Provided by: Patient  Patient Orientation: Alert and Oriented    Patient Cognition: Alert    Hospitalization in the last 30 days (Readmission):  No    If yes, Readmission Assessment in  Navigator will be completed.    Advance Directives:      Code Status: Full Code   Patient's Primary Decision Maker is: Legal Next of Kin    Primary Decision Maker: Vickie Ham - Child - [phone]    Discharge Planning:    Patient lives with: Children Type of Home: House  Primary Care Giver: Self  Patient Support Systems include: Children   Current Financial resources:    Current community resources:    Current services prior to admission: None            Current DME:              Type of Home Care services:  None    ADLS  Prior functional level: Assistance with the following:, Cooking, Housework, Shopping  Current functional level: Assistance with the following:, Cooking, Housework, Shopping, Mobility    PT AM-PAC: 12 /24  OT AM-PAC: 16 /24    Family can provide assistance at DC: Other (comment) (yes but dtr works and not there all the time)  Would you like Case Management to discuss the discharge plan with any other family members/significant others, and if so, who? Yes (pts dtr Vickie and son Milo)  Plans to Return to Present Housing: Other (see comment) (pt/family requesting SNF)  Other

## 2024-08-05 NOTE — ACP (ADVANCE CARE PLANNING)
Advance Care Planning   Healthcare Decision Maker:    Primary Decision Maker: Vickie Ham - Child - [phone]

## 2024-08-05 NOTE — PLAN OF CARE
Problem: Discharge Planning  Goal: Discharge to home or other facility with appropriate resources  8/5/2024 1405 by Baldomero May RN  Outcome: Progressing     Problem: Pain  Goal: Verbalizes/displays adequate comfort level or baseline comfort level  8/5/2024 1405 by Baldomero May RN  Outcome: Progressing     Problem: Safety - Adult  Goal: Free from fall injury  8/5/2024 1405 by Baldomero May, RN  Outcome: Progressing     Problem: ABCDS Injury Assessment  Goal: Absence of physical injury  8/5/2024 1405 by Baldomero May RN  Outcome: Progressing     Problem: Skin/Tissue Integrity  Goal: Absence of new skin breakdown  Description: 1.  Monitor for areas of redness and/or skin breakdown  2.  Assess vascular access sites hourly  3.  Every 4-6 hours minimum:  Change oxygen saturation probe site  4.  Every 4-6 hours:  If on nasal continuous positive airway pressure, respiratory therapy assess nares and determine need for appliance change or resting period.  8/5/2024 1405 by Baldomero May RN  Outcome: Progressing     Problem: Chronic Conditions and Co-morbidities  Goal: Patient's chronic conditions and co-morbidity symptoms are monitored and maintained or improved  8/5/2024 1405 by Baldomero May RN  Outcome: Progressing

## 2024-08-06 VITALS
RESPIRATION RATE: 18 BRPM | OXYGEN SATURATION: 97 % | HEART RATE: 67 BPM | BODY MASS INDEX: 26.66 KG/M2 | WEIGHT: 160 LBS | SYSTOLIC BLOOD PRESSURE: 134 MMHG | DIASTOLIC BLOOD PRESSURE: 61 MMHG | HEIGHT: 65 IN | TEMPERATURE: 98.3 F

## 2024-08-06 LAB
ALBUMIN SERPL-MCNC: 3.3 G/DL (ref 3.5–5.2)
ALP SERPL-CCNC: 144 U/L (ref 35–104)
ALT SERPL-CCNC: 12 U/L (ref 0–32)
ANION GAP SERPL CALCULATED.3IONS-SCNC: 6 MMOL/L (ref 7–16)
AST SERPL-CCNC: 15 U/L (ref 0–31)
BASOPHILS # BLD: 0.07 K/UL (ref 0–0.2)
BASOPHILS NFR BLD: 1 % (ref 0–2)
BILIRUB SERPL-MCNC: <0.2 MG/DL (ref 0–1.2)
BUN SERPL-MCNC: 28 MG/DL (ref 6–23)
CALCIUM SERPL-MCNC: 8.7 MG/DL (ref 8.6–10.2)
CHLORIDE SERPL-SCNC: 106 MMOL/L (ref 98–107)
CO2 SERPL-SCNC: 26 MMOL/L (ref 22–29)
CREAT SERPL-MCNC: 1.4 MG/DL (ref 0.5–1)
EOSINOPHIL # BLD: 0.31 K/UL (ref 0.05–0.5)
EOSINOPHILS RELATIVE PERCENT: 4 % (ref 0–6)
ERYTHROCYTE [DISTWIDTH] IN BLOOD BY AUTOMATED COUNT: 16.5 % (ref 11.5–15)
GFR, ESTIMATED: 38 ML/MIN/1.73M2
GLUCOSE BLD-MCNC: 138 MG/DL (ref 74–99)
GLUCOSE BLD-MCNC: 295 MG/DL (ref 74–99)
GLUCOSE SERPL-MCNC: 131 MG/DL (ref 74–99)
HCT VFR BLD AUTO: 34 % (ref 34–48)
HGB BLD-MCNC: 10 G/DL (ref 11.5–15.5)
IMM GRANULOCYTES # BLD AUTO: <0.03 K/UL (ref 0–0.58)
IMM GRANULOCYTES NFR BLD: 0 % (ref 0–5)
LYMPHOCYTES NFR BLD: 2.3 K/UL (ref 1.5–4)
LYMPHOCYTES RELATIVE PERCENT: 30 % (ref 20–42)
MAGNESIUM SERPL-MCNC: 1.9 MG/DL (ref 1.6–2.6)
MCH RBC QN AUTO: 24.7 PG (ref 26–35)
MCHC RBC AUTO-ENTMCNC: 29.4 G/DL (ref 32–34.5)
MCV RBC AUTO: 84 FL (ref 80–99.9)
MONOCYTES NFR BLD: 0.82 K/UL (ref 0.1–0.95)
MONOCYTES NFR BLD: 11 % (ref 2–12)
NEUTROPHILS NFR BLD: 54 % (ref 43–80)
NEUTS SEG NFR BLD: 4.17 K/UL (ref 1.8–7.3)
PHOSPHATE SERPL-MCNC: 3.1 MG/DL (ref 2.5–4.5)
PLATELET # BLD AUTO: 279 K/UL (ref 130–450)
PMV BLD AUTO: 9.6 FL (ref 7–12)
POTASSIUM SERPL-SCNC: 4.3 MMOL/L (ref 3.5–5)
PROT SERPL-MCNC: 5.8 G/DL (ref 6.4–8.3)
RBC # BLD AUTO: 4.05 M/UL (ref 3.5–5.5)
SODIUM SERPL-SCNC: 138 MMOL/L (ref 132–146)
WBC OTHER # BLD: 7.7 K/UL (ref 4.5–11.5)

## 2024-08-06 PROCEDURE — 6370000000 HC RX 637 (ALT 250 FOR IP)

## 2024-08-06 PROCEDURE — 80053 COMPREHEN METABOLIC PANEL: CPT

## 2024-08-06 PROCEDURE — 97530 THERAPEUTIC ACTIVITIES: CPT

## 2024-08-06 PROCEDURE — 97110 THERAPEUTIC EXERCISES: CPT

## 2024-08-06 PROCEDURE — 83735 ASSAY OF MAGNESIUM: CPT

## 2024-08-06 PROCEDURE — 82962 GLUCOSE BLOOD TEST: CPT

## 2024-08-06 PROCEDURE — 6370000000 HC RX 637 (ALT 250 FOR IP): Performed by: NURSE PRACTITIONER

## 2024-08-06 PROCEDURE — 84100 ASSAY OF PHOSPHORUS: CPT

## 2024-08-06 PROCEDURE — 85025 COMPLETE CBC W/AUTO DIFF WBC: CPT

## 2024-08-06 PROCEDURE — 6360000002 HC RX W HCPCS

## 2024-08-06 PROCEDURE — 99233 SBSQ HOSP IP/OBS HIGH 50: CPT | Performed by: INTERNAL MEDICINE

## 2024-08-06 PROCEDURE — 2580000003 HC RX 258: Performed by: INTERNAL MEDICINE

## 2024-08-06 PROCEDURE — 2580000003 HC RX 258

## 2024-08-06 PROCEDURE — 36415 COLL VENOUS BLD VENIPUNCTURE: CPT

## 2024-08-06 PROCEDURE — 6370000000 HC RX 637 (ALT 250 FOR IP): Performed by: INTERNAL MEDICINE

## 2024-08-06 RX ORDER — INSULIN GLARGINE 100 [IU]/ML
INJECTION, SOLUTION SUBCUTANEOUS
Qty: 10 ML | Refills: 3 | Status: SHIPPED | OUTPATIENT
Start: 2024-08-06

## 2024-08-06 RX ORDER — SYRINGE-NEEDLE,INSULIN,0.5 ML 27GX1/2"
1 SYRINGE, EMPTY DISPOSABLE MISCELLANEOUS DAILY
Qty: 100 EACH | Refills: 0 | Status: SHIPPED | OUTPATIENT
Start: 2024-08-06

## 2024-08-06 RX ADMIN — GABAPENTIN 300 MG: 300 CAPSULE ORAL at 08:47

## 2024-08-06 RX ADMIN — INSULIN LISPRO 4 UNITS: 100 INJECTION, SOLUTION INTRAVENOUS; SUBCUTANEOUS at 12:30

## 2024-08-06 RX ADMIN — BUPROPION HYDROCHLORIDE 150 MG: 150 TABLET, EXTENDED RELEASE ORAL at 08:47

## 2024-08-06 RX ADMIN — MECLIZINE HYDROCHLORIDE 25 MG: 12.5 TABLET ORAL at 08:47

## 2024-08-06 RX ADMIN — HYDRALAZINE HYDROCHLORIDE 25 MG: 25 TABLET ORAL at 14:53

## 2024-08-06 RX ADMIN — HYDRALAZINE HYDROCHLORIDE 25 MG: 25 TABLET ORAL at 05:40

## 2024-08-06 RX ADMIN — MECLIZINE HYDROCHLORIDE 25 MG: 12.5 TABLET ORAL at 14:53

## 2024-08-06 RX ADMIN — Medication 1 TABLET: at 08:47

## 2024-08-06 RX ADMIN — ENOXAPARIN SODIUM 40 MG: 100 INJECTION SUBCUTANEOUS at 08:49

## 2024-08-06 RX ADMIN — METOPROLOL SUCCINATE 25 MG: 25 TABLET, EXTENDED RELEASE ORAL at 08:47

## 2024-08-06 RX ADMIN — LEVOTHYROXINE SODIUM 75 MCG: 0.05 TABLET ORAL at 05:47

## 2024-08-06 RX ADMIN — PANTOPRAZOLE SODIUM 40 MG: 40 TABLET, DELAYED RELEASE ORAL at 05:48

## 2024-08-06 RX ADMIN — LOSARTAN POTASSIUM 100 MG: 50 TABLET, FILM COATED ORAL at 08:47

## 2024-08-06 RX ADMIN — SODIUM CHLORIDE: 9 INJECTION, SOLUTION INTRAVENOUS at 05:54

## 2024-08-06 RX ADMIN — WATER 1000 MG: 1 INJECTION INTRAMUSCULAR; INTRAVENOUS; SUBCUTANEOUS at 07:00

## 2024-08-06 NOTE — PLAN OF CARE
Problem: Pain  Goal: Verbalizes/displays adequate comfort level or baseline comfort level  8/5/2024 2327 by Liz Newsome RN  Outcome: Progressing  8/5/2024 1405 by Baldomero May RN  Outcome: Progressing     Problem: Safety - Adult  Goal: Free from fall injury  8/5/2024 2327 by Liz Newsome RN  Outcome: Progressing  8/5/2024 1405 by Baldomero May RN  Outcome: Progressing     Problem: ABCDS Injury Assessment  Goal: Absence of physical injury  8/5/2024 2327 by Liz Newsome RN  Outcome: Progressing  8/5/2024 1405 by Baldomero May RN  Outcome: Progressing     Problem: Skin/Tissue Integrity  Goal: Absence of new skin breakdown  Description: 1.  Monitor for areas of redness and/or skin breakdown  2.  Assess vascular access sites hourly  3.  Every 4-6 hours minimum:  Change oxygen saturation probe site  4.  Every 4-6 hours:  If on nasal continuous positive airway pressure, respiratory therapy assess nares and determine need for appliance change or resting period.  8/5/2024 2327 by Liz Newsome RN  Outcome: Progressing  8/5/2024 1405 by Baldomero May RN  Outcome: Progressing     Problem: Chronic Conditions and Co-morbidities  Goal: Patient's chronic conditions and co-morbidity symptoms are monitored and maintained or improved  8/5/2024 2327 by Liz Newsome RN  Outcome: Progressing  Flowsheets (Taken 8/5/2024 2000)  Care Plan - Patient's Chronic Conditions and Co-Morbidity Symptoms are Monitored and Maintained or Improved: Monitor and assess patient's chronic conditions and comorbid symptoms for stability, deterioration, or improvement  8/5/2024 1405 by Baldomero May RN  Outcome: Progressing     Problem: Discharge Planning  Goal: Discharge to home or other facility with appropriate resources  8/5/2024 2327 by Liz Newsome RN  Outcome: Progressing  Flowsheets (Taken 8/5/2024 2000)  Discharge to home or other facility with

## 2024-08-06 NOTE — CARE COORDINATION
8/6/24 1230 CM note: no covid testing, urine cx (+) 8/3/24. Room air. Discharge order noted. PO omnicef. Discharge plan is [facility]. Per [facility]roc[facility] liaison, they can accept patient and have obtained auth for patient to come to their facility. RONNELL is signed. HEN completed. Lolly arranged for Samantha carli to pick pt up at 4pm. Notified pt, pts son Milo ([facility]), pts NIXON Thomas, and charge nurse Tracey of  time. Electronically signed by Hina Adames RN on 8/6/2024 at 12:36 PM

## 2024-08-06 NOTE — DISCHARGE SUMMARY
Internal Medicine Progress Note     [facility]     Cuate Christianson D.O., ASADOGeorgeI.                         Jose Jones D.O., ASADOGeorgeIGeorge Caraballo D.O.     Desirae Bonilla, MSN, APRN, NP-C  Aren Zaldivar, MSN, APRN-CNP  Augustus Swartz, MSN, APRN, NP-C  Guerda Gunderson, MSN, APRN-CNP  Iliana Mendes, MSN, APRN, NP-C       Internal Medicine  Discharge Summary    NAME: Kylee Wang  :  1940  MRN:  [id number]  PCP:Roxie Maldonado MD  ADMITTED: 8/3/2024      DISCHARGED: 24    ADMITTING PHYSICIAN: Cuate Christianson DO    CONSULTANT(S):   IP CONSULT TO CARDIOLOGY     ADMITTING DIAGNOSIS:   Dizziness [R42]     DISCHARGE DIAGNOSES:   E. coli urinary tract infection  Vertiginous symptomatology with poorly tractable dizziness  Essential hypertension  Normocytic anemia of chronic disease  Non-insulin-dependent diabetes mellitus type 2  Hyperlipidemia    BRIEF HISTORY OF PRESENT ILLNESS:   This is a pleasant 84-year-old white female who is admitted to [facility]. The patient presented to the hospital here and was subsequently admitted under the service of Dr. Roxie Maldonado. Apparently, as well can be ascertained, the patient states she was doing relatively well. She was in normal state of health yesterday. Yesterday afternoon, approximately 4 o'clock, she was sitting outside on the swing with her dog reading a book. The patient sat there for a while. At that time, the patient apparently got up to go into the house, at which time she noticed that she was somewhat dizzy. The patient does ambulate with the use of a cane and a walker. The patient did go into the house. She rested at that time. Approximately 11 hours later, she still remained dizzy, at which time she apparently went to get up and had a fall. The patient at that time called the paramedics, at which time she was brought into the hospital here. The patient was seen and evaluated. We noted at that time that she did

## 2024-08-06 NOTE — PROGRESS NOTES
INPATIENT CARDIOLOGY FOLLOW-UP    Name: Kylee Wang    Age: 84 y.o.    Date of Admission: 8/3/2024  3:42 AM    Date of Service: 8/6/2024    Primary Cardiologist: Dr. An remotely, new to me    Reason for follow-up: Dizziness, decompensated heart failure    Interim History:  Denies chest pain or SOB.  She reports dizziness is improving  She states she is awaiting discharge to a rehab facility.    Review of Systems:   Negative except as described above    Problem List:  Patient Active Problem List   Diagnosis    Hypertension    Dyspnea on exertion    Osteopenia of the elderly    Gastroesophageal reflux disease without esophagitis    Autoimmune hypothyroidism    Moderate major depression (HCC)    Nonrheumatic tricuspid valve regurgitation    Hypochromic microcytic anemia    Hyperlipidemia    Depression    Pulmonary hypertension (HCC)    Chronic midline thoracic back pain    Onychomycosis    Pain of toe of left foot    Solid nodule of lung 6 mm to 8 mm in diameter    Fall in elderly patient    Acute midline thoracic back pain    Drug-induced diarrhea    Compression fracture of thoracic vertebra (HCC)    Thoracic facet joint syndrome    Thoracic spondylosis    Thoracic disc disease    Cervical spondylosis    Cervical disc disorder    Cervical facet joint syndrome    Chronic pain syndrome    Spinal stenosis of lumbar region without neurogenic claudication    Diabetes mellitus due to underlying condition, uncontrolled, with hyperglycemia, without long-term current use of insulin (HCC)    Hyperglycemia due to diabetes mellitus (HCC)    Hyperglycemic crisis in diabetes mellitus (HCC)    Acute cystitis without hematuria    Lumbar radiculopathy    Nocturnal hypoglycemia in patient with type 2 diabetes mellitus (HCC)    Diabetes mellitus (HCC)    Type 2 diabetes mellitus with stage 3b chronic kidney disease, with long-term current use of insulin (HCC)    Sternal fracture    Superficial burn of back    Generalized 
4 Eyes Skin Assessment     NAME:  Kylee Wang  YOB: 1940  MEDICAL RECORD NUMBER:  [id number]    The patient is being assessed for  Admission    I agree that at least one RN has performed a thorough Head to Toe Skin Assessment on the patient. ALL assessment sites listed below have been assessed.      Areas assessed by both nurses:    Head, Face, Ears, Shoulders, Back, Chest, Arms, Elbows, Hands, Sacrum. Buttock, Coccyx, Ischium, Legs. Feet and Heels, and Under Medical Devices         Does the Patient have a Wound? Yes wound(s) were present on assessment. LDA wound assessment was Initiated and completed by RN  Bridge on nose 1.9 cm x 1 cm  Blanchable redness on coccyx       Efrain Prevention initiated by RN: Yes  Wound Care Orders initiated by RN: No    Pressure Injury (Stage 3,4, Unstageable, DTI, NWPT, and Complex wounds) if present, place Wound referral order by RN under : No    New Ostomies, if present place, Ostomy referral order under : No     Nurse 1 eSignature: Electronically signed by Carroll Gutierrez RN on 8/3/24 at 5:58 PM EDT    **SHARE this note so that the co-signing nurse can place an eSignature**    Nurse 2 eSignature: {Esignature:[id number]}    
Internal Medicine Progress Note     [facility]     Cuate Christianson D.O., LORIEI.                         Jose Jones D.O., AMARA Caraballo D.O.     Desirae Bonilla, MSN, APRN, NP-C  Aren Zaldivar, MSN, APRN-CNP  Augustus Swartz, MSN, APRN, NP-C  Guerda Gunderson, MSN, APRN-CNP  lIiana Mendes, MSN, APRN, NP-C     Primary Care Physician: Roxie Maldonado MD   Admitting Physician:  Cuate Christianson DO  Admission date and time: 8/3/2024  3:42 AM    Room:  [address] [place] [address]  Admitting diagnosis: Dizziness [R42]    Patient Name: Kylee Wang  MRN: [id number]    Date of Service: 8/4/2024     Subjective:  Kylee is a 84 y.o. female who was seen and examined today,8/4/2024, at the bedside.  Kylee states that she is feeling better today but remains dizzy.  We personally assisted her to the bedside chair with some degree of dizziness that improved after sitting for a period of time.  Cardiology recommendations have been reviewed.  E. coli urinary tract infection has been identified.  She is feeling better but has not yet returned to baseline and we discussed maintaining hospitalization for an additional 24 hours.    Review of System:   Constitutional:   Denies fever or chills, weight loss or gain, fatigue or malaise.  HEENT:   Denies ear pain, sore throat, sinus or eye problems.  Cardiovascular:   Denies any chest pain, irregular heartbeats, or palpitations.   Respiratory:   Denies shortness of breath, coughing, sputum production, hemoptysis, or wheezing.  Gastrointestinal:   Denies nausea, vomiting, diarrhea, or constipation.  Denies any abdominal pain.  Genitourinary:    Denies any urgency, frequency, hematuria. Voiding  without difficulty.  Extremities:   Denies lower extremity swelling, edema or cyanosis.   Neurology:    Denies any headache or focal neurological deficits, Denies generalized weakness or memory difficulty.   Psch:   Anxious regarding ongoing hospitalization.  Musculoskeletal:  
Internal Medicine Progress Note     [facility]     Cuate Christianson D.O., LORIEIGeorge Jones D.O., AMARA Caraballo D.O.     Desirae Bonilla, MSN, APRN, NP-C  Aren Zaldivar, MSN, APRN-CNP  Augustus Swartz, MSN, APRN, NP-C  Guerda Gunderson, MSN, APRN-CNP  Iliana Mendes, MSN, APRN, NP-C     Primary Care Physician: Roxie Maldonado MD   Admitting Physician:  Cuate Christianson DO  Admission date and time: 8/3/2024  3:42 AM    Room:  [address] [place] [address]  Admitting diagnosis: Dizziness [R42]    Patient Name: Kylee Wang  MRN: [id number]    Date of Service: 8/5/2024     Subjective:  Kylee is a 84 y.o. female who was seen and examined today,8/5/2024, at the bedside.  Kylee is feeling well today but continues to be dizzy when up and active.  We have discussed the need for additional therapy at a skilled nursing facility and she is reluctant but agreeable.  Tolerating current intravenous therapy for E. coli urinary tract infection.  No additional complaints or concerns.  No family present.    Review of systems:  Constitutional:   + fatigue and malaise , - fever/chills  HEENT:   Denies ear pain, sore throat, sinus or eye problems.  Cardiovascular:   - chest pain, irregular heartbeats, or palpitations.   Respiratory:   - dyspnea at rest, - dyspnea on exertion, - coughing, - sputum, - hemoptysis  Gastrointestinal:   - nausea, -vomiting. - bowel pattern changes. - hematochezia. - melena. - poor appetite and poor intake. - abdominal pain.  Genitourinary:    Improved urinary tract infection symptoms.  Extremities:   Denies lower extremity swelling, edema or cyanosis.   Neurology:    Denies any headache or focal neurological deficits, positive for dizziness when up and active and with certain head and neck position changes.  Positive for generalized weakness and fatigue without focal component  Psch:   Denies being anxious or depressed.  Musculoskeletal:    Denies  myalgias, 
OCCUPATIONAL THERAPY INITIAL EVALUATION    [facility]  [address]  [place]. [place]        Date:2024                                                  Patient Name: Kylee Wang    MRN: [id number]    : 1940    Room: [address] [place] [address]      Evaluating OT: Robin Bautista OTR/L; #[id number]     Referring Provider and Specific Provider Orders/Date:      24 1200  OT eval and treat  Start:  24 1200,   End:  24 1200,   ONE TIME,   Standing Count:  1 Occurrences,   R         Jose Jones,       Placement Recommendation: Subacute Rehab       Diagnosis:   1. Dizziness         Surgery: None      Pertinent Medical History:       Past Medical History:   Diagnosis Date    Depression     Diabetes mellitus (HCC)     Hyperlipidemia     Hypertension     Thyroid disease     Type 2 diabetes mellitus without complication (HCC)          Past Surgical History:   Procedure Laterality Date    CHOLECYSTECTOMY      COLONOSCOPY      EYE SURGERY      cataract both eyes    HYSTERECTOMY (CERVIX STATUS UNKNOWN)      JOINT REPLACEMENT Left     KNEE    PAIN MANAGEMENT PROCEDURE Bilateral 2022    BILATERAL THORACIC FACET MEDIAL BRANCH BLOCK AT T8, 9, 10 (CPT 17295) performed by Ozzy Haro MD at [facility] OR    PAIN MANAGEMENT PROCEDURE N/A 10/6/2022    EPIDURAL STEROID INJECTION L3-4 WITH 40 DEPO performed by Ozzy Haro MD at [facility] OR    PAIN MANAGEMENT PROCEDURE Bilateral 2022    BILATERAL T8,9,10 MEDIAL BRANCH BLOCK performed by Ozzy Haro MD at [facility] OR    SHOULDER SURGERY  rt rotator    UPPER GASTROINTESTINAL ENDOSCOPY  5/15//12    toni        Precautions:  Fall Risk, Dizziness     Assessment of current deficits:     [x] Functional mobility  [x]ADLs  [x] Strength               []Cognition    [x] Functional transfers   [x] IADLs         [] Safety Awareness   [x]Endurance    [] Fine Coordination              [x] Balance      [] 
needed walking in hospital room?: A Lot  How much help is needed climbing 3-5 steps with a railing?: A Lot  AM-PAC Inpatient Mobility Raw Score : 12  AM-PAC Inpatient T-Scale Score : 35.33  Mobility Inpatient CMS 0-100% Score: 68.66  Mobility Inpatient CMS G-Code Modifier : CL    Nursing cleared patient for PT evaluation. The admitting diagnosis and active problem list as listed above have been reviewed prior to the initiation of this evaluation.    OBJECTIVE:   Initial Evaluation  Date: 8/5/2024 Treatment Date:     Short Term/ Long Term   Goals   Was pt agreeable to Eval/treatment? Yes  To be met in 3 days   Pain level   5/10        Bed Mobility  Using rails and head of bed elevated:     Rolling: Moderate assist of 1    Supine to sit: Moderate assist of 1    Sit to supine: Moderate assist of 1    Scooting: Minimal assist of 1    Rolling: Supervision     Supine to sit: Supervision     Sit to supine: Supervision     Scooting: Supervision      Transfers Sit to stand: Moderate progressing to minimal assist  from chiar x 3 reps and bed x 2 reps   Sit to stand: Supervision      Ambulation     50 and 30 feet using  wheeled walker with Moderate assist of 1   for walker approximation, upright, multiplane instability, and safety, Patient with flexed posture, decreased step length, step to gait pattern, and unsteady gait, no loss of balance, and cues for upright posture, walker approximation, safety, and walker technique with turns/obstacles    100 feet using  least restrictive device with Supervision     ROM Within functional limits    Forward head, extended cervical spine with rounded shoulders and kyphotic thoracic spine      Strength BUE:  refer to OT eval  RLE:  3/5  LLE:  3/5  Increase strength in affected mm groups by 1/3 grade   Balance Sitting EOB:  fair    Dynamic Standing:  fair minus  Sitting EOB:  good    Dynamic Standing: fair       Patient is Alert & Oriented x person, place, time, and situation and follows 
disease    ASSESSMENT AND PLAN:  1.  Dizziness: Orthostatic vital signs negative.  Symptoms appear to be vertiginous in nature.  Will initiate a trial of meclizine.  Reassess orthostatic vital signs later today  Monitor on telemetry.  So far, no concerning arrhythmia seen.  2.  Heart failure preserved ejection fraction-hypervolemic on examination.  Good response to IV Lasix so far.  1.75 L negative.  Continue for 24 more hours.  Echocardiogram from last year reviewed as above.    Shows preserved biventricular systolic function.  Indeterminate diastolic function.  Mild pulmonary hypertension which is most likely group 2 in etiology.  Moderate tricuspid regurgitation.  Mild mitral regurgitation.  3.  Hypertension: Continue home Cozaar and Toprol-XL.  4.  Chronic anemia: Hemoglobin 10.8.  Will check iron studies.  5.  Valvular heart disease, pulmonary hypertension: Echo as above.  No need for any further changes apart from volume and blood pressure management as outlined above.      Greater than 50 minutes was spent counseling the patient, reviewing the rationale for the above recommendations and reviewing the patient's current medication list, problem list and results of all previously ordered testing.      See Au MD, [facility]    NOTE: This report was transcribed using voice recognition software. Every effort was made to ensure accuracy; however, inadvertent computerized transcription errors may be present.    
from [facility] from March 2023:  EF is 65%.  Mild aortic regurgitation, moderate TR.  Mild to moderate pulmonary hypertension.    Echocardiogram from 8/3/2024:    Left Ventricle: Normal left ventricular systolic function with a visually estimated EF of 55 - 60%. Left ventricle size is normal. Findings consistent with mild concentric hypertrophy. Normal wall motion. Indeterminate diastolic function.    Right Ventricle: Right ventricle size is normal. Normal systolic function.    Aortic Valve: Mild sclerosis of the aortic valve cusps.    Mitral Valve: Mild annular calcification. Mild regurgitation.    Tricuspid Valve: Moderate regurgitation. Mildly elevated RVSP, consistent with mild pulmonary hypertension. Est RA pressure is 3 mmHg. The estimated PASP is 39 mmHg.    Left Atrium: Left atrium size is normal.    Right Atrium: Right atrium size is normal.    Image quality is adequate.    Carotid ultrasound:  Mild bilateral disease    ASSESSMENT AND PLAN:  Dizziness: On meclizine for chronic Dizziness/vertigo  :   Orthostatic vital signs have been negative.      Continue to monitor ortho    Monitor on telemetry    2. Heart failure preserved ejection fraction-hypervolemic on examination.    Has had good response to IV Lasix   Echocardiogram as above.    Monitor electrolytes and replace  Low salt diet   Good BP control    3.  Hypertension: Continue home Cozaar and Toprol-XL.  Monitor closely     4.  Chronic anemia: Please check iron studies.  5.  Valvular heart disease, pulmonary hypertension: Echo as above.    F/u with cardiology at the outpt for close montoring      Greater than 50 minutes was spent counseling the patient, reviewing the rationale for the above recommendations and reviewing the patient's current medication list, problem list and results of all previously ordered testing.      Jorge Alberto Doyle MD,   [facility] Card[facility]    NOTE: This report was transcribed using voice recognition software. Every effort was 
    Patient would benefit from continued skilled Physical Therapy to improve functional independence and quality of life.         Patient's/ family goals   home    Time in 09:47  Time out  10:25    Total Treatment Time  38 minutes         CPT codes:    Therapeutic activities (87903)   27 minutes  2 unit(s)  Therapeutic exercises (56187)   11 minutes  1 unit(s)    César Coronel  [facility]  LIC # [id number]

## 2024-08-10 LAB
BILIRUB UR QL STRIP: NEGATIVE
CLARITY UR: CLEAR
COLOR UR: YELLOW
GLUCOSE UR STRIP-MCNC: 100 MG/DL
HGB UR QL STRIP.AUTO: NEGATIVE
KETONES UR STRIP-MCNC: NEGATIVE MG/DL
LEUKOCYTE ESTERASE UR QL STRIP: NEGATIVE
NITRITE UR QL STRIP: NEGATIVE
PH UR STRIP: 6 [PH] (ref 5–9)
PROT UR STRIP-MCNC: NEGATIVE MG/DL
RBC #/AREA URNS HPF: ABNORMAL /HPF
SP GR UR STRIP: 1.02 (ref 1–1.03)
UROBILINOGEN UR STRIP-ACNC: 0.2 EU/DL (ref 0–1)
WBC #/AREA URNS HPF: ABNORMAL /HPF

## 2024-08-11 LAB
MICROORGANISM SPEC CULT: ABNORMAL
SPECIMEN DESCRIPTION: ABNORMAL

## 2024-08-12 LAB
ANION GAP SERPL CALCULATED.3IONS-SCNC: 13 MMOL/L (ref 7–16)
BASOPHILS # BLD: 0.07 K/UL (ref 0–0.2)
BASOPHILS NFR BLD: 1 % (ref 0–2)
BUN SERPL-MCNC: 31 MG/DL (ref 6–23)
CALCIUM SERPL-MCNC: 9.2 MG/DL (ref 8.6–10.2)
CHLORIDE SERPL-SCNC: 106 MMOL/L (ref 98–107)
CO2 SERPL-SCNC: 23 MMOL/L (ref 22–29)
CREAT SERPL-MCNC: 1.3 MG/DL (ref 0.5–1)
EOSINOPHIL # BLD: 0.27 K/UL (ref 0.05–0.5)
EOSINOPHILS RELATIVE PERCENT: 4 % (ref 0–6)
ERYTHROCYTE [DISTWIDTH] IN BLOOD BY AUTOMATED COUNT: 16.7 % (ref 11.5–15)
GFR, ESTIMATED: 41 ML/MIN/1.73M2
GLUCOSE SERPL-MCNC: 70 MG/DL (ref 74–99)
HCT VFR BLD AUTO: 34 % (ref 34–48)
HGB BLD-MCNC: 10 G/DL (ref 11.5–15.5)
IMM GRANULOCYTES # BLD AUTO: <0.03 K/UL (ref 0–0.58)
IMM GRANULOCYTES NFR BLD: 0 % (ref 0–5)
LYMPHOCYTES NFR BLD: 2.52 K/UL (ref 1.5–4)
LYMPHOCYTES RELATIVE PERCENT: 37 % (ref 20–42)
MCH RBC QN AUTO: 24.3 PG (ref 26–35)
MCHC RBC AUTO-ENTMCNC: 29.4 G/DL (ref 32–34.5)
MCV RBC AUTO: 82.7 FL (ref 80–99.9)
MONOCYTES NFR BLD: 0.79 K/UL (ref 0.1–0.95)
MONOCYTES NFR BLD: 12 % (ref 2–12)
NEUTROPHILS NFR BLD: 47 % (ref 43–80)
NEUTS SEG NFR BLD: 3.18 K/UL (ref 1.8–7.3)
PLATELET # BLD AUTO: 303 K/UL (ref 130–450)
PMV BLD AUTO: 10.2 FL (ref 7–12)
POTASSIUM SERPL-SCNC: 3.9 MMOL/L (ref 3.5–5)
RBC # BLD AUTO: 4.11 M/UL (ref 3.5–5.5)
SODIUM SERPL-SCNC: 142 MMOL/L (ref 132–146)
WBC OTHER # BLD: 6.9 K/UL (ref 4.5–11.5)

## 2024-08-14 LAB
BACTERIA URNS QL MICRO: ABNORMAL
BILIRUB UR QL STRIP: NEGATIVE
CLARITY UR: CLEAR
COLOR UR: YELLOW
GLUCOSE UR STRIP-MCNC: >=1000 MG/DL
HGB UR QL STRIP.AUTO: NEGATIVE
KETONES UR STRIP-MCNC: NEGATIVE MG/DL
LEUKOCYTE ESTERASE UR QL STRIP: NEGATIVE
NITRITE UR QL STRIP: NEGATIVE
PH UR STRIP: 6 [PH] (ref 5–9)
PROT UR STRIP-MCNC: NEGATIVE MG/DL
RBC #/AREA URNS HPF: ABNORMAL /HPF
SP GR UR STRIP: 1.01 (ref 1–1.03)
UROBILINOGEN UR STRIP-ACNC: 0.2 EU/DL (ref 0–1)
WBC #/AREA URNS HPF: ABNORMAL /HPF

## 2024-08-16 LAB
MICROORGANISM SPEC CULT: ABNORMAL
SPECIMEN DESCRIPTION: ABNORMAL

## 2024-08-19 ENCOUNTER — CARE COORDINATION (OUTPATIENT)
Dept: CASE MANAGEMENT | Age: 84
End: 2024-08-19

## 2024-08-19 NOTE — CARE COORDINATION
Care Transitions Note    Initial Call - Call within 2 business days of discharge: Yes    Attempted to reach patient for transitions of care follow up. Unable to reach patient.    Outreach Attempts:   HIPAA compliant voicemail left for patient.     Patient: Kylee Wang    Patient : 1940   MRN: [id number]    Reason for Admission: UTI and dizziness  Discharge Date: 24  RURS: Readmission Risk Score: 16.7    Last Discharge Facility       Date Complaint Diagnosis Description Type Department Provider    8/3/24 Dizziness; Fall Dizziness ED to Hosp-Admission (Discharged) (ADMITTED) Cuate Lira DO; Jakub Allen..            Was this an external facility discharge? Yes. Discharge Date: . Facility Name:   [facility]    Follow Up Appointment:   Patient does not have a follow up appointment scheduled at time of call.  Pdiatry appt noted  Future Appointments         Provider Specialty Dept Phone    2024 1:15 PM Colby Medrano Jr., DPM Podiatry [phone]    2024 12:30 PM Roxie Maldonado MD Primary Care [phone]            Plan for follow-up call in 2-5 days     LAKESHIA Resendiz, RN   [facility]/ [facility] Acute Care Coordinator  [phone]

## 2024-08-21 ENCOUNTER — CARE COORDINATION (OUTPATIENT)
Dept: CASE MANAGEMENT | Age: 84
End: 2024-08-21

## 2024-08-21 NOTE — CARE COORDINATION
Care Transitions Note    Initial Call - Call within 2 business days of discharge: Yes    Attempted to reach patient for transitions of care follow up. Unable to reach patient.    Outreach Attempts:   Unable to leave message.     Patient: Kylee Wang    Patient : 1940   MRN: [id number]    Reason for Admission: UTI and dizziness  Discharge Date: 24  RURS: Readmission Risk Score: 16.7    Last Discharge Facility       Date Complaint Diagnosis Description Type Department Provider    8/3/24 Dizziness; Fall Dizziness ED to Hosp-Admission (Discharged) (ADMITTED) Cuate Lira DO; Jakub Allen..            Was this an external facility discharge? Yes. Discharge Date: . Facility Name: [facility]    Follow Up Appointment:   Patient has hospital follow up appointment scheduled within 14 days of discharge.    Future Appointments         Provider Specialty Dept Phone    2024 1:15 PM Colby Medrano Jr., DPM Podiatry [phone]    2024 12:30 PM Roxie Maldonado MD Primary Care [phone]            Referred to Manager      LAKESHIA Resendiz, RN   [facility]/ [facility] Acute Care Coordinator  [phone]

## 2024-08-22 ENCOUNTER — TELEPHONE (OUTPATIENT)
Dept: PRIMARY CARE CLINIC | Age: 84
End: 2024-08-22

## 2024-08-22 DIAGNOSIS — M54.16 LUMBAR RADICULOPATHY: ICD-10-CM

## 2024-08-22 DIAGNOSIS — G89.29 CHRONIC MIDLINE THORACIC BACK PAIN: ICD-10-CM

## 2024-08-22 DIAGNOSIS — R53.1 GENERALIZED WEAKNESS: Primary | ICD-10-CM

## 2024-08-22 DIAGNOSIS — M54.6 CHRONIC MIDLINE THORACIC BACK PAIN: ICD-10-CM

## 2024-08-22 NOTE — TELEPHONE ENCOUNTER
Patient needs a referral from [facility] for occupational therapy and nursing care

## 2024-09-03 PROBLEM — N39.0 URINARY TRACT INFECTION: Status: RESOLVED | Noted: 2024-08-04 | Resolved: 2024-09-03

## 2024-09-11 ENCOUNTER — TELEPHONE (OUTPATIENT)
Dept: PRIMARY CARE CLINIC | Age: 84
End: 2024-09-11

## 2024-09-11 DIAGNOSIS — G89.29 CHRONIC LOW BACK PAIN WITH SCIATICA, SCIATICA LATERALITY UNSPECIFIED, UNSPECIFIED BACK PAIN LATERALITY: ICD-10-CM

## 2024-09-11 DIAGNOSIS — M54.40 CHRONIC LOW BACK PAIN WITH SCIATICA, SCIATICA LATERALITY UNSPECIFIED, UNSPECIFIED BACK PAIN LATERALITY: ICD-10-CM

## 2024-09-12 RX ORDER — GABAPENTIN 300 MG/1
300 CAPSULE ORAL 2 TIMES DAILY
Qty: 180 CAPSULE | Refills: 1 | Status: SHIPPED | OUTPATIENT
Start: 2024-09-12 | End: 2025-03-11

## 2024-09-17 ENCOUNTER — CARE COORDINATION (OUTPATIENT)
Dept: CARE COORDINATION | Age: 84
End: 2024-09-17

## 2024-09-23 RX ORDER — HYDRALAZINE HYDROCHLORIDE 25 MG/1
25 TABLET, FILM COATED ORAL EVERY 8 HOURS SCHEDULED
Qty: 90 TABLET | Refills: 0 | Status: SHIPPED | OUTPATIENT
Start: 2024-09-23

## 2024-09-25 ENCOUNTER — TELEPHONE (OUTPATIENT)
Dept: PRIMARY CARE CLINIC | Age: 84
End: 2024-09-25

## 2024-09-25 RX ORDER — MECLIZINE HCL 12.5 MG 12.5 MG/1
12.5 TABLET ORAL 2 TIMES DAILY
Qty: 20 TABLET | Refills: 0 | Status: SHIPPED | OUTPATIENT
Start: 2024-09-25 | End: 2024-10-05

## 2024-10-07 RX ORDER — MECLIZINE HCL 12.5 MG 12.5 MG/1
12.5 TABLET ORAL 2 TIMES DAILY
COMMUNITY
End: 2024-10-07 | Stop reason: SDUPTHER

## 2024-10-07 RX ORDER — MECLIZINE HCL 12.5 MG 12.5 MG/1
12.5 TABLET ORAL 2 TIMES DAILY
Qty: 180 TABLET | Refills: 0 | Status: SHIPPED | OUTPATIENT
Start: 2024-10-07

## 2024-10-11 RX ORDER — OLANZAPINE 5 MG/1
5 TABLET ORAL NIGHTLY
Qty: 90 TABLET | Refills: 0 | OUTPATIENT
Start: 2024-10-11

## 2024-10-15 RX ORDER — OLANZAPINE 5 MG/1
5 TABLET ORAL NIGHTLY
Qty: 90 TABLET | Refills: 0 | Status: CANCELLED | OUTPATIENT
Start: 2024-10-15

## 2024-10-15 NOTE — TELEPHONE ENCOUNTER
Previously denied stating not the prescriber of this medication- you prescribed this medication on 07/01/24

## 2024-11-05 ENCOUNTER — OFFICE VISIT (OUTPATIENT)
Dept: PRIMARY CARE CLINIC | Age: 84
End: 2024-11-05

## 2024-11-05 VITALS
OXYGEN SATURATION: 92 % | HEIGHT: 65 IN | BODY MASS INDEX: 26.63 KG/M2 | DIASTOLIC BLOOD PRESSURE: 74 MMHG | HEART RATE: 75 BPM | SYSTOLIC BLOOD PRESSURE: 128 MMHG | TEMPERATURE: 97.2 F

## 2024-11-05 DIAGNOSIS — E11.22 TYPE 2 DIABETES MELLITUS WITH STAGE 3B CHRONIC KIDNEY DISEASE, WITH LONG-TERM CURRENT USE OF INSULIN (HCC): Primary | ICD-10-CM

## 2024-11-05 DIAGNOSIS — I27.20 PULMONARY HYPERTENSION (HCC): ICD-10-CM

## 2024-11-05 DIAGNOSIS — E06.3 AUTOIMMUNE HYPOTHYROIDISM: ICD-10-CM

## 2024-11-05 DIAGNOSIS — N18.32 TYPE 2 DIABETES MELLITUS WITH STAGE 3B CHRONIC KIDNEY DISEASE, WITH LONG-TERM CURRENT USE OF INSULIN (HCC): Primary | ICD-10-CM

## 2024-11-05 DIAGNOSIS — M47.814 THORACIC SPONDYLOSIS: ICD-10-CM

## 2024-11-05 DIAGNOSIS — F32.1 MODERATE MAJOR DEPRESSION (HCC): ICD-10-CM

## 2024-11-05 DIAGNOSIS — M54.16 LUMBAR RADICULOPATHY: ICD-10-CM

## 2024-11-05 DIAGNOSIS — I36.1 NONRHEUMATIC TRICUSPID VALVE REGURGITATION: ICD-10-CM

## 2024-11-05 DIAGNOSIS — Z79.4 TYPE 2 DIABETES MELLITUS WITH STAGE 3B CHRONIC KIDNEY DISEASE, WITH LONG-TERM CURRENT USE OF INSULIN (HCC): Primary | ICD-10-CM

## 2024-11-05 DIAGNOSIS — I10 PRIMARY HYPERTENSION: ICD-10-CM

## 2024-11-05 DIAGNOSIS — D50.9 HYPOCHROMIC MICROCYTIC ANEMIA: ICD-10-CM

## 2024-11-05 DIAGNOSIS — G89.4 CHRONIC PAIN SYNDROME: ICD-10-CM

## 2024-11-05 LAB — HBA1C MFR BLD: 9.8 %

## 2024-11-05 RX ORDER — LOSARTAN POTASSIUM 100 MG/1
100 TABLET ORAL DAILY
Qty: 90 TABLET | Refills: 0 | Status: SHIPPED | OUTPATIENT
Start: 2024-11-05

## 2024-11-05 RX ORDER — INSULIN GLARGINE 100 [IU]/ML
INJECTION, SOLUTION SUBCUTANEOUS
Qty: 10 ML | Refills: 3 | Status: SHIPPED | OUTPATIENT
Start: 2024-11-05

## 2024-11-05 RX ORDER — DULOXETIN HYDROCHLORIDE 30 MG/1
30 CAPSULE, DELAYED RELEASE ORAL DAILY
Qty: 30 CAPSULE | Refills: 3 | Status: SHIPPED | OUTPATIENT
Start: 2024-11-05

## 2024-11-05 RX ORDER — MECLIZINE HCL 12.5 MG 12.5 MG/1
12.5 TABLET ORAL 2 TIMES DAILY
Qty: 180 TABLET | Refills: 0 | Status: SHIPPED | OUTPATIENT
Start: 2024-11-05

## 2024-11-05 RX ORDER — PANTOPRAZOLE SODIUM 40 MG/1
40 TABLET, DELAYED RELEASE ORAL DAILY
Qty: 90 TABLET | Refills: 0 | Status: SHIPPED | OUTPATIENT
Start: 2024-11-05

## 2024-11-05 RX ORDER — HYDRALAZINE HYDROCHLORIDE 25 MG/1
25 TABLET, FILM COATED ORAL EVERY 8 HOURS SCHEDULED
Qty: 90 TABLET | Refills: 0 | Status: SHIPPED | OUTPATIENT
Start: 2024-11-05

## 2024-11-05 RX ORDER — OLANZAPINE 5 MG/1
5 TABLET ORAL NIGHTLY
Qty: 90 TABLET | Refills: 0 | Status: CANCELLED | OUTPATIENT
Start: 2024-11-05

## 2024-11-05 RX ORDER — BUPROPION HYDROCHLORIDE 300 MG/1
300 TABLET ORAL EVERY MORNING
Qty: 90 TABLET | Refills: 0 | Status: CANCELLED | OUTPATIENT
Start: 2024-11-05

## 2024-11-05 RX ORDER — CALCIUM CITRATE/VITAMIN D3 200MG-6.25
1 TABLET ORAL 3 TIMES DAILY
Qty: 300 EACH | Refills: 1 | Status: SHIPPED | OUTPATIENT
Start: 2024-11-05

## 2024-11-05 RX ORDER — TRAZODONE HYDROCHLORIDE 50 MG/1
50 TABLET, FILM COATED ORAL NIGHTLY
COMMUNITY
Start: 2024-10-03 | End: 2024-11-05 | Stop reason: SDUPTHER

## 2024-11-05 RX ORDER — FLUOXETINE 40 MG/1
40 CAPSULE ORAL NIGHTLY
Qty: 90 CAPSULE | Refills: 0 | Status: SHIPPED | OUTPATIENT
Start: 2024-11-05

## 2024-11-05 RX ORDER — METOPROLOL SUCCINATE 25 MG/1
25 TABLET, EXTENDED RELEASE ORAL DAILY
Qty: 30 TABLET | Refills: 3 | Status: SHIPPED | OUTPATIENT
Start: 2024-11-05

## 2024-11-05 RX ORDER — SYRINGE-NEEDLE,INSULIN,0.5 ML 27GX1/2"
1 SYRINGE, EMPTY DISPOSABLE MISCELLANEOUS DAILY
Qty: 100 EACH | Refills: 0 | Status: SHIPPED | OUTPATIENT
Start: 2024-11-05

## 2024-11-05 RX ORDER — ALCOHOL ANTISEPTIC PADS
1 PADS, MEDICATED (EA) TOPICAL 3 TIMES DAILY
Qty: 300 EACH | Refills: 1 | Status: SHIPPED | OUTPATIENT
Start: 2024-11-05

## 2024-11-05 RX ORDER — BUPROPION HYDROCHLORIDE 300 MG/1
300 TABLET ORAL EVERY MORNING
COMMUNITY
Start: 2024-09-16

## 2024-11-05 RX ORDER — TRAZODONE HYDROCHLORIDE 50 MG/1
50 TABLET, FILM COATED ORAL NIGHTLY
Qty: 90 TABLET | Refills: 0 | Status: SHIPPED | OUTPATIENT
Start: 2024-11-05

## 2024-11-05 RX ORDER — GLIMEPIRIDE 4 MG/1
TABLET ORAL
Qty: 135 TABLET | Refills: 0 | Status: SHIPPED | OUTPATIENT
Start: 2024-11-05

## 2024-11-05 RX ORDER — LEVOTHYROXINE SODIUM 75 MCG
75 TABLET ORAL DAILY
Qty: 90 TABLET | Refills: 0 | Status: SHIPPED | OUTPATIENT
Start: 2024-11-05

## 2024-11-05 NOTE — PROGRESS NOTES
Chief Complaint   Patient presents with    Follow-up     Pt is a routine F/U, and states she was in ICU in August followed by Rehabilitation at [facility]. Pt was discharged home after 7 days and is C/o back pain daily.        HPI:  Patient is here accompanied by her daughter for follow-up of diabetes mellitus type 2 hypertension hyperlipidemia chronic low back pain spinal lumbar stenosis depression and bipolar disorder.  Patient was hospitalized back in August with what appeared to be dizziness and acute cystitis with E. coli and she was placed on IV antibiotics and IV fluid hydration and she was sent to a nursing home for rehabilitation.  She stayed there for a week and she was sent home afterwards.    .  Patient complains of severe low back pain that has been chronic.  She has been sent to the pain clinic and had received 3 epidural injections and according to her has not helped her and she was told that there is nothing else they can offer her at this point.  She had moderately severe kyphosis secondary to osteoporosis to and that is most probably aggravating the low back pain.  Her renal functions have been hanging in the 1.3-1.4 creatinine level and so we explained to her that NSAIDs are not recommended for her pain.  We will try Cymbalta 30 mg daily to see if that might provide her some help with the gabapentin.  She was advised to use a binder into continue with massage therapy and moist heat.  Also she was advised to have physical therapy but she declined as claims that this has not helped her.    Blood work from August was discussed with her appears within reasonable range except for hyperglycemia.  Hemoglobin A1c today is 9.8%.  Her daughter had noticed that her fasting blood sugars were reasonable less than 160 but her postprandial are running in the 300 range.  We will increase her glimepiride to 4 mg in the morning and 2 mg with supper and patient was advised to comply with a low carbohydrate 
Statement Selected

## 2024-12-30 ENCOUNTER — TELEPHONE (OUTPATIENT)
Dept: PRIMARY CARE CLINIC | Age: 84
End: 2024-12-30

## 2024-12-30 DIAGNOSIS — I10 PRIMARY HYPERTENSION: ICD-10-CM

## 2024-12-30 NOTE — TELEPHONE ENCOUNTER
Patients daughter states patient does not \"feel well\"  Could not give specific symptoms- advised physician not in the office and has no available appt until feb to go directly to urgent care or emergency room

## 2024-12-30 NOTE — TELEPHONE ENCOUNTER
Name of Medication(s) Requested:  Requested Prescriptions     Pending Prescriptions Disp Refills    hydrALAZINE (APRESOLINE) 25 MG tablet [Pharmacy Med Name: HYDRALAZINE HCL 25 MG TABS 25 Tablet] 90 tablet 0     Sig: TAKE 1 TABLET BY MOUTH EVERY 8 HOURS       Medication is on current medication list Yes    Dosage and directions were verified? Yes    Quantity verified: 30 day supply     Pharmacy Verified?  Yes    Last Appointment:  11/5/2024    Future appts:  Future Appointments   Date Time Provider Department Center   2/11/2025 11:00 AM Roxie Maldonado MD [facility] DEP        (If no appt send self scheduling link. .REFILLAPPT)  Scheduling request sent?     [] Yes  [] No    Does patient need updated?  [] Yes  [] No

## 2025-01-01 RX ORDER — HYDRALAZINE HYDROCHLORIDE 25 MG/1
25 TABLET, FILM COATED ORAL EVERY 8 HOURS SCHEDULED
Qty: 90 TABLET | Refills: 0 | Status: SHIPPED | OUTPATIENT
Start: 2025-01-01

## 2025-02-11 ENCOUNTER — OFFICE VISIT (OUTPATIENT)
Dept: PRIMARY CARE CLINIC | Age: 85
End: 2025-02-11

## 2025-02-11 VITALS
BODY MASS INDEX: 26.63 KG/M2 | HEIGHT: 65 IN | OXYGEN SATURATION: 98 % | TEMPERATURE: 97.5 F | DIASTOLIC BLOOD PRESSURE: 76 MMHG | SYSTOLIC BLOOD PRESSURE: 128 MMHG | HEART RATE: 77 BPM

## 2025-02-11 DIAGNOSIS — M54.40 CHRONIC LOW BACK PAIN WITH SCIATICA, SCIATICA LATERALITY UNSPECIFIED, UNSPECIFIED BACK PAIN LATERALITY: ICD-10-CM

## 2025-02-11 DIAGNOSIS — E06.3 AUTOIMMUNE HYPOTHYROIDISM: ICD-10-CM

## 2025-02-11 DIAGNOSIS — S22.000G COMPRESSION FRACTURE OF THORACIC VERTEBRA WITH DELAYED HEALING, UNSPECIFIED THORACIC VERTEBRAL LEVEL, SUBSEQUENT ENCOUNTER: Primary | ICD-10-CM

## 2025-02-11 DIAGNOSIS — E11.65 HYPERGLYCEMIA DUE TO DIABETES MELLITUS (HCC): ICD-10-CM

## 2025-02-11 DIAGNOSIS — Z79.4 TYPE 2 DIABETES MELLITUS WITH STAGE 3B CHRONIC KIDNEY DISEASE, WITH LONG-TERM CURRENT USE OF INSULIN (HCC): ICD-10-CM

## 2025-02-11 DIAGNOSIS — I10 PRIMARY HYPERTENSION: ICD-10-CM

## 2025-02-11 DIAGNOSIS — G89.29 CHRONIC LOW BACK PAIN WITH SCIATICA, SCIATICA LATERALITY UNSPECIFIED, UNSPECIFIED BACK PAIN LATERALITY: ICD-10-CM

## 2025-02-11 DIAGNOSIS — E78.2 MIXED HYPERLIPIDEMIA: ICD-10-CM

## 2025-02-11 DIAGNOSIS — T30.0 FIRST DEGREE BURN INJURY: ICD-10-CM

## 2025-02-11 DIAGNOSIS — M48.061 SPINAL STENOSIS OF LUMBAR REGION WITHOUT NEUROGENIC CLAUDICATION: ICD-10-CM

## 2025-02-11 DIAGNOSIS — F32.89 OTHER DEPRESSION: ICD-10-CM

## 2025-02-11 DIAGNOSIS — N18.32 TYPE 2 DIABETES MELLITUS WITH STAGE 3B CHRONIC KIDNEY DISEASE, WITH LONG-TERM CURRENT USE OF INSULIN (HCC): ICD-10-CM

## 2025-02-11 DIAGNOSIS — G89.4 CHRONIC PAIN SYNDROME: ICD-10-CM

## 2025-02-11 DIAGNOSIS — E11.22 TYPE 2 DIABETES MELLITUS WITH STAGE 3B CHRONIC KIDNEY DISEASE, WITH LONG-TERM CURRENT USE OF INSULIN (HCC): ICD-10-CM

## 2025-02-11 DIAGNOSIS — F32.1 MODERATE MAJOR DEPRESSION (HCC): ICD-10-CM

## 2025-02-11 LAB — HBA1C MFR BLD: 9.2 %

## 2025-02-11 RX ORDER — GABAPENTIN 300 MG/1
300 CAPSULE ORAL 4 TIMES DAILY
Qty: 120 CAPSULE | Refills: 2 | Status: SHIPPED | OUTPATIENT
Start: 2025-02-11 | End: 2025-05-12

## 2025-02-11 RX ORDER — DULOXETIN HYDROCHLORIDE 30 MG/1
30 CAPSULE, DELAYED RELEASE ORAL DAILY
Qty: 30 CAPSULE | Refills: 3 | Status: SHIPPED | OUTPATIENT
Start: 2025-02-11

## 2025-02-11 RX ORDER — PANTOPRAZOLE SODIUM 40 MG/1
40 TABLET, DELAYED RELEASE ORAL DAILY
Qty: 90 TABLET | Refills: 0 | Status: SHIPPED | OUTPATIENT
Start: 2025-02-11

## 2025-02-11 RX ORDER — SYRINGE-NEEDLE,INSULIN,0.5 ML 27GX1/2"
1 SYRINGE, EMPTY DISPOSABLE MISCELLANEOUS DAILY
Qty: 100 EACH | Refills: 0 | Status: SHIPPED | OUTPATIENT
Start: 2025-02-11

## 2025-02-11 RX ORDER — LEVOTHYROXINE SODIUM 75 MCG
75 TABLET ORAL DAILY
Qty: 90 TABLET | Refills: 0 | Status: SHIPPED | OUTPATIENT
Start: 2025-02-11

## 2025-02-11 RX ORDER — SILVER SULFADIAZINE 10 MG/G
CREAM TOPICAL
Qty: 50 G | Refills: 1 | Status: SHIPPED | OUTPATIENT
Start: 2025-02-11

## 2025-02-11 RX ORDER — GLIMEPIRIDE 4 MG/1
4 TABLET ORAL 2 TIMES DAILY
Qty: 180 TABLET | Refills: 0 | Status: SHIPPED | OUTPATIENT
Start: 2025-02-11

## 2025-02-11 RX ORDER — FLUOXETINE 40 MG/1
40 CAPSULE ORAL NIGHTLY
Qty: 90 CAPSULE | Refills: 0 | Status: SHIPPED | OUTPATIENT
Start: 2025-02-11

## 2025-02-11 RX ORDER — MECLIZINE HCL 12.5 MG 12.5 MG/1
12.5 TABLET ORAL 2 TIMES DAILY
Qty: 180 TABLET | Refills: 0 | Status: SHIPPED | OUTPATIENT
Start: 2025-02-11

## 2025-02-11 RX ORDER — LOSARTAN POTASSIUM 100 MG/1
100 TABLET ORAL DAILY
Qty: 90 TABLET | Refills: 0 | Status: SHIPPED | OUTPATIENT
Start: 2025-02-11

## 2025-02-11 RX ORDER — METOPROLOL SUCCINATE 25 MG/1
25 TABLET, EXTENDED RELEASE ORAL DAILY
Qty: 30 TABLET | Refills: 3 | Status: SHIPPED | OUTPATIENT
Start: 2025-02-11

## 2025-02-11 RX ORDER — OLANZAPINE 5 MG/1
5 TABLET ORAL NIGHTLY
Qty: 90 TABLET | Refills: 0 | Status: SHIPPED | OUTPATIENT
Start: 2025-02-11

## 2025-02-11 RX ORDER — TRAZODONE HYDROCHLORIDE 50 MG/1
50 TABLET, FILM COATED ORAL NIGHTLY
Qty: 90 TABLET | Refills: 0 | Status: SHIPPED | OUTPATIENT
Start: 2025-02-11

## 2025-02-11 RX ORDER — GLIMEPIRIDE 4 MG/1
TABLET ORAL
Qty: 135 TABLET | Refills: 0 | Status: CANCELLED | OUTPATIENT
Start: 2025-02-11

## 2025-02-11 SDOH — ECONOMIC STABILITY: FOOD INSECURITY: WITHIN THE PAST 12 MONTHS, YOU WORRIED THAT YOUR FOOD WOULD RUN OUT BEFORE YOU GOT MONEY TO BUY MORE.: NEVER TRUE

## 2025-02-11 SDOH — ECONOMIC STABILITY: FOOD INSECURITY: WITHIN THE PAST 12 MONTHS, THE FOOD YOU BOUGHT JUST DIDN'T LAST AND YOU DIDN'T HAVE MONEY TO GET MORE.: NEVER TRUE

## 2025-02-11 NOTE — PROGRESS NOTES
Chief Complaint   Patient presents with    Follow-up     Pt has discoloration on lower back with blister from heating pad use.     Diabetes     A1C done at this visit.        HPI:  Patient is here accompanied by her daughter for follow-up of diabetes mellitus type 2 hypertension hyperlipidemia chronic low back pain spinal lumbar stenosis depression and bipolar disorder. .  Patient complains of severe low back pain that has been chronic.  She has been sent to the pain clinic and had received 3 epidural injections and according to her has not helped her and she was told that there is nothing else they can offer her at this point.  She had moderately severe kyphosis secondary to osteoporosis to and that is most probably aggravating the low back pain.  Her renal functions have been hanging in the 1.3-1.4 creatinine level and so we explained to her that NSAIDs are not recommended for her pain.  We added Cymbalta 30 mg daily last visit but she claims it did not help her that much.  She has been on gabapentin 300 mg twice a day and I am going to increase it today to 300 mg 4 times a day..    We will also refer her to a different pain clinic for second opinion and possible long-term opiate therapy.    Blood work is not done.  Hemoglobin A1c today is 9.2%.  We will increase her glimepiride to 4 mg twice a day.  According to her daughter she does not have any episodes of hypoglycemia and her blood sugars have been hanging in the 100-300.  Patient was not able to get weight today as she cannot stand for a long time but it seems like she gained at least 10 to 15 pounds.    On exam of her back she has first-degree generalized burn on the lower back with a blister formation actually.  She has been using her heating pad nonstop and has not been feeling how high it was.  She was counseled not to use the heating pad anymore and to start using Silvadene cream for first-degree burn.    Past Medical History, Surgical History, and

## 2025-02-14 DIAGNOSIS — E06.3 AUTOIMMUNE HYPOTHYROIDISM: ICD-10-CM

## 2025-02-14 RX ORDER — LEVOTHYROXINE SODIUM 75 UG/1
75 TABLET ORAL DAILY
Qty: 90 TABLET | Refills: 0 | OUTPATIENT
Start: 2025-02-14

## 2025-02-14 NOTE — TELEPHONE ENCOUNTER
The pharmacy is requesting a new script due to the first script saying YA. They will not allow us to give a verbal to take YA off.  Please sign new script

## 2025-02-17 RX ORDER — LEVOTHYROXINE SODIUM 75 UG/1
75 TABLET ORAL DAILY
Qty: 90 TABLET | Refills: 0 | Status: SHIPPED | OUTPATIENT
Start: 2025-02-17

## 2025-02-22 ENCOUNTER — APPOINTMENT (OUTPATIENT)
Dept: CT IMAGING | Age: 85
End: 2025-02-22
Payer: MEDICARE

## 2025-02-22 ENCOUNTER — APPOINTMENT (OUTPATIENT)
Dept: GENERAL RADIOLOGY | Age: 85
End: 2025-02-22
Payer: MEDICARE

## 2025-02-22 ENCOUNTER — HOSPITAL ENCOUNTER (EMERGENCY)
Age: 85
Discharge: HOME | End: 2025-02-22
Attending: STUDENT IN AN ORGANIZED HEALTH CARE EDUCATION/TRAINING PROGRAM
Payer: MEDICARE

## 2025-02-22 VITALS
SYSTOLIC BLOOD PRESSURE: 171 MMHG | DIASTOLIC BLOOD PRESSURE: 82 MMHG | OXYGEN SATURATION: 96 % | TEMPERATURE: 97.9 F | RESPIRATION RATE: 21 BRPM | HEART RATE: 61 BPM

## 2025-02-22 DIAGNOSIS — G89.29 CHRONIC LOW BACK PAIN, UNSPECIFIED BACK PAIN LATERALITY, UNSPECIFIED WHETHER SCIATICA PRESENT: Primary | ICD-10-CM

## 2025-02-22 DIAGNOSIS — R06.02 SHORTNESS OF BREATH: ICD-10-CM

## 2025-02-22 DIAGNOSIS — M54.50 CHRONIC LOW BACK PAIN, UNSPECIFIED BACK PAIN LATERALITY, UNSPECIFIED WHETHER SCIATICA PRESENT: Primary | ICD-10-CM

## 2025-02-22 LAB
ANION GAP SERPL CALCULATED.3IONS-SCNC: 7 MMOL/L (ref 7–16)
B-OH-BUTYR SERPL-MCNC: 0.09 MMOL/L (ref 0.02–0.27)
BASOPHILS # BLD: 0.05 K/UL (ref 0–0.2)
BASOPHILS NFR BLD: 1 % (ref 0–2)
BNP SERPL-MCNC: 1274 PG/ML (ref 0–450)
BUN SERPL-MCNC: 21 MG/DL (ref 6–23)
CALCIUM SERPL-MCNC: 9.3 MG/DL (ref 8.6–10.2)
CHLORIDE SERPL-SCNC: 103 MMOL/L (ref 98–107)
CO2 SERPL-SCNC: 28 MMOL/L (ref 22–29)
CREAT SERPL-MCNC: 1.3 MG/DL (ref 0.5–1)
D-DIMER QUANTITATIVE: 350 NG/ML DDU (ref 0–230)
EOSINOPHIL # BLD: 0.28 K/UL (ref 0.05–0.5)
EOSINOPHILS RELATIVE PERCENT: 4 % (ref 0–6)
ERYTHROCYTE [DISTWIDTH] IN BLOOD BY AUTOMATED COUNT: 17.9 % (ref 11.5–15)
FLUAV RNA RESP QL NAA+PROBE: NOT DETECTED
FLUBV RNA RESP QL NAA+PROBE: NOT DETECTED
GFR, ESTIMATED: 40 ML/MIN/1.73M2
GLUCOSE BLD-MCNC: 304 MG/DL (ref 74–99)
GLUCOSE SERPL-MCNC: 299 MG/DL (ref 74–99)
HCT VFR BLD AUTO: 30.7 % (ref 34–48)
HGB BLD-MCNC: 9.1 G/DL (ref 11.5–15.5)
IMM GRANULOCYTES # BLD AUTO: <0.03 K/UL (ref 0–0.58)
IMM GRANULOCYTES NFR BLD: 0 % (ref 0–5)
LYMPHOCYTES NFR BLD: 1.85 K/UL (ref 1.5–4)
LYMPHOCYTES RELATIVE PERCENT: 27 % (ref 20–42)
MAGNESIUM SERPL-MCNC: 2 MG/DL (ref 1.6–2.6)
MCH RBC QN AUTO: 24.3 PG (ref 26–35)
MCHC RBC AUTO-ENTMCNC: 29.6 G/DL (ref 32–34.5)
MCV RBC AUTO: 81.9 FL (ref 80–99.9)
MONOCYTES NFR BLD: 0.6 K/UL (ref 0.1–0.95)
MONOCYTES NFR BLD: 9 % (ref 2–12)
NEUTROPHILS NFR BLD: 59 % (ref 43–80)
NEUTS SEG NFR BLD: 4.07 K/UL (ref 1.8–7.3)
PH VENOUS: 7.33 (ref 7.35–7.45)
PLATELET # BLD AUTO: 321 K/UL (ref 130–450)
PMV BLD AUTO: 9.4 FL (ref 7–12)
POTASSIUM SERPL-SCNC: 4.5 MMOL/L (ref 3.5–5)
RBC # BLD AUTO: 3.75 M/UL (ref 3.5–5.5)
SARS-COV-2 RNA RESP QL NAA+PROBE: NOT DETECTED
SODIUM SERPL-SCNC: 138 MMOL/L (ref 132–146)
SOURCE: NORMAL
SPECIMEN DESCRIPTION: NORMAL
TROPONIN I SERPL HS-MCNC: 29 NG/L (ref 0–9)
TROPONIN I SERPL HS-MCNC: 30 NG/L (ref 0–9)
WBC OTHER # BLD: 6.9 K/UL (ref 4.5–11.5)

## 2025-02-22 PROCEDURE — 82800 BLOOD PH: CPT

## 2025-02-22 PROCEDURE — 84484 ASSAY OF TROPONIN QUANT: CPT

## 2025-02-22 PROCEDURE — 85379 FIBRIN DEGRADATION QUANT: CPT

## 2025-02-22 PROCEDURE — 71045 X-RAY EXAM CHEST 1 VIEW: CPT

## 2025-02-22 PROCEDURE — 6370000000 HC RX 637 (ALT 250 FOR IP)

## 2025-02-22 PROCEDURE — 83880 ASSAY OF NATRIURETIC PEPTIDE: CPT

## 2025-02-22 PROCEDURE — 80048 BASIC METABOLIC PNL TOTAL CA: CPT

## 2025-02-22 PROCEDURE — 87636 SARSCOV2 & INF A&B AMP PRB: CPT

## 2025-02-22 PROCEDURE — 6360000004 HC RX CONTRAST MEDICATION: Performed by: RADIOLOGY

## 2025-02-22 PROCEDURE — 83735 ASSAY OF MAGNESIUM: CPT

## 2025-02-22 PROCEDURE — 82962 GLUCOSE BLOOD TEST: CPT

## 2025-02-22 PROCEDURE — 82010 KETONE BODYS QUAN: CPT

## 2025-02-22 PROCEDURE — 71275 CT ANGIOGRAPHY CHEST: CPT

## 2025-02-22 PROCEDURE — 85025 COMPLETE CBC W/AUTO DIFF WBC: CPT

## 2025-02-22 PROCEDURE — 99285 EMERGENCY DEPT VISIT HI MDM: CPT

## 2025-02-22 PROCEDURE — 93005 ELECTROCARDIOGRAM TRACING: CPT

## 2025-02-22 RX ORDER — LIDOCAINE 4 G/G
1 PATCH TOPICAL ONCE
Status: DISCONTINUED | OUTPATIENT
Start: 2025-02-22 | End: 2025-02-23 | Stop reason: HOSPADM

## 2025-02-22 RX ORDER — MORPHINE SULFATE 4 MG/ML
4 INJECTION, SOLUTION INTRAMUSCULAR; INTRAVENOUS ONCE
Status: DISCONTINUED | OUTPATIENT
Start: 2025-02-22 | End: 2025-02-22

## 2025-02-22 RX ORDER — IOPAMIDOL 755 MG/ML
75 INJECTION, SOLUTION INTRAVASCULAR
Status: COMPLETED | OUTPATIENT
Start: 2025-02-22 | End: 2025-02-22

## 2025-02-22 RX ADMIN — IOPAMIDOL 75 ML: 755 INJECTION, SOLUTION INTRAVENOUS at 20:20

## 2025-02-22 NOTE — ED PROVIDER NOTES
[facility] EMERGENCY DEPARTMENT  EMERGENCY DEPARTMENT ENCOUNTER        Pt Name: Kylee Wang  MRN: [id number]  Birthdate 1940  Date of evaluation: 2/22/2025  Provider: Mario Quiroz MD  PCP: Roxie Maldonado MD  Note Started: 6:10 PM EST 2/22/25    CHIEF COMPLAINT       Chief Complaint   Patient presents with    Back Pain     Generalized back pain that has been ongoing for years. Denies falls or injury    Hyperglycemia            HISTORY OF PRESENT ILLNESS: 1 or more Elements        Limitations to history : None    Kylee Wang is a 84 y.o. female who presents to the emergency room for chronic back pain.  Has been back pain ongoing for several years, she states that the Tylenol ibuprofen, as well as the Cymbalta her PCP prescribed her is not helping with the pain.  Is also prescribed gabapentin, as well as Wellbutrin.  She presents that she said she has not felt well since [place], has been feeling short of breath and dyspneic on exertion.  Denies any chest pain, no fevers, no chills, denies any recent travel injuries or surgeries or history of blood clots.    Nursing Notes were all reviewed and agreed with or any disagreements were addressed in the HPI.      REVIEW OF EXTERNAL NOTE :       PCP visit from 2/11/2025 patient was seen for chronic low back pain with sciatica, compression fracture of the thoracic vertebra, chronic pain syndrome, spinal stenosis, and hyperglycemia.  Her diabetic medications were changed around, and they planned on referring her to a different pain management specialist as patient had minimal relief from epidural injections.    REVIEW OF SYSTEMS :      Positives and Pertinent negatives as per HPI.     SURGICAL HISTORY     Past Surgical History:   Procedure Laterality Date    CHOLECYSTECTOMY      COLONOSCOPY      EYE SURGERY      cataract both eyes    HYSTERECTOMY (CERVIX STATUS UNKNOWN)      JOINT REPLACEMENT Left     KNEE    PAIN MANAGEMENT

## 2025-02-23 LAB
EKG ATRIAL RATE: 63 BPM
EKG P AXIS: 4 DEGREES
EKG P-R INTERVAL: 142 MS
EKG Q-T INTERVAL: 398 MS
EKG QRS DURATION: 82 MS
EKG QTC CALCULATION (BAZETT): 407 MS
EKG R AXIS: 44 DEGREES
EKG T AXIS: 46 DEGREES
EKG VENTRICULAR RATE: 63 BPM

## 2025-02-23 NOTE — DISCHARGE INSTRUCTIONS
You were seen in the emergency room today for your back pain and shortness of breath.  I recommend following up with your pain management appointment on Monday for further evaluation and control your back pain.  Your EKG and troponins did not show signs of heart attack and your CAT scan and your chest did not show pneumonia or a blood clot.  I would recommend following up with her primary care physician for further evaluation of your shortness of breath.  If you do start experiencing issues with fevers, chills, chest pain, worsening shortness of breath, leg swelling, please return to the emergency department.

## 2025-02-23 NOTE — ED PROVIDER NOTES
84-year-old female with history of chronic back pain presents to the emergency department for worsening low back pain that does not radiate.  She states that she has been taking Tylenol and ibuprofen with minimal improvement of symptoms.  She denies the following: Saddle anesthesia, bowel bladder incontinence, bilateral lower extremity pain/weakness/numbness/tingling.  Patient was able to ambulate.  EMS, she denies fever, chills, nausea, vomiting abdominal pain, headache, vision changes.  Patient said that she has been having increased shortness of breath on exertion for the past month is gradually been getting worse.  Her shortness of breath improves while being idle.  She denies any chest pain with her symptoms.  She      Chief Complaint   Patient presents with    Back Pain     Generalized back pain that has been ongoing for years. Denies falls or injury    Hyperglycemia            Review of Systems   Pert stated in the hpi above   Physical Exam  Vitals reviewed.   Constitutional:       General: She is not in acute distress.     Appearance: She is not ill-appearing.   HENT:      Head: Normocephalic.      Right Ear: External ear normal.      Left Ear: External ear normal.      Nose: Nose normal.      Mouth/Throat:      Mouth: Mucous membranes are moist.   Eyes:      General:         Right eye: No discharge.         Left eye: No discharge.      Conjunctiva/sclera: Conjunctivae normal.   Cardiovascular:      Rate and Rhythm: Normal rate and regular rhythm.      Heart sounds:      No friction rub. No gallop.   Pulmonary:      Effort: No respiratory distress.      Breath sounds: No stridor.   Abdominal:      General: There is no distension.      Tenderness: There is no abdominal tenderness. There is no guarding or rebound.   Musculoskeletal:         General: No deformity or signs of injury.      Cervical back: Normal range of motion and neck supple. No rigidity or tenderness.   Skin:     Coloration: Skin is not

## 2025-03-26 ENCOUNTER — APPOINTMENT (OUTPATIENT)
Dept: CT IMAGING | Age: 85
DRG: 871 | End: 2025-03-26
Payer: MEDICARE

## 2025-03-26 ENCOUNTER — HOSPITAL ENCOUNTER (INPATIENT)
Age: 85
LOS: 5 days | Discharge: HOME HEALTH | DRG: 871 | End: 2025-04-01
Attending: EMERGENCY MEDICINE | Admitting: INTERNAL MEDICINE
Payer: MEDICARE

## 2025-03-26 ENCOUNTER — APPOINTMENT (OUTPATIENT)
Dept: GENERAL RADIOLOGY | Age: 85
DRG: 871 | End: 2025-03-26
Payer: MEDICARE

## 2025-03-26 DIAGNOSIS — G89.29 CHRONIC LOW BACK PAIN WITH SCIATICA, SCIATICA LATERALITY UNSPECIFIED, UNSPECIFIED BACK PAIN LATERALITY: ICD-10-CM

## 2025-03-26 DIAGNOSIS — R07.9 CHEST PAIN, UNSPECIFIED TYPE: Primary | ICD-10-CM

## 2025-03-26 DIAGNOSIS — R00.1 SYMPTOMATIC BRADYCARDIA: ICD-10-CM

## 2025-03-26 DIAGNOSIS — R00.1 BRADYCARDIA: ICD-10-CM

## 2025-03-26 DIAGNOSIS — M54.40 CHRONIC LOW BACK PAIN WITH SCIATICA, SCIATICA LATERALITY UNSPECIFIED, UNSPECIFIED BACK PAIN LATERALITY: ICD-10-CM

## 2025-03-26 LAB
ALBUMIN SERPL-MCNC: 4 G/DL (ref 3.5–5.2)
ALP SERPL-CCNC: 83 U/L (ref 35–104)
ALT SERPL-CCNC: 14 U/L (ref 0–32)
ANION GAP SERPL CALCULATED.3IONS-SCNC: 12 MMOL/L (ref 7–16)
AST SERPL-CCNC: 17 U/L (ref 0–31)
BASOPHILS # BLD: 0.08 K/UL (ref 0–0.2)
BASOPHILS NFR BLD: 1 % (ref 0–2)
BILIRUB SERPL-MCNC: 0.2 MG/DL (ref 0–1.2)
BNP SERPL-MCNC: 3866 PG/ML (ref 0–450)
BUN SERPL-MCNC: 32 MG/DL (ref 6–23)
CALCIUM SERPL-MCNC: 9.3 MG/DL (ref 8.6–10.2)
CHLORIDE SERPL-SCNC: 101 MMOL/L (ref 98–107)
CO2 SERPL-SCNC: 22 MMOL/L (ref 22–29)
CREAT SERPL-MCNC: 1.5 MG/DL (ref 0.5–1)
D-DIMER QUANTITATIVE: 247 NG/ML DDU (ref 0–230)
EOSINOPHIL # BLD: 0.06 K/UL (ref 0.05–0.5)
EOSINOPHILS RELATIVE PERCENT: 1 % (ref 0–6)
ERYTHROCYTE [DISTWIDTH] IN BLOOD BY AUTOMATED COUNT: 17.7 % (ref 11.5–15)
FLUAV RNA RESP QL NAA+PROBE: NOT DETECTED
FLUBV RNA RESP QL NAA+PROBE: NOT DETECTED
GFR, ESTIMATED: 34 ML/MIN/1.73M2
GLUCOSE SERPL-MCNC: 367 MG/DL (ref 74–99)
HCT VFR BLD AUTO: 34.7 % (ref 34–48)
HGB BLD-MCNC: 10.1 G/DL (ref 11.5–15.5)
IMM GRANULOCYTES # BLD AUTO: 0.04 K/UL (ref 0–0.58)
IMM GRANULOCYTES NFR BLD: 0 % (ref 0–5)
LACTATE BLDV-SCNC: 1.6 MMOL/L (ref 0.5–1.9)
LYMPHOCYTES NFR BLD: 1.89 K/UL (ref 1.5–4)
LYMPHOCYTES RELATIVE PERCENT: 18 % (ref 20–42)
MCH RBC QN AUTO: 23.5 PG (ref 26–35)
MCHC RBC AUTO-ENTMCNC: 29.1 G/DL (ref 32–34.5)
MCV RBC AUTO: 80.7 FL (ref 80–99.9)
MONOCYTES NFR BLD: 0.61 K/UL (ref 0.1–0.95)
MONOCYTES NFR BLD: 6 % (ref 2–12)
NEUTROPHILS NFR BLD: 74 % (ref 43–80)
NEUTS SEG NFR BLD: 7.78 K/UL (ref 1.8–7.3)
PLATELET # BLD AUTO: 414 K/UL (ref 130–450)
PMV BLD AUTO: 9.9 FL (ref 7–12)
POTASSIUM SERPL-SCNC: 4.7 MMOL/L (ref 3.5–5)
PROT SERPL-MCNC: 6.9 G/DL (ref 6.4–8.3)
RBC # BLD AUTO: 4.3 M/UL (ref 3.5–5.5)
SARS-COV-2 RNA RESP QL NAA+PROBE: NOT DETECTED
SODIUM SERPL-SCNC: 135 MMOL/L (ref 132–146)
SOURCE: NORMAL
SPECIMEN DESCRIPTION: NORMAL
T4 FREE SERPL-MCNC: 1.2 NG/DL (ref 0.9–1.7)
TROPONIN I SERPL HS-MCNC: 63 NG/L (ref 0–9)
TSH SERPL DL<=0.05 MIU/L-ACNC: 2.11 UIU/ML (ref 0.27–4.2)
WBC OTHER # BLD: 10.5 K/UL (ref 4.5–11.5)

## 2025-03-26 PROCEDURE — 83880 ASSAY OF NATRIURETIC PEPTIDE: CPT

## 2025-03-26 PROCEDURE — 99285 EMERGENCY DEPT VISIT HI MDM: CPT

## 2025-03-26 PROCEDURE — 85379 FIBRIN DEGRADATION QUANT: CPT

## 2025-03-26 PROCEDURE — 87040 BLOOD CULTURE FOR BACTERIA: CPT

## 2025-03-26 PROCEDURE — 84439 ASSAY OF FREE THYROXINE: CPT

## 2025-03-26 PROCEDURE — 85025 COMPLETE CBC W/AUTO DIFF WBC: CPT

## 2025-03-26 PROCEDURE — 96374 THER/PROPH/DIAG INJ IV PUSH: CPT

## 2025-03-26 PROCEDURE — 83605 ASSAY OF LACTIC ACID: CPT

## 2025-03-26 PROCEDURE — 6360000002 HC RX W HCPCS

## 2025-03-26 PROCEDURE — 80053 COMPREHEN METABOLIC PANEL: CPT

## 2025-03-26 PROCEDURE — 2580000003 HC RX 258: Performed by: EMERGENCY MEDICINE

## 2025-03-26 PROCEDURE — 70450 CT HEAD/BRAIN W/O DYE: CPT

## 2025-03-26 PROCEDURE — 71045 X-RAY EXAM CHEST 1 VIEW: CPT

## 2025-03-26 PROCEDURE — 2500000003 HC RX 250 WO HCPCS

## 2025-03-26 PROCEDURE — 84443 ASSAY THYROID STIM HORMONE: CPT

## 2025-03-26 PROCEDURE — 84145 PROCALCITONIN (PCT): CPT

## 2025-03-26 PROCEDURE — 93005 ELECTROCARDIOGRAM TRACING: CPT | Performed by: EMERGENCY MEDICINE

## 2025-03-26 PROCEDURE — 87636 SARSCOV2 & INF A&B AMP PRB: CPT

## 2025-03-26 PROCEDURE — 84484 ASSAY OF TROPONIN QUANT: CPT

## 2025-03-26 RX ORDER — 0.9 % SODIUM CHLORIDE 0.9 %
30 INTRAVENOUS SOLUTION INTRAVENOUS ONCE
Status: COMPLETED | OUTPATIENT
Start: 2025-03-26 | End: 2025-03-27

## 2025-03-26 RX ADMIN — SODIUM CHLORIDE 2000 ML: 0.9 INJECTION, SOLUTION INTRAVENOUS at 23:42

## 2025-03-26 ASSESSMENT — LIFESTYLE VARIABLES
HOW MANY STANDARD DRINKS CONTAINING ALCOHOL DO YOU HAVE ON A TYPICAL DAY: PATIENT DOES NOT DRINK
HOW OFTEN DO YOU HAVE A DRINK CONTAINING ALCOHOL: NEVER
HOW OFTEN DO YOU HAVE A DRINK CONTAINING ALCOHOL: NEVER

## 2025-03-26 ASSESSMENT — PAIN - FUNCTIONAL ASSESSMENT: PAIN_FUNCTIONAL_ASSESSMENT: 0-10

## 2025-03-26 ASSESSMENT — PAIN DESCRIPTION - FREQUENCY: FREQUENCY: INTERMITTENT

## 2025-03-26 ASSESSMENT — PAIN SCALES - GENERAL: PAINLEVEL_OUTOF10: 0

## 2025-03-26 ASSESSMENT — PAIN DESCRIPTION - ONSET: ONSET: AWAKENED FROM SLEEP

## 2025-03-26 ASSESSMENT — PAIN DESCRIPTION - PAIN TYPE: TYPE: ACUTE PAIN

## 2025-03-27 ENCOUNTER — APPOINTMENT (OUTPATIENT)
Dept: CT IMAGING | Age: 85
DRG: 871 | End: 2025-03-27
Payer: MEDICARE

## 2025-03-27 ENCOUNTER — APPOINTMENT (OUTPATIENT)
Age: 85
DRG: 871 | End: 2025-03-27
Payer: MEDICARE

## 2025-03-27 PROBLEM — R00.1 BRADYCARDIA: Status: ACTIVE | Noted: 2025-03-27

## 2025-03-27 LAB
BACTERIA URNS QL MICRO: ABNORMAL
BILIRUB UR QL STRIP: NEGATIVE
CLARITY UR: CLEAR
COLOR UR: YELLOW
ECHO AO ASC DIAM: 2.9 CM
ECHO AO ASCENDING AORTA INDEX: 1.61 CM/M2
ECHO AR MAX VEL PISA: 4.3 M/S
ECHO AV AREA PEAK VELOCITY: 2.1 CM2
ECHO AV AREA VTI: 1.8 CM2
ECHO AV AREA/BSA PEAK VELOCITY: 1.2 CM2/M2
ECHO AV AREA/BSA VTI: 1 CM2/M2
ECHO AV CUSP MM: 1.8 CM
ECHO AV MEAN GRADIENT: 3 MMHG
ECHO AV MEAN VELOCITY: 0.9 M/S
ECHO AV PEAK GRADIENT: 6 MMHG
ECHO AV PEAK VELOCITY: 1.2 M/S
ECHO AV REGURGITANT PHT: 592.8 MS
ECHO AV VELOCITY RATIO: 0.67
ECHO AV VTI: 32.1 CM
ECHO BSA: 1.82 M2
ECHO EST RA PRESSURE: 15 MMHG
ECHO LA DIAMETER INDEX: 2.5 CM/M2
ECHO LA DIAMETER: 4.5 CM
ECHO LA VOL A-L A2C: 53 ML (ref 22–52)
ECHO LA VOL A-L A4C: 72 ML (ref 22–52)
ECHO LA VOL BP: 66 ML (ref 22–52)
ECHO LA VOL MOD A2C: 53 ML (ref 22–52)
ECHO LA VOL MOD A4C: 65 ML (ref 22–52)
ECHO LA VOL/BSA BIPLANE: 37 ML/M2 (ref 16–34)
ECHO LA VOLUME AREA LENGTH: 70 ML
ECHO LA VOLUME INDEX A-L A2C: 29 ML/M2 (ref 16–34)
ECHO LA VOLUME INDEX A-L A4C: 40 ML/M2 (ref 16–34)
ECHO LA VOLUME INDEX AREA LENGTH: 39 ML/M2 (ref 16–34)
ECHO LA VOLUME INDEX MOD A2C: 29 ML/M2 (ref 16–34)
ECHO LA VOLUME INDEX MOD A4C: 36 ML/M2 (ref 16–34)
ECHO LV EDV A2C: 82 ML
ECHO LV EDV A4C: 79 ML
ECHO LV EDV BP: 81 ML (ref 56–104)
ECHO LV EDV INDEX A4C: 44 ML/M2
ECHO LV EDV INDEX BP: 45 ML/M2
ECHO LV EDV NDEX A2C: 46 ML/M2
ECHO LV EF PHYSICIAN: 55 %
ECHO LV EJECTION FRACTION A2C: 63 %
ECHO LV EJECTION FRACTION A4C: 50 %
ECHO LV EJECTION FRACTION BIPLANE: 56 % (ref 55–100)
ECHO LV ESV A2C: 30 ML
ECHO LV ESV A4C: 40 ML
ECHO LV ESV BP: 36 ML (ref 19–49)
ECHO LV ESV INDEX A2C: 17 ML/M2
ECHO LV ESV INDEX A4C: 22 ML/M2
ECHO LV ESV INDEX BP: 20 ML/M2
ECHO LV FRACTIONAL SHORTENING: 24 % (ref 28–44)
ECHO LV INTERNAL DIMENSION DIASTOLE INDEX: 2.33 CM/M2
ECHO LV INTERNAL DIMENSION DIASTOLIC: 4.2 CM (ref 3.9–5.3)
ECHO LV INTERNAL DIMENSION SYSTOLIC INDEX: 1.78 CM/M2
ECHO LV INTERNAL DIMENSION SYSTOLIC: 3.2 CM
ECHO LV ISOVOLUMETRIC RELAXATION TIME (IVRT): 114.2 MS
ECHO LV IVSD: 1.1 CM (ref 0.6–0.9)
ECHO LV MASS 2D: 178.2 G (ref 67–162)
ECHO LV MASS INDEX 2D: 99 G/M2 (ref 43–95)
ECHO LV POSTERIOR WALL DIASTOLIC: 1.3 CM (ref 0.6–0.9)
ECHO LV RELATIVE WALL THICKNESS RATIO: 0.62
ECHO LVOT AREA: 3.1 CM2
ECHO LVOT AV VTI INDEX: 0.57
ECHO LVOT DIAM: 2 CM
ECHO LVOT MEAN GRADIENT: 1 MMHG
ECHO LVOT PEAK GRADIENT: 3 MMHG
ECHO LVOT PEAK VELOCITY: 0.8 M/S
ECHO LVOT STROKE VOLUME INDEX: 31.7 ML/M2
ECHO LVOT SV: 57.1 ML
ECHO LVOT VTI: 18.2 CM
ECHO MV "A" WAVE DURATION: 140.8 MSEC
ECHO MV A VELOCITY: 0.65 M/S
ECHO MV AREA PHT: 2.7 CM2
ECHO MV AREA VTI: 1.8 CM2
ECHO MV E DECELERATION TIME (DT): 246 MS
ECHO MV E VELOCITY: 0.97 M/S
ECHO MV E/A RATIO: 1.49
ECHO MV EROA PISA: 0.1 CM2
ECHO MV LVOT VTI INDEX: 1.75
ECHO MV MAX VELOCITY: 1 M/S
ECHO MV MEAN GRADIENT: 1 MMHG
ECHO MV MEAN VELOCITY: 0.5 M/S
ECHO MV PEAK GRADIENT: 4 MMHG
ECHO MV PRESSURE HALF TIME (PHT): 82.7 MS
ECHO MV REGURGITANT ALIASING (NYQUIST) VELOCITY: 36 CM/S
ECHO MV REGURGITANT RADIUS PISA: 0.48 CM
ECHO MV REGURGITANT VELOCITY PISA: 5.5 M/S
ECHO MV REGURGITANT VOLUME PISA: 20.67 ML
ECHO MV REGURGITANT VTIA: 218.2 CM
ECHO MV VTI: 31.8 CM
ECHO PULMONARY ARTERY END DIASTOLIC PRESSURE: 4 MMHG
ECHO PV MAX VELOCITY: 0.7 M/S
ECHO PV MEAN GRADIENT: 1 MMHG
ECHO PV MEAN VELOCITY: 0.5 M/S
ECHO PV PEAK GRADIENT: 2 MMHG
ECHO PV REGURGITANT MAX VELOCITY: 1 M/S
ECHO PV VTI: 16.5 CM
ECHO PVEIN A DURATION: 125.6 MS
ECHO PVEIN A VELOCITY: 0.2 M/S
ECHO PVEIN PEAK D VELOCITY: 0.5 M/S
ECHO PVEIN PEAK S VELOCITY: 0.3 M/S
ECHO PVEIN S/D RATIO: 0.6
ECHO RIGHT VENTRICULAR SYSTOLIC PRESSURE (RVSP): 46 MMHG
ECHO RV INTERNAL DIMENSION: 3.4 CM
ECHO RV LONGITUDINAL DIMENSION: 6.1 CM
ECHO RV MID DIMENSION: 2.7 CM
ECHO TV REGURGITANT MAX VELOCITY: 2.79 M/S
ECHO TV REGURGITANT PEAK GRADIENT: 31 MMHG
EKG ATRIAL RATE: 57 BPM
EKG Q-T INTERVAL: 314 MS
EKG QRS DURATION: 88 MS
EKG QTC CALCULATION (BAZETT): 474 MS
EKG R AXIS: 43 DEGREES
EKG T AXIS: 34 DEGREES
EKG VENTRICULAR RATE: 137 BPM
EPI CELLS #/AREA URNS HPF: ABNORMAL /HPF
GLUCOSE BLD-MCNC: 103 MG/DL (ref 74–99)
GLUCOSE BLD-MCNC: 105 MG/DL (ref 74–99)
GLUCOSE BLD-MCNC: 156 MG/DL (ref 74–99)
GLUCOSE BLD-MCNC: 202 MG/DL (ref 74–99)
GLUCOSE BLD-MCNC: 313 MG/DL (ref 74–99)
GLUCOSE BLD-MCNC: 335 MG/DL
GLUCOSE BLD-MCNC: 335 MG/DL (ref 74–99)
GLUCOSE UR STRIP-MCNC: 250 MG/DL
HGB UR QL STRIP.AUTO: NEGATIVE
KETONES UR STRIP-MCNC: NEGATIVE MG/DL
LACTATE BLDV-SCNC: 1.3 MMOL/L (ref 0.5–2.2)
LACTATE BLDV-SCNC: 1.5 MMOL/L (ref 0.5–1.9)
LEUKOCYTE ESTERASE UR QL STRIP: NEGATIVE
NITRITE UR QL STRIP: POSITIVE
PH UR STRIP: 6 [PH] (ref 5–8)
PROCALCITONIN SERPL-MCNC: 0.06 NG/ML (ref 0–0.08)
PROT UR STRIP-MCNC: ABNORMAL MG/DL
RBC #/AREA URNS HPF: ABNORMAL /HPF
SP GR UR STRIP: 1.02 (ref 1–1.03)
TROPONIN I SERPL HS-MCNC: 120 NG/L (ref 0–9)
TROPONIN I SERPL HS-MCNC: 72 NG/L (ref 0–9)
TROPONIN I SERPL HS-MCNC: 78 NG/L (ref 0–9)
UROBILINOGEN UR STRIP-ACNC: 0.2 EU/DL (ref 0–1)
WBC #/AREA URNS HPF: ABNORMAL /HPF

## 2025-03-27 PROCEDURE — 6370000000 HC RX 637 (ALT 250 FOR IP): Performed by: EMERGENCY MEDICINE

## 2025-03-27 PROCEDURE — 84484 ASSAY OF TROPONIN QUANT: CPT

## 2025-03-27 PROCEDURE — 99291 CRITICAL CARE FIRST HOUR: CPT | Performed by: INTERNAL MEDICINE

## 2025-03-27 PROCEDURE — 6360000004 HC RX CONTRAST MEDICATION: Performed by: RADIOLOGY

## 2025-03-27 PROCEDURE — 2500000003 HC RX 250 WO HCPCS

## 2025-03-27 PROCEDURE — 2500000003 HC RX 250 WO HCPCS: Performed by: INTERNAL MEDICINE

## 2025-03-27 PROCEDURE — 2580000003 HC RX 258: Performed by: EMERGENCY MEDICINE

## 2025-03-27 PROCEDURE — 2000000000 HC ICU R&B

## 2025-03-27 PROCEDURE — 2580000003 HC RX 258: Performed by: INTERNAL MEDICINE

## 2025-03-27 PROCEDURE — 6360000002 HC RX W HCPCS

## 2025-03-27 PROCEDURE — 6360000002 HC RX W HCPCS: Performed by: INTERNAL MEDICINE

## 2025-03-27 PROCEDURE — 6370000000 HC RX 637 (ALT 250 FOR IP)

## 2025-03-27 PROCEDURE — 87086 URINE CULTURE/COLONY COUNT: CPT

## 2025-03-27 PROCEDURE — 87077 CULTURE AEROBIC IDENTIFY: CPT

## 2025-03-27 PROCEDURE — 87081 CULTURE SCREEN ONLY: CPT

## 2025-03-27 PROCEDURE — 83605 ASSAY OF LACTIC ACID: CPT

## 2025-03-27 PROCEDURE — 81001 URINALYSIS AUTO W/SCOPE: CPT

## 2025-03-27 PROCEDURE — 82962 GLUCOSE BLOOD TEST: CPT

## 2025-03-27 PROCEDURE — 6360000002 HC RX W HCPCS: Performed by: EMERGENCY MEDICINE

## 2025-03-27 PROCEDURE — 96374 THER/PROPH/DIAG INJ IV PUSH: CPT

## 2025-03-27 PROCEDURE — 92610 EVALUATE SWALLOWING FUNCTION: CPT | Performed by: SPEECH-LANGUAGE PATHOLOGIST

## 2025-03-27 PROCEDURE — 96375 TX/PRO/DX INJ NEW DRUG ADDON: CPT

## 2025-03-27 PROCEDURE — 71275 CT ANGIOGRAPHY CHEST: CPT

## 2025-03-27 PROCEDURE — 93306 TTE W/DOPPLER COMPLETE: CPT

## 2025-03-27 PROCEDURE — 2500000003 HC RX 250 WO HCPCS: Performed by: EMERGENCY MEDICINE

## 2025-03-27 PROCEDURE — 93010 ELECTROCARDIOGRAM REPORT: CPT | Performed by: INTERNAL MEDICINE

## 2025-03-27 RX ORDER — LORAZEPAM 2 MG/ML
0.5 INJECTION INTRAMUSCULAR ONCE
Status: COMPLETED | OUTPATIENT
Start: 2025-03-27 | End: 2025-03-27

## 2025-03-27 RX ORDER — TRAZODONE HYDROCHLORIDE 50 MG/1
50 TABLET ORAL NIGHTLY
Status: DISCONTINUED | OUTPATIENT
Start: 2025-03-27 | End: 2025-04-01 | Stop reason: HOSPADM

## 2025-03-27 RX ORDER — HYDRALAZINE HYDROCHLORIDE 25 MG/1
25 TABLET, FILM COATED ORAL EVERY 8 HOURS SCHEDULED
Status: DISCONTINUED | OUTPATIENT
Start: 2025-03-27 | End: 2025-03-29

## 2025-03-27 RX ORDER — 0.9 % SODIUM CHLORIDE 0.9 %
1000 INTRAVENOUS SOLUTION INTRAVENOUS ONCE
Status: COMPLETED | OUTPATIENT
Start: 2025-03-27 | End: 2025-03-27

## 2025-03-27 RX ORDER — GLUCAGON 1 MG/ML
1 KIT INJECTION PRN
Status: DISCONTINUED | OUTPATIENT
Start: 2025-03-27 | End: 2025-04-01 | Stop reason: HOSPADM

## 2025-03-27 RX ORDER — GABAPENTIN 300 MG/1
300 CAPSULE ORAL 2 TIMES DAILY
Status: DISCONTINUED | OUTPATIENT
Start: 2025-03-27 | End: 2025-04-01 | Stop reason: HOSPADM

## 2025-03-27 RX ORDER — BUPROPION HYDROCHLORIDE 150 MG/1
150 TABLET ORAL EVERY MORNING
Status: DISCONTINUED | OUTPATIENT
Start: 2025-03-27 | End: 2025-04-01 | Stop reason: HOSPADM

## 2025-03-27 RX ORDER — DOPAMINE HYDROCHLORIDE 320 MG/100ML
1-20 INJECTION, SOLUTION INTRAVENOUS CONTINUOUS
Status: DISCONTINUED | OUTPATIENT
Start: 2025-03-27 | End: 2025-03-27

## 2025-03-27 RX ORDER — SODIUM CHLORIDE 9 MG/ML
INJECTION, SOLUTION INTRAVENOUS CONTINUOUS
Status: DISCONTINUED | OUTPATIENT
Start: 2025-03-27 | End: 2025-03-27

## 2025-03-27 RX ORDER — DEXMEDETOMIDINE HYDROCHLORIDE 4 UG/ML
.1-1.5 INJECTION, SOLUTION INTRAVENOUS CONTINUOUS
Status: DISCONTINUED | OUTPATIENT
Start: 2025-03-27 | End: 2025-03-28

## 2025-03-27 RX ORDER — OLANZAPINE 5 MG/1
5 TABLET ORAL NIGHTLY
Status: DISCONTINUED | OUTPATIENT
Start: 2025-03-27 | End: 2025-04-01 | Stop reason: HOSPADM

## 2025-03-27 RX ORDER — SODIUM CHLORIDE 9 MG/ML
INJECTION, SOLUTION INTRAVENOUS CONTINUOUS
Status: DISCONTINUED | OUTPATIENT
Start: 2025-03-27 | End: 2025-03-31

## 2025-03-27 RX ORDER — ATROPINE SULFATE 0.1 MG/ML
0.5 INJECTION INTRAVENOUS ONCE
Status: COMPLETED | OUTPATIENT
Start: 2025-03-27 | End: 2025-03-27

## 2025-03-27 RX ORDER — LEVOTHYROXINE SODIUM 75 UG/1
75 TABLET ORAL DAILY
Status: DISCONTINUED | OUTPATIENT
Start: 2025-03-27 | End: 2025-04-01 | Stop reason: HOSPADM

## 2025-03-27 RX ORDER — PANTOPRAZOLE SODIUM 40 MG/1
40 TABLET, DELAYED RELEASE ORAL
Status: DISCONTINUED | OUTPATIENT
Start: 2025-03-27 | End: 2025-04-01 | Stop reason: HOSPADM

## 2025-03-27 RX ORDER — LOSARTAN POTASSIUM 50 MG/1
100 TABLET ORAL DAILY
Status: DISCONTINUED | OUTPATIENT
Start: 2025-03-27 | End: 2025-03-28

## 2025-03-27 RX ORDER — DOPAMINE HYDROCHLORIDE 320 MG/100ML
1-20 INJECTION, SOLUTION INTRAVENOUS CONTINUOUS
Status: DISCONTINUED | OUTPATIENT
Start: 2025-03-27 | End: 2025-03-28

## 2025-03-27 RX ORDER — DILTIAZEM HYDROCHLORIDE 5 MG/ML
10 INJECTION INTRAVENOUS ONCE
Status: COMPLETED | OUTPATIENT
Start: 2025-03-27 | End: 2025-03-27

## 2025-03-27 RX ORDER — INSULIN LISPRO 100 [IU]/ML
0-8 INJECTION, SOLUTION INTRAVENOUS; SUBCUTANEOUS
Status: DISCONTINUED | OUTPATIENT
Start: 2025-03-27 | End: 2025-04-01 | Stop reason: HOSPADM

## 2025-03-27 RX ORDER — IOPAMIDOL 755 MG/ML
75 INJECTION, SOLUTION INTRAVASCULAR
Status: COMPLETED | OUTPATIENT
Start: 2025-03-27 | End: 2025-03-27

## 2025-03-27 RX ORDER — ENOXAPARIN SODIUM 100 MG/ML
30 INJECTION SUBCUTANEOUS DAILY
Status: DISCONTINUED | OUTPATIENT
Start: 2025-03-27 | End: 2025-03-28

## 2025-03-27 RX ORDER — DULOXETIN HYDROCHLORIDE 30 MG/1
30 CAPSULE, DELAYED RELEASE ORAL DAILY
Status: DISCONTINUED | OUTPATIENT
Start: 2025-03-27 | End: 2025-03-27

## 2025-03-27 RX ORDER — DEXTROSE MONOHYDRATE 100 MG/ML
INJECTION, SOLUTION INTRAVENOUS CONTINUOUS PRN
Status: DISCONTINUED | OUTPATIENT
Start: 2025-03-27 | End: 2025-04-01 | Stop reason: HOSPADM

## 2025-03-27 RX ORDER — GABAPENTIN 300 MG/1
300 CAPSULE ORAL 4 TIMES DAILY
Status: DISCONTINUED | OUTPATIENT
Start: 2025-03-27 | End: 2025-03-27 | Stop reason: DRUGHIGH

## 2025-03-27 RX ORDER — BUPROPION HYDROCHLORIDE 150 MG/1
300 TABLET ORAL EVERY MORNING
Status: DISCONTINUED | OUTPATIENT
Start: 2025-03-27 | End: 2025-03-27

## 2025-03-27 RX ORDER — ASPIRIN 81 MG/1
324 TABLET, CHEWABLE ORAL ONCE
Status: COMPLETED | OUTPATIENT
Start: 2025-03-27 | End: 2025-03-27

## 2025-03-27 RX ORDER — METOPROLOL TARTRATE 1 MG/ML
5 INJECTION, SOLUTION INTRAVENOUS ONCE
Status: COMPLETED | OUTPATIENT
Start: 2025-03-27 | End: 2025-03-27

## 2025-03-27 RX ORDER — METOPROLOL SUCCINATE 25 MG/1
25 TABLET, EXTENDED RELEASE ORAL DAILY
Status: DISCONTINUED | OUTPATIENT
Start: 2025-03-27 | End: 2025-03-28

## 2025-03-27 RX ORDER — INSULIN GLARGINE 100 [IU]/ML
20 INJECTION, SOLUTION SUBCUTANEOUS NIGHTLY
Status: DISCONTINUED | OUTPATIENT
Start: 2025-03-27 | End: 2025-04-01 | Stop reason: HOSPADM

## 2025-03-27 RX ADMIN — TRAZODONE HYDROCHLORIDE 50 MG: 50 TABLET ORAL at 21:24

## 2025-03-27 RX ADMIN — INSULIN LISPRO 6 UNITS: 100 INJECTION, SOLUTION INTRAVENOUS; SUBCUTANEOUS at 08:46

## 2025-03-27 RX ADMIN — ATROPINE SULFATE INJECTION 0.5 MG: 0.1 INJECTION, SOLUTION INTRAVENOUS at 04:09

## 2025-03-27 RX ADMIN — GABAPENTIN 300 MG: 300 CAPSULE ORAL at 13:15

## 2025-03-27 RX ADMIN — OLANZAPINE 5 MG: 5 TABLET, FILM COATED ORAL at 21:23

## 2025-03-27 RX ADMIN — SODIUM CHLORIDE: 0.9 INJECTION, SOLUTION INTRAVENOUS at 13:08

## 2025-03-27 RX ADMIN — LEVOTHYROXINE SODIUM 75 MCG: 0.07 TABLET ORAL at 08:42

## 2025-03-27 RX ADMIN — DILTIAZEM HYDROCHLORIDE 5 MG/HR: 5 INJECTION, SOLUTION INTRAVENOUS at 02:03

## 2025-03-27 RX ADMIN — DEXMEDETOMIDINE HYDROCHLORIDE 0.2 MCG/KG/HR: 100 INJECTION, SOLUTION INTRAVENOUS at 08:59

## 2025-03-27 RX ADMIN — ENOXAPARIN SODIUM 30 MG: 100 INJECTION SUBCUTANEOUS at 08:38

## 2025-03-27 RX ADMIN — ASPIRIN 324 MG: 81 TABLET, CHEWABLE ORAL at 01:13

## 2025-03-27 RX ADMIN — SODIUM CHLORIDE 3000 MG: 9 INJECTION, SOLUTION INTRAVENOUS at 13:11

## 2025-03-27 RX ADMIN — DILTIAZEM HYDROCHLORIDE 10 MG: 5 INJECTION, SOLUTION INTRAVENOUS at 02:02

## 2025-03-27 RX ADMIN — SODIUM CHLORIDE 1000 ML: 0.9 INJECTION, SOLUTION INTRAVENOUS at 02:47

## 2025-03-27 RX ADMIN — METOPROLOL TARTRATE 5 MG: 5 INJECTION INTRAVENOUS at 01:14

## 2025-03-27 RX ADMIN — WATER 1000 MG: 1 INJECTION INTRAMUSCULAR; INTRAVENOUS; SUBCUTANEOUS at 08:38

## 2025-03-27 RX ADMIN — PANTOPRAZOLE SODIUM 40 MG: 40 TABLET, DELAYED RELEASE ORAL at 08:42

## 2025-03-27 RX ADMIN — IOPAMIDOL 75 ML: 755 INJECTION, SOLUTION INTRAVENOUS at 00:30

## 2025-03-27 RX ADMIN — GABAPENTIN 300 MG: 300 CAPSULE ORAL at 21:23

## 2025-03-27 RX ADMIN — LORAZEPAM 0.5 MG: 2 INJECTION INTRAMUSCULAR; INTRAVENOUS at 06:38

## 2025-03-27 RX ADMIN — DOPAMINE HYDROCHLORIDE IN DEXTROSE 2.5 MCG/KG/MIN: 3.2 INJECTION, SOLUTION INTRAVENOUS at 16:23

## 2025-03-27 RX ADMIN — BUPROPION HYDROCHLORIDE 150 MG: 150 TABLET, EXTENDED RELEASE ORAL at 13:14

## 2025-03-27 RX ADMIN — LORAZEPAM 0.5 MG: 2 INJECTION INTRAMUSCULAR; INTRAVENOUS at 04:52

## 2025-03-27 RX ADMIN — FLUOXETINE HYDROCHLORIDE 40 MG: 20 CAPSULE ORAL at 21:23

## 2025-03-27 RX ADMIN — DOPAMINE HYDROCHLORIDE IN DEXTROSE 2.5 MCG/KG/MIN: 3.2 INJECTION, SOLUTION INTRAVENOUS at 04:27

## 2025-03-27 ASSESSMENT — PAIN SCALES - GENERAL: PAINLEVEL_OUTOF10: 5

## 2025-03-27 ASSESSMENT — PAIN DESCRIPTION - PAIN TYPE: TYPE: ACUTE PAIN

## 2025-03-27 ASSESSMENT — PAIN DESCRIPTION - LOCATION: LOCATION: ABDOMEN

## 2025-03-27 ASSESSMENT — PAIN DESCRIPTION - FREQUENCY: FREQUENCY: CONTINUOUS

## 2025-03-27 ASSESSMENT — PAIN - FUNCTIONAL ASSESSMENT: PAIN_FUNCTIONAL_ASSESSMENT: ACTIVITIES ARE NOT PREVENTED

## 2025-03-27 ASSESSMENT — PAIN DESCRIPTION - DESCRIPTORS: DESCRIPTORS: PATIENT UNABLE TO DESCRIBE

## 2025-03-27 ASSESSMENT — PAIN DESCRIPTION - ORIENTATION: ORIENTATION: MID;UPPER

## 2025-03-27 NOTE — ED NOTES
Pt staring off in space, not responding verbally, bradycardic. Dr at bedside. 1 mg of atropine given by this RN through R AC IV.

## 2025-03-27 NOTE — CONSULTS
Today's Date: 3/27/2025  Patient Name: Kylee Wang  Date of admission: 3/26/2025  9:12 PM  Patient's age: 84 y.o., 1940female    Admission Dx: Bradycardia [R00.1]  Symptomatic bradycardia [R00.1]  Chest pain, unspecified type [R07.9]    Requesting Physician: Cuate Christianson DO    Chief Complaint   Patient presents with    Chest Pain     Patient woke up with 8/10 chest and jaw pain this morning. Patient also complains of dizziness and weakness.       HISTORY OF PRESENT ILLNESS:      84-year-old patient that has past history as below, presented to the hospital secondary to dizziness and palpitations.  She also developed symptoms of chest pain in the morning when she woke up.  It radiated to her jaw then resolved spontaneously.  Her EKG showed SVT with heart rate of 137 bpm  She was given IV Cardizem bolus and developed sudden severe bradycardia with junctional escape rhythm around 40.  This lasted about half hour then it spontaneously resolved.  She did not have any ischemic EKG changes during tachycardia.  Her initial troponin was 60 and subsequent troponin was up to 120  She was given IV Ativan for sedation and now she is delirious.    Review of systems: Cannot be obtained since patient is delirious now    Past Medical History:   has a past medical history of Depression, Diabetes mellitus (HCC), Hyperlipidemia, Hypertension, Thyroid disease, and Type 2 diabetes mellitus without complication (HCC).    Past Surgical History:   has a past surgical history that includes Cholecystectomy; shoulder surgery (rt rotator); Colonoscopy; Hysterectomy; eye surgery; Upper gastrointestinal endoscopy (5/15//12); joint replacement (Left); Pain management procedure (Bilateral, 8/11/2022); Pain management procedure (N/A, 10/6/2022); and Pain management procedure (Bilateral, 12/12/2022).     Social History:   reports that she has never smoked. She has never used smokeless tobacco. She reports that she does not drink alcohol and  does not use drugs.     Family History:  .    family history includes Cancer in her father and mother; Coronary Art Dis in her brother; Stroke in her mother..     Allergies:  Patient has no known allergies.    MEDICATIONS:   pantoprazole  40 mg Oral QAM AC    enoxaparin  30 mg SubCUTAneous Daily    insulin lispro  0-8 Units SubCUTAneous 4x Daily AC & HS    levothyroxine  75 mcg Oral Daily    [Held by provider] metoprolol succinate  25 mg Oral Daily    FLUoxetine  40 mg Oral Nightly    [Held by provider] hydrALAZINE  25 mg Oral 3 times per day    traZODone  50 mg Oral Nightly    [Held by provider] losartan  100 mg Oral Daily    OLANZapine  5 mg Oral Nightly    [Held by provider] insulin glargine  20 Units SubCUTAneous Nightly    buPROPion  150 mg Oral QAM    gabapentin  300 mg Oral BID    ampicillin-sulbactam  3,000 mg IntraVENous Q12H       OUTPT MEDS  Current Discharge Medication List        CONTINUE these medications which have NOT CHANGED    Details   levothyroxine (SYNTHROID) 75 MCG tablet Take 1 tablet by mouth Daily  Qty: 90 tablet, Refills: 0    Associated Diagnoses: Autoimmune hypothyroidism      FLUoxetine (PROZAC) 40 MG capsule Take 1 capsule by mouth nightly  Qty: 90 capsule, Refills: 0    Associated Diagnoses: Other depression      gabapentin (NEURONTIN) 300 MG capsule Take 1 capsule by mouth 4 times daily for 90 days. Intended supply: 90 days  Qty: 120 capsule, Refills: 2    Associated Diagnoses: Chronic low back pain with sciatica, sciatica laterality unspecified, unspecified back pain laterality      losartan (COZAAR) 100 MG tablet Take 1 tablet by mouth daily  Qty: 90 tablet, Refills: 0    Associated Diagnoses: Primary hypertension      meclizine (ANTIVERT) 12.5 MG tablet Take 1 tablet by mouth in the morning and at bedtime  Qty: 180 tablet, Refills: 0      metoprolol succinate (TOPROL XL) 25 MG extended release tablet Take 1 tablet by mouth daily  Qty: 30 tablet, Refills: 3    Associated Diagnoses:

## 2025-03-27 NOTE — PLAN OF CARE
Problem: Pain  Goal: Verbalizes/displays adequate comfort level or baseline comfort level  Outcome: Progressing     Problem: Chronic Conditions and Co-morbidities  Goal: Patient's chronic conditions and co-morbidity symptoms are monitored and maintained or improved  Outcome: Progressing  Flowsheets (Taken 3/27/2025 0815)  Care Plan - Patient's Chronic Conditions and Co-Morbidity Symptoms are Monitored and Maintained or Improved:   Collaborate with multidisciplinary team to address chronic and comorbid conditions and prevent exacerbation or deterioration   Monitor and assess patient's chronic conditions and comorbid symptoms for stability, deterioration, or improvement     Problem: Discharge Planning  Goal: Discharge to home or other facility with appropriate resources  Outcome: Progressing  Flowsheets (Taken 3/27/2025 0815)  Discharge to home or other facility with appropriate resources:   Identify barriers to discharge with patient and caregiver   Arrange for needed discharge resources and transportation as appropriate   Identify discharge learning needs (meds, wound care, etc)     Problem: Safety - Adult  Goal: Free from fall injury  Outcome: Progressing     Problem: Skin/Tissue Integrity  Goal: Skin integrity remains intact  Description: 1.  Monitor for areas of redness and/or skin breakdown  2.  Assess vascular access sites hourly  3.  Every 4-6 hours minimum:  Change oxygen saturation probe site  4.  Every 4-6 hours:  If on nasal continuous positive airway pressure, respiratory therapy assess nares and determine need for appliance change or resting period  Outcome: Progressing  Flowsheets (Taken 3/27/2025 0815)  Skin Integrity Remains Intact:   Monitor for areas of redness and/or skin breakdown   Every 4-6 hours minimum: Change oxygen saturation probe site     Problem: Neurosensory - Adult  Goal: Achieves stable or improved neurological status  Outcome: Progressing  Flowsheets (Taken 3/27/2025 0815)  Achieves  stable or improved neurological status:   Assess for and report changes in neurological status   Initiate measures to prevent increased intracranial pressure   Maintain blood pressure and fluid volume within ordered parameters to optimize cerebral perfusion and minimize risk of hemorrhage   Monitor temperature, glucose, and sodium. Initiate appropriate interventions as ordered  Goal: Remains free of injury related to seizures activity  Outcome: Progressing  Flowsheets (Taken 3/27/2025 0815)  Remains free of injury related to seizure activity:   Maintain airway, patient safety  and administer oxygen as ordered   Monitor patient for seizure activity, document and report duration and description of seizure to Licensed Independent Practitioner  Goal: Achieves maximal functionality and self care  Outcome: Progressing  Flowsheets (Taken 3/27/2025 0815)  Achieves maximal functionality and self care:   Monitor swallowing and airway patency with patient fatigue and changes in neurological status   Encourage and assist patient to increase activity and self care with guidance from physical therapy/occupational therapy     Problem: Respiratory - Adult  Goal: Achieves optimal ventilation and oxygenation  Outcome: Progressing  Flowsheets (Taken 3/27/2025 0815)  Achieves optimal ventilation and oxygenation:   Assess for changes in respiratory status   Assess for changes in mentation and behavior   Position to facilitate oxygenation and minimize respiratory effort   Assess the need for suctioning and aspirate as needed     Problem: Cardiovascular - Adult  Goal: Maintains optimal cardiac output and hemodynamic stability  Outcome: Progressing  Flowsheets (Taken 3/27/2025 0815)  Maintains optimal cardiac output and hemodynamic stability:   Monitor blood pressure and heart rate   Monitor urine output and notify Licensed Independent Practitioner for values outside of normal range   Assess for signs of decreased cardiac output

## 2025-03-27 NOTE — ED PROVIDER NOTES
Patient is an 85 y/o female who presents to the ED via EMS with dizziness. Patient states she has not felt good for the past three months. She states that she is dizzy and off balance. This worsens when she stands up and when she walks. She states that she had substernal chest pain when she woke up this morning. It radiated into her jaw. She states that it resolved spontaneously. She denies any current chest pain. She denies any shortness of breath. She denies any fever, nausea, vomiting, diarrhea or dysuria. She does have a frontal headache.         Review of Systems   Constitutional:  Negative for chills and fever.   HENT:  Negative for ear pain, sinus pressure and sore throat.    Eyes:  Negative for pain, discharge and redness.   Respiratory:  Negative for cough, shortness of breath and wheezing.    Cardiovascular:  Positive for chest pain.   Gastrointestinal:  Negative for abdominal distention, diarrhea, nausea and vomiting.   Genitourinary:  Negative for dysuria and frequency.   Musculoskeletal:  Negative for arthralgias and back pain.   Skin:  Negative for rash and wound.   Neurological:  Positive for dizziness and headaches. Negative for weakness.   Hematological:  Negative for adenopathy.   All other systems reviewed and are negative.       Physical Exam  Vitals and nursing note reviewed.   Constitutional:       General: She is not in acute distress.  HENT:      Head: Normocephalic and atraumatic.      Right Ear: External ear normal.      Left Ear: External ear normal.      Nose: Nose normal.      Mouth/Throat:      Mouth: Mucous membranes are moist.   Eyes:      Conjunctiva/sclera: Conjunctivae normal.      Pupils: Pupils are equal, round, and reactive to light.   Neck:      Comments: No meningeal signs  Cardiovascular:      Rate and Rhythm: Regular rhythm. Tachycardia present.      Heart sounds: No murmur heard.  Pulmonary:      Effort: Pulmonary effort is normal. No respiratory distress.      Breath    03/27/25 0614 122/68 -- -- 89 21 -- -- --   03/27/25 0540 100/67 -- -- 94 (!) 38 -- -- --   03/27/25 0514 105/64 -- -- 67 15 -- -- --   03/27/25 0503 (!) 100/58 -- -- 59 15 -- -- --   03/27/25 0457 (!) 98/44 -- -- 56 19 -- -- --   03/27/25 0444 (!) 134/94 -- -- 54 24 95 % -- --   03/27/25 0417 -- -- -- (!) 47 27 100 % -- --   03/27/25 0414 (!) 88/55 -- -- (!) 43 21 98 % -- --   03/27/25 0403 (!) 96/41 -- -- (!) 44 25 99 % -- --   03/27/25 0402 -- -- -- (!) 44 20 99 % -- --   03/27/25 0401 -- -- -- 51 20 99 % -- --   03/27/25 0400 -- -- -- (!) 44 21 98 % -- --   03/27/25 0359 (!) 82/41 -- -- (!) 44 13 99 % -- --   03/27/25 0350 -- -- -- 54 18 100 % -- --   03/27/25 0349 -- -- -- (!) 45 18 100 % -- --   03/27/25 0348 (!) 89/53 -- -- (!) 45 21 98 % -- --   03/27/25 0347 -- -- -- (!) 45 20 98 % -- --   03/27/25 0346 (!) 94/53 -- -- (!) 46 18 95 % -- --   03/27/25 0338 (!) 75/47 -- -- (!) 44 22 -- -- --   03/27/25 0319 (!) 85/51 -- -- (!) 46 (!) 32 98 % -- --   03/27/25 0313 (!) 90/51 -- -- (!) 48 18 94 % -- --   03/27/25 0311 -- -- -- (!) 47 27 93 % -- --   03/27/25 0310 -- -- -- (!) 48 23 94 % -- --   03/27/25 0308 (!) 87/52 -- -- (!) 47 24 97 % -- --   03/27/25 0303 (!) 99/52 -- -- (!) 47 26 96 % -- --   03/27/25 0302 -- -- -- (!) 47 28 97 % -- --   03/27/25 0259 -- -- -- (!) 47 24 95 % -- --   03/27/25 0258 92/60 -- -- (!) 47 25 96 % -- --   03/27/25 0255 (!) 86/54 -- -- (!) 48 18 96 % -- --   03/27/25 0253 (!) 84/59 -- -- (!) 48 19 96 % -- --   03/27/25 0249 (!) 106/50 -- -- (!) 47 25 95 % -- --   03/27/25 0245 -- -- -- (!) 47 19 91 % -- --   03/27/25 0243 -- -- -- (!) 47 22 92 % -- --   03/27/25 0242 -- -- -- (!) 47 26 95 % -- --   03/27/25 0241 (!) 95/50 -- -- (!) 49 28 -- -- --   03/27/25 0240 -- -- -- 54 29 -- -- --   03/27/25 0239 -- -- -- 57 29 -- -- --   03/27/25 0238 -- -- -- 59 15 -- -- --   03/27/25 0237 -- -- -- (!) 38 15 -- -- --   03/27/25 0236 -- -- -- (!) 33 14 -- -- --   03/27/25 0226 -- -- -- (!)

## 2025-03-27 NOTE — PROGRESS NOTES
Spiritual Health History and Assessment/Progress Note  [facility]    Spiritual/Emotional Needs,  ,  ,      Name: Kylee Wang MRN: [id number]    Age: 84 y.o.     Sex: female   Language: English   [employer]: Hindu   Bradycardia     Date: 3/27/2025                           Spiritual Assessment began in [facility] 2 ICU        Referral/Consult From: (P) Rounding   Encounter Overview/Reason: Spiritual/Emotional Needs  Service Provided For: (P) Family, Patient    Yamilet, Belief, Meaning:   Patient has beliefs or practices that help with coping during difficult times and Other: Patient information provided by family.  Family/Friends have beliefs or practices that help with coping during difficult times      Importance and Influence:  Patient has spiritual/personal beliefs that influence decisions regarding their health  Family/Friends have spiritual/personal beliefs that influence decisions regarding the patient's health    Community:  Patient feels well-supported. Support system includes: Children  Family/Friends feel well-supported. Support system includes: Children    Assessment and Plan of Care:       Family/Friends Interventions include: Facilitated expression of thoughts and feelings, Explored spiritual coping/struggle/distress, Affirmed coping skills/support systems, Facilitated life review and/or legacy, and Other:  offered a listening presence, nurtured hope and prayer for patient and extended family.    Patient Plan of Care: Spiritual Care available upon further referral  Family/Friends Plan of Care: Spiritual Care available upon further referral    Electronically signed by Chaplain Roberta on 3/27/2025 at 3:01 PM

## 2025-03-27 NOTE — PROGRESS NOTES
SPEECH/LANGUAGE PATHOLOGY  CLINICAL ASSESSMENT OF SWALLOWING FUNCTION   and PLAN OF CARE      PATIENT NAME:  Kylee Wang  (female)     MRN:  [id number]    :  1940  (84 y.o.)  STATUS:  Inpatient: Room [id number]    TODAY'S DATE:  3/27/2025  ORDER DATE, DESCRIPTION AND REFERRING PROVIDER:     SLP eval and treat  Start:  25 1300,   End:  25 1300,   ONE TIME,   Standing Count:  1 Occurrences,   Sharlene Galvan MD  REASON FOR REFERRAL: dysphagia    EVALUATING THERAPIST: NICOLASA Trotter                 RESULTS:    DYSPHAGIA DIAGNOSIS:   Clinical indicators of mild  oropharyngeal phase dysphagia       DIET RECOMMENDATIONS:  Minced and moist consistency solids (IDDSI level 5) with  thin liquids (IDDSI level 0)--did better with a straw     FEEDING RECOMMENDATIONS:     Assistance level:  Supervision is needed during all oral intake  Encourage self-feeding as function allows  Verbal cueing for implementation of safe swallow strategies       Compensatory strategies recommended: Fully alert for all PO, Thorough oral care to prevent colonization of oral bacteria, Upright in bed/ chair as tolerated  Encourage oral clearing of bolus before next bite/sip is taken  Slow rate of intake   SINGLE straw sips   SMALL bites      Discussed recommendations with:  Patient  and patient nurse in person    SPEECH THERAPY  PLAN OF CARE   The dysphagia POC is established based on physician order, dysphagia diagnosis and results of clinical assessment     Skilled SLP intervention for dysphagia management on acute care up to 5 x per week until goals met, pt plateaus in function and/or discharged from hospital    Conditions Requiring Skilled Therapeutic Intervention for dysphagia:    Patient is performing below functional baseline d/t  current acute condition, respiratory compromise, multiple medications, and/or increased dependency upon caregivers.  Impaired initiation of the swallow needed verbal

## 2025-03-27 NOTE — ACP (ADVANCE CARE PLANNING)
Advance Care Planning   Healthcare Decision Maker:    Primary Decision Maker: Vickie Ham - Child - [phone]      Today we documented Decision Maker(s) consistent with Legal Next of Kin hierarchy.       Electronically signed by Jennifer Nicole RN-BC on 3/27/2025 at 10:08 AM

## 2025-03-27 NOTE — PROGRESS NOTES
0830 The patient arrived from the ED with nursing and a sitter at the bedside while on the cardiac monitor. The patient was having spasms in all extremities. She was able to state her name but otherwise was confused. She was complaining of pain in her head while indicating with her hands that her pain was in her upper abdomen. She was not able to use a straw but was able to swallow water and a pill with assistance using a spoon. Most of her speech was garbled and incomprehensible, she was not able to articulate most of her thoughts. She was continuously attempting to get out of bed and having to be redirected verbally and reassured.   1000 The patient's daughter Vickie at the bedside. The patient is resting comfortably. Vickie verbalized that the patient lives at home with the patient. She also verbalized that the current confusion the patient is having is new and has worsened since the patient's arrival to the Emergency room.

## 2025-03-27 NOTE — CONSULTS
Assessment and Plan  Patient is a 84 y.o. 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  Patient is not septic and therefore we will hold off linezolid at the meantime.  Speech therapy evaluation.  Diet as tolerated  Decision for full anticoagulation will be left to the discretion of the cardiology team.  Patient is currently on Lovenox 30 mg subcutaneously daily for DVT prophylaxis.  Patient is on Protonix for GI prophylaxis.  Precedex for anxiety and agitation.  Dopamine for bradycardia.  Echocardiography  Insulin sliding scale    History of Present Illness:   Patient is a 84-year-old woman with above-mentioned medical problem who was brought to the emergency room on March 26, 2025 due to weakness, dizziness, and intermittent confusion.  Patient has been describing nonspecific chest pain.  She was initially in A-fib with RVR and was given 1 dose of Cardizem that led her heart rate to drop down to 33.  Patient received atropine twice and was started on dopamine.  She was admitted to the ICU for management of junctional bradycardia as well as hypotension.  Patient is extremely confused and unable to provide history.  UA was mildly positive.  Patient was started initially on ceftriaxone but switched to Unasyn while awaiting for cultures and sensitivity.    Past Medical History:  Past Medical History:   Diagnosis Date    Depression     Diabetes mellitus (HCC)     Hyperlipidemia     Hypertension     Thyroid disease     Type 2 diabetes mellitus without complication (HCC)       Past Surgical History:   Procedure Laterality Date    CHOLECYSTECTOMY      COLONOSCOPY      EYE SURGERY      cataract both eyes    HYSTERECTOMY (CERVIX STATUS

## 2025-03-27 NOTE — PROGRESS NOTES
4 Eyes Skin Assessment     NAME:  Kylee Wang  YOB: 1940  MEDICAL RECORD NUMBER:  [id number]    The patient is being assessed for  Admission    I agree that at least one RN has performed a thorough Head to Toe Skin Assessment on the patient. ALL assessment sites listed below have been assessed.      Areas assessed by both nurses:    Head, Face, Ears, Shoulders, Back, Chest, Arms, Elbows, Hands, Sacrum. Buttock, Coccyx, Ischium, Legs. Feet and Heels, and Under Medical Devices         Does the Patient have a Wound? No noted wound(s)       Efrain Prevention initiated by RN: Yes  Wound Care Orders initiated by RN: No    Pressure Injury (Stage 3,4, Unstageable, DTI, NWPT, and Complex wounds) if present, place Wound referral order by RN under : No    New Ostomies, if present place, Ostomy referral order under : No     Nurse 1 eSignature: Electronically signed by Gaby Floyd RN on 3/27/25 at 12:14 PM EDT    **SHARE this note so that the co-signing nurse can place an eSignature**    Nurse 2 eSignature: Electronically signed by Mira Rod RN on 3/27/25 at 2:49 PM EDT

## 2025-03-27 NOTE — CARE COORDINATION
Case Management Assessment  Initial Evaluation    Date/Time of Evaluation: 3/27/2025 1:03 PM  Assessment Completed by: Jennifer Nicole RN    If patient is discharged prior to next notation, then this note serves as note for discharge by case management.    Patient Name: Kylee Wang                   YOB: 1940  Diagnosis: Bradycardia [R00.1]  Symptomatic bradycardia [R00.1]  Chest pain, unspecified type [R07.9]                   Date / Time: 3/26/2025  9:12 PM    Patient Admission Status: Inpatient   Readmission Risk (Low < 19, Mod (19-27), High > 27): Readmission Risk Score: 16.9    Current PCP: Roxie Maldonado MD  PCP verified by CM?      Chart Reviewed: Yes      History Provided by: Medical Record, Child/Family (daughter Vickie- Cm left a message- she was here earlier)  Patient Orientation: Other (see comment) (precedex gtt, confused- sitter at bedside)    Patient Cognition: Alert    Hospitalization in the last 30 days (Readmission):  No    If yes, Readmission Assessment in  Navigator will be completed.    Advance Directives:      Code Status: Full Code   Patient's Primary Decision Maker is: Legal Next of Kin    Primary Decision Maker: Vickie Ham - Child - [phone]    Discharge Planning:    Patient lives with: Children Type of Home: House  Primary Care Giver: Other (Comment) (has been to SNF in the past and lives with daughter)  Patient Support Systems include: Children      Current services prior to admission: None            Current DME:              Type of Home Care services:  None    ADLS  Prior functional level: Assistance with the following:, Other (see comment) (unclear)  Current functional level: Assistance with the following:, Other (see comment) (unclear will speak to pts daughter)      Family can provide assistance at DC: Yes  Would you like Case Management to discuss the discharge plan with any other family members/significant others, and if so, who? Yes (daughter Vickie)  Plans

## 2025-03-27 NOTE — PROGRESS NOTES
Pharmacy Note - Renal dose adjustment made per P/T protocol      Original order:  Gabapentin 300mg PO QID    Estimated Creatinine Clearance: 27 mL/min (A) (based on SCr of 1.5 mg/dL (H)).    Recent Labs     03/26/25 2133   BUN 32*   CREATININE 1.5*       Renally adjusted order:  Gabapentin 300mg PO BID      Please call pharmacy with any questions.    Thank you,  Francisca Fritz [facility]  3/27/2025 9:07 AM

## 2025-03-27 NOTE — H&P
[place] [id number]                           HISTORY & PHYSICAL      PATIENT NAME: SACHIN LIMON                : 1940  MED REC NO: [id number]                        ROOM: ROACYC72  ACCOUNT NO: [id number]                       ADMIT DATE: 2025  PROVIDER: Cuate Christianson DO      CHIEF COMPLAINT AND HISTORY OF CHIEF COMPLAINT:  This is a pleasant 84-year-old white female who is admitted to [facility].  The patient presented to the hospital here by squad in the early morning on 2025.  The patient states apparently today she woke up, at which time the patient was complaining some dizziness and some chest pain.  The patient states she has not been feeling well for approximately the past 3 months.  The patient presented here by ambulance.  Apparently when ambulance arrived, the patient did have a tachycardic rhythm.  The patient at that time did receive IV Cardizem, at which time she developed significant bradycardia.  This was characterized by also mental status, hypotension with the infusion of dopamine.  The patient's rhythm at that time did improve.  She was seen in the emergency room bay 3.  The patient is currently being admitted to the intensive care unit.  Consultation had been obtained with Dr. Myrick.  From a cardiac standpoint of view, the patient did have an episode of chest pain.  This is currently resolved.  She does appear to be in sinus rhythm at the present time and her troponin level at this time is elevated from 78 to 120 suggesting a non-ST-elevated myocardial infarction.  The patient states that she feels improved.  She is alert and responsive, but it does appear to be somewhat restless.  Respiratory wise, she did not appear to be short of breath.  Her O2 sat is satisfactory.  She denies any productive cough.  According to the family, her weight has been stable.  She denies any

## 2025-03-27 NOTE — ED NOTES
Patient very agitated in presentation, Ativan 0.5 admin with no effect, continues to flail on bed, attempting to climb from bed, unable to sit still

## 2025-03-28 LAB
25(OH)D3 SERPL-MCNC: 30.5 NG/ML (ref 30–100)
ALBUMIN SERPL-MCNC: 3.7 G/DL (ref 3.5–5.2)
ALP SERPL-CCNC: 79 U/L (ref 35–104)
ALT SERPL-CCNC: 46 U/L (ref 0–32)
ANION GAP SERPL CALCULATED.3IONS-SCNC: 9 MMOL/L (ref 7–16)
AST SERPL-CCNC: 53 U/L (ref 0–31)
BILIRUB SERPL-MCNC: 0.3 MG/DL (ref 0–1.2)
BUN SERPL-MCNC: 20 MG/DL (ref 6–23)
CALCIUM SERPL-MCNC: 9.2 MG/DL (ref 8.6–10.2)
CHLORIDE SERPL-SCNC: 109 MMOL/L (ref 98–107)
CHOLEST SERPL-MCNC: 131 MG/DL
CO2 SERPL-SCNC: 23 MMOL/L (ref 22–29)
CREAT SERPL-MCNC: 1.2 MG/DL (ref 0.5–1)
FOLATE SERPL-MCNC: 10.9 NG/ML (ref 4.8–24.2)
GFR, ESTIMATED: 47 ML/MIN/1.73M2
GLUCOSE BLD-MCNC: 142 MG/DL (ref 74–99)
GLUCOSE BLD-MCNC: 215 MG/DL (ref 74–99)
GLUCOSE BLD-MCNC: 296 MG/DL (ref 74–99)
GLUCOSE BLD-MCNC: 88 MG/DL (ref 74–99)
GLUCOSE SERPL-MCNC: 63 MG/DL (ref 74–99)
HDLC SERPL-MCNC: 42 MG/DL
IRON SATN MFR SERPL: 8 % (ref 15–50)
IRON SERPL-MCNC: 30 UG/DL (ref 37–145)
LDLC SERPL CALC-MCNC: 59 MG/DL
MAGNESIUM SERPL-MCNC: 1.8 MG/DL (ref 1.6–2.6)
MICROORGANISM SPEC CULT: NORMAL
PHOSPHATE SERPL-MCNC: 2.5 MG/DL (ref 2.5–4.5)
POTASSIUM SERPL-SCNC: 3.6 MMOL/L (ref 3.5–5)
PROT SERPL-MCNC: 6.5 G/DL (ref 6.4–8.3)
SODIUM SERPL-SCNC: 141 MMOL/L (ref 132–146)
SPECIMEN DESCRIPTION: NORMAL
T4 FREE SERPL-MCNC: 1.3 NG/DL (ref 0.9–1.7)
TIBC SERPL-MCNC: 397 UG/DL (ref 250–450)
TRIGL SERPL-MCNC: 151 MG/DL
TROPONIN I SERPL HS-MCNC: 96 NG/L (ref 0–9)
VIT B12 SERPL-MCNC: 459 PG/ML (ref 211–946)
VLDLC SERPL CALC-MCNC: 30 MG/DL

## 2025-03-28 PROCEDURE — 84100 ASSAY OF PHOSPHORUS: CPT

## 2025-03-28 PROCEDURE — 6360000002 HC RX W HCPCS: Performed by: INTERNAL MEDICINE

## 2025-03-28 PROCEDURE — 2000000000 HC ICU R&B

## 2025-03-28 PROCEDURE — 80061 LIPID PANEL: CPT

## 2025-03-28 PROCEDURE — 80053 COMPREHEN METABOLIC PANEL: CPT

## 2025-03-28 PROCEDURE — 83550 IRON BINDING TEST: CPT

## 2025-03-28 PROCEDURE — 6360000002 HC RX W HCPCS

## 2025-03-28 PROCEDURE — 99291 CRITICAL CARE FIRST HOUR: CPT | Performed by: INTERNAL MEDICINE

## 2025-03-28 PROCEDURE — 84439 ASSAY OF FREE THYROXINE: CPT

## 2025-03-28 PROCEDURE — 83540 ASSAY OF IRON: CPT

## 2025-03-28 PROCEDURE — 82746 ASSAY OF FOLIC ACID SERUM: CPT

## 2025-03-28 PROCEDURE — 2700000000 HC OXYGEN THERAPY PER DAY

## 2025-03-28 PROCEDURE — 83735 ASSAY OF MAGNESIUM: CPT

## 2025-03-28 PROCEDURE — 6370000000 HC RX 637 (ALT 250 FOR IP): Performed by: INTERNAL MEDICINE

## 2025-03-28 PROCEDURE — 82962 GLUCOSE BLOOD TEST: CPT

## 2025-03-28 PROCEDURE — 82607 VITAMIN B-12: CPT

## 2025-03-28 PROCEDURE — 2580000003 HC RX 258: Performed by: INTERNAL MEDICINE

## 2025-03-28 PROCEDURE — 36415 COLL VENOUS BLD VENIPUNCTURE: CPT

## 2025-03-28 PROCEDURE — 6370000000 HC RX 637 (ALT 250 FOR IP)

## 2025-03-28 PROCEDURE — 82306 VITAMIN D 25 HYDROXY: CPT

## 2025-03-28 PROCEDURE — 84484 ASSAY OF TROPONIN QUANT: CPT

## 2025-03-28 PROCEDURE — 93005 ELECTROCARDIOGRAM TRACING: CPT

## 2025-03-28 RX ORDER — METOPROLOL TARTRATE 1 MG/ML
5 INJECTION, SOLUTION INTRAVENOUS ONCE
Status: DISCONTINUED | OUTPATIENT
Start: 2025-03-28 | End: 2025-03-28

## 2025-03-28 RX ORDER — ENOXAPARIN SODIUM 100 MG/ML
40 INJECTION SUBCUTANEOUS DAILY
Status: DISCONTINUED | OUTPATIENT
Start: 2025-03-29 | End: 2025-04-01 | Stop reason: HOSPADM

## 2025-03-28 RX ORDER — ACETAMINOPHEN 325 MG/1
650 TABLET ORAL EVERY 6 HOURS PRN
Status: DISCONTINUED | OUTPATIENT
Start: 2025-03-28 | End: 2025-04-01 | Stop reason: HOSPADM

## 2025-03-28 RX ORDER — HYDRALAZINE HYDROCHLORIDE 20 MG/ML
5 INJECTION INTRAMUSCULAR; INTRAVENOUS EVERY 4 HOURS PRN
Status: DISCONTINUED | OUTPATIENT
Start: 2025-03-28 | End: 2025-04-01 | Stop reason: HOSPADM

## 2025-03-28 RX ORDER — METOPROLOL TARTRATE 1 MG/ML
5 INJECTION, SOLUTION INTRAVENOUS EVERY 4 HOURS PRN
Status: DISCONTINUED | OUTPATIENT
Start: 2025-03-28 | End: 2025-03-28

## 2025-03-28 RX ORDER — LOSARTAN POTASSIUM 50 MG/1
100 TABLET ORAL DAILY
Status: DISCONTINUED | OUTPATIENT
Start: 2025-03-28 | End: 2025-04-01 | Stop reason: HOSPADM

## 2025-03-28 RX ORDER — METOPROLOL SUCCINATE 25 MG/1
25 TABLET, EXTENDED RELEASE ORAL DAILY
Status: DISCONTINUED | OUTPATIENT
Start: 2025-03-28 | End: 2025-04-01 | Stop reason: HOSPADM

## 2025-03-28 RX ADMIN — SODIUM CHLORIDE 3000 MG: 9 INJECTION, SOLUTION INTRAVENOUS at 01:31

## 2025-03-28 RX ADMIN — SODIUM CHLORIDE: 0.9 INJECTION, SOLUTION INTRAVENOUS at 09:18

## 2025-03-28 RX ADMIN — HYDRALAZINE HYDROCHLORIDE 25 MG: 25 TABLET ORAL at 12:52

## 2025-03-28 RX ADMIN — INSULIN LISPRO 4 UNITS: 100 INJECTION, SOLUTION INTRAVENOUS; SUBCUTANEOUS at 16:36

## 2025-03-28 RX ADMIN — ACETAMINOPHEN 650 MG: 325 TABLET ORAL at 12:51

## 2025-03-28 RX ADMIN — TRAZODONE HYDROCHLORIDE 50 MG: 50 TABLET ORAL at 20:32

## 2025-03-28 RX ADMIN — ENOXAPARIN SODIUM 30 MG: 100 INJECTION SUBCUTANEOUS at 07:55

## 2025-03-28 RX ADMIN — LOSARTAN POTASSIUM 100 MG: 50 TABLET, FILM COATED ORAL at 10:11

## 2025-03-28 RX ADMIN — SODIUM CHLORIDE 3000 MG: 9 INJECTION, SOLUTION INTRAVENOUS at 17:50

## 2025-03-28 RX ADMIN — LEVOTHYROXINE SODIUM 75 MCG: 0.07 TABLET ORAL at 05:45

## 2025-03-28 RX ADMIN — BUPROPION HYDROCHLORIDE 150 MG: 150 TABLET, EXTENDED RELEASE ORAL at 07:55

## 2025-03-28 RX ADMIN — GABAPENTIN 300 MG: 300 CAPSULE ORAL at 07:55

## 2025-03-28 RX ADMIN — GABAPENTIN 300 MG: 300 CAPSULE ORAL at 19:45

## 2025-03-28 RX ADMIN — HYDRALAZINE HYDROCHLORIDE 25 MG: 25 TABLET ORAL at 20:32

## 2025-03-28 RX ADMIN — HYDRALAZINE HYDROCHLORIDE 5 MG: 20 INJECTION INTRAMUSCULAR; INTRAVENOUS at 12:17

## 2025-03-28 RX ADMIN — METOPROLOL SUCCINATE 25 MG: 25 TABLET, EXTENDED RELEASE ORAL at 10:11

## 2025-03-28 RX ADMIN — INSULIN LISPRO 2 UNITS: 100 INJECTION, SOLUTION INTRAVENOUS; SUBCUTANEOUS at 21:38

## 2025-03-28 RX ADMIN — SODIUM CHLORIDE 3000 MG: 9 INJECTION, SOLUTION INTRAVENOUS at 12:56

## 2025-03-28 RX ADMIN — ACETAMINOPHEN 650 MG: 325 TABLET ORAL at 19:45

## 2025-03-28 RX ADMIN — PANTOPRAZOLE SODIUM 40 MG: 40 TABLET, DELAYED RELEASE ORAL at 05:46

## 2025-03-28 RX ADMIN — FLUOXETINE HYDROCHLORIDE 40 MG: 20 CAPSULE ORAL at 19:45

## 2025-03-28 RX ADMIN — HYDRALAZINE HYDROCHLORIDE 5 MG: 20 INJECTION INTRAMUSCULAR; INTRAVENOUS at 16:17

## 2025-03-28 RX ADMIN — OLANZAPINE 5 MG: 5 TABLET, FILM COATED ORAL at 20:32

## 2025-03-28 ASSESSMENT — PAIN DESCRIPTION - ORIENTATION: ORIENTATION: MID

## 2025-03-28 ASSESSMENT — PAIN SCALES - GENERAL
PAINLEVEL_OUTOF10: 4
PAINLEVEL_OUTOF10: 5

## 2025-03-28 ASSESSMENT — PAIN DESCRIPTION - DESCRIPTORS: DESCRIPTORS: ACHING;DISCOMFORT

## 2025-03-28 ASSESSMENT — PAIN DESCRIPTION - LOCATION
LOCATION: HEAD
LOCATION: HEAD

## 2025-03-28 ASSESSMENT — PAIN DESCRIPTION - ONSET: ONSET: PROGRESSIVE

## 2025-03-28 ASSESSMENT — PAIN DESCRIPTION - PAIN TYPE: TYPE: ACUTE PAIN

## 2025-03-28 ASSESSMENT — PAIN DESCRIPTION - FREQUENCY: FREQUENCY: CONTINUOUS

## 2025-03-28 NOTE — CARE COORDINATION
3/28/2025 ICU, IVF, Unasyn, Sitter in room. 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 Pt has been to [facility] in the past. Watch for discharge needs. Therapies for direction of discharge planning. CM following.  Electronically signed by Jennifer Nicole RN-BC on 3/28/2025 at 1:32 PM

## 2025-03-28 NOTE — PLAN OF CARE
Problem: Safety - Adult  Goal: Free from fall injury  Outcome: Progressing     Problem: Neurosensory - Adult  Goal: Achieves stable or improved neurological status  Outcome: Progressing     Problem: Neurosensory - Adult  Goal: Achieves stable or improved neurological status  Outcome: Progressing

## 2025-03-28 NOTE — PROGRESS NOTES
SPEECH LANGUAGE PATHOLOGY  DAILY PROGRESS NOTE      PATIENT NAME:  Kylee Wang      :  1940          TODAY'S DATE:  3/28/2025 ROOM:  [place] [address]/[place] [address]    Current Diet: ADULT DIET; Dysphagia - Minced and Moist; 5 carb choices (75 gm/meal)    Discussed with nursing tolerated puree and thin with assist still with some confusion and still with extended oral phase with thicker puree.  Feeding self some but needs assist and constant supervision yet.  Not ready to attempt diet advancement yet.     Recommendation: will continue POC        Total minutes :    0 minutes    Dolores Bush MSCCC/SLP  Speech Language Pathologist  [phone]

## 2025-03-28 NOTE — PROGRESS NOTES
Internal Medicine Progress Note     [facility]     Cuate Christianson D.O., LORIEIGeorge Jones D.O., LORIEIGeorge Caraballo D.O.     Desirae Bonilla, MSN, APRN, NP-C  Aren Zaldivar, MSN, APRN-CNP  Augustus Swartz, MSN, APRN, NP-C  Guerda Gunderson, MSN, APRN-CNP  Iliana Mendes, MSN, APRN, NP-C     Primary Care Physician: Roxie Maldonado MD   Admitting Physician:  Cuate Christianson DO  Admission date and time: 3/26/2025  9:12 PM    Room:  [place] [address]/[place] [address]  Admitting diagnosis: Bradycardia [R00.1]  Symptomatic bradycardia [R00.1]  Chest pain, unspecified type [R07.9]    Patient Name: Kylee Wang  MRN: [id number]    Date of Service: 3/28/2025     Subjective:  Kylee is a 84 y.o. female who was seen and examined today,3/28/2025, at the bedside. Kylee is an 84-year-old female who presented to [facility] emergency department yesterday with dizziness and abdominal symptomatology including nausea and vomiting.  She was initially found to be in SVT and was started on a Cardizem infusion which resulted in almost immediate bradycardia.  Dopamine was instituted and has since been weaned off.  A one-on-one sitter is present at the bedside in the setting of severe confusion.  She is easily redirectable but is overall agitated.  She is being treated accordingly for a urinary tract infection no family members were present during my examination.    Review of System:   Limited in the setting of her confusion.  Constitutional:   Admits to generalized malaise and fatigue.  HEENT:   Denies ear pain, sore throat, sinus or eye problems.  Cardiovascular:   Resolution of what she described as chest pain on presentation.  Respiratory:   Denies shortness of breath, coughing, sputum production, hemoptysis, or wheezing.  Gastrointestinal:   Overall abdominal discomfort..  Genitourinary:    Denies any urgency, frequency, hematuria.  Voiding with the use of an external

## 2025-03-28 NOTE — PROGRESS NOTES
DVT Prophylaxis Adjustment Policy (DVT Prophylaxis)     This patient is on DVT Prophylaxis medication that requires a dose adjustment      Date Body Weight IBW  Adjusted BW SCr  CrCl Dialysis status   3/28/2025 72.3 kg (159 lb 6.3 oz) Ideal body weight: 57 kg (125 lb 10.6 oz)  Adjusted ideal body weight: 63.1 kg (139 lb 2.5 oz) Serum creatinine: 1.2 mg/dL (H) 03/28/25 0412  Estimated creatinine clearance: 34 mL/min (A) N/a       Pharmacy has dose-adjusted the DVT Prophylaxis regimen to match   the recommendations from the following table        Ordered Medication:Lovenox 30mg daily    Order Changed/converted to: Lovenox 40mg daily    These changes were made per protocol according to the [facility] Pharmacist   Review for Appropriate Use and Automatic Dose Adjustments of   Subcutaneous Anticoagulants Policy     *Please note this dose may need readjusted if patient's condition changes.    Please contact pharmacy with any questions regarding these changes.    Thank you,       Shaw Gonzales [facility]  3/28/2025   11:25 AM    DEL: [phone]

## 2025-03-28 NOTE — PROGRESS NOTES
Spiritual Health History and Assessment/Progress Note  [facility]    Initial Encounter, Spiritual/Emotional Needs, Rituals, Rites and Sacraments,  Encounter (Fr. Calvillo),  ,  ,      Name: Kylee Wang MRN: [id number]    Age: 84 y.o.     Sex: female   Language: English   Yazidi: Scientologist   Bradycardia     Date: 3/28/2025                           Spiritual Assessment began in [facility] 2 ICU        Referral/Consult From: Rounding   Encounter Overview/Reason: Initial Encounter, Spiritual/Emotional Needs, Rituals, Rites and Sacraments,  Encounter (Fr. Calvillo)  Service Provided For: Patient    Yamilet, Belief, Meaning:   Patient identifies as spiritual and is connected with a yamilet tradition or spiritual practice  Family/Friends No family/friends present      Importance and Influence:  Patient unable to assess at this time  Family/Friends No family/friends present    Community:  Patient is connected with a spiritual community  Family/Friends No family/friends present    Assessment and Plan of Care:     Patient Interventions include: Facilitated expression of thoughts and feelings, Affirmed coping skills/support systems, and Provided sacramental/Caodaism ritual  Family/Friends Interventions include: No family/friends present    Patient Plan of Care: No spiritual needs identified for follow-up and Spiritual Care available upon further referral  Family/Friends Plan of Care: No family/friends present    Electronically signed by Chaplain Angela on 3/28/2025 at 2:28 PM

## 2025-03-28 NOTE — PROGRESS NOTES
The patients daughter brought in patient's home medications which were sent to pharmacy and placed in a lock box.

## 2025-03-28 NOTE — PROGRESS NOTES
Assessment and Plan  Patient is a 84 y.o. female with the following medical Problems:   Symptomatic bradycardia  Urinary tract infection  Metabolic encephalopathy  Acute kidney injury on CKD  Paroxysmal atrial fibrillation  Healthcare associated delirium  Type 2 diabetes  Hypertension  Dyslipidemia    Heart disease with mitral regurgitation and tricuspid regurgitation    Plan of care:  Unasyn for UTI due to history of VRE and E. coli in the urine.  Patient is not septic and therefore we will hold off linezolid at the meantime.  Urine cultures growing Klebsiella oxytoca  Speech therapy evaluation.  Diet as tolerated  Decision for full anticoagulation will be left to the discretion of the cardiology team.  Patient is currently on Lovenox 40 mg subcutaneously daily for DVT prophylaxis.  Patient is on Protonix for GI prophylaxis.  Patient is off dopamine and off Precedex.  Echocardiography  PT, OT, and out of bed as tolerated  Insulin sliding scale    History of Present Illness:   Patient is a 84-year-old woman with above-mentioned medical problem who was brought to the emergency room on March 26, 2025 due to weakness, dizziness, and intermittent confusion.  Patient has been describing nonspecific chest pain.  She was initially in A-fib with RVR and was given 1 dose of Cardizem that led her heart rate to drop down to 33.  Patient received atropine twice and was started on dopamine.  She was admitted to the ICU for management of junctional bradycardia as well as hypotension.  Patient is extremely confused and unable to provide history.  UA was mildly positive.  Patient was started initially on ceftriaxone but switched to Unasyn while awaiting for cultures and sensitivity.    Daily progress:  March 28, 2025: Patient is more awake and more interactive today .  Blood pressure has been elevated and home antihypertensive medications were resumed including losartan, metoprolol, and hydralazine.  As needed hydralazine was

## 2025-03-28 NOTE — PROGRESS NOTES
Renal Dose Adjustment Policy (Generic)     This patient is on medication that requires renal, weight, and/or indication dose adjustment.      Date Body Weight IBW  Adjusted BW SCr  CrCl Dialysis status   3/28/2025 72.3 kg (159 lb 6.3 oz) Ideal body weight: 57 kg (125 lb 10.6 oz)  Adjusted ideal body weight: 63.1 kg (139 lb 2.5 oz) Serum creatinine: 1.2 mg/dL (H) 03/28/25 0412  Estimated creatinine clearance: 34 mL/min (A) N/a       Pharmacy has dose-adjusted the following medication(s):    Date Previous Order Adjusted Order   3/28/2025 Unasyn 3gm IV q 12hrs Unasyn 3gm IV q 6hrs     These changes were made per protocol according to the [facility]   Automatic Renal Dose Adjustment Policy.     *Please note this dose may need readjusted if patient's condition changes.    Please contact pharmacy with any questions regarding these changes.    Thank you,     Shaw Gonzales [facility]  3/28/2025   12:06 PM    SJJACKIE: [phone]

## 2025-03-29 LAB
ALBUMIN SERPL-MCNC: 3.6 G/DL (ref 3.5–5.2)
ALP SERPL-CCNC: 76 U/L (ref 35–104)
ALT SERPL-CCNC: 33 U/L (ref 0–32)
ANION GAP SERPL CALCULATED.3IONS-SCNC: 9 MMOL/L (ref 7–16)
AST SERPL-CCNC: 27 U/L (ref 0–31)
BASOPHILS # BLD: 0.05 K/UL (ref 0–0.2)
BASOPHILS NFR BLD: 1 % (ref 0–2)
BILIRUB SERPL-MCNC: 0.4 MG/DL (ref 0–1.2)
BUN SERPL-MCNC: 14 MG/DL (ref 6–23)
CALCIUM SERPL-MCNC: 9 MG/DL (ref 8.6–10.2)
CHLORIDE SERPL-SCNC: 109 MMOL/L (ref 98–107)
CO2 SERPL-SCNC: 23 MMOL/L (ref 22–29)
CREAT SERPL-MCNC: 1.1 MG/DL (ref 0.5–1)
EKG ATRIAL RATE: 75 BPM
EKG P AXIS: 27 DEGREES
EKG P-R INTERVAL: 154 MS
EKG Q-T INTERVAL: 390 MS
EKG QRS DURATION: 86 MS
EKG QTC CALCULATION (BAZETT): 435 MS
EKG R AXIS: 48 DEGREES
EKG T AXIS: 60 DEGREES
EKG VENTRICULAR RATE: 75 BPM
EOSINOPHIL # BLD: 0.26 K/UL (ref 0.05–0.5)
EOSINOPHILS RELATIVE PERCENT: 3 % (ref 0–6)
ERYTHROCYTE [DISTWIDTH] IN BLOOD BY AUTOMATED COUNT: 17.6 % (ref 11.5–15)
GFR, ESTIMATED: 51 ML/MIN/1.73M2
GLUCOSE BLD-MCNC: 282 MG/DL (ref 74–99)
GLUCOSE BLD-MCNC: 299 MG/DL (ref 74–99)
GLUCOSE BLD-MCNC: 330 MG/DL (ref 74–99)
GLUCOSE SERPL-MCNC: 145 MG/DL (ref 74–99)
HCT VFR BLD AUTO: 29.2 % (ref 34–48)
HGB BLD-MCNC: 8.7 G/DL (ref 11.5–15.5)
IMM GRANULOCYTES # BLD AUTO: 0.03 K/UL (ref 0–0.58)
IMM GRANULOCYTES NFR BLD: 0 % (ref 0–5)
LYMPHOCYTES NFR BLD: 1.86 K/UL (ref 1.5–4)
LYMPHOCYTES RELATIVE PERCENT: 20 % (ref 20–42)
MAGNESIUM SERPL-MCNC: 1.7 MG/DL (ref 1.6–2.6)
MCH RBC QN AUTO: 23.3 PG (ref 26–35)
MCHC RBC AUTO-ENTMCNC: 29.8 G/DL (ref 32–34.5)
MCV RBC AUTO: 78.3 FL (ref 80–99.9)
MICROORGANISM SPEC CULT: ABNORMAL
MONOCYTES NFR BLD: 0.84 K/UL (ref 0.1–0.95)
MONOCYTES NFR BLD: 9 % (ref 2–12)
NEUTROPHILS NFR BLD: 67 % (ref 43–80)
NEUTS SEG NFR BLD: 6.13 K/UL (ref 1.8–7.3)
PHOSPHATE SERPL-MCNC: 2.5 MG/DL (ref 2.5–4.5)
PLATELET # BLD AUTO: 324 K/UL (ref 130–450)
PMV BLD AUTO: 9.5 FL (ref 7–12)
POTASSIUM SERPL-SCNC: 3.6 MMOL/L (ref 3.5–5)
PROT SERPL-MCNC: 6 G/DL (ref 6.4–8.3)
RBC # BLD AUTO: 3.73 M/UL (ref 3.5–5.5)
SERVICE CMNT-IMP: ABNORMAL
SODIUM SERPL-SCNC: 141 MMOL/L (ref 132–146)
SPECIMEN DESCRIPTION: ABNORMAL
WBC OTHER # BLD: 9.2 K/UL (ref 4.5–11.5)

## 2025-03-29 PROCEDURE — 6360000002 HC RX W HCPCS

## 2025-03-29 PROCEDURE — 99291 CRITICAL CARE FIRST HOUR: CPT | Performed by: INTERNAL MEDICINE

## 2025-03-29 PROCEDURE — 83735 ASSAY OF MAGNESIUM: CPT

## 2025-03-29 PROCEDURE — 6370000000 HC RX 637 (ALT 250 FOR IP)

## 2025-03-29 PROCEDURE — 85025 COMPLETE CBC W/AUTO DIFF WBC: CPT

## 2025-03-29 PROCEDURE — 82962 GLUCOSE BLOOD TEST: CPT

## 2025-03-29 PROCEDURE — 2500000003 HC RX 250 WO HCPCS: Performed by: INTERNAL MEDICINE

## 2025-03-29 PROCEDURE — 2000000000 HC ICU R&B

## 2025-03-29 PROCEDURE — 6360000002 HC RX W HCPCS: Performed by: INTERNAL MEDICINE

## 2025-03-29 PROCEDURE — 84100 ASSAY OF PHOSPHORUS: CPT

## 2025-03-29 PROCEDURE — 80053 COMPREHEN METABOLIC PANEL: CPT

## 2025-03-29 PROCEDURE — 2580000003 HC RX 258: Performed by: INTERNAL MEDICINE

## 2025-03-29 PROCEDURE — 6370000000 HC RX 637 (ALT 250 FOR IP): Performed by: INTERNAL MEDICINE

## 2025-03-29 RX ORDER — HYDRALAZINE HYDROCHLORIDE 50 MG/1
50 TABLET, FILM COATED ORAL EVERY 8 HOURS SCHEDULED
Status: DISCONTINUED | OUTPATIENT
Start: 2025-03-29 | End: 2025-04-01 | Stop reason: HOSPADM

## 2025-03-29 RX ORDER — MAGNESIUM SULFATE IN WATER 40 MG/ML
2000 INJECTION, SOLUTION INTRAVENOUS ONCE
Status: COMPLETED | OUTPATIENT
Start: 2025-03-29 | End: 2025-03-29

## 2025-03-29 RX ADMIN — GABAPENTIN 300 MG: 300 CAPSULE ORAL at 20:17

## 2025-03-29 RX ADMIN — METOPROLOL SUCCINATE 25 MG: 25 TABLET, EXTENDED RELEASE ORAL at 09:08

## 2025-03-29 RX ADMIN — INSULIN LISPRO 4 UNITS: 100 INJECTION, SOLUTION INTRAVENOUS; SUBCUTANEOUS at 15:56

## 2025-03-29 RX ADMIN — OLANZAPINE 5 MG: 5 TABLET, FILM COATED ORAL at 20:17

## 2025-03-29 RX ADMIN — MAGNESIUM SULFATE HEPTAHYDRATE 2000 MG: 40 INJECTION, SOLUTION INTRAVENOUS at 09:03

## 2025-03-29 RX ADMIN — SODIUM CHLORIDE 3000 MG: 9 INJECTION, SOLUTION INTRAVENOUS at 05:15

## 2025-03-29 RX ADMIN — GABAPENTIN 300 MG: 300 CAPSULE ORAL at 09:08

## 2025-03-29 RX ADMIN — FLUOXETINE HYDROCHLORIDE 40 MG: 20 CAPSULE ORAL at 20:17

## 2025-03-29 RX ADMIN — INSULIN LISPRO 6 UNITS: 100 INJECTION, SOLUTION INTRAVENOUS; SUBCUTANEOUS at 12:19

## 2025-03-29 RX ADMIN — INSULIN LISPRO 4 UNITS: 100 INJECTION, SOLUTION INTRAVENOUS; SUBCUTANEOUS at 20:21

## 2025-03-29 RX ADMIN — LEVOTHYROXINE SODIUM 75 MCG: 0.07 TABLET ORAL at 05:15

## 2025-03-29 RX ADMIN — PANTOPRAZOLE SODIUM 40 MG: 40 TABLET, DELAYED RELEASE ORAL at 05:15

## 2025-03-29 RX ADMIN — BUPROPION HYDROCHLORIDE 150 MG: 150 TABLET, EXTENDED RELEASE ORAL at 09:15

## 2025-03-29 RX ADMIN — SODIUM CHLORIDE: 0.9 INJECTION, SOLUTION INTRAVENOUS at 05:49

## 2025-03-29 RX ADMIN — WATER 1000 MG: 1 INJECTION INTRAMUSCULAR; INTRAVENOUS; SUBCUTANEOUS at 12:23

## 2025-03-29 RX ADMIN — HYDRALAZINE HYDROCHLORIDE 50 MG: 50 TABLET ORAL at 20:16

## 2025-03-29 RX ADMIN — HYDRALAZINE HYDROCHLORIDE 5 MG: 20 INJECTION INTRAMUSCULAR; INTRAVENOUS at 17:36

## 2025-03-29 RX ADMIN — ENOXAPARIN SODIUM 40 MG: 100 INJECTION SUBCUTANEOUS at 09:05

## 2025-03-29 RX ADMIN — ACETAMINOPHEN 650 MG: 325 TABLET ORAL at 21:26

## 2025-03-29 RX ADMIN — HYDRALAZINE HYDROCHLORIDE 50 MG: 50 TABLET ORAL at 13:11

## 2025-03-29 RX ADMIN — ACETAMINOPHEN 650 MG: 325 TABLET ORAL at 12:27

## 2025-03-29 RX ADMIN — LOSARTAN POTASSIUM 100 MG: 50 TABLET, FILM COATED ORAL at 09:08

## 2025-03-29 RX ADMIN — HYDRALAZINE HYDROCHLORIDE 5 MG: 20 INJECTION INTRAMUSCULAR; INTRAVENOUS at 07:08

## 2025-03-29 RX ADMIN — INSULIN GLARGINE 20 UNITS: 100 INJECTION, SOLUTION SUBCUTANEOUS at 20:21

## 2025-03-29 RX ADMIN — SODIUM CHLORIDE 3000 MG: 9 INJECTION, SOLUTION INTRAVENOUS at 01:45

## 2025-03-29 RX ADMIN — HYDRALAZINE HYDROCHLORIDE 25 MG: 25 TABLET ORAL at 05:15

## 2025-03-29 RX ADMIN — TRAZODONE HYDROCHLORIDE 50 MG: 50 TABLET ORAL at 20:17

## 2025-03-29 ASSESSMENT — PAIN DESCRIPTION - FREQUENCY: FREQUENCY: INTERMITTENT

## 2025-03-29 ASSESSMENT — PAIN DESCRIPTION - ORIENTATION
ORIENTATION: ANTERIOR
ORIENTATION: ANTERIOR
ORIENTATION: MID

## 2025-03-29 ASSESSMENT — PAIN DESCRIPTION - LOCATION
LOCATION: HEAD

## 2025-03-29 ASSESSMENT — PAIN - FUNCTIONAL ASSESSMENT: PAIN_FUNCTIONAL_ASSESSMENT: ACTIVITIES ARE NOT PREVENTED

## 2025-03-29 ASSESSMENT — PAIN SCALES - GENERAL
PAINLEVEL_OUTOF10: 5
PAINLEVEL_OUTOF10: 2
PAINLEVEL_OUTOF10: 0
PAINLEVEL_OUTOF10: 6
PAINLEVEL_OUTOF10: 8
PAINLEVEL_OUTOF10: 0

## 2025-03-29 ASSESSMENT — PAIN DESCRIPTION - DESCRIPTORS
DESCRIPTORS: ACHING;DISCOMFORT;NAGGING
DESCRIPTORS: THROBBING
DESCRIPTORS: THROBBING

## 2025-03-29 ASSESSMENT — PAIN DESCRIPTION - PAIN TYPE: TYPE: CHRONIC PAIN

## 2025-03-29 NOTE — PROGRESS NOTES
Date:  3/28/2025  Patient: Kylee Wang  Admission:  3/26/2025  9:12 PM  Admit DX: Bradycardia [R00.1]  Symptomatic bradycardia [R00.1]  Chest pain, unspecified type [R07.9]  Age:  84 y.o., 1940     LOS: 1 day       SUBJECTIVE:      Patient is doing much better today  She is more alert and oriented  Denies chest pain  She is slightly depressed  Blood pressure is elevated    OBJECTIVE:    BP (!) 150/75   Pulse 83   Temp 98.8 °F (37.1 °C) (Oral)   Resp 26   Ht 1.651 m (5' 5\")   Wt 72.3 kg (159 lb 6.3 oz)   SpO2 99%   BMI 26.52 kg/m²     Intake/Output Summary (Last 24 hours) at 3/28/2025 2044  Last data filed at 3/28/2025 1750  Gross per 24 hour   Intake 1879.36 ml   Output 1450 ml   Net 429.36 ml       EXAM:     General: Alert and and responsive,No acute distress.  Neck: Supple. No jugular venous distention.  Respiratory: Lungs are clear to auscultation, Respirations are non-labored, Breath sounds are equal, Symmetrical chest wall expansion.  Cardiovascular: Normal rate, No murmur, No gallop, Good pulses equal in all extremities, No edema.  Gastrointestinal: Soft, Non-tender, Non-distended, Normal bowel sounds.  Musculoskeletal: Normal strength, No tenderness, No deformity.  Neurologic: Alert, No focal deficits.  Cognition and Speech   Speech clear and coherent.      Current Inpatient Medications:   metoprolol succinate  25 mg Oral Daily    losartan  100 mg Oral Daily    [START ON 3/29/2025] enoxaparin  40 mg SubCUTAneous Daily    ampicillin-sulbactam  3,000 mg IntraVENous Q6H    pantoprazole  40 mg Oral QAM AC    insulin lispro  0-8 Units SubCUTAneous 4x Daily AC & HS    levothyroxine  75 mcg Oral Daily    FLUoxetine  40 mg Oral Nightly    hydrALAZINE  25 mg Oral 3 times per day    traZODone  50 mg Oral Nightly    OLANZapine  5 mg Oral Nightly    [Held by provider] insulin glargine  20 Units SubCUTAneous Nightly    buPROPion  150 mg Oral QAM    gabapentin  300 mg Oral BID       IV Infusions (if any):

## 2025-03-29 NOTE — PROGRESS NOTES
Assessment and Plan  Patient is a 84 y.o. female with the following medical Problems:   Symptomatic bradycardia  Urinary tract infection  Metabolic encephalopathy  Acute kidney injury on CKD  Paroxysmal atrial fibrillation  Healthcare associated delirium  Type 2 diabetes  Hypertension  Dyslipidemia    Heart disease with mitral regurgitation and tricuspid regurgitation    Plan of care:  Unasyn for UTI due to history of VRE and E. coli in the urine.  Patient is not septic and therefore we will hold off linezolid at the meantime.  Urine cultures growing Klebsiella oxytoca which was sensitive to ceftriaxone.  Unasyn was switched to ceftriaxone.  Speech therapy evaluation.  Diet as tolerated  Decision for full anticoagulation will be left to the discretion of the cardiology team.  Patient is currently on Lovenox 40 mg subcutaneously daily for DVT prophylaxis.  Patient is on Protonix for GI prophylaxis.  Patient is off dopamine and off Precedex.  Echocardiography  PT, OT, and out of bed as tolerated  Insulin sliding scale    History of Present Illness:   Patient is a 84-year-old woman with above-mentioned medical problem who was brought to the emergency room on March 26, 2025 due to weakness, dizziness, and intermittent confusion.  Patient has been describing nonspecific chest pain.  She was initially in A-fib with RVR and was given 1 dose of Cardizem that led her heart rate to drop down to 33.  Patient received atropine twice and was started on dopamine.  She was admitted to the ICU for management of junctional bradycardia as well as hypotension.  Patient is extremely confused and unable to provide history.  UA was mildly positive.  Patient was started initially on ceftriaxone but switched to Unasyn while awaiting for cultures and sensitivity.    Daily progress:  March 28, 2025: Patient is more awake and more interactive today .  Blood pressure has been elevated and home antihypertensive medications were resumed

## 2025-03-29 NOTE — PROGRESS NOTES
Internal Medicine Progress Note     [facility]     Cuate Christianson D.O., LORIEI.                         Jose Jones D.O., AMARA Caraballo D.O.     Desirae Bonilla, MSN, APRN, NP-C  Aren Zaldivar, MSN, APRN-CNP  Augustus Swartz, MSN, APRN, NP-C  Guerda Gunderson, MSN, APRN-CNP  Iliana Mendes, MSN, APRN, NP-C     Primary Care Physician: Roxie Maldonado MD   Admitting Physician:  Cuate Christianson DO  Admission date and time: 3/26/2025  9:12 PM    Room:  [place] [address]/[place] [address]  Admitting diagnosis: Bradycardia [R00.1]  Symptomatic bradycardia [R00.1]  Chest pain, unspecified type [R07.9]    Patient Name: Kylee Wang  MRN: [id number]    Date of Service: 3/29/2025     Subjective:  Kylee is a 84 y.o. female who was seen and examined today,3/29/2025, at the bedside. Kylee has improved dramatically when compared to my examination yesterday.  She is now alert and oriented and feeling dramatically better overall.  There have been no additional issues with arrhythmia but she is currently hypertensive.  She is maintained on antibiotics for coverage of urinary tract infection.  We discussed becoming more ambulatory with dietary advancement today.    Review of System:   Substantial improvement in her presenting confusion.  Constitutional:   Improving malaise and fatigue.  HEENT:   Denies ear pain, sore throat, sinus or eye problems.  Cardiovascular:   Resolution of what she described as chest pain on presentation.  Respiratory:   Denies shortness of breath, coughing, sputum production, hemoptysis, or wheezing.  Gastrointestinal:   No current abdominal pain.  Now tolerating a diet.  Genitourinary:    Denies any urgency, frequency, hematuria.  Voiding with the use of an external catheter.  Extremities:   Denies lower extremity swelling, edema or cyanosis.   Neurology:    Confusion has resolved.  Psch:   Far less agitated.  She is resting comfortably.  She slept all night.  Musculoskeletal:   and individuals accompanying the patient.    Kylee requires this high level of physician care and nursing in the ICU due to the complexity of decision management and chance of rapid decline or death.  Continued cardiac monitoring and higher level of nursing are required. I am readily available for any further decision-making and intervention.     Jose Jones DO, FBHAVIN.C.O.I.  3/29/2025  5:26 AM

## 2025-03-29 NOTE — PLAN OF CARE
Problem: Pain  Goal: Verbalizes/displays adequate comfort level or baseline comfort level  Outcome: Progressing     Problem: Chronic Conditions and Co-morbidities  Goal: Patient's chronic conditions and co-morbidity symptoms are monitored and maintained or improved  Outcome: Progressing     Problem: Discharge Planning  Goal: Discharge to home or other facility with appropriate resources  Outcome: Progressing     Problem: Safety - Adult  Goal: Free from fall injury  Outcome: Progressing     Problem: Skin/Tissue Integrity  Goal: Skin integrity remains intact  Description: 1.  Monitor for areas of redness and/or skin breakdown  2.  Assess vascular access sites hourly  3.  Every 4-6 hours minimum:  Change oxygen saturation probe site  4.  Every 4-6 hours:  If on nasal continuous positive airway pressure, respiratory therapy assess nares and determine need for appliance change or resting period  Outcome: Progressing     Problem: Neurosensory - Adult  Goal: Achieves stable or improved neurological status  Outcome: Progressing  Goal: Remains free of injury related to seizures activity  Outcome: Progressing  Goal: Achieves maximal functionality and self care  Outcome: Progressing     Problem: Respiratory - Adult  Goal: Achieves optimal ventilation and oxygenation  Outcome: Progressing     Problem: Cardiovascular - Adult  Goal: Maintains optimal cardiac output and hemodynamic stability  Outcome: Progressing  Goal: Absence of cardiac dysrhythmias or at baseline  Outcome: Progressing     Problem: Skin/Tissue Integrity - Adult  Goal: Skin integrity remains intact  Description: 1.  Monitor for areas of redness and/or skin breakdown  2.  Assess vascular access sites hourly  3.  Every 4-6 hours minimum:  Change oxygen saturation probe site  4.  Every 4-6 hours:  If on nasal continuous positive airway pressure, respiratory therapy assess nares and determine need for appliance change or resting period  Outcome: Progressing  Goal:  Incisions, wounds, or drain sites healing without S/S of infection  Outcome: Progressing  Goal: Oral mucous membranes remain intact  Outcome: Progressing     Problem: Musculoskeletal - Adult  Goal: Return mobility to safest level of function  Outcome: Progressing  Goal: Maintain proper alignment of affected body part  Outcome: Progressing  Goal: Return ADL status to a safe level of function  Outcome: Progressing     Problem: Gastrointestinal - Adult  Goal: Minimal or absence of nausea and vomiting  Outcome: Progressing  Goal: Maintains or returns to baseline bowel function  Outcome: Progressing  Goal: Maintains adequate nutritional intake  Outcome: Progressing  Goal: Establish and maintain optimal ostomy function  Outcome: Progressing     Problem: Genitourinary - Adult  Goal: Absence of urinary retention  Outcome: Progressing  Goal: Urinary catheter remains patent  Outcome: Progressing     Problem: Infection - Adult  Goal: Absence of infection at discharge  Outcome: Progressing  Goal: Absence of infection during hospitalization  Outcome: Progressing  Goal: Absence of fever/infection during anticipated neutropenic period  Outcome: Progressing     Problem: Metabolic/Fluid and Electrolytes - Adult  Goal: Electrolytes maintained within normal limits  Outcome: Progressing  Goal: Hemodynamic stability and optimal renal function maintained  Outcome: Progressing  Goal: Glucose maintained within prescribed range  Outcome: Progressing     Problem: Hematologic - Adult  Goal: Maintains hematologic stability  Outcome: Progressing     Problem: Anxiety  Goal: Will report anxiety at manageable levels  Description: INTERVENTIONS:  1. Administer medication as ordered  2. Teach and rehearse alternative coping skills  3. Provide emotional support with 1:1 interaction with staff  Outcome: Progressing     Problem: Coping  Goal: Pt/Family able to verbalize concerns and demonstrate effective coping strategies  Description:

## 2025-03-30 LAB
ALBUMIN SERPL-MCNC: 3.4 G/DL (ref 3.5–5.2)
ALP SERPL-CCNC: 76 U/L (ref 35–104)
ALT SERPL-CCNC: 26 U/L (ref 0–32)
ANION GAP SERPL CALCULATED.3IONS-SCNC: 6 MMOL/L (ref 7–16)
AST SERPL-CCNC: 19 U/L (ref 0–31)
BASOPHILS # BLD: 0.05 K/UL (ref 0–0.2)
BASOPHILS NFR BLD: 1 % (ref 0–2)
BILIRUB SERPL-MCNC: 0.3 MG/DL (ref 0–1.2)
BUN SERPL-MCNC: 15 MG/DL (ref 6–23)
CALCIUM SERPL-MCNC: 9 MG/DL (ref 8.6–10.2)
CHLORIDE SERPL-SCNC: 106 MMOL/L (ref 98–107)
CO2 SERPL-SCNC: 25 MMOL/L (ref 22–29)
CREAT SERPL-MCNC: 1.3 MG/DL (ref 0.5–1)
EOSINOPHIL # BLD: 0.42 K/UL (ref 0.05–0.5)
EOSINOPHILS RELATIVE PERCENT: 5 % (ref 0–6)
ERYTHROCYTE [DISTWIDTH] IN BLOOD BY AUTOMATED COUNT: 17.8 % (ref 11.5–15)
GFR, ESTIMATED: 41 ML/MIN/1.73M2
GLUCOSE BLD-MCNC: 215 MG/DL (ref 74–99)
GLUCOSE BLD-MCNC: 245 MG/DL (ref 74–99)
GLUCOSE BLD-MCNC: 261 MG/DL (ref 74–99)
GLUCOSE SERPL-MCNC: 155 MG/DL (ref 74–99)
HCT VFR BLD AUTO: 30.3 % (ref 34–48)
HGB BLD-MCNC: 9.1 G/DL (ref 11.5–15.5)
IMM GRANULOCYTES # BLD AUTO: 0.03 K/UL (ref 0–0.58)
IMM GRANULOCYTES NFR BLD: 0 % (ref 0–5)
LYMPHOCYTES NFR BLD: 2.09 K/UL (ref 1.5–4)
LYMPHOCYTES RELATIVE PERCENT: 25 % (ref 20–42)
MAGNESIUM SERPL-MCNC: 2.1 MG/DL (ref 1.6–2.6)
MCH RBC QN AUTO: 23.8 PG (ref 26–35)
MCHC RBC AUTO-ENTMCNC: 30 G/DL (ref 32–34.5)
MCV RBC AUTO: 79.1 FL (ref 80–99.9)
MONOCYTES NFR BLD: 0.71 K/UL (ref 0.1–0.95)
MONOCYTES NFR BLD: 9 % (ref 2–12)
NEUTROPHILS NFR BLD: 60 % (ref 43–80)
NEUTS SEG NFR BLD: 4.98 K/UL (ref 1.8–7.3)
PHOSPHATE SERPL-MCNC: 2.8 MG/DL (ref 2.5–4.5)
PLATELET CONFIRMATION: NORMAL
PLATELET, FLUORESCENCE: 332 K/UL (ref 130–450)
PMV BLD AUTO: 9.6 FL (ref 7–12)
POTASSIUM SERPL-SCNC: 4 MMOL/L (ref 3.5–5)
PROT SERPL-MCNC: 6.1 G/DL (ref 6.4–8.3)
RBC # BLD AUTO: 3.83 M/UL (ref 3.5–5.5)
SODIUM SERPL-SCNC: 137 MMOL/L (ref 132–146)
WBC OTHER # BLD: 8.3 K/UL (ref 4.5–11.5)

## 2025-03-30 PROCEDURE — 83735 ASSAY OF MAGNESIUM: CPT

## 2025-03-30 PROCEDURE — 2000000000 HC ICU R&B

## 2025-03-30 PROCEDURE — 6360000002 HC RX W HCPCS

## 2025-03-30 PROCEDURE — 84100 ASSAY OF PHOSPHORUS: CPT

## 2025-03-30 PROCEDURE — 2580000003 HC RX 258: Performed by: INTERNAL MEDICINE

## 2025-03-30 PROCEDURE — 85025 COMPLETE CBC W/AUTO DIFF WBC: CPT

## 2025-03-30 PROCEDURE — 6370000000 HC RX 637 (ALT 250 FOR IP): Performed by: INTERNAL MEDICINE

## 2025-03-30 PROCEDURE — 2500000003 HC RX 250 WO HCPCS: Performed by: INTERNAL MEDICINE

## 2025-03-30 PROCEDURE — 6360000002 HC RX W HCPCS: Performed by: INTERNAL MEDICINE

## 2025-03-30 PROCEDURE — 97530 THERAPEUTIC ACTIVITIES: CPT | Performed by: PHYSICAL THERAPIST

## 2025-03-30 PROCEDURE — 82962 GLUCOSE BLOOD TEST: CPT

## 2025-03-30 PROCEDURE — 99233 SBSQ HOSP IP/OBS HIGH 50: CPT | Performed by: INTERNAL MEDICINE

## 2025-03-30 PROCEDURE — 6370000000 HC RX 637 (ALT 250 FOR IP)

## 2025-03-30 PROCEDURE — 80053 COMPREHEN METABOLIC PANEL: CPT

## 2025-03-30 PROCEDURE — 97161 PT EVAL LOW COMPLEX 20 MIN: CPT | Performed by: PHYSICAL THERAPIST

## 2025-03-30 RX ORDER — AMLODIPINE BESYLATE 5 MG/1
5 TABLET ORAL DAILY
Status: DISCONTINUED | OUTPATIENT
Start: 2025-03-30 | End: 2025-04-01 | Stop reason: HOSPADM

## 2025-03-30 RX ADMIN — HYDRALAZINE HYDROCHLORIDE 5 MG: 20 INJECTION INTRAMUSCULAR; INTRAVENOUS at 13:16

## 2025-03-30 RX ADMIN — ENOXAPARIN SODIUM 40 MG: 100 INJECTION SUBCUTANEOUS at 08:04

## 2025-03-30 RX ADMIN — HYDRALAZINE HYDROCHLORIDE 50 MG: 50 TABLET ORAL at 08:00

## 2025-03-30 RX ADMIN — GABAPENTIN 300 MG: 300 CAPSULE ORAL at 19:56

## 2025-03-30 RX ADMIN — HYDRALAZINE HYDROCHLORIDE 50 MG: 50 TABLET ORAL at 12:32

## 2025-03-30 RX ADMIN — BUPROPION HYDROCHLORIDE 150 MG: 150 TABLET, EXTENDED RELEASE ORAL at 08:04

## 2025-03-30 RX ADMIN — INSULIN LISPRO 4 UNITS: 100 INJECTION, SOLUTION INTRAVENOUS; SUBCUTANEOUS at 11:44

## 2025-03-30 RX ADMIN — ACETAMINOPHEN 650 MG: 325 TABLET ORAL at 20:06

## 2025-03-30 RX ADMIN — METOPROLOL SUCCINATE 25 MG: 25 TABLET, EXTENDED RELEASE ORAL at 08:04

## 2025-03-30 RX ADMIN — GABAPENTIN 300 MG: 300 CAPSULE ORAL at 08:03

## 2025-03-30 RX ADMIN — ACETAMINOPHEN 650 MG: 325 TABLET ORAL at 15:03

## 2025-03-30 RX ADMIN — WATER 1000 MG: 1 INJECTION INTRAMUSCULAR; INTRAVENOUS; SUBCUTANEOUS at 12:32

## 2025-03-30 RX ADMIN — HYDRALAZINE HYDROCHLORIDE 50 MG: 50 TABLET ORAL at 21:10

## 2025-03-30 RX ADMIN — AMLODIPINE BESYLATE 5 MG: 5 TABLET ORAL at 13:20

## 2025-03-30 RX ADMIN — FLUOXETINE HYDROCHLORIDE 40 MG: 20 CAPSULE ORAL at 19:56

## 2025-03-30 RX ADMIN — INSULIN GLARGINE 20 UNITS: 100 INJECTION, SOLUTION SUBCUTANEOUS at 20:00

## 2025-03-30 RX ADMIN — LEVOTHYROXINE SODIUM 75 MCG: 0.07 TABLET ORAL at 06:19

## 2025-03-30 RX ADMIN — SODIUM CHLORIDE: 0.9 INJECTION, SOLUTION INTRAVENOUS at 23:52

## 2025-03-30 RX ADMIN — TRAZODONE HYDROCHLORIDE 50 MG: 50 TABLET ORAL at 19:56

## 2025-03-30 RX ADMIN — ACETAMINOPHEN 650 MG: 325 TABLET ORAL at 06:19

## 2025-03-30 RX ADMIN — INSULIN LISPRO 2 UNITS: 100 INJECTION, SOLUTION INTRAVENOUS; SUBCUTANEOUS at 20:01

## 2025-03-30 RX ADMIN — PANTOPRAZOLE SODIUM 40 MG: 40 TABLET, DELAYED RELEASE ORAL at 06:19

## 2025-03-30 RX ADMIN — INSULIN LISPRO 2 UNITS: 100 INJECTION, SOLUTION INTRAVENOUS; SUBCUTANEOUS at 16:07

## 2025-03-30 RX ADMIN — OLANZAPINE 5 MG: 5 TABLET, FILM COATED ORAL at 19:56

## 2025-03-30 RX ADMIN — LOSARTAN POTASSIUM 100 MG: 50 TABLET, FILM COATED ORAL at 08:04

## 2025-03-30 RX ADMIN — SODIUM CHLORIDE: 0.9 INJECTION, SOLUTION INTRAVENOUS at 02:26

## 2025-03-30 RX ADMIN — HYDRALAZINE HYDROCHLORIDE 5 MG: 20 INJECTION INTRAMUSCULAR; INTRAVENOUS at 17:41

## 2025-03-30 ASSESSMENT — PAIN DESCRIPTION - ORIENTATION
ORIENTATION: MID
ORIENTATION: MID
ORIENTATION: RIGHT;LEFT

## 2025-03-30 ASSESSMENT — PAIN SCALES - GENERAL
PAINLEVEL_OUTOF10: 0
PAINLEVEL_OUTOF10: 2
PAINLEVEL_OUTOF10: 0
PAINLEVEL_OUTOF10: 7
PAINLEVEL_OUTOF10: 0
PAINLEVEL_OUTOF10: 0
PAINLEVEL_OUTOF10: 4
PAINLEVEL_OUTOF10: 0
PAINLEVEL_OUTOF10: 2
PAINLEVEL_OUTOF10: 5

## 2025-03-30 ASSESSMENT — PAIN DESCRIPTION - DESCRIPTORS
DESCRIPTORS: ACHING;TENDER;SORE
DESCRIPTORS: ACHING;DISCOMFORT;NAGGING
DESCRIPTORS: ACHING;DISCOMFORT;NAGGING

## 2025-03-30 ASSESSMENT — PAIN DESCRIPTION - LOCATION
LOCATION: HEAD
LOCATION: HEAD
LOCATION: LEG

## 2025-03-30 NOTE — PROGRESS NOTES
Physical Therapy Initial Evaluation/Plan of Care    Room #:  IHODSD40/JGPHPH03-65  Patient Name: Kylee Wang  YOB: 1940  MRN: [id number]    Date of Service: 3/30/2025     Tentative placement recommendation: Subacute Rehab  Equipment recommendation: To be determined      Evaluating Physical Therapist: Mary Quijano, PT #[id number]      Specific Provider Orders/Date/Referring Provider :  03/28/25 1545    PT eval and treat  Start:  03/28/25 1545,   End:  03/28/25 1545,   ONE TIME,   Standing Count:  1 Occurrences,   R       Sharlene Lilly MD    Admitting Diagnosis:   Bradycardia [R00.1]  Symptomatic bradycardia [R00.1]  Chest pain, unspecified type [R07.9]     dizziness  Surgery: none  Visit Diagnoses         Codes      Chest pain, unspecified type    -  Primary R07.9      Symptomatic bradycardia     R00.1            Patient Active Problem List   Diagnosis    Hypertension    Dyspnea on exertion    Osteopenia of the elderly    Gastroesophageal reflux disease without esophagitis    Autoimmune hypothyroidism    Moderate major depression (HCC)    Nonrheumatic tricuspid valve regurgitation    Hypochromic microcytic anemia    Hyperlipidemia    Depression    Pulmonary hypertension (HCC)    Chronic midline thoracic back pain    Onychomycosis    Pain of toe of left foot    Solid nodule of lung 6 mm to 8 mm in diameter    Fall in elderly patient    Acute midline thoracic back pain    Drug-induced diarrhea    Compression fracture of thoracic vertebra (HCC)    Thoracic facet joint syndrome    Thoracic spondylosis    Thoracic disc disease    Cervical spondylosis    Cervical disc disorder    Cervical facet joint syndrome    Chronic pain syndrome    Spinal stenosis of lumbar region without neurogenic claudication    Diabetes mellitus due to underlying condition, uncontrolled, with hyperglycemia, without long-term current use of insulin (HCC)    Hyperglycemia due to diabetes mellitus (HCC)    Hyperglycemic

## 2025-03-30 NOTE — PROGRESS NOTES
Internal Medicine Progress Note     [facility]     Cuate Christianson D.O., AMARA Jones D.O., AMARA Caraballo D.O.     Desirae Bonilla, MSN, APRN, NP-C  Aren Zaldivar, MSN, APRN-CNP  Augustus Swartz, MSN, APRN, NP-C  Guerda Gunderson, MSN, APRN-CNP  Iliana Mendes, MSN, APRN, NP-C     Primary Care Physician: Roxie Maldonado MD   Admitting Physician:  Cuate Christianson DO  Admission date and time: 3/26/2025  9:12 PM    Room:  [place] [address]/[place] [address]  Admitting diagnosis: Bradycardia [R00.1]  Symptomatic bradycardia [R00.1]  Chest pain, unspecified type [R07.9]    Patient Name: Kylee Wang  MRN: [id number]    Date of Service: 3/30/2025     Subjective:  Kylee is a 84 y.o. female who was seen and examined today,3/30/2025, at the bedside.  Patient resting comfortably at the present time.  The patient mentation has significantly improved and appear to be at baseline.  Patient maintaining sinus rhythm at 70 bpm and the patient is hemodynamically stable.  Discussed with transfer out of ICU today    No family member present    Review of System:   Alert and oriented.  Constitutional:   Improving malaise and fatigue.  HEENT:   Denies ear pain, sore throat, sinus or eye problems.  Cardiovascular:   Resolution of what she described as chest pain on presentation.  Respiratory:   Denies shortness of breath, coughing, sputum production, hemoptysis, or wheezing.  Gastrointestinal:   No current abdominal pain.  Now tolerating a diet.  Genitourinary:    Denies any urgency, frequency, hematuria.  Voiding with the use of an external catheter.  Extremities:   Denies lower extremity swelling, edema or cyanosis.   Neurology:    Confusion has resolved.  Psch:   Far less agitated.  She is resting comfortably.  She slept all night.  Musculoskeletal:    Denies  myalgias, joint complaints or back pain.   Integumentary:   Denies any rashes, ulcers, or excoriations.  Denies

## 2025-03-30 NOTE — PLAN OF CARE
Problem: Pain  Goal: Verbalizes/displays adequate comfort level or baseline comfort level  3/29/2025 2155 by Brie Galeano RN  Outcome: Progressing     Problem: Chronic Conditions and Co-morbidities  Goal: Patient's chronic conditions and co-morbidity symptoms are monitored and maintained or improved  3/29/2025 2155 by Brie Galeano RN  Outcome: Progressing     Problem: Discharge Planning  Goal: Discharge to home or other facility with appropriate resources  3/29/2025 2155 by Brie Galeano RN  Outcome: Progressing     Problem: Safety - Adult  Goal: Free from fall injury  3/29/2025 2155 by Brie Galeano RN  Outcome: Progressing     Problem: Skin/Tissue Integrity  Goal: Skin integrity remains intact  Description: 1.  Monitor for areas of redness and/or skin breakdown  2.  Assess vascular access sites hourly  3.  Every 4-6 hours minimum:  Change oxygen saturation probe site  4.  Every 4-6 hours:  If on nasal continuous positive airway pressure, respiratory therapy assess nares and determine need for appliance change or resting period  3/29/2025 2155 by Brie Galeano RN  Outcome: Progressing     Problem: Neurosensory - Adult  Goal: Achieves stable or improved neurological status  3/29/2025 2155 by Brie Galeano RN  Outcome: Progressing     Problem: Neurosensory - Adult  Goal: Remains free of injury related to seizures activity  3/29/2025 2155 by Brie Galeano RN  Outcome: Progressing     Problem: Neurosensory - Adult  Goal: Achieves maximal functionality and self care  3/29/2025 2155 by Brie Galeano RN  Outcome: Progressing     Problem: Respiratory - Adult  Goal: Achieves optimal ventilation and oxygenation  3/29/2025 2155 by Brie Galeano RN  Outcome: Progressing     Problem: Cardiovascular - Adult  Goal: Maintains optimal cardiac output and hemodynamic stability  3/29/2025 2155 by Brie Galeano RN  Outcome: Progressing     Problem: Cardiovascular - Adult  Goal: Absence of cardiac dysrhythmias or at

## 2025-03-30 NOTE — PLAN OF CARE
Problem: Pain  Goal: Verbalizes/displays adequate comfort level or baseline comfort level  Outcome: Progressing     Problem: Safety - Adult  Goal: Free from fall injury  Outcome: Progressing     Problem: Skin/Tissue Integrity  Goal: Skin integrity remains intact  Description: 1.  Monitor for areas of redness and/or skin breakdown  2.  Assess vascular access sites hourly  3.  Every 4-6 hours minimum:  Change oxygen saturation probe site  4.  Every 4-6 hours:  If on nasal continuous positive airway pressure, respiratory therapy assess nares and determine need for appliance change or resting period  Outcome: Progressing     Problem: Neurosensory - Adult  Goal: Achieves stable or improved neurological status  Outcome: Progressing     Problem: Neurosensory - Adult  Goal: Achieves maximal functionality and self care  Outcome: Progressing     Problem: Cardiovascular - Adult  Goal: Maintains optimal cardiac output and hemodynamic stability  Outcome: Progressing     Problem: Cardiovascular - Adult  Goal: Absence of cardiac dysrhythmias or at baseline  Outcome: Progressing     Problem: Gastrointestinal - Adult  Goal: Maintains adequate nutritional intake  Outcome: Progressing     Problem: Genitourinary - Adult  Goal: Urinary catheter remains patent  Outcome: Progressing

## 2025-03-30 NOTE — PROGRESS NOTES
Assessment and Plan  Patient is a 84 y.o. female with the following medical Problems:   Symptomatic bradycardia  Urinary tract infection  Metabolic encephalopathy  Acute kidney injury on CKD  Paroxysmal atrial fibrillation  Healthcare associated delirium  Type 2 diabetes  Hypertension  Dyslipidemia  Mild to moderate pulmonary hypertension  Heart disease with mitral regurgitation and tricuspid regurgitation    Plan of care:  Continue with ceftriaxone for Klebsiella oxytoca  Speech therapy evaluation.  Diet as tolerated  Decision for full anticoagulation will be left to the discretion of the cardiology team.  Patient is currently on Lovenox 40 mg subcutaneously daily for DVT prophylaxis.  Patient is on Protonix for GI prophylaxis.  Patient is off dopamine and off Precedex.  Echocardiography  PT, OT, and out of bed as tolerated  Insulin sliding scale    History of Present Illness:   Patient is a 84-year-old woman with above-mentioned medical problem who was brought to the emergency room on March 26, 2025 due to weakness, dizziness, and intermittent confusion.  Patient has been describing nonspecific chest pain.  She was initially in A-fib with RVR and was given 1 dose of Cardizem that led her heart rate to drop down to 33.  Patient received atropine twice and was started on dopamine.  She was admitted to the ICU for management of junctional bradycardia as well as hypotension.  Patient is extremely confused and unable to provide history.  UA was mildly positive.  Patient was started initially on ceftriaxone but switched to Unasyn while awaiting for cultures and sensitivity.    Daily progress:  March 28, 2025: Patient is more awake and more interactive today .  Blood pressure has been elevated and home antihypertensive medications were resumed including losartan, metoprolol, and hydralazine.  As needed hydralazine was ordered.  She remains to be intermittently confused.  Patient is more appropriate had a sitter will be

## 2025-03-31 LAB
ALBUMIN SERPL-MCNC: 3.6 G/DL (ref 3.5–5.2)
ALP SERPL-CCNC: 81 U/L (ref 35–104)
ALT SERPL-CCNC: 22 U/L (ref 0–32)
ANION GAP SERPL CALCULATED.3IONS-SCNC: 9 MMOL/L (ref 7–16)
AST SERPL-CCNC: 16 U/L (ref 0–31)
BASOPHILS # BLD: 0.06 K/UL (ref 0–0.2)
BASOPHILS NFR BLD: 1 % (ref 0–2)
BILIRUB SERPL-MCNC: 0.2 MG/DL (ref 0–1.2)
BUN SERPL-MCNC: 13 MG/DL (ref 6–23)
CALCIUM SERPL-MCNC: 9.5 MG/DL (ref 8.6–10.2)
CHLORIDE SERPL-SCNC: 108 MMOL/L (ref 98–107)
CO2 SERPL-SCNC: 24 MMOL/L (ref 22–29)
CREAT SERPL-MCNC: 1.1 MG/DL (ref 0.5–1)
EOSINOPHIL # BLD: 0.48 K/UL (ref 0.05–0.5)
EOSINOPHILS RELATIVE PERCENT: 6 % (ref 0–6)
ERYTHROCYTE [DISTWIDTH] IN BLOOD BY AUTOMATED COUNT: 18.6 % (ref 11.5–15)
GFR, ESTIMATED: 50 ML/MIN/1.73M2
GLUCOSE BLD-MCNC: 164 MG/DL (ref 74–99)
GLUCOSE BLD-MCNC: 187 MG/DL (ref 74–99)
GLUCOSE BLD-MCNC: 231 MG/DL (ref 74–99)
GLUCOSE BLD-MCNC: 242 MG/DL (ref 74–99)
GLUCOSE SERPL-MCNC: 186 MG/DL (ref 74–99)
HCT VFR BLD AUTO: 32.5 % (ref 34–48)
HGB BLD-MCNC: 9.6 G/DL (ref 11.5–15.5)
IMM GRANULOCYTES # BLD AUTO: <0.03 K/UL (ref 0–0.58)
IMM GRANULOCYTES NFR BLD: 0 % (ref 0–5)
LYMPHOCYTES NFR BLD: 1.93 K/UL (ref 1.5–4)
LYMPHOCYTES RELATIVE PERCENT: 23 % (ref 20–42)
MAGNESIUM SERPL-MCNC: 1.8 MG/DL (ref 1.6–2.6)
MCH RBC QN AUTO: 23.5 PG (ref 26–35)
MCHC RBC AUTO-ENTMCNC: 29.5 G/DL (ref 32–34.5)
MCV RBC AUTO: 79.7 FL (ref 80–99.9)
MONOCYTES NFR BLD: 0.71 K/UL (ref 0.1–0.95)
MONOCYTES NFR BLD: 9 % (ref 2–12)
NEUTROPHILS NFR BLD: 62 % (ref 43–80)
NEUTS SEG NFR BLD: 5.12 K/UL (ref 1.8–7.3)
PHOSPHATE SERPL-MCNC: 3.3 MG/DL (ref 2.5–4.5)
PLATELET # BLD AUTO: 388 K/UL (ref 130–450)
PMV BLD AUTO: 10 FL (ref 7–12)
POTASSIUM SERPL-SCNC: 4.2 MMOL/L (ref 3.5–5)
PROT SERPL-MCNC: 6.5 G/DL (ref 6.4–8.3)
RBC # BLD AUTO: 4.08 M/UL (ref 3.5–5.5)
SODIUM SERPL-SCNC: 141 MMOL/L (ref 132–146)
WBC OTHER # BLD: 8.3 K/UL (ref 4.5–11.5)

## 2025-03-31 PROCEDURE — 97165 OT EVAL LOW COMPLEX 30 MIN: CPT

## 2025-03-31 PROCEDURE — 36415 COLL VENOUS BLD VENIPUNCTURE: CPT

## 2025-03-31 PROCEDURE — 97535 SELF CARE MNGMENT TRAINING: CPT

## 2025-03-31 PROCEDURE — 82962 GLUCOSE BLOOD TEST: CPT

## 2025-03-31 PROCEDURE — 2580000003 HC RX 258: Performed by: INTERNAL MEDICINE

## 2025-03-31 PROCEDURE — 83735 ASSAY OF MAGNESIUM: CPT

## 2025-03-31 PROCEDURE — 84100 ASSAY OF PHOSPHORUS: CPT

## 2025-03-31 PROCEDURE — 6370000000 HC RX 637 (ALT 250 FOR IP)

## 2025-03-31 PROCEDURE — 80053 COMPREHEN METABOLIC PANEL: CPT

## 2025-03-31 PROCEDURE — 6370000000 HC RX 637 (ALT 250 FOR IP): Performed by: INTERNAL MEDICINE

## 2025-03-31 PROCEDURE — 85025 COMPLETE CBC W/AUTO DIFF WBC: CPT

## 2025-03-31 PROCEDURE — 92526 ORAL FUNCTION THERAPY: CPT | Performed by: SPEECH-LANGUAGE PATHOLOGIST

## 2025-03-31 PROCEDURE — 6360000002 HC RX W HCPCS

## 2025-03-31 PROCEDURE — 2060000000 HC ICU INTERMEDIATE R&B

## 2025-03-31 RX ADMIN — BUPROPION HYDROCHLORIDE 150 MG: 150 TABLET, EXTENDED RELEASE ORAL at 08:39

## 2025-03-31 RX ADMIN — TRAZODONE HYDROCHLORIDE 50 MG: 50 TABLET ORAL at 21:57

## 2025-03-31 RX ADMIN — PANTOPRAZOLE SODIUM 40 MG: 40 TABLET, DELAYED RELEASE ORAL at 05:38

## 2025-03-31 RX ADMIN — INSULIN LISPRO 2 UNITS: 100 INJECTION, SOLUTION INTRAVENOUS; SUBCUTANEOUS at 18:06

## 2025-03-31 RX ADMIN — AMLODIPINE BESYLATE 5 MG: 5 TABLET ORAL at 08:39

## 2025-03-31 RX ADMIN — FLUOXETINE HYDROCHLORIDE 40 MG: 20 CAPSULE ORAL at 21:58

## 2025-03-31 RX ADMIN — HYDRALAZINE HYDROCHLORIDE 50 MG: 50 TABLET ORAL at 21:57

## 2025-03-31 RX ADMIN — ACETAMINOPHEN 650 MG: 325 TABLET ORAL at 12:44

## 2025-03-31 RX ADMIN — SODIUM CHLORIDE: 0.9 INJECTION, SOLUTION INTRAVENOUS at 01:38

## 2025-03-31 RX ADMIN — INSULIN GLARGINE 20 UNITS: 100 INJECTION, SOLUTION SUBCUTANEOUS at 21:55

## 2025-03-31 RX ADMIN — INSULIN LISPRO 2 UNITS: 100 INJECTION, SOLUTION INTRAVENOUS; SUBCUTANEOUS at 12:40

## 2025-03-31 RX ADMIN — METOPROLOL SUCCINATE 25 MG: 25 TABLET, EXTENDED RELEASE ORAL at 08:39

## 2025-03-31 RX ADMIN — ACETAMINOPHEN 650 MG: 325 TABLET ORAL at 21:58

## 2025-03-31 RX ADMIN — ENOXAPARIN SODIUM 40 MG: 100 INJECTION SUBCUTANEOUS at 08:39

## 2025-03-31 RX ADMIN — GABAPENTIN 300 MG: 300 CAPSULE ORAL at 08:39

## 2025-03-31 RX ADMIN — LOSARTAN POTASSIUM 100 MG: 50 TABLET, FILM COATED ORAL at 08:39

## 2025-03-31 RX ADMIN — GABAPENTIN 300 MG: 300 CAPSULE ORAL at 21:58

## 2025-03-31 RX ADMIN — HYDRALAZINE HYDROCHLORIDE 50 MG: 50 TABLET ORAL at 12:39

## 2025-03-31 RX ADMIN — LEVOTHYROXINE SODIUM 75 MCG: 0.07 TABLET ORAL at 05:38

## 2025-03-31 RX ADMIN — OLANZAPINE 5 MG: 5 TABLET, FILM COATED ORAL at 21:58

## 2025-03-31 RX ADMIN — ACETAMINOPHEN 650 MG: 325 TABLET ORAL at 02:02

## 2025-03-31 RX ADMIN — HYDRALAZINE HYDROCHLORIDE 50 MG: 50 TABLET ORAL at 05:38

## 2025-03-31 ASSESSMENT — PAIN SCALES - GENERAL
PAINLEVEL_OUTOF10: 3
PAINLEVEL_OUTOF10: 6
PAINLEVEL_OUTOF10: 9
PAINLEVEL_OUTOF10: 2
PAINLEVEL_OUTOF10: 0
PAINLEVEL_OUTOF10: 4

## 2025-03-31 ASSESSMENT — PAIN DESCRIPTION - LOCATION
LOCATION: HEAD
LOCATION: HEAD;LEG
LOCATION: GENERALIZED

## 2025-03-31 ASSESSMENT — PAIN DESCRIPTION - DESCRIPTORS
DESCRIPTORS: ACHING
DESCRIPTORS: ACHING

## 2025-03-31 ASSESSMENT — PAIN DESCRIPTION - ORIENTATION: ORIENTATION: RIGHT

## 2025-03-31 ASSESSMENT — PAIN SCALES - WONG BAKER
WONGBAKER_NUMERICALRESPONSE: NO HURT
WONGBAKER_NUMERICALRESPONSE: HURTS A LITTLE BIT
WONGBAKER_NUMERICALRESPONSE: NO HURT

## 2025-03-31 NOTE — PROGRESS NOTES
Date:  3/31/2025  Patient: Kylee Wang  Admission:  3/26/2025  9:12 PM  Admit DX: Bradycardia [R00.1]  Symptomatic bradycardia [R00.1]  Chest pain, unspecified type [R07.9]  Age:  84 y.o., 1940     LOS: 4 days       SUBJECTIVE:      Patient seen and examined  She denies any cardiac symptoms  Slightly anxious  Vitals are stable      OBJECTIVE:    BP (!) 144/68   Pulse 68   Temp 98 °F (36.7 °C) (Oral)   Resp 16   Ht 1.651 m (5' 5\")   Wt 74.6 kg (164 lb 7.4 oz)   SpO2 93%   BMI 27.37 kg/m²     Intake/Output Summary (Last 24 hours) at 3/31/2025 0800  Last data filed at 3/30/2025 2000  Gross per 24 hour   Intake 120 ml   Output 1700 ml   Net -1580 ml       EXAM:     General: Alert and and responsive,No acute distress.  Neck: Supple. No jugular venous distention.  Respiratory: Lungs are clear to auscultation, Respirations are non-labored, Breath sounds are equal, Symmetrical chest wall expansion.  Cardiovascular: Normal rate, No murmur, No gallop, Good pulses equal in all extremities, No edema.  Gastrointestinal: Soft, Non-tender, Non-distended, Normal bowel sounds.  Musculoskeletal: Normal strength, No tenderness, No deformity.  Neurologic: Alert, No focal deficits.  Cognition and Speech   Speech clear and coherent.      Current Inpatient Medications:   amLODIPine  5 mg Oral Daily    hydrALAZINE  50 mg Oral 3 times per day    cefTRIAXone (ROCEPHIN) IV  1,000 mg IntraVENous Q24H    metoprolol succinate  25 mg Oral Daily    losartan  100 mg Oral Daily    enoxaparin  40 mg SubCUTAneous Daily    pantoprazole  40 mg Oral QAM AC    insulin lispro  0-8 Units SubCUTAneous 4x Daily AC & HS    levothyroxine  75 mcg Oral Daily    FLUoxetine  40 mg Oral Nightly    traZODone  50 mg Oral Nightly    OLANZapine  5 mg Oral Nightly    insulin glargine  20 Units SubCUTAneous Nightly    buPROPion  150 mg Oral QAM    gabapentin  300 mg Oral BID       IV Infusions (if any):   dextrose      sodium chloride 50 mL/hr at 03/31/25

## 2025-03-31 NOTE — PROGRESS NOTES
SPEECH LANGUAGE PATHOLOGY  DAILY PROGRESS NOTE      PATIENT NAME:  Kylee Wang      :  1940          TODAY'S DATE:  3/31/2025 ROOM:  [address] [place] [address]    Current Diet: ADULT DIET; Dysphagia - Minced and Moist; 5 carb choices (75 gm/meal)    Pt doing better up to chair feeding self does not like the minced and moist.  Trial of harder solids tolerated well     Recommendation: Regular consistency solids (IDDSI level 7) with  thin liquids (IDDSI level 0)        CPT code(s) 87894  dysphagia tx  Total minutes :  10 minutes    Dolores Bush MSCCC/SLP  Speech Language Pathologist  [id number]

## 2025-03-31 NOTE — PLAN OF CARE
Problem: Pain  Goal: Verbalizes/displays adequate comfort level or baseline comfort level  3/31/2025 0953 by Martha Zhao RN  Outcome: Progressing     Problem: Chronic Conditions and Co-morbidities  Goal: Patient's chronic conditions and co-morbidity symptoms are monitored and maintained or improved  3/31/2025 0953 by Martha Zhao RN  Outcome: Progressing     Problem: Discharge Planning  Goal: Discharge to home or other facility with appropriate resources  3/31/2025 0953 by Martha Zhao RN  Outcome: Progressing     Problem: Safety - Adult  Goal: Free from fall injury  3/31/2025 0953 by Martha Zhao RN  Outcome: Progressing     Problem: Skin/Tissue Integrity  Goal: Skin integrity remains intact  Description: 1.  Monitor for areas of redness and/or skin breakdown  2.  Assess vascular access sites hourly  3.  Every 4-6 hours minimum:  Change oxygen saturation probe site  4.  Every 4-6 hours:  If on nasal continuous positive airway pressure, respiratory therapy assess nares and determine need for appliance change or resting period  3/31/2025 0953 by Matrha Zhao RN  Outcome: Progressing     Problem: Neurosensory - Adult  Goal: Achieves stable or improved neurological status  3/31/2025 0953 by Martha Zhao RN  Outcome: Progressing

## 2025-03-31 NOTE — PROGRESS NOTES
OCCUPATIONAL THERAPY INITIAL EVALUATION    [facility]  [address] [facility] [place]        Date:3/31/2025                                                  Patient Name: Kylee Wang    MRN: [id number]    : 1940    Room: [address] [place] [address]      Evaluating OT: Colette Mccoy OTR/L [id number]     Referring Provider and Specific Provider Orders/Date:      25  OT eval and treat  Start:  25,   End:  25,   ONE TIME,   Standing Count:  1 Occurrences   R         Sharlene Lilly MD      Placement Recommendation: Home with Home Health Occupational Therapy        Diagnosis:   1. Chest pain, unspecified type    2. Symptomatic bradycardia    3. Bradycardia         Surgery: none      Pertinent Medical History:       Past Medical History:   Diagnosis Date    Depression     Diabetes mellitus (HCC)     Hyperlipidemia     Hypertension     Thyroid disease     Type 2 diabetes mellitus without complication (HCC)          Past Surgical History:   Procedure Laterality Date    CHOLECYSTECTOMY      COLONOSCOPY      EYE SURGERY      cataract both eyes    HYSTERECTOMY (CERVIX STATUS UNKNOWN)      JOINT REPLACEMENT Left     KNEE    PAIN MANAGEMENT PROCEDURE Bilateral 2022    BILATERAL THORACIC FACET MEDIAL BRANCH BLOCK AT T8, 9, 10 (CPT 21418) performed by Ozzy Haro MD at [facility] OR    PAIN MANAGEMENT PROCEDURE N/A 10/6/2022    EPIDURAL STEROID INJECTION L3-4 WITH 40 DEPO performed by Ozzy Haro MD at [facility] OR    PAIN MANAGEMENT PROCEDURE Bilateral 2022    BILATERAL T8,9,10 MEDIAL BRANCH BLOCK performed by Ozzy Haro MD at [facility] OR    SHOULDER SURGERY  rt rotator    UPPER GASTROINTESTINAL ENDOSCOPY  5/15//12    toni      Precautions:  Fall Risk, Contact/VRE isolation     Assessment of current deficits:     [x] Functional mobility  [x]ADLs  [x] Strength               []Cognition    [x] Functional transfers

## 2025-03-31 NOTE — PLAN OF CARE
Problem: Pain  Goal: Verbalizes/displays adequate comfort level or baseline comfort level  3/31/2025 0234 by Dolores Rollins RN  Outcome: Progressing  3/30/2025 2023 by Brie Galeano RN  Outcome: Progressing     Problem: Chronic Conditions and Co-morbidities  Goal: Patient's chronic conditions and co-morbidity symptoms are monitored and maintained or improved  3/31/2025 0234 by Dolores Rollins RN  Outcome: Progressing  3/30/2025 2023 by Brie Galeano RN  Outcome: Progressing     Problem: Discharge Planning  Goal: Discharge to home or other facility with appropriate resources  3/31/2025 0234 by Dolores Rollins RN  Outcome: Progressing  3/30/2025 2023 by Brie Galeano RN  Outcome: Progressing     Problem: Safety - Adult  Goal: Free from fall injury  3/31/2025 0234 by Dolores Rollins RN  Outcome: Progressing  3/30/2025 2023 by Brie Galeano RN  Outcome: Progressing     Problem: Skin/Tissue Integrity  Goal: Skin integrity remains intact  Description: 1.  Monitor for areas of redness and/or skin breakdown  2.  Assess vascular access sites hourly  3.  Every 4-6 hours minimum:  Change oxygen saturation probe site  4.  Every 4-6 hours:  If on nasal continuous positive airway pressure, respiratory therapy assess nares and determine need for appliance change or resting period  3/31/2025 0234 by Dolores Rollins RN  Outcome: Progressing  3/30/2025 2023 by Brie Galeano RN  Outcome: Progressing     Problem: Neurosensory - Adult  Goal: Achieves stable or improved neurological status  3/31/2025 0234 by Dolores Rollins RN  Outcome: Progressing  3/30/2025 2023 by Brie Galeano RN  Outcome: Progressing     Problem: Neurosensory - Adult  Goal: Remains free of injury related to seizures activity  3/31/2025 0234 by Dolores Rollins RN  Outcome: Progressing  3/30/2025 2023 by Brie Galeano RN  Outcome: Progressing     Problem: Neurosensory - Adult  Goal: Achieves maximal functionality and self care  3/31/2025 0234 by Dolores Rollins RN  Outcome:  others  5. Encourage participation in the decision making process about the behavioral management agreement  6. If a visitor's behavior poses a threat to safety call refer to organization policy.  7. Initiate consult with , Psychosocial CNS, Spiritual Care as appropriate  Outcome: Progressing     Problem: Depression/Self Harm  Goal: Effect of psychiatric condition will be minimized and patient will be protected from self harm  Description: INTERVENTIONS:  1. Assess impact of patient's symptoms on level of functioning, self care needs and offer support as indicated  2. Assess patient/family knowledge of depression, impact on illness and need for teaching  3. Provide emotional support, presence and reassurance  4. Assess for possible suicidal thoughts or ideation. If patient expresses suicidal thoughts or statements do not leave alone, initiate Suicide Precautions, move to a room close to the nursing station and obtain sitter  5. Initiate consults as appropriate with Mental Health Professional, Spiritual Care, Psychosocial CNS, and consider a recommendation to the LIP for a Psychiatric Consultation  Outcome: Progressing     Problem: Spiritual Care  Goal: Pt/Family able to move forward in process of forgiving self, others, and/or higher power  Description: INTERVENTIONS:  1. Assist patient/family to overcome blocks to healing by use of spiritual practices (prayer, meditation, guided imagery, reiki, breath work, etc).  2. De-myth guilt and help patient/family identify possible irrational spiritual/cultural beliefs and values.  3. Explore possibilities of making amends & reconciliation with self, others, and/or a greater power.  4. Guide patient/family in identifying painful feelings of guilt.  5. Help patient/famiy explore and identify spiritual beliefs, cultural understandings or values that may help or hinder letting go of issue.  6. Help patient/family explore feelings of anger, bitterness,

## 2025-03-31 NOTE — PROGRESS NOTES
Internal Medicine Progress Note     [facility]     Cuate Christianson D.O., AMARA Jones D.O., AMARA Caraballo D.O.     Desirae Bonilla, MSN, APRN, NP-C  Aren Zaldivar, MSN, APRN-CNP  Augustus Swartz, MSN, APRN, NP-C  Guerda Gunderson, MSN, APRN-CNP  Iliana Mendes, MSN, APRN, NP-C     Primary Care Physician: Roxie Maldonado MD   Admitting Physician:  Cuate Christianson DO  Admission date and time: 3/26/2025  9:12 PM    Room:  [address] [place] [address]  Admitting diagnosis: Bradycardia [R00.1]  Symptomatic bradycardia [R00.1]  Chest pain, unspecified type [R07.9]    Patient Name: Kylee Wang  MRN: [id number]    Date of Service: 3/31/2025     Subjective:  Kylee is a 84 y.o. female who was seen and examined today,3/31/2025, at the bedside.  Kylee continues to improve on a daily basis.  She remains profoundly weak and deconditioned and is agreeable to temporary skilled nursing facility placement.  We discussed transitioning to oral medications and discontinuation of IV fluids.    Review of System:   Awake and alert.  Constitutional:   Improving malaise and fatigue.  HEENT:   Denies ear pain, sore throat, sinus or eye problems.  Cardiovascular:   No current chest pain.  No palpitations.  Respiratory:   Denies shortness of breath, coughing, sputum production, hemoptysis, or wheezing.  Gastrointestinal:   No current abdominal pain.  Now tolerating a diet.  Genitourinary:    Denies any urgency, frequency, hematuria.  Voiding with the use of an external catheter.  Extremities:   Denies lower extremity swelling, edema or cyanosis.   Neurology:    No confusion.  Ongoing weakness.  Psch:   Awake and alert.  Return to baseline from a mentation standpoint.  Musculoskeletal:    Denies  myalgias, joint complaints or back pain.   Integumentary:   Denies any rashes, ulcers, or excoriations.  Denies bruising.  Hematologic/Lymphatic:  Denies bruising or bleeding.    Physical     pantoprazole  40 mg Oral QAM AC    insulin lispro  0-8 Units SubCUTAneous 4x Daily AC & HS    levothyroxine  75 mcg Oral Daily    FLUoxetine  40 mg Oral Nightly    traZODone  50 mg Oral Nightly    OLANZapine  5 mg Oral Nightly    insulin glargine  20 Units SubCUTAneous Nightly    buPROPion  150 mg Oral QAM    gabapentin  300 mg Oral BID     Continuous Infusions:   dextrose         Objective Data:  CBC with Differential:    Lab Results   Component Value Date/Time    WBC 8.3 03/31/2025 05:46 AM    RBC 4.08 03/31/2025 05:46 AM    HGB 9.6 03/31/2025 05:46 AM    HCT 32.5 03/31/2025 05:46 AM     03/31/2025 05:46 AM    MCV 79.7 03/31/2025 05:46 AM    MCH 23.5 03/31/2025 05:46 AM    MCHC 29.5 03/31/2025 05:46 AM    RDW 18.6 03/31/2025 05:46 AM    LYMPHOPCT 23 03/31/2025 05:46 AM    MONOPCT 9 03/31/2025 05:46 AM    EOSPCT 6 03/31/2025 05:46 AM    BASOPCT 1 03/31/2025 05:46 AM    MONOSABS 0.71 03/31/2025 05:46 AM    LYMPHSABS 1.93 03/31/2025 05:46 AM    EOSABS 0.48 03/31/2025 05:46 AM    BASOSABS 0.06 03/31/2025 05:46 AM     BMP:    Lab Results   Component Value Date/Time     03/31/2025 05:46 AM    K 4.2 03/31/2025 05:46 AM    K 4.6 09/14/2022 11:53 AM     03/31/2025 05:46 AM    CO2 24 03/31/2025 05:46 AM    BUN 13 03/31/2025 05:46 AM    CREATININE 1.1 03/31/2025 05:46 AM    CALCIUM 9.5 03/31/2025 05:46 AM    GFRAA 52 09/16/2022 06:22 AM    LABGLOM 50 03/31/2025 05:46 AM    LABGLOM 55 01/08/2024 04:38 AM    GLUCOSE 186 03/31/2025 05:46 AM       Assessment:  SVT with development of bradycardia following Cardizem infusion -resolved  Sepsis (POA) secondary to Klebsiella urinary tract infection  Acute septic/infectious encephalopathy with clinical findings to suggest some degree of underlying dementia -resolved  Acute on chronic kidney disease stage III  Paroxysmal atrial fibrillation  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

## 2025-03-31 NOTE — CARE COORDINATION
3/31/25 1015 CM note: covid/flu (-) 3/26/25, urine cx (+) 3/27/25. IV rocephin. Room air. Pt was a transfer from ICU early this am. Spoke with patient regarding therapies recommendation for rehab. Pt choiced for [facility] stating she has been to this facility in the past. Referral given to [facility] for review, imformed her pt is ready to start precert if they can accept. WILL NEED AUTH, SIGNED RONNELL, AND DC ORDER. Patient lives with daughter in a 1.5 story home, 1st floor set up, [address]. Her DME includes a cane, ww, and tub transfer bench. CM will follow. Electronically signed by Hina Adames RN on 3/31/2025 at 10:20 AM     Update: 3/31/25 1246 OT worked with patient today, and her mobility has significantly improved since yesterday (pt walked to & from BR with minimal assist). After speaking again with patient, and also speaking with her daughter Vickie, discharge plan is home. Vickie is requesting [facility] for nursing and therapy. Referral given to [facility] and awaiting their response. [facility] order noted. Pt dtr will transport home. CM will follow. Electronically signed by Hina Adames RN on 3/31/2025 at 12:49 PM

## 2025-04-01 VITALS
HEIGHT: 65 IN | BODY MASS INDEX: 27.4 KG/M2 | SYSTOLIC BLOOD PRESSURE: 147 MMHG | DIASTOLIC BLOOD PRESSURE: 70 MMHG | HEART RATE: 73 BPM | OXYGEN SATURATION: 93 % | WEIGHT: 164.46 LBS | RESPIRATION RATE: 18 BRPM | TEMPERATURE: 97.9 F

## 2025-04-01 LAB
ALBUMIN SERPL-MCNC: 3.4 G/DL (ref 3.5–5.2)
ALP SERPL-CCNC: 69 U/L (ref 35–104)
ALT SERPL-CCNC: 18 U/L (ref 0–32)
ANION GAP SERPL CALCULATED.3IONS-SCNC: 11 MMOL/L (ref 7–16)
AST SERPL-CCNC: 17 U/L (ref 0–31)
BASOPHILS # BLD: 0.05 K/UL (ref 0–0.2)
BASOPHILS NFR BLD: 1 % (ref 0–2)
BILIRUB SERPL-MCNC: <0.2 MG/DL (ref 0–1.2)
BUN SERPL-MCNC: 16 MG/DL (ref 6–23)
CALCIUM SERPL-MCNC: 9.1 MG/DL (ref 8.6–10.2)
CHLORIDE SERPL-SCNC: 106 MMOL/L (ref 98–107)
CO2 SERPL-SCNC: 22 MMOL/L (ref 22–29)
CREAT SERPL-MCNC: 1.4 MG/DL (ref 0.5–1)
EOSINOPHIL # BLD: 0.41 K/UL (ref 0.05–0.5)
EOSINOPHILS RELATIVE PERCENT: 5 % (ref 0–6)
ERYTHROCYTE [DISTWIDTH] IN BLOOD BY AUTOMATED COUNT: 18.4 % (ref 11.5–15)
GFR, ESTIMATED: 37 ML/MIN/1.73M2
GLUCOSE BLD-MCNC: 264 MG/DL (ref 74–99)
GLUCOSE SERPL-MCNC: 148 MG/DL (ref 74–99)
HCT VFR BLD AUTO: 28.8 % (ref 34–48)
HGB BLD-MCNC: 8.6 G/DL (ref 11.5–15.5)
IMM GRANULOCYTES # BLD AUTO: <0.03 K/UL (ref 0–0.58)
IMM GRANULOCYTES NFR BLD: 0 % (ref 0–5)
LYMPHOCYTES NFR BLD: 2.24 K/UL (ref 1.5–4)
LYMPHOCYTES RELATIVE PERCENT: 29 % (ref 20–42)
MAGNESIUM SERPL-MCNC: 1.7 MG/DL (ref 1.6–2.6)
MCH RBC QN AUTO: 23.5 PG (ref 26–35)
MCHC RBC AUTO-ENTMCNC: 29.9 G/DL (ref 32–34.5)
MCV RBC AUTO: 78.7 FL (ref 80–99.9)
MICROORGANISM SPEC CULT: NORMAL
MICROORGANISM SPEC CULT: NORMAL
MONOCYTES NFR BLD: 0.79 K/UL (ref 0.1–0.95)
MONOCYTES NFR BLD: 10 % (ref 2–12)
NEUTROPHILS NFR BLD: 55 % (ref 43–80)
NEUTS SEG NFR BLD: 4.26 K/UL (ref 1.8–7.3)
PHOSPHATE SERPL-MCNC: 3.7 MG/DL (ref 2.5–4.5)
PLATELET # BLD AUTO: 348 K/UL (ref 130–450)
PMV BLD AUTO: 9.5 FL (ref 7–12)
POTASSIUM SERPL-SCNC: 4.1 MMOL/L (ref 3.5–5)
PROT SERPL-MCNC: 5.9 G/DL (ref 6.4–8.3)
RBC # BLD AUTO: 3.66 M/UL (ref 3.5–5.5)
SERVICE CMNT-IMP: NORMAL
SERVICE CMNT-IMP: NORMAL
SODIUM SERPL-SCNC: 139 MMOL/L (ref 132–146)
SPECIMEN DESCRIPTION: NORMAL
SPECIMEN DESCRIPTION: NORMAL
WBC OTHER # BLD: 7.8 K/UL (ref 4.5–11.5)

## 2025-04-01 PROCEDURE — 82962 GLUCOSE BLOOD TEST: CPT

## 2025-04-01 PROCEDURE — 84100 ASSAY OF PHOSPHORUS: CPT

## 2025-04-01 PROCEDURE — 36415 COLL VENOUS BLD VENIPUNCTURE: CPT

## 2025-04-01 PROCEDURE — 85025 COMPLETE CBC W/AUTO DIFF WBC: CPT

## 2025-04-01 PROCEDURE — 6360000002 HC RX W HCPCS

## 2025-04-01 PROCEDURE — 1800000000 HC LEAVE OF ABSENCE

## 2025-04-01 PROCEDURE — 97530 THERAPEUTIC ACTIVITIES: CPT

## 2025-04-01 PROCEDURE — 80053 COMPREHEN METABOLIC PANEL: CPT

## 2025-04-01 PROCEDURE — 6370000000 HC RX 637 (ALT 250 FOR IP): Performed by: INTERNAL MEDICINE

## 2025-04-01 PROCEDURE — 6370000000 HC RX 637 (ALT 250 FOR IP)

## 2025-04-01 PROCEDURE — 97110 THERAPEUTIC EXERCISES: CPT

## 2025-04-01 PROCEDURE — 83735 ASSAY OF MAGNESIUM: CPT

## 2025-04-01 RX ORDER — HYDRALAZINE HYDROCHLORIDE 50 MG/1
50 TABLET, FILM COATED ORAL EVERY 8 HOURS SCHEDULED
Qty: 90 TABLET | Refills: 3 | Status: SHIPPED | OUTPATIENT
Start: 2025-04-01

## 2025-04-01 RX ORDER — AMLODIPINE BESYLATE 5 MG/1
5 TABLET ORAL DAILY
Qty: 30 TABLET | Refills: 3 | Status: SHIPPED | OUTPATIENT
Start: 2025-04-02

## 2025-04-01 RX ORDER — GABAPENTIN 300 MG/1
300 CAPSULE ORAL 2 TIMES DAILY
Qty: 120 CAPSULE | Refills: 2 | Status: SHIPPED | OUTPATIENT
Start: 2025-04-01 | End: 2025-06-30

## 2025-04-01 RX ADMIN — HYDRALAZINE HYDROCHLORIDE 50 MG: 50 TABLET ORAL at 06:10

## 2025-04-01 RX ADMIN — GABAPENTIN 300 MG: 300 CAPSULE ORAL at 08:31

## 2025-04-01 RX ADMIN — METOPROLOL SUCCINATE 25 MG: 25 TABLET, EXTENDED RELEASE ORAL at 08:31

## 2025-04-01 RX ADMIN — ENOXAPARIN SODIUM 40 MG: 100 INJECTION SUBCUTANEOUS at 08:31

## 2025-04-01 RX ADMIN — HYDRALAZINE HYDROCHLORIDE 50 MG: 50 TABLET ORAL at 12:36

## 2025-04-01 RX ADMIN — BUPROPION HYDROCHLORIDE 150 MG: 150 TABLET, EXTENDED RELEASE ORAL at 08:31

## 2025-04-01 RX ADMIN — LEVOTHYROXINE SODIUM 75 MCG: 0.07 TABLET ORAL at 06:10

## 2025-04-01 RX ADMIN — INSULIN LISPRO 2 UNITS: 100 INJECTION, SOLUTION INTRAVENOUS; SUBCUTANEOUS at 12:36

## 2025-04-01 RX ADMIN — PANTOPRAZOLE SODIUM 40 MG: 40 TABLET, DELAYED RELEASE ORAL at 06:10

## 2025-04-01 RX ADMIN — LOSARTAN POTASSIUM 100 MG: 50 TABLET, FILM COATED ORAL at 08:31

## 2025-04-01 RX ADMIN — AMLODIPINE BESYLATE 5 MG: 5 TABLET ORAL at 08:31

## 2025-04-01 RX ADMIN — ACETAMINOPHEN 650 MG: 325 TABLET ORAL at 06:10

## 2025-04-01 NOTE — PLAN OF CARE
Problem: Safety - Adult  Goal: Free from fall injury  4/1/2025 1238 by Martha Zhao RN  Outcome: Adequate for Discharge     Problem: Metabolic/Fluid and Electrolytes - Adult  Goal: Electrolytes maintained within normal limits  4/1/2025 1238 by Martha Zhao RN  Outcome: Adequate for Discharge     Problem: Metabolic/Fluid and Electrolytes - Adult  Goal: Hemodynamic stability and optimal renal function maintained  4/1/2025 1238 by Martha Zhao RN  Outcome: Adequate for Discharge     Problem: Metabolic/Fluid and Electrolytes - Adult  Goal: Glucose maintained within prescribed range  4/1/2025 1238 by Martha Zhao RN  Outcome: Adequate for Discharge     Problem: Anxiety  Goal: Will report anxiety at manageable levels  Description: INTERVENTIONS:  1. Administer medication as ordered  2. Teach and rehearse alternative coping skills  3. Provide emotional support with 1:1 interaction with staff  4/1/2025 1238 by Martha Zhao RN  Outcome: Adequate for Discharge     Problem: Pain  Goal: Verbalizes/displays adequate comfort level or baseline comfort level  4/1/2025 1238 by Martha Zhao RN  Outcome: Completed     Problem: Chronic Conditions and Co-morbidities  Goal: Patient's chronic conditions and co-morbidity symptoms are monitored and maintained or improved  4/1/2025 1238 by Martha Zhao RN  Outcome: Completed     Problem: Discharge Planning  Goal: Discharge to home or other facility with appropriate resources  4/1/2025 1238 by Martha Zhao RN  Outcome: Completed     Problem: Skin/Tissue Integrity  Goal: Skin integrity remains intact  Description: 1.  Monitor for areas of redness and/or skin breakdown  2.  Assess vascular access sites hourly  3.  Every 4-6 hours minimum:  Change oxygen saturation probe site  4.  Every 4-6 hours:  If on nasal continuous positive airway pressure, respiratory therapy assess nares and determine need for appliance

## 2025-04-01 NOTE — PLAN OF CARE
Problem: Pain  Goal: Verbalizes/displays adequate comfort level or baseline comfort level  Outcome: Progressing     Problem: Chronic Conditions and Co-morbidities  Goal: Patient's chronic conditions and co-morbidity symptoms are monitored and maintained or improved  Outcome: Progressing     Problem: Discharge Planning  Goal: Discharge to home or other facility with appropriate resources  Outcome: Progressing     Problem: Safety - Adult  Goal: Free from fall injury  Outcome: Progressing     Problem: Skin/Tissue Integrity  Goal: Skin integrity remains intact  Description: 1.  Monitor for areas of redness and/or skin breakdown  2.  Assess vascular access sites hourly  3.  Every 4-6 hours minimum:  Change oxygen saturation probe site  4.  Every 4-6 hours:  If on nasal continuous positive airway pressure, respiratory therapy assess nares and determine need for appliance change or resting period  Outcome: Progressing     Problem: Neurosensory - Adult  Goal: Achieves stable or improved neurological status  Outcome: Progressing  Goal: Remains free of injury related to seizures activity  Outcome: Progressing  Goal: Achieves maximal functionality and self care  Outcome: Progressing     Problem: Respiratory - Adult  Goal: Achieves optimal ventilation and oxygenation  Outcome: Progressing     Problem: Cardiovascular - Adult  Goal: Maintains optimal cardiac output and hemodynamic stability  Outcome: Progressing  Goal: Absence of cardiac dysrhythmias or at baseline  Outcome: Progressing     Problem: Skin/Tissue Integrity - Adult  Goal: Skin integrity remains intact  Description: 1.  Monitor for areas of redness and/or skin breakdown  2.  Assess vascular access sites hourly  3.  Every 4-6 hours minimum:  Change oxygen saturation probe site  4.  Every 4-6 hours:  If on nasal continuous positive airway pressure, respiratory therapy assess nares and determine need for appliance change or resting period  Outcome: Progressing  Goal:  safety without constant attention obtain sitter and review sitter guidelines with assigned personnel  7. Initiate Psychosocial CNS and Spiritual Care consult, as indicated  Outcome: Progressing     Problem: Behavior  Goal: Pt/Family maintain appropriate behavior and adhere to behavioral management agreement, if implemented  Description: INTERVENTIONS:  1. Assess patient/family's coping skills and  non-compliant behavior (including use of illegal substances)  2. Notify security of behavior or suspected illegal substances which indicate the need for search of the family and/or belongings  3. Encourage verbalization of thoughts and concerns in a socially appropriate manner  4. Utilize positive, consistent limit setting strategies supporting safety of patient, staff and others  5. Encourage participation in the decision making process about the behavioral management agreement  6. If a visitor's behavior poses a threat to safety call refer to organization policy.  7. Initiate consult with , Psychosocial CNS, Spiritual Care as appropriate  Outcome: Progressing     Problem: Depression/Self Harm  Goal: Effect of psychiatric condition will be minimized and patient will be protected from self harm  Description: INTERVENTIONS:  1. Assess impact of patient's symptoms on level of functioning, self care needs and offer support as indicated  2. Assess patient/family knowledge of depression, impact on illness and need for teaching  3. Provide emotional support, presence and reassurance  4. Assess for possible suicidal thoughts or ideation. If patient expresses suicidal thoughts or statements do not leave alone, initiate Suicide Precautions, move to a room close to the nursing station and obtain sitter  5. Initiate consults as appropriate with Mental Health Professional, Spiritual Care, Psychosocial CNS, and consider a recommendation to the [facility] for a Psychiatric Consultation  Outcome: Progressing     Problem: Spiritual

## 2025-04-01 NOTE — CARE COORDINATION
4/1/25 0952 CM note: covid/flu (-) 3/26/25, urine cx (+) 3/27/25. Room air. Discharge order noted. New medications sent to [facility] pharmacy. Discharge plan is home with [facility]. [facility] order noted. Notified [facility] liaison, of pts dc today.  Patient lives with daughter in a 1.5 story home, 1st floor set up, [address]. Her DME includes a cane, ww, and tub transfer bench. Pt dtr will transport home. Electronically signed by Hina Adames RN on 4/1/2025 at 9:54 AM

## 2025-04-01 NOTE — DISCHARGE SUMMARY
Internal Medicine Progress Note     [facility]     Cuate Christianson D.O., ASADOGeorgeI.                         Jose Jones D.O., ASADOGeorgeIGeorge Caraballo D.O.     Desirae Bonilla, MSN, APRN, NP-C  Aren Zaldivar, MSN, APRN-CNP  Augustus Swartz, MSN, APRN, NP-C  Guerda Gunderson, MSN, APRN-CNP  Iliana Mendes, MSN, APRN, NP-C       Internal Medicine  Discharge Summary    NAME: Kylee Wang  :  1940  MRN:  [id number]  PCP:Roxie Maldonado MD  ADMITTED: 3/26/2025      DISCHARGED: 25    ADMITTING PHYSICIAN: Cuate Christianson DO    CONSULTANT(S):   IP CONSULT TO CARDIOLOGY  IP CONSULT TO CRITICAL CARE  IP CONSULT TO HOME CARE NEEDS  IP CONSULT TO HOME CARE NEEDS     ADMITTING DIAGNOSIS:   Bradycardia [R00.1]  Symptomatic bradycardia [R00.1]  Chest pain, unspecified type [R07.9]     DISCHARGE DIAGNOSES:   SVT with development of bradycardia following Cardizem infusion -resolved  Sepsis (POA) secondary to Klebsiella urinary tract infection  Acute septic/infectious encephalopathy with clinical findings to suggest some degree of underlying dementia -resolved  Acute on chronic kidney disease stage III  Paroxysmal atrial fibrillation  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 The patient presented to the hospital here by squad in the early morning on 2025. The patient states apparently today she woke up, at which time the patient was complaining some dizziness and some chest pain. The patient states she has not been feeling well for approximately the past 3 months. The patient presented here by ambulance. Apparently when ambulance arrived, the patient did have a tachycardic rhythm. The patient at that time did receive IV Cardizem, at which time she developed significant bradycardia. This was characterized by

## 2025-04-01 NOTE — PROGRESS NOTES
CLINICAL PHARMACY NOTE: MEDS TO BEDS    Total # of Prescriptions Filled: 2   The following medications were delivered to the patient:  Hydralazine 50 mg  Amlodipine besylate 5 mg  Neurontin - Refill too soon    Additional Documentation:   Daughter picked up at the pharmacy

## 2025-04-01 NOTE — PROGRESS NOTES
Physical Therapy Treatment Note/Plan of Care    Room #:  0513/0513-01  Patient Name: Kylee Wang  YOB: 1940  MRN: [id number]    Date of Service: 4/1/2025     Tentative placement recommendation: Home with Home Health Physical Therapy  Equipment recommendation: To be determined      Evaluating Physical Therapist: Mary Quijano, PT #[id number]      Specific Provider Orders/Date/Referring Provider :  03/28/25 1545    PT eval and treat  Start:  03/28/25 1545,   End:  03/28/25 1545,   ONE TIME,   Standing Count:  1 Occurrences,   R       Sharlene Lilly MD    Admitting Diagnosis:   Bradycardia [R00.1]  Symptomatic bradycardia [R00.1]  Chest pain, unspecified type [R07.9]     dizziness  Surgery: none  Visit Diagnoses         Codes      Chest pain, unspecified type    -  Primary R07.9      Symptomatic bradycardia     R00.1      Chronic low back pain with sciatica, sciatica laterality unspecified, unspecified back pain laterality     M54.40, G89.29            Patient Active Problem List   Diagnosis    Hypertension    Dyspnea on exertion    Osteopenia of the elderly    Gastroesophageal reflux disease without esophagitis    Autoimmune hypothyroidism    Moderate major depression (HCC)    Nonrheumatic tricuspid valve regurgitation    Hypochromic microcytic anemia    Hyperlipidemia    Depression    Pulmonary hypertension (HCC)    Chronic midline thoracic back pain    Onychomycosis    Pain of toe of left foot    Solid nodule of lung 6 mm to 8 mm in diameter    Fall in elderly patient    Acute midline thoracic back pain    Drug-induced diarrhea    Compression fracture of thoracic vertebra (HCC)    Thoracic facet joint syndrome    Thoracic spondylosis    Thoracic disc disease    Cervical spondylosis    Cervical disc disorder    Cervical facet joint syndrome    Chronic pain syndrome    Spinal stenosis of lumbar region without neurogenic claudication    Diabetes mellitus due to underlying condition,  this area   Yes Partial Yes      Treatment:  Patient practiced and was instructed/facilitated in the following treatment: Patient performed supine exercises. Pt  assisted to edge of bed,  Sat edge of bed 10 minutes with Supervision  to increase dynamic sitting balance and activity tolerance. Pt stood, ambulated in the room, and back to the bed. Pt returned to supine and repositioned to her comfort.     Therapeutic Exercises:  ankle pumps, quad sets, heel slide, hip abduction/adduction, straight leg raise, and SAQ,   x 15 reps.  AROM     At end of session, patient in bed with  .  call light and phone within reach,  all lines and tubes intact, nursing notified.      Patient would benefit from continued skilled Physical Therapy to improve functional independence and quality of life.         Patient's/ family goals   get stronger    Time in  10:25  Time out  10:49    Total Treatment Time  24 minutes        CPT codes:    Therapeutic activities (86067)   14 minutes  1 unit(s)  Therapeutic exercises (31954)   10 minutes  1 unit(s)    César Coronel  [facility]  LIC # [id number]

## 2025-04-02 ENCOUNTER — APPOINTMENT (OUTPATIENT)
Dept: GENERAL RADIOLOGY | Age: 85
DRG: 871 | End: 2025-04-02
Payer: MEDICARE

## 2025-04-02 ENCOUNTER — HOSPITAL ENCOUNTER (INPATIENT)
Age: 85
LOS: 3 days | Discharge: HOME HEALTH | DRG: 871 | End: 2025-04-05
Attending: STUDENT IN AN ORGANIZED HEALTH CARE EDUCATION/TRAINING PROGRAM | Admitting: INTERNAL MEDICINE
Payer: MEDICARE

## 2025-04-02 DIAGNOSIS — R53.1 GENERAL WEAKNESS: Primary | ICD-10-CM

## 2025-04-02 DIAGNOSIS — R62.7 FAILURE TO THRIVE IN ADULT: ICD-10-CM

## 2025-04-02 LAB
ALBUMIN SERPL-MCNC: 3.2 G/DL (ref 3.5–5.2)
ALP SERPL-CCNC: 69 U/L (ref 35–104)
ALT SERPL-CCNC: 34 U/L (ref 0–32)
ANION GAP SERPL CALCULATED.3IONS-SCNC: 12 MMOL/L (ref 7–16)
AST SERPL-CCNC: 50 U/L (ref 0–31)
B PARAP IS1001 DNA NPH QL NAA+NON-PROBE: NOT DETECTED
B PERT DNA SPEC QL NAA+PROBE: NOT DETECTED
B-OH-BUTYR SERPL-MCNC: 0.15 MMOL/L (ref 0.02–0.27)
BACTERIA URNS QL MICRO: ABNORMAL
BASOPHILS # BLD: 0.05 K/UL (ref 0–0.2)
BASOPHILS NFR BLD: 1 % (ref 0–2)
BILIRUB SERPL-MCNC: <0.2 MG/DL (ref 0–1.2)
BILIRUB UR QL STRIP: NEGATIVE
BUN SERPL-MCNC: 20 MG/DL (ref 6–23)
C PNEUM DNA NPH QL NAA+NON-PROBE: NOT DETECTED
CALCIUM SERPL-MCNC: 8.3 MG/DL (ref 8.6–10.2)
CHLORIDE SERPL-SCNC: 106 MMOL/L (ref 98–107)
CLARITY UR: CLEAR
CO2 SERPL-SCNC: 19 MMOL/L (ref 22–29)
COLOR UR: YELLOW
CREAT SERPL-MCNC: 1.2 MG/DL (ref 0.5–1)
EOSINOPHIL # BLD: 0.31 K/UL (ref 0.05–0.5)
EOSINOPHILS RELATIVE PERCENT: 3 % (ref 0–6)
EPI CELLS #/AREA URNS HPF: ABNORMAL /HPF
ERYTHROCYTE [DISTWIDTH] IN BLOOD BY AUTOMATED COUNT: 18.2 % (ref 11.5–15)
FLUAV RNA NPH QL NAA+NON-PROBE: NOT DETECTED
FLUAV RNA RESP QL NAA+PROBE: NOT DETECTED
FLUBV RNA NPH QL NAA+NON-PROBE: NOT DETECTED
FLUBV RNA RESP QL NAA+PROBE: NOT DETECTED
GFR, ESTIMATED: 44 ML/MIN/1.73M2
GLUCOSE SERPL-MCNC: 338 MG/DL (ref 74–99)
GLUCOSE UR STRIP-MCNC: 250 MG/DL
HADV DNA NPH QL NAA+NON-PROBE: NOT DETECTED
HCOV 229E RNA NPH QL NAA+NON-PROBE: NOT DETECTED
HCOV HKU1 RNA NPH QL NAA+NON-PROBE: NOT DETECTED
HCOV NL63 RNA NPH QL NAA+NON-PROBE: NOT DETECTED
HCOV OC43 RNA NPH QL NAA+NON-PROBE: NOT DETECTED
HCT VFR BLD AUTO: 29.4 % (ref 34–48)
HGB BLD-MCNC: 9 G/DL (ref 11.5–15.5)
HGB UR QL STRIP.AUTO: NEGATIVE
HMPV RNA NPH QL NAA+NON-PROBE: NOT DETECTED
HPIV1 RNA NPH QL NAA+NON-PROBE: NOT DETECTED
HPIV2 RNA NPH QL NAA+NON-PROBE: NOT DETECTED
HPIV3 RNA NPH QL NAA+NON-PROBE: NOT DETECTED
HPIV4 RNA NPH QL NAA+NON-PROBE: NOT DETECTED
IMM GRANULOCYTES # BLD AUTO: <0.03 K/UL (ref 0–0.58)
IMM GRANULOCYTES NFR BLD: 0 % (ref 0–5)
KETONES UR STRIP-MCNC: NEGATIVE MG/DL
LACTATE BLDV-SCNC: 1.2 MMOL/L (ref 0.5–2.2)
LEUKOCYTE ESTERASE UR QL STRIP: NEGATIVE
LIPASE SERPL-CCNC: 44 U/L (ref 13–60)
LYMPHOCYTES NFR BLD: 1.72 K/UL (ref 1.5–4)
LYMPHOCYTES RELATIVE PERCENT: 18 % (ref 20–42)
M PNEUMO DNA NPH QL NAA+NON-PROBE: NOT DETECTED
MCH RBC QN AUTO: 24.1 PG (ref 26–35)
MCHC RBC AUTO-ENTMCNC: 30.6 G/DL (ref 32–34.5)
MCV RBC AUTO: 78.6 FL (ref 80–99.9)
MONOCYTES NFR BLD: 0.89 K/UL (ref 0.1–0.95)
MONOCYTES NFR BLD: 9 % (ref 2–12)
NEUTROPHILS NFR BLD: 69 % (ref 43–80)
NEUTS SEG NFR BLD: 6.72 K/UL (ref 1.8–7.3)
NITRITE UR QL STRIP: NEGATIVE
PH UR STRIP: 6 [PH] (ref 5–8)
PH VENOUS: 7.44 (ref 7.35–7.45)
PLATELET # BLD AUTO: 317 K/UL (ref 130–450)
PMV BLD AUTO: 9.2 FL (ref 7–12)
POTASSIUM SERPL-SCNC: 4.1 MMOL/L (ref 3.5–5)
PROT SERPL-MCNC: 5.6 G/DL (ref 6.4–8.3)
PROT UR STRIP-MCNC: NEGATIVE MG/DL
RBC # BLD AUTO: 3.74 M/UL (ref 3.5–5.5)
RBC #/AREA URNS HPF: ABNORMAL /HPF
RSV RNA NPH QL NAA+NON-PROBE: NOT DETECTED
RV+EV RNA NPH QL NAA+NON-PROBE: NOT DETECTED
SARS-COV-2 RNA NPH QL NAA+NON-PROBE: NOT DETECTED
SARS-COV-2 RNA RESP QL NAA+PROBE: NOT DETECTED
SODIUM SERPL-SCNC: 137 MMOL/L (ref 132–146)
SOURCE: NORMAL
SP GR UR STRIP: 1.02 (ref 1–1.03)
SPECIMEN DESCRIPTION: NORMAL
SPECIMEN DESCRIPTION: NORMAL
TROPONIN I SERPL HS-MCNC: 36 NG/L (ref 0–9)
UROBILINOGEN UR STRIP-ACNC: 0.2 EU/DL (ref 0–1)
WBC #/AREA URNS HPF: ABNORMAL /HPF
WBC OTHER # BLD: 9.7 K/UL (ref 4.5–11.5)

## 2025-04-02 PROCEDURE — 93005 ELECTROCARDIOGRAM TRACING: CPT | Performed by: STUDENT IN AN ORGANIZED HEALTH CARE EDUCATION/TRAINING PROGRAM

## 2025-04-02 PROCEDURE — 2500000003 HC RX 250 WO HCPCS: Performed by: INTERNAL MEDICINE

## 2025-04-02 PROCEDURE — 87086 URINE CULTURE/COLONY COUNT: CPT

## 2025-04-02 PROCEDURE — 81001 URINALYSIS AUTO W/SCOPE: CPT

## 2025-04-02 PROCEDURE — 6370000000 HC RX 637 (ALT 250 FOR IP): Performed by: INTERNAL MEDICINE

## 2025-04-02 PROCEDURE — 83690 ASSAY OF LIPASE: CPT

## 2025-04-02 PROCEDURE — 99285 EMERGENCY DEPT VISIT HI MDM: CPT

## 2025-04-02 PROCEDURE — 82800 BLOOD PH: CPT

## 2025-04-02 PROCEDURE — 1200000000 HC SEMI PRIVATE

## 2025-04-02 PROCEDURE — 87636 SARSCOV2 & INF A&B AMP PRB: CPT

## 2025-04-02 PROCEDURE — 84484 ASSAY OF TROPONIN QUANT: CPT

## 2025-04-02 PROCEDURE — 83605 ASSAY OF LACTIC ACID: CPT

## 2025-04-02 PROCEDURE — 6360000002 HC RX W HCPCS: Performed by: INTERNAL MEDICINE

## 2025-04-02 PROCEDURE — 87040 BLOOD CULTURE FOR BACTERIA: CPT

## 2025-04-02 PROCEDURE — 2580000003 HC RX 258: Performed by: STUDENT IN AN ORGANIZED HEALTH CARE EDUCATION/TRAINING PROGRAM

## 2025-04-02 PROCEDURE — 82010 KETONE BODYS QUAN: CPT

## 2025-04-02 PROCEDURE — 0202U NFCT DS 22 TRGT SARS-COV-2: CPT

## 2025-04-02 PROCEDURE — 80053 COMPREHEN METABOLIC PANEL: CPT

## 2025-04-02 PROCEDURE — 71045 X-RAY EXAM CHEST 1 VIEW: CPT

## 2025-04-02 PROCEDURE — 85025 COMPLETE CBC W/AUTO DIFF WBC: CPT

## 2025-04-02 RX ORDER — LEVOTHYROXINE SODIUM 75 UG/1
75 TABLET ORAL DAILY
Status: DISCONTINUED | OUTPATIENT
Start: 2025-04-03 | End: 2025-04-05 | Stop reason: HOSPADM

## 2025-04-02 RX ORDER — 0.9 % SODIUM CHLORIDE 0.9 %
1000 INTRAVENOUS SOLUTION INTRAVENOUS ONCE
Status: COMPLETED | OUTPATIENT
Start: 2025-04-02 | End: 2025-04-02

## 2025-04-02 RX ORDER — GLUCAGON 1 MG/ML
1 KIT INJECTION PRN
Status: DISCONTINUED | OUTPATIENT
Start: 2025-04-02 | End: 2025-04-05 | Stop reason: HOSPADM

## 2025-04-02 RX ORDER — MAGNESIUM SULFATE IN WATER 40 MG/ML
2000 INJECTION, SOLUTION INTRAVENOUS PRN
Status: DISCONTINUED | OUTPATIENT
Start: 2025-04-02 | End: 2025-04-05 | Stop reason: HOSPADM

## 2025-04-02 RX ORDER — POTASSIUM CHLORIDE 7.45 MG/ML
10 INJECTION INTRAVENOUS PRN
Status: DISCONTINUED | OUTPATIENT
Start: 2025-04-02 | End: 2025-04-05 | Stop reason: HOSPADM

## 2025-04-02 RX ORDER — INSULIN GLARGINE 100 [IU]/ML
28 INJECTION, SOLUTION SUBCUTANEOUS NIGHTLY
Status: DISCONTINUED | OUTPATIENT
Start: 2025-04-02 | End: 2025-04-05 | Stop reason: HOSPADM

## 2025-04-02 RX ORDER — PANTOPRAZOLE SODIUM 40 MG/1
40 TABLET, DELAYED RELEASE ORAL DAILY
Status: DISCONTINUED | OUTPATIENT
Start: 2025-04-02 | End: 2025-04-05 | Stop reason: HOSPADM

## 2025-04-02 RX ORDER — DEXTROSE MONOHYDRATE 100 MG/ML
INJECTION, SOLUTION INTRAVENOUS CONTINUOUS PRN
Status: DISCONTINUED | OUTPATIENT
Start: 2025-04-02 | End: 2025-04-05 | Stop reason: HOSPADM

## 2025-04-02 RX ORDER — LOSARTAN POTASSIUM 100 MG/1
100 TABLET ORAL DAILY
Status: DISCONTINUED | OUTPATIENT
Start: 2025-04-02 | End: 2025-04-05 | Stop reason: HOSPADM

## 2025-04-02 RX ORDER — SODIUM CHLORIDE 9 MG/ML
INJECTION, SOLUTION INTRAVENOUS PRN
Status: DISCONTINUED | OUTPATIENT
Start: 2025-04-02 | End: 2025-04-05 | Stop reason: HOSPADM

## 2025-04-02 RX ORDER — GABAPENTIN 300 MG/1
300 CAPSULE ORAL 2 TIMES DAILY
Status: DISCONTINUED | OUTPATIENT
Start: 2025-04-02 | End: 2025-04-05 | Stop reason: HOSPADM

## 2025-04-02 RX ORDER — SODIUM CHLORIDE 0.9 % (FLUSH) 0.9 %
5-40 SYRINGE (ML) INJECTION EVERY 12 HOURS SCHEDULED
Status: DISCONTINUED | OUTPATIENT
Start: 2025-04-02 | End: 2025-04-05 | Stop reason: HOSPADM

## 2025-04-02 RX ORDER — ACETAMINOPHEN 650 MG/1
650 SUPPOSITORY RECTAL EVERY 6 HOURS PRN
Status: DISCONTINUED | OUTPATIENT
Start: 2025-04-02 | End: 2025-04-05 | Stop reason: HOSPADM

## 2025-04-02 RX ORDER — TRAZODONE HYDROCHLORIDE 50 MG/1
50 TABLET ORAL NIGHTLY
Status: DISCONTINUED | OUTPATIENT
Start: 2025-04-02 | End: 2025-04-05 | Stop reason: HOSPADM

## 2025-04-02 RX ORDER — AMLODIPINE BESYLATE 5 MG/1
5 TABLET ORAL DAILY
Status: DISCONTINUED | OUTPATIENT
Start: 2025-04-02 | End: 2025-04-05 | Stop reason: HOSPADM

## 2025-04-02 RX ORDER — SODIUM CHLORIDE 0.9 % (FLUSH) 0.9 %
5-40 SYRINGE (ML) INJECTION PRN
Status: DISCONTINUED | OUTPATIENT
Start: 2025-04-02 | End: 2025-04-05 | Stop reason: HOSPADM

## 2025-04-02 RX ORDER — HYDRALAZINE HYDROCHLORIDE 50 MG/1
50 TABLET, FILM COATED ORAL EVERY 8 HOURS SCHEDULED
Status: DISCONTINUED | OUTPATIENT
Start: 2025-04-02 | End: 2025-04-05 | Stop reason: HOSPADM

## 2025-04-02 RX ORDER — METOPROLOL SUCCINATE 25 MG/1
25 TABLET, EXTENDED RELEASE ORAL DAILY
Status: DISCONTINUED | OUTPATIENT
Start: 2025-04-02 | End: 2025-04-05 | Stop reason: HOSPADM

## 2025-04-02 RX ORDER — BUPROPION HYDROCHLORIDE 150 MG/1
150 TABLET ORAL EVERY MORNING
Status: DISCONTINUED | OUTPATIENT
Start: 2025-04-03 | End: 2025-04-05 | Stop reason: HOSPADM

## 2025-04-02 RX ORDER — POTASSIUM CHLORIDE 1500 MG/1
40 TABLET, EXTENDED RELEASE ORAL PRN
Status: DISCONTINUED | OUTPATIENT
Start: 2025-04-02 | End: 2025-04-05 | Stop reason: HOSPADM

## 2025-04-02 RX ORDER — ENOXAPARIN SODIUM 100 MG/ML
40 INJECTION SUBCUTANEOUS DAILY
Status: DISCONTINUED | OUTPATIENT
Start: 2025-04-02 | End: 2025-04-05 | Stop reason: HOSPADM

## 2025-04-02 RX ORDER — POLYETHYLENE GLYCOL 3350 17 G/17G
17 POWDER, FOR SOLUTION ORAL DAILY PRN
Status: DISCONTINUED | OUTPATIENT
Start: 2025-04-02 | End: 2025-04-05 | Stop reason: HOSPADM

## 2025-04-02 RX ORDER — ACETAMINOPHEN 325 MG/1
650 TABLET ORAL EVERY 6 HOURS PRN
Status: DISCONTINUED | OUTPATIENT
Start: 2025-04-02 | End: 2025-04-05 | Stop reason: HOSPADM

## 2025-04-02 RX ADMIN — SODIUM CHLORIDE, PRESERVATIVE FREE 10 ML: 5 INJECTION INTRAVENOUS at 21:14

## 2025-04-02 RX ADMIN — METOPROLOL SUCCINATE 25 MG: 25 TABLET, EXTENDED RELEASE ORAL at 21:11

## 2025-04-02 RX ADMIN — FLUOXETINE HYDROCHLORIDE 40 MG: 20 CAPSULE ORAL at 21:12

## 2025-04-02 RX ADMIN — ENOXAPARIN SODIUM 40 MG: 100 INJECTION SUBCUTANEOUS at 21:12

## 2025-04-02 RX ADMIN — PANTOPRAZOLE SODIUM 40 MG: 40 TABLET, DELAYED RELEASE ORAL at 21:12

## 2025-04-02 RX ADMIN — GABAPENTIN 300 MG: 300 CAPSULE ORAL at 21:11

## 2025-04-02 RX ADMIN — TRAZODONE HYDROCHLORIDE 50 MG: 50 TABLET ORAL at 21:12

## 2025-04-02 RX ADMIN — SODIUM CHLORIDE 1000 ML: 0.9 INJECTION, SOLUTION INTRAVENOUS at 17:02

## 2025-04-02 RX ADMIN — AMLODIPINE BESYLATE 5 MG: 5 TABLET ORAL at 21:12

## 2025-04-02 RX ADMIN — INSULIN GLARGINE 28 UNITS: 100 INJECTION, SOLUTION SUBCUTANEOUS at 21:12

## 2025-04-02 RX ADMIN — HYDRALAZINE HYDROCHLORIDE 50 MG: 50 TABLET ORAL at 21:11

## 2025-04-02 NOTE — PROGRESS NOTES
Date:  4/1/2025  Patient: Kylee Wang  Admission:  3/26/2025  9:12 PM  Admit DX: Bradycardia [R00.1]  Symptomatic bradycardia [R00.1]  Chest pain, unspecified type [R07.9]  Age:  84 y.o., 1940     LOS: 5 days       SUBJECTIVE:      Patient is comfortable  Denies any cardiac symptoms  No further bradycardia      OBJECTIVE:    BP (!) 147/70   Pulse 73   Temp 97.9 °F (36.6 °C) (Oral)   Resp 18   Ht 1.651 m (5' 5\")   Wt 74.6 kg (164 lb 7.4 oz)   SpO2 93%   BMI 27.37 kg/m²   No intake or output data in the 24 hours ending 04/01/25 9932      EXAM:     General: Alert and and responsive,No acute distress.  Neck: Supple. No jugular venous distention.  Respiratory: Lungs are clear to auscultation, Respirations are non-labored, Breath sounds are equal, Symmetrical chest wall expansion.  Cardiovascular: Normal rate, No murmur, No gallop, Good pulses equal in all extremities, No edema.  Gastrointestinal: Soft, Non-tender, Non-distended, Normal bowel sounds.  Musculoskeletal: Normal strength, No tenderness, No deformity.  Neurologic: Alert, No focal deficits.  Cognition and Speech   Speech clear and coherent.      Current Inpatient Medications:        IV Infusions (if any):          Labs:   CBC:   Recent Labs     03/31/25  0546 04/01/25  0730   WBC 8.3 7.8   HGB 9.6* 8.6*   HCT 32.5* 28.8*    348     BMP:   Recent Labs     03/31/25  0546 04/01/25  0730    139   K 4.2 4.1   CO2 24 22   BUN 13 16   CREATININE 1.1* 1.4*   LABGLOM 50* 37*   GLUCOSE 186* 148*     BNP: No results for input(s): \"BNP\" in the last 72 hours.  PT/INR: No results for input(s): \"PROTIME\", \"INR\" in the last 72 hours.  APTT:No results for input(s): \"APTT\" in the last 72 hours.  CARDIAC ENZYMES:No results for input(s): \"CKTOTAL\", \"CKMB\", \"CKMBINDEX\", \"TROPONINI\" in the last 72 hours.  FASTING LIPID PANEL:  Lab Results   Component Value Date/Time    HDL 42 03/28/2025 04:12 AM    TRIG 151 03/28/2025 04:12 AM     LIVER PROFILE:  Recent

## 2025-04-03 LAB
ALBUMIN SERPL-MCNC: 3.6 G/DL (ref 3.5–5.2)
ALP SERPL-CCNC: 72 U/L (ref 35–104)
ALT SERPL-CCNC: 37 U/L (ref 0–32)
ANION GAP SERPL CALCULATED.3IONS-SCNC: 11 MMOL/L (ref 7–16)
AST SERPL-CCNC: 47 U/L (ref 0–31)
BASOPHILS # BLD: 0.06 K/UL (ref 0–0.2)
BASOPHILS NFR BLD: 1 % (ref 0–2)
BILIRUB SERPL-MCNC: 0.2 MG/DL (ref 0–1.2)
BUN SERPL-MCNC: 17 MG/DL (ref 6–23)
CALCIUM SERPL-MCNC: 9.3 MG/DL (ref 8.6–10.2)
CHLORIDE SERPL-SCNC: 108 MMOL/L (ref 98–107)
CO2 SERPL-SCNC: 23 MMOL/L (ref 22–29)
CREAT SERPL-MCNC: 1.2 MG/DL (ref 0.5–1)
EKG ATRIAL RATE: 75 BPM
EKG P-R INTERVAL: 138 MS
EKG Q-T INTERVAL: 396 MS
EKG QRS DURATION: 82 MS
EKG QTC CALCULATION (BAZETT): 442 MS
EKG R AXIS: 41 DEGREES
EKG T AXIS: 80 DEGREES
EKG VENTRICULAR RATE: 75 BPM
EOSINOPHIL # BLD: 0.39 K/UL (ref 0.05–0.5)
EOSINOPHILS RELATIVE PERCENT: 5 % (ref 0–6)
ERYTHROCYTE [DISTWIDTH] IN BLOOD BY AUTOMATED COUNT: 18.4 % (ref 11.5–15)
GFR, ESTIMATED: 43 ML/MIN/1.73M2
GLUCOSE BLD-MCNC: 149 MG/DL (ref 74–99)
GLUCOSE BLD-MCNC: 187 MG/DL (ref 74–99)
GLUCOSE BLD-MCNC: 271 MG/DL (ref 74–99)
GLUCOSE BLD-MCNC: 370 MG/DL (ref 74–99)
GLUCOSE BLD-MCNC: 95 MG/DL (ref 74–99)
GLUCOSE SERPL-MCNC: 166 MG/DL (ref 74–99)
HCT VFR BLD AUTO: 30.2 % (ref 34–48)
HGB BLD-MCNC: 8.8 G/DL (ref 11.5–15.5)
IMM GRANULOCYTES # BLD AUTO: <0.03 K/UL (ref 0–0.58)
IMM GRANULOCYTES NFR BLD: 0 % (ref 0–5)
IRON SATN MFR SERPL: 6 % (ref 15–50)
IRON SERPL-MCNC: 22 UG/DL (ref 37–145)
LYMPHOCYTES NFR BLD: 1.82 K/UL (ref 1.5–4)
LYMPHOCYTES RELATIVE PERCENT: 22 % (ref 20–42)
MAGNESIUM SERPL-MCNC: 1.8 MG/DL (ref 1.6–2.6)
MCH RBC QN AUTO: 23.3 PG (ref 26–35)
MCHC RBC AUTO-ENTMCNC: 29.1 G/DL (ref 32–34.5)
MCV RBC AUTO: 80.1 FL (ref 80–99.9)
MONOCYTES NFR BLD: 0.86 K/UL (ref 0.1–0.95)
MONOCYTES NFR BLD: 10 % (ref 2–12)
NEUTROPHILS NFR BLD: 62 % (ref 43–80)
NEUTS SEG NFR BLD: 5.1 K/UL (ref 1.8–7.3)
PHOSPHATE SERPL-MCNC: 3.6 MG/DL (ref 2.5–4.5)
PLATELET # BLD AUTO: 354 K/UL (ref 130–450)
PMV BLD AUTO: 9.5 FL (ref 7–12)
POTASSIUM SERPL-SCNC: 4 MMOL/L (ref 3.5–5)
PROCALCITONIN SERPL-MCNC: 0.09 NG/ML (ref 0–0.08)
PROT SERPL-MCNC: 6.2 G/DL (ref 6.4–8.3)
RBC # BLD AUTO: 3.77 M/UL (ref 3.5–5.5)
SODIUM SERPL-SCNC: 142 MMOL/L (ref 132–146)
TIBC SERPL-MCNC: 355 UG/DL (ref 250–450)
WBC OTHER # BLD: 8.3 K/UL (ref 4.5–11.5)

## 2025-04-03 PROCEDURE — 97535 SELF CARE MNGMENT TRAINING: CPT

## 2025-04-03 PROCEDURE — 97161 PT EVAL LOW COMPLEX 20 MIN: CPT | Performed by: PHYSICAL THERAPIST

## 2025-04-03 PROCEDURE — 84207 ASSAY OF VITAMIN B-6: CPT

## 2025-04-03 PROCEDURE — 6370000000 HC RX 637 (ALT 250 FOR IP): Performed by: INTERNAL MEDICINE

## 2025-04-03 PROCEDURE — 36415 COLL VENOUS BLD VENIPUNCTURE: CPT

## 2025-04-03 PROCEDURE — 85025 COMPLETE CBC W/AUTO DIFF WBC: CPT

## 2025-04-03 PROCEDURE — 6370000000 HC RX 637 (ALT 250 FOR IP)

## 2025-04-03 PROCEDURE — 2500000003 HC RX 250 WO HCPCS: Performed by: INTERNAL MEDICINE

## 2025-04-03 PROCEDURE — 82962 GLUCOSE BLOOD TEST: CPT

## 2025-04-03 PROCEDURE — 6360000002 HC RX W HCPCS: Performed by: INTERNAL MEDICINE

## 2025-04-03 PROCEDURE — 1200000000 HC SEMI PRIVATE

## 2025-04-03 PROCEDURE — 84100 ASSAY OF PHOSPHORUS: CPT

## 2025-04-03 PROCEDURE — 97165 OT EVAL LOW COMPLEX 30 MIN: CPT

## 2025-04-03 PROCEDURE — 83540 ASSAY OF IRON: CPT

## 2025-04-03 PROCEDURE — 93010 ELECTROCARDIOGRAM REPORT: CPT | Performed by: INTERNAL MEDICINE

## 2025-04-03 PROCEDURE — 80053 COMPREHEN METABOLIC PANEL: CPT

## 2025-04-03 PROCEDURE — 83735 ASSAY OF MAGNESIUM: CPT

## 2025-04-03 PROCEDURE — 84145 PROCALCITONIN (PCT): CPT

## 2025-04-03 PROCEDURE — 83550 IRON BINDING TEST: CPT

## 2025-04-03 PROCEDURE — 97530 THERAPEUTIC ACTIVITIES: CPT | Performed by: PHYSICAL THERAPIST

## 2025-04-03 RX ORDER — INSULIN LISPRO 100 [IU]/ML
0-8 INJECTION, SOLUTION INTRAVENOUS; SUBCUTANEOUS
Status: DISCONTINUED | OUTPATIENT
Start: 2025-04-03 | End: 2025-04-05 | Stop reason: HOSPADM

## 2025-04-03 RX ADMIN — SODIUM CHLORIDE, PRESERVATIVE FREE 10 ML: 5 INJECTION INTRAVENOUS at 09:55

## 2025-04-03 RX ADMIN — INSULIN GLARGINE 28 UNITS: 100 INJECTION, SOLUTION SUBCUTANEOUS at 20:23

## 2025-04-03 RX ADMIN — ENOXAPARIN SODIUM 40 MG: 100 INJECTION SUBCUTANEOUS at 09:55

## 2025-04-03 RX ADMIN — PANTOPRAZOLE SODIUM 40 MG: 40 TABLET, DELAYED RELEASE ORAL at 09:54

## 2025-04-03 RX ADMIN — INSULIN LISPRO 4 UNITS: 100 INJECTION, SOLUTION INTRAVENOUS; SUBCUTANEOUS at 20:22

## 2025-04-03 RX ADMIN — GABAPENTIN 300 MG: 300 CAPSULE ORAL at 20:21

## 2025-04-03 RX ADMIN — SODIUM CHLORIDE, PRESERVATIVE FREE 10 ML: 5 INJECTION INTRAVENOUS at 20:38

## 2025-04-03 RX ADMIN — ACETAMINOPHEN 650 MG: 325 TABLET ORAL at 20:39

## 2025-04-03 RX ADMIN — HYDRALAZINE HYDROCHLORIDE 50 MG: 50 TABLET ORAL at 16:00

## 2025-04-03 RX ADMIN — GABAPENTIN 300 MG: 300 CAPSULE ORAL at 09:54

## 2025-04-03 RX ADMIN — HYDRALAZINE HYDROCHLORIDE 50 MG: 50 TABLET ORAL at 06:15

## 2025-04-03 RX ADMIN — METOPROLOL SUCCINATE 25 MG: 25 TABLET, EXTENDED RELEASE ORAL at 09:54

## 2025-04-03 RX ADMIN — INSULIN LISPRO 8 UNITS: 100 INJECTION, SOLUTION INTRAVENOUS; SUBCUTANEOUS at 11:17

## 2025-04-03 RX ADMIN — HYDRALAZINE HYDROCHLORIDE 50 MG: 50 TABLET ORAL at 20:21

## 2025-04-03 RX ADMIN — AMLODIPINE BESYLATE 5 MG: 5 TABLET ORAL at 09:54

## 2025-04-03 RX ADMIN — BUPROPION HYDROCHLORIDE 150 MG: 150 TABLET, EXTENDED RELEASE ORAL at 09:54

## 2025-04-03 RX ADMIN — LEVOTHYROXINE SODIUM 75 MCG: 0.07 TABLET ORAL at 06:15

## 2025-04-03 RX ADMIN — FLUOXETINE HYDROCHLORIDE 40 MG: 20 CAPSULE ORAL at 20:21

## 2025-04-03 RX ADMIN — TRAZODONE HYDROCHLORIDE 50 MG: 50 TABLET ORAL at 20:21

## 2025-04-03 ASSESSMENT — PAIN SCALES - WONG BAKER: WONGBAKER_NUMERICALRESPONSE: NO HURT

## 2025-04-03 ASSESSMENT — PAIN SCALES - GENERAL: PAINLEVEL_OUTOF10: 3

## 2025-04-03 ASSESSMENT — PAIN DESCRIPTION - LOCATION: LOCATION: LEG

## 2025-04-03 NOTE — CONSULTS
Today's Date: 4/3/2025  Patient Name: Kylee Wang  Date of admission: 4/2/2025  4:02 PM  Patient's age: 84 y.o., 1940female    Admission Dx: Failure to thrive in adult [R62.7]    Requesting Physician: Cuate Christianson DO    Chief Complaint   Patient presents with    Shortness of Breath     Pt complaint of shortness of breath beginning this morning. Pt states she feels unwell       HISTORY OF PRESENT ILLNESS:      84-year-old pleasant lady who was recently discharged from hospital after she was treated for SVT, NSTEMI, and UTI.  She presented again to the emergency room secondary to sudden onset feeling of sickness at home after she woke up in the morning.  She could not express whether she had chest pain but she said she felt some shortness of breath and could not walk without feeling very tired and dizzy.  Her EKG showed sinus rhythm.  Heart rate has been in the 60s.  Her blood pressure is normal.  Her sugar is not well controlled      Review of systems:    All other reviews of systems are negative, except as mentioned above.        Past Medical History:   has a past medical history of Depression, Diabetes mellitus (HCC), Hyperlipidemia, Hypertension, Thyroid disease, and Type 2 diabetes mellitus without complication (HCC).    Past Surgical History:   has a past surgical history that includes Cholecystectomy; shoulder surgery (rt rotator); Colonoscopy; Hysterectomy; eye surgery; Upper gastrointestinal endoscopy (5/15//12); joint replacement (Left); Pain management procedure (Bilateral, 8/11/2022); Pain management procedure (N/A, 10/6/2022); and Pain management procedure (Bilateral, 12/12/2022).     Social History:   reports that she has never smoked. She has never used smokeless tobacco. She reports that she does not drink alcohol and does not use drugs.     Family History:    family history includes Cancer in her father and mother; Coronary Art Dis in her brother; Stroke in her mother..     Allergies:   process. The osseous structures are without acute process.     No acute process.     Echo (TTE) complete (PRN contrast/bubble/strain/3D)  Result Date: 3/27/2025    Left Ventricle: Normal left ventricular systolic function with a visually estimated EF of 50 - 55%. Mildly increased ventricular mass.   Aortic Valve: Trileaflet valve. Mild regurgitation.   Mitral Valve: Moderate to severe regurgitation.   Tricuspid Valve: Moderate to severe regurgitation. RVSP is 46.   Pulmonic Valve: Moderate regurgitation.   Left Atrium: Left atrium is mildly dilated.   Right Atrium: Right atrium is mildly dilated.   Pulmonary Arteries: Mild to moderate pulmonary hypertension present.   Pericardium: Trivial pericardial effusion present. No indication of cardiac tamponade.   IVC/SVC: IVC is moderately dilated.   Image quality is adequate.     CTA CHEST W CONTRAST  Result Date: 3/27/2025  EXAMINATION: CTA OF THE CHEST 3/27/2025 12:30 am TECHNIQUE: CTA of the chest was performed after the administration of intravenous contrast.  Multiplanar reformatted images are provided for review.  MIP images are provided for review. Automated exposure control, iterative reconstruction, and/or weight based adjustment of the mA/kV was utilized to reduce the radiation dose to as low as reasonably achievable. COMPARISON: None. HISTORY: ORDERING SYSTEM PROVIDED HISTORY: chest pain, r/o PE TECHNOLOGIST PROVIDED HISTORY: Reason for exam:->chest pain, r/o PE Additional Contrast?->1 FINDINGS: Pulmonary Arteries: Pulmonary arteries are adequately opacified for evaluation.  No evidence of intraluminal filling defect to suggest pulmonary embolism.  Main pulmonary artery is normal in caliber. Mediastinum: No evidence of mediastinal lymphadenopathy.  The heart and pericardium demonstrate no acute abnormality.  There is no acute abnormality of the thoracic aorta. Lungs/pleura: The lungs are without acute process.  No focal consolidation or pulmonary edema.  No  air cells demonstrate no acute abnormality. SOFT TISSUES/SKULL:  No acute abnormality of the visualized skull or soft tissues.     1. No acute intracranial abnormality. 2. Periventricular white matter changes consistent with chronic microvascular disease. 3. Diffuse volume loss.           03/26/25    ECHO (TTE) COMPLETE (PRN CONTRAST/BUBBLE/STRAIN/3D) 03/27/2025  6:58 PM (Final)    Interpretation Summary    Left Ventricle: Normal left ventricular systolic function with a visually estimated EF of 50 - 55%. Mildly increased ventricular mass.    Aortic Valve: Trileaflet valve. Mild regurgitation.    Mitral Valve: Moderate to severe regurgitation.    Tricuspid Valve: Moderate to severe regurgitation. RVSP is 46.    Pulmonic Valve: Moderate regurgitation.    Left Atrium: Left atrium is mildly dilated.    Right Atrium: Right atrium is mildly dilated.    Pulmonary Arteries: Mild to moderate pulmonary hypertension present.    Pericardium: Trivial pericardial effusion present. No indication of cardiac tamponade.    IVC/SVC: IVC is moderately dilated.    Image quality is adequate.    Signed by: Richy Seaman on 3/27/2025  6:58 PM        IMPRESSION:      Dizziness  Dyspnea  SVT  Hypertension  Uncontrolled diabetes  Chronic renal insufficiency stage one  Hyperlipidemia  Recent NSTEMI    RECOMMENDATIONS:      Her symptoms are vague  She is a poor historian  She may have had recurrent SVT that caused her to have dizziness.  She did have acutely elevated enzymes during her recent admission and she is diabetic therefore she may have also significant coronary disease.  Will have to discuss with her family if they would prefer she has a pharmacological nuclear stress test or invasive evaluation or just medical therapy.  Continue telemetry      Hany Myrick MD, MD, FACC

## 2025-04-03 NOTE — PROGRESS NOTES
OCCUPATIONAL THERAPY INITIAL EVALUATION    [facility]  [address] [place]        Date:4/3/2025                                                  Patient Name: Kylee Wang    MRN: [id number]    : 1940    Room: [address] [place] [address]      Evaluating OT: Jayda Chu OTR/L; [id number]     Referring Provider and Specific Provider Orders/Date:      25  OT eval and treat  Start:  25,   End:  25,   ONE TIME,   Standing Count:  1 Occurrences,   R         Malou, Ismail U, DO      Placement Recommendation: [facility]        Diagnosis:   No diagnosis found.       Surgery: none       Pertinent Medical History:       Past Medical History:   Diagnosis Date    Depression     Diabetes mellitus (HCC)     Hyperlipidemia     Hypertension     Thyroid disease     Type 2 diabetes mellitus without complication (HCC)          Past Surgical History:   Procedure Laterality Date    CHOLECYSTECTOMY      COLONOSCOPY      EYE SURGERY      cataract both eyes    HYSTERECTOMY (CERVIX STATUS UNKNOWN)      JOINT REPLACEMENT Left     KNEE    PAIN MANAGEMENT PROCEDURE Bilateral 2022    BILATERAL THORACIC FACET MEDIAL BRANCH BLOCK AT T8, 9, 10 (CPT 23501) performed by Ozzy Haro MD at [facility] OR    PAIN MANAGEMENT PROCEDURE N/A 10/6/2022    EPIDURAL STEROID INJECTION L3-4 WITH 40 DEPO performed by Ozzy Haro MD at [facility] OR    PAIN MANAGEMENT PROCEDURE Bilateral 2022    BILATERAL T8,9,10 MEDIAL BRANCH BLOCK performed by Ozzy Haro MD at [facility] OR    SHOULDER SURGERY  rt rotator    UPPER GASTROINTESTINAL ENDOSCOPY  5/15//12    toni      Precautions:  Fall Risk, contact isolation      Assessment of current deficits:     [x] Functional mobility  [x]ADLs  [x] Strength               []Cognition    [x] Functional transfers   [x] IADLs         [] Safety Awareness   [x]Endurance    [] Fine Coordination              [x] Balance      []    Manual [id number]     Neuro Re-Ed [id number]       Non-Billable Time        Evaluation Time additionally includes thorough review of current medical information, gathering information on past medical history/social history and prior level of function, interpretation of standardized testing/informal observation of tasks, assessment of data and development of plan of care and goals.        Evaluating OT: Jayda Chu OTR/L; [id number]

## 2025-04-03 NOTE — PROGRESS NOTES
4 Eyes Skin Assessment     NAME:  Kylee Wang  YOB: 1940  MEDICAL RECORD NUMBER:  [id number]    The patient is being assessed for  Admission    I agree that at least one RN has performed a thorough Head to Toe Skin Assessment on the patient. ALL assessment sites listed below have been assessed.      Areas assessed by both nurses:    Head, Face, Ears, Shoulders, Back, Chest, Arms, Elbows, Hands, Sacrum. Buttock, Coccyx, Ischium, and Legs. Feet and Heels        Does the Patient have a Wound? No noted wound(s)       Efrain Prevention initiated by RN: Yes  Wound Care Orders initiated by RN: No    Pressure Injury (Stage 3,4, Unstageable, DTI, NWPT, and Complex wounds) if present, place Wound referral order by RN under : No    New Ostomies, if present place, Ostomy referral order under : No     Nurse 1 eSignature: Electronically signed by Janina Waddell RN on 4/3/25 at 4:31 AM EDT    **SHARE this note so that the co-signing nurse can place an eSignature**    Nurse 2 eSignature: Electronically signed by Cristiana Justin RN on 4/3/25 at 4:32 AM EDT

## 2025-04-03 NOTE — PLAN OF CARE
Problem: Safety - Adult  Goal: Free from fall injury  4/3/2025 1941 by Maxine Bautista RN  Outcome: Progressing     Problem: Chronic Conditions and Co-morbidities  Goal: Patient's chronic conditions and co-morbidity symptoms are monitored and maintained or improved  4/3/2025 1941 by Maxine Bautista RN  Outcome: Progressing     Problem: Discharge Planning  Goal: Discharge to home or other facility with appropriate resources  4/3/2025 1941 by Maxine Bautista RN  Outcome: Progressing     Problem: ABCDS Injury Assessment  Goal: Absence of physical injury  Outcome: Progressing

## 2025-04-03 NOTE — CARE COORDINATION
Case Management Assessment  Initial Evaluation    Date/Time of Evaluation: 4/3/2025 3:48 PM  Assessment Completed by: ATIYA Roca    If patient is discharged prior to next notation, then this note serves as note for discharge by case management.    Patient Name: Kylee Wang                   YOB: 1940  Diagnosis: Failure to thrive in adult [R62.7]                   Date / Time: 4/2/2025  4:02 PM    Patient Admission Status: Inpatient   Readmission Risk (Low < 19, Mod (19-27), High > 27): Readmission Risk Score: 21.1    Current PCP: Roxie Maldonado MD  PCP verified by CM?      Chart Reviewed: Yes      History Provided by: Patient  Patient Orientation: Alert and Oriented    Patient Cognition: Alert    Hospitalization in the last 30 days (Readmission):  Yes    If yes, Readmission Assessment in  Navigator will be completed.Readmission Assessment  Number of Days since last admission?: 1-7 days  Previous Disposition: Home with Home Health  Who is being Interviewed: Patient  What was the patient's/caregiver's perception as to why they think they needed to return back to the hospital?:  (pt says her \"whole body\" started huring and was having difficutly walking)  Did you visit your Primary Care Physician after you left the hospital, before you returned this time?: Yes (pt readmitted before her scheduled appointment)  Did you see a specialist, such as Cardiac, Pulmonary, Orthopedic Physician, etc. after you left the hospital?: No  Who advised the patient to return to the hospital?: Self-referral  Does the patient report anything that got in the way of taking their medications?: No  In our efforts to provide the best possible care to you and others like you, can you think of anything that we could have done to help you after you left the hospital the first time, so that you might not have needed to return so soon?: Other (Comment) (no)     Advance Directives:      Code Status: Full Code   Patient's  helps her as needed and she was also active with [facility] by Brian, pt has gone to [facility] in the past but currently does NOT want to consider rehab placement again, she [facility], has a w/w and says her dtr will transport her home, sw/cm will follow for PT recommendations to confirm d/c plan.Electronically signed by ATIYA Roca on 4/3/2025 at 3:43 PM    The Plan for Transition of Care is related to the following treatment goals of Failure to thrive in adult [R62.7]    IF APPLICABLE: The Patient and/or patient representative Kylee and her family were provided with a choice of provider and agrees with the discharge plan. Freedom of choice list with basic dialogue that supports the patient's individualized plan of care/goals and shares the quality data associated with the providers was provided to:     Patient Representative Name:       The Patient and/or Patient Representative Agree with the Discharge Plan?      ATIYA Roca  Case Management Department  Ph: [phone]

## 2025-04-03 NOTE — PROGRESS NOTES
Physical Therapy Initial Evaluation/Plan of Care    Room #:  0332/0332-01  Patient Name: Kylee Wang  YOB: 1940  MRN: [id number]    Date of Service: 4/3/2025     Tentative placement recommendation: Home with Home Health Physical Therapy  Equipment recommendation: To be determined      Evaluating Physical Therapist: Shaw Morrison, PT #[id number]      Specific Provider Orders/Date/Referring Provider :    PT eval and treat  Start:  04/03/25 0245,   End:  04/03/25 0245,   ONE TIME,   Standing Count:  1 Occurrences,   R       Malou, Ismail U, DO    Admitting Diagnosis:   Failure to thrive in adult [R62.7]      Surgery: none      Patient Active Problem List   Diagnosis    Hypertension    Dyspnea on exertion    Osteopenia of the elderly    Gastroesophageal reflux disease without esophagitis    Autoimmune hypothyroidism    Moderate major depression (HCC)    Nonrheumatic tricuspid valve regurgitation    Hypochromic microcytic anemia    Hyperlipidemia    Depression    Pulmonary hypertension (HCC)    Chronic midline thoracic back pain    Onychomycosis    Pain of toe of left foot    Solid nodule of lung 6 mm to 8 mm in diameter    Fall in elderly patient    Acute midline thoracic back pain    Drug-induced diarrhea    Compression fracture of thoracic vertebra (HCC)    Thoracic facet joint syndrome    Thoracic spondylosis    Thoracic disc disease    Cervical spondylosis    Cervical disc disorder    Cervical facet joint syndrome    Chronic pain syndrome    Spinal stenosis of lumbar region without neurogenic claudication    Diabetes mellitus due to underlying condition, uncontrolled, with hyperglycemia, without long-term current use of insulin (HCC)    Hyperglycemia due to diabetes mellitus (HCC)    Hyperglycemic crisis in diabetes mellitus (HCC)    Acute cystitis without hematuria    Lumbar radiculopathy    Nocturnal hypoglycemia in patient with type 2 diabetes mellitus (HCC)    Diabetes mellitus (HCC)    Type 2  admitting diagnosis and active problem list as listed above have been reviewed prior to the initiation of this evaluation.    OBJECTIVE:   Initial Evaluation  Date: 4/3/2025 Treatment Date:     Short Term/ Long Term   Goals   Was pt agreeable to Eval/treatment? Yes  To be met in 5 days   Pain level   No c/o pain     Bed Mobility  Using rails and head of bed elevated:     Rolling: Not assessed     Supine to sit: Supervision     Sit to supine: Supervision     Scooting: Not assessed     Rolling: Independent    Supine to sit: Independent    Sit to supine: Independent    Scooting: Independent     Transfers Sit to stand: Minimal assist of 1   Sit to stand: Modified Independent    Ambulation    Pt AMB x2 40ft & x1 50ft all with minAx1 & without AD   Pt AMB 125ft with RW & SBAx1    Stair negotiation: ascended and descended   Not assessed     2 steps    ROM Within functional limits    Increase range of motion 10% of affected joints    Strength BUE:  refer to OT eval  RLE:  3/5  LLE:  3/5  Increase strength in affected mm groups by 1/3 grade   Balance Sitting EOB:  good   Dynamic Standing:  fair   Sitting EOB:  WNL  Dynamic Standing: good      Patient is Alert & Oriented x person, place, time, and situation and follows directions    Sensation:  Patient  denies numbness/tingling   Edema:  no   Endurance: fair      Vitals: room air     Patient education  Patient educated on role of Physical Therapy, risks of immobility, safety and plan of care, energy conservation,  importance of mobility while in hospital , and safety      Patient response to education:   Pt verbalized understanding Pt demonstrated skill Pt requires further education in this area   Yes Partial Yes      Treatment:  Patient practiced and was instructed/facilitated in the following treatment: Patient  Sat edge of bed 10 minutes with Supervision  to increase dynamic sitting balance and activity tolerance. Therapeutic Activity 10min pt AMB x2 40ft & x1 60ft all  with minAx1 & without AD     Therapeutic Exercises:  not performed  x  reps.       At end of session, patient in bed with alarm call light and phone within reach,  all lines and tubes intact, nursing notified.      Patient would benefit from continued skilled Physical Therapy to improve functional independence and quality of life.         Patient's/ family goals   home    Time in  1615  Time out  1645    Total Treatment Time  10 minutes    Evaluation time includes thorough review of current medical information, gathering information on past medical history/social history and prior level of function, completion of standardized testing/informal observation of tasks, assessment of data, and development of Plan of care and goals.     CPT codes:  Low Complexity PT evaluation (19157)  Therapeutic activities (28794)   10 minutes  1 unit(s)    Shaw Morrison, PT

## 2025-04-03 NOTE — PROGRESS NOTES
Internal Medicine Consult Note    [facility]    Cuate Christianson D.O., ASADOGeorgeI.                    Jose Jones D.O., LORIEI.                             Shawn Caraballo D.O.     Desirae Bonilla, MSN, APRN, NP-C  Aren Zaldivar, MSN, APRN-CNP  Augustus Swartz, MSN, APRN-CNP  Guerda Gunderson, MSN APRN-CNP  Iliana Mendes, MSN. APRN-NP-C     Primary Care Physician: Roxie Maldonado MD   Admitting Physician:  Cuate Christianson DO  Admission date and time: 4/2/2025  4:02 PM    Room:  [address] [place], [place] [address]-  Admitting diagnosis: Failure to thrive in adult [R62.7]    Patient Name: Kylee Wang  MRN: [id number]    Date of Service: 4/3/2025     Subjective:  Kylee is a 84 y.o. female who was seen and examined today,4/3/2025, at the bedside.  Patient complaining of generalized not feeling well.  Patient very nondescript when asking what is bothering her.  She states I just do not feel well.  Patient denies chest pain or shortness of breath.  Appears to be resting comfortably    No family present during my examination.    Review of System:   Constitutional:   Denies fever or chills, weight loss or gain, fatigue or malaise.  HEENT:   Denies ear pain, sore throat, sinus or eye problems.  Cardiovascular:   Denies any chest pain, irregular heartbeats, or palpitations.   Respiratory:   Denies shortness of breath, coughing, sputum production, hemoptysis, or wheezing.  Gastrointestinal:   Denies nausea, vomiting, diarrhea, or constipation.  Denies any abdominal pain.  Genitourinary:    Denies any urgency, frequency, hematuria. Voiding  without difficulty.  Extremities:   Denies lower extremity swelling, edema or cyanosis.   Neurology:    Denies any headache or focal neurological deficits, Denies generalized weakness or memory difficulty.  Somewhat poor historian  Psch:   Denies being anxious or depressed.  Musculoskeletal:    Denies  myalgias, joint complaints or back pain.   Integumentary:   Denies any rashes, ulcers,  family or surrogate(s) is included only if the patient was incapable of providing the necessary information or participating in medical decisions. I also discussed the differential diagnosis and all of the proposed management plans with the patient and individuals accompanying the patient.    Cuate Christianson DO, F.A.C.O.I.  4/3/2025  5:54 PM

## 2025-04-03 NOTE — H&P
Department of Internal Medicine  History and Physical    PCP: Roxie Maldonado MD  Admitting Physician: Dr. Christianson/Robert  Consultants:   Date of Service: 4/2/2025    CHIEF COMPLAINT: Weakness and fatigue    HISTORY OF PRESENT ILLNESS:    Patient is a 84-year-old female presented the ED due to malaise.  Patient was just discharged yesterday after being treated for UTI and SVT.  States that she felt okay yesterday.  However earlier today she woke up feeling very sick.  She admits to associated headache and lightheadedness.  She denies any fall.  Patient has been difficulty ambulating as a result.    PAST MEDICAL Hx:  Past Medical History:   Diagnosis Date    Depression     Diabetes mellitus (HCC)     Hyperlipidemia     Hypertension     Thyroid disease     Type 2 diabetes mellitus without complication (HCC)        PAST SURGICAL Hx:   Past Surgical History:   Procedure Laterality Date    CHOLECYSTECTOMY      COLONOSCOPY      EYE SURGERY      cataract both eyes    HYSTERECTOMY (CERVIX STATUS UNKNOWN)      JOINT REPLACEMENT Left     KNEE    PAIN MANAGEMENT PROCEDURE Bilateral 8/11/2022    BILATERAL THORACIC FACET MEDIAL BRANCH BLOCK AT T8, 9, 10 (CPT 24139) performed by Ozzy Haro MD at [facility] OR    PAIN MANAGEMENT PROCEDURE N/A 10/6/2022    EPIDURAL STEROID INJECTION L3-4 WITH 40 DEPO performed by Ozzy Haro MD at [facility] OR    PAIN MANAGEMENT PROCEDURE Bilateral 12/12/2022    BILATERAL T8,9,10 MEDIAL BRANCH BLOCK performed by Ozzy Haro MD at [facility] OR    SHOULDER SURGERY  rt rotator    UPPER GASTROINTESTINAL ENDOSCOPY  5/15//12    toni       FAMILY Hx:  Family History   Problem Relation Age of Onset    Cancer Mother         ESOPHAGEAL     Stroke Mother     Cancer Father         KIDNEY     Coronary Art Dis Brother        HOME MEDICATIONS:  Prior to Admission medications    Medication Sig Start Date End Date Taking? Authorizing Provider   amLODIPine (NORVASC) 5 MG tablet Take 1 tablet by mouth  daily 4/2/25  Yes Jose Jones DO   gabapentin (NEURONTIN) 300 MG capsule Take 1 capsule by mouth in the morning and at bedtime for 90 days. Intended supply: 90 days  Patient taking differently: Take 1 capsule by mouth 4 times daily. Intended supply: 90 days 4/1/25 6/30/25 Yes Jose Jones DO   hydrALAZINE (APRESOLINE) 50 MG tablet Take 1 tablet by mouth every 8 hours 4/1/25  Yes Jose Jones DO   levothyroxine (SYNTHROID) 75 MCG tablet Take 1 tablet by mouth Daily 2/17/25  Yes Roxie Maldonado MD   FLUoxetine (PROZAC) 40 MG capsule Take 1 capsule by mouth nightly 2/11/25  Yes Roxie Maldonado MD   Insulin Syringe-Needle U-100 (DROPLET INSULIN SYRINGE) 31G X 5/16\" 1 ML MISC Inject 1 each into the skin daily 2/11/25  Yes Roxie Maldonado MD   losartan (COZAAR) 100 MG tablet Take 1 tablet by mouth daily 2/11/25  Yes Roxie Maldonado MD   metoprolol succinate (TOPROL XL) 25 MG extended release tablet Take 1 tablet by mouth daily 2/11/25  Yes Roxie Maldonado MD   pantoprazole (PROTONIX) 40 MG tablet Take 1 tablet by mouth daily 2/11/25  Yes Roxie Maldonado MD   traZODone (DESYREL) 50 MG tablet Take 1 tablet by mouth nightly 2/11/25  Yes Roxie Maldonado MD   glimepiride (AMARYL) 4 MG tablet Take 1 tablet by mouth in the morning and 1 tablet in the evening. 2/11/25  Yes Roxie Maldonado MD   buPROPion (WELLBUTRIN XL) 300 MG extended release tablet Take 150 mg by mouth every morning 9/16/24  Yes ProviderLalito MD   blood glucose test strips (TRUE METRIX BLOOD GLUCOSE TEST) strip 1 each by In Vitro route 3 times daily As needed. 11/5/24  Yes Roxie Maldonado MD   LANTUS 100 UNIT/ML injection vial Use 28 U SC nightly 11/5/24  Yes Roxie Maldonado MD   Ultra Thin Lancets 31G MISC 1 each by Does not apply route in the morning, at noon, and at bedtime 11/5/24  Yes Roxie Maldonado MD       ALLERGIES:  Patient has no known allergies.    SOCIAL Hx:  Social History     Socioeconomic History    Marital status:      Spouse

## 2025-04-03 NOTE — ACP (ADVANCE CARE PLANNING)
Advance Care Planning   Healthcare Decision Maker:    Primary Decision Maker: Vickie Ham - Child - [phone]    Click here to complete Healthcare Decision Makers including selection of the Healthcare Decision Maker Relationship (ie \"Primary\").  Today we documented Decision Maker(s) consistent with Legal Next of Kin hierarchy.

## 2025-04-04 ENCOUNTER — APPOINTMENT (OUTPATIENT)
Dept: CT IMAGING | Age: 85
DRG: 871 | End: 2025-04-04
Payer: MEDICARE

## 2025-04-04 LAB
ALBUMIN SERPL-MCNC: 3.3 G/DL (ref 3.5–5.2)
ALP SERPL-CCNC: 67 U/L (ref 35–104)
ALT SERPL-CCNC: 30 U/L (ref 0–32)
ANION GAP SERPL CALCULATED.3IONS-SCNC: 10 MMOL/L (ref 7–16)
AST SERPL-CCNC: 28 U/L (ref 0–31)
BASOPHILS # BLD: 0.06 K/UL (ref 0–0.2)
BASOPHILS NFR BLD: 1 % (ref 0–2)
BILIRUB SERPL-MCNC: 0.2 MG/DL (ref 0–1.2)
BUN SERPL-MCNC: 20 MG/DL (ref 6–23)
CALCIUM SERPL-MCNC: 9 MG/DL (ref 8.6–10.2)
CHLORIDE SERPL-SCNC: 106 MMOL/L (ref 98–107)
CO2 SERPL-SCNC: 24 MMOL/L (ref 22–29)
CREAT SERPL-MCNC: 1.5 MG/DL (ref 0.5–1)
EOSINOPHIL # BLD: 0.41 K/UL (ref 0.05–0.5)
EOSINOPHILS RELATIVE PERCENT: 6 % (ref 0–6)
ERYTHROCYTE [DISTWIDTH] IN BLOOD BY AUTOMATED COUNT: 18.3 % (ref 11.5–15)
FOLATE SERPL-MCNC: 9.5 NG/ML (ref 4.8–24.2)
GFR, ESTIMATED: 35 ML/MIN/1.73M2
GLUCOSE BLD-MCNC: 136 MG/DL (ref 74–99)
GLUCOSE BLD-MCNC: 216 MG/DL (ref 74–99)
GLUCOSE BLD-MCNC: 298 MG/DL (ref 74–99)
GLUCOSE BLD-MCNC: 97 MG/DL (ref 74–99)
GLUCOSE SERPL-MCNC: 91 MG/DL (ref 74–99)
HCT VFR BLD AUTO: 28.1 % (ref 34–48)
HGB BLD-MCNC: 8.2 G/DL (ref 11.5–15.5)
IMM GRANULOCYTES # BLD AUTO: <0.03 K/UL (ref 0–0.58)
IMM GRANULOCYTES NFR BLD: 0 % (ref 0–5)
LYMPHOCYTES NFR BLD: 2.16 K/UL (ref 1.5–4)
LYMPHOCYTES RELATIVE PERCENT: 31 % (ref 20–42)
MCH RBC QN AUTO: 23.3 PG (ref 26–35)
MCHC RBC AUTO-ENTMCNC: 29.2 G/DL (ref 32–34.5)
MCV RBC AUTO: 79.8 FL (ref 80–99.9)
MICROORGANISM SPEC CULT: ABNORMAL
MONOCYTES NFR BLD: 0.74 K/UL (ref 0.1–0.95)
MONOCYTES NFR BLD: 11 % (ref 2–12)
NEUTROPHILS NFR BLD: 51 % (ref 43–80)
NEUTS SEG NFR BLD: 3.53 K/UL (ref 1.8–7.3)
PLATELET # BLD AUTO: 327 K/UL (ref 130–450)
PMV BLD AUTO: 9.3 FL (ref 7–12)
POTASSIUM SERPL-SCNC: 4.2 MMOL/L (ref 3.5–5)
PROT SERPL-MCNC: 5.9 G/DL (ref 6.4–8.3)
RBC # BLD AUTO: 3.52 M/UL (ref 3.5–5.5)
SERVICE CMNT-IMP: ABNORMAL
SODIUM SERPL-SCNC: 140 MMOL/L (ref 132–146)
SPECIMEN DESCRIPTION: ABNORMAL
TSH SERPL DL<=0.05 MIU/L-ACNC: 3.32 UIU/ML (ref 0.27–4.2)
VIT B12 SERPL-MCNC: 428 PG/ML (ref 211–946)
WBC OTHER # BLD: 6.9 K/UL (ref 4.5–11.5)

## 2025-04-04 PROCEDURE — 6360000002 HC RX W HCPCS: Performed by: INTERNAL MEDICINE

## 2025-04-04 PROCEDURE — 6370000000 HC RX 637 (ALT 250 FOR IP): Performed by: INTERNAL MEDICINE

## 2025-04-04 PROCEDURE — 85025 COMPLETE CBC W/AUTO DIFF WBC: CPT

## 2025-04-04 PROCEDURE — 6360000004 HC RX CONTRAST MEDICATION: Performed by: RADIOLOGY

## 2025-04-04 PROCEDURE — 6370000000 HC RX 637 (ALT 250 FOR IP)

## 2025-04-04 PROCEDURE — 82962 GLUCOSE BLOOD TEST: CPT

## 2025-04-04 PROCEDURE — 80053 COMPREHEN METABOLIC PANEL: CPT

## 2025-04-04 PROCEDURE — 75574 CT ANGIO HRT W/3D IMAGE: CPT

## 2025-04-04 PROCEDURE — 1200000000 HC SEMI PRIVATE

## 2025-04-04 PROCEDURE — 82607 VITAMIN B-12: CPT

## 2025-04-04 PROCEDURE — 36415 COLL VENOUS BLD VENIPUNCTURE: CPT

## 2025-04-04 PROCEDURE — 2580000003 HC RX 258

## 2025-04-04 PROCEDURE — 84443 ASSAY THYROID STIM HORMONE: CPT

## 2025-04-04 PROCEDURE — 2500000003 HC RX 250 WO HCPCS: Performed by: INTERNAL MEDICINE

## 2025-04-04 PROCEDURE — 6360000002 HC RX W HCPCS

## 2025-04-04 PROCEDURE — 82746 ASSAY OF FOLIC ACID SERUM: CPT

## 2025-04-04 RX ORDER — METOPROLOL TARTRATE 50 MG
150 TABLET ORAL PRN
Status: COMPLETED | OUTPATIENT
Start: 2025-04-04 | End: 2025-04-04

## 2025-04-04 RX ORDER — METOPROLOL TARTRATE 1 MG/ML
5 INJECTION, SOLUTION INTRAVENOUS EVERY 5 MIN PRN
Status: DISCONTINUED | OUTPATIENT
Start: 2025-04-04 | End: 2025-04-05 | Stop reason: HOSPADM

## 2025-04-04 RX ORDER — SODIUM CHLORIDE 9 MG/ML
INJECTION, SOLUTION INTRAVENOUS CONTINUOUS
Status: DISCONTINUED | OUTPATIENT
Start: 2025-04-04 | End: 2025-04-05 | Stop reason: HOSPADM

## 2025-04-04 RX ORDER — METOPROLOL TARTRATE 50 MG
100 TABLET ORAL PRN
Status: COMPLETED | OUTPATIENT
Start: 2025-04-04 | End: 2025-04-04

## 2025-04-04 RX ORDER — NITROGLYCERIN 0.4 MG/1
0.8 TABLET SUBLINGUAL PRN
Status: COMPLETED | OUTPATIENT
Start: 2025-04-04 | End: 2025-04-04

## 2025-04-04 RX ORDER — METOPROLOL TARTRATE 50 MG
50 TABLET ORAL PRN
Status: COMPLETED | OUTPATIENT
Start: 2025-04-04 | End: 2025-04-04

## 2025-04-04 RX ORDER — SODIUM CHLORIDE 0.9 % (FLUSH) 0.9 %
5-40 SYRINGE (ML) INJECTION PRN
Status: DISCONTINUED | OUTPATIENT
Start: 2025-04-04 | End: 2025-04-05 | Stop reason: HOSPADM

## 2025-04-04 RX ORDER — SODIUM CHLORIDE 9 MG/ML
INJECTION, SOLUTION INTRAVENOUS PRN
Status: DISCONTINUED | OUTPATIENT
Start: 2025-04-04 | End: 2025-04-05 | Stop reason: HOSPADM

## 2025-04-04 RX ORDER — IOPAMIDOL 755 MG/ML
75 INJECTION, SOLUTION INTRAVASCULAR
Status: COMPLETED | OUTPATIENT
Start: 2025-04-04 | End: 2025-04-04

## 2025-04-04 RX ORDER — SODIUM CHLORIDE 0.9 % (FLUSH) 0.9 %
5-40 SYRINGE (ML) INJECTION EVERY 12 HOURS SCHEDULED
Status: DISCONTINUED | OUTPATIENT
Start: 2025-04-04 | End: 2025-04-05 | Stop reason: HOSPADM

## 2025-04-04 RX ORDER — NITROGLYCERIN 0.4 MG/1
0.4 TABLET SUBLINGUAL PRN
Status: COMPLETED | OUTPATIENT
Start: 2025-04-04 | End: 2025-04-04

## 2025-04-04 RX ADMIN — GABAPENTIN 300 MG: 300 CAPSULE ORAL at 20:46

## 2025-04-04 RX ADMIN — IOPAMIDOL 75 ML: 755 INJECTION, SOLUTION INTRAVENOUS at 11:26

## 2025-04-04 RX ADMIN — TRAZODONE HYDROCHLORIDE 50 MG: 50 TABLET ORAL at 20:57

## 2025-04-04 RX ADMIN — AMLODIPINE BESYLATE 5 MG: 5 TABLET ORAL at 09:38

## 2025-04-04 RX ADMIN — ACETAMINOPHEN 650 MG: 325 TABLET ORAL at 12:23

## 2025-04-04 RX ADMIN — SODIUM CHLORIDE, PRESERVATIVE FREE 10 ML: 5 INJECTION INTRAVENOUS at 20:51

## 2025-04-04 RX ADMIN — ENOXAPARIN SODIUM 40 MG: 100 INJECTION SUBCUTANEOUS at 09:38

## 2025-04-04 RX ADMIN — FLUOXETINE HYDROCHLORIDE 40 MG: 20 CAPSULE ORAL at 20:46

## 2025-04-04 RX ADMIN — HYDRALAZINE HYDROCHLORIDE 50 MG: 50 TABLET ORAL at 05:27

## 2025-04-04 RX ADMIN — PANTOPRAZOLE SODIUM 40 MG: 40 TABLET, DELAYED RELEASE ORAL at 09:38

## 2025-04-04 RX ADMIN — BUPROPION HYDROCHLORIDE 150 MG: 150 TABLET, EXTENDED RELEASE ORAL at 09:38

## 2025-04-04 RX ADMIN — METOPROLOL SUCCINATE 25 MG: 25 TABLET, EXTENDED RELEASE ORAL at 09:37

## 2025-04-04 RX ADMIN — GABAPENTIN 300 MG: 300 CAPSULE ORAL at 09:38

## 2025-04-04 RX ADMIN — INSULIN LISPRO 4 UNITS: 100 INJECTION, SOLUTION INTRAVENOUS; SUBCUTANEOUS at 16:42

## 2025-04-04 RX ADMIN — INSULIN GLARGINE 28 UNITS: 100 INJECTION, SOLUTION SUBCUTANEOUS at 20:47

## 2025-04-04 RX ADMIN — METOPROLOL TARTRATE 100 MG: 50 TABLET, FILM COATED ORAL at 10:34

## 2025-04-04 RX ADMIN — SODIUM CHLORIDE 25 MG: 9 INJECTION, SOLUTION INTRAVENOUS at 10:09

## 2025-04-04 RX ADMIN — SODIUM CHLORIDE: 0.9 INJECTION, SOLUTION INTRAVENOUS at 09:36

## 2025-04-04 RX ADMIN — HYDRALAZINE HYDROCHLORIDE 50 MG: 50 TABLET ORAL at 20:47

## 2025-04-04 RX ADMIN — LEVOTHYROXINE SODIUM 75 MCG: 0.07 TABLET ORAL at 05:27

## 2025-04-04 RX ADMIN — SODIUM CHLORIDE 100 MG: 9 INJECTION, SOLUTION INTRAVENOUS at 11:53

## 2025-04-04 RX ADMIN — NITROGLYCERIN 0.4 MG: 0.4 TABLET, ORALLY DISINTEGRATING SUBLINGUAL at 11:35

## 2025-04-04 RX ADMIN — INSULIN LISPRO 2 UNITS: 100 INJECTION, SOLUTION INTRAVENOUS; SUBCUTANEOUS at 20:50

## 2025-04-04 ASSESSMENT — PAIN DESCRIPTION - LOCATION: LOCATION: HEAD

## 2025-04-04 ASSESSMENT — PAIN SCALES - GENERAL
PAINLEVEL_OUTOF10: 7
PAINLEVEL_OUTOF10: 2

## 2025-04-04 ASSESSMENT — PAIN DESCRIPTION - DESCRIPTORS: DESCRIPTORS: ACHING

## 2025-04-04 NOTE — CARE COORDINATION
4/4/2025: SS NOTE: Re-admit done:  Discharge plan for pt to return home with her daughter when medically stable, PT/OT evals completed and recommendation for home with home health therapy, pt already active with [facility] by Yuni Torres, agency liaison notified and will follow for medical d/c but will NEED NEW [facility] orders for SN/PT/OT, pt says her dtr will transport pt home, Nursing informed. Electronically signed by ATIYA Roca on 4/4/2025 at 10:58 AM

## 2025-04-04 NOTE — PROGRESS NOTES
Internal Medicine Consult Note    [facility]    Cuate Christianson D.O., LORIEI.                    Jose Jones D.O., ULISSES.                             Shawn Caraballo D.O.     Desirae Bonilla, MSN, APRN, NP-C  Aren Zaldivar, MSN, APRN-CNP  Augustus Swartz, MSN, APRN-CNP  Guerda Gunderson, MSN APRN-CNP  Iliana Mendes, MSN. APRN-NP-C     Primary Care Physician: Roxie Maldonado MD   Admitting Physician:  Cuate Christianson DO  Admission date and time: 4/2/2025  4:02 PM    Room:  [address] [place], [place] [address]-  Admitting diagnosis: Failure to thrive in adult [R62.7]    Patient Name: Kylee Wang  MRN: [id number]    Date of Service: 4/4/2025     Subjective:  Kylee is a 84 y.o. female who was seen and examined today,4/4/2025, at the bedside.  Patient still complains of not feeling well.  Evidence of iron deficiency anemia is noted in the patient receiving IV iron.  Renal function is noted at 1.5 with IVs.  Discussed the condition with cardiology and CTA has been ordered    No family present during my examination.    Review of System:   Constitutional:   Denies fever or chills, weight loss or gain, fatigue or malaise.  HEENT:   Denies ear pain, sore throat, sinus or eye problems.  Cardiovascular:   Denies any chest pain, irregular heartbeats, or palpitations.   Respiratory:   Denies shortness of breath, coughing, sputum production, hemoptysis, or wheezing.  Gastrointestinal:   Denies nausea, vomiting, diarrhea, or constipation.  Denies any abdominal pain.  Genitourinary:    Denies any urgency, frequency, hematuria. Voiding  without difficulty.  Extremities:   Denies lower extremity swelling, edema or cyanosis.   Neurology:    Denies any headache or focal neurological deficits, Denies generalized weakness or memory difficulty.  Somewhat poor historian  Psch:   Denies being anxious or depressed.  Musculoskeletal:    Denies  myalgias, joint complaints or back pain.   Integumentary:   Denies any rashes, ulcers, or

## 2025-04-04 NOTE — PLAN OF CARE
Problem: Safety - Adult  Goal: Free from fall injury  4/4/2025 0740 by Carmenza Delgado, RN  Outcome: Progressing  4/3/2025 1941 by Maxine Bautista, RN  Outcome: Progressing

## 2025-04-05 VITALS
TEMPERATURE: 98.6 F | DIASTOLIC BLOOD PRESSURE: 82 MMHG | HEIGHT: 65 IN | HEART RATE: 67 BPM | RESPIRATION RATE: 18 BRPM | OXYGEN SATURATION: 96 % | BODY MASS INDEX: 27.4 KG/M2 | SYSTOLIC BLOOD PRESSURE: 172 MMHG | WEIGHT: 164.46 LBS

## 2025-04-05 LAB
ALBUMIN SERPL-MCNC: 3.3 G/DL (ref 3.5–5.2)
ALP SERPL-CCNC: 66 U/L (ref 35–104)
ALT SERPL-CCNC: 25 U/L (ref 0–32)
ANION GAP SERPL CALCULATED.3IONS-SCNC: 8 MMOL/L (ref 7–16)
AST SERPL-CCNC: 27 U/L (ref 0–31)
BASOPHILS # BLD: 0.07 K/UL (ref 0–0.2)
BASOPHILS NFR BLD: 1 % (ref 0–2)
BILIRUB SERPL-MCNC: <0.2 MG/DL (ref 0–1.2)
BUN SERPL-MCNC: 18 MG/DL (ref 6–23)
CALCIUM SERPL-MCNC: 8.6 MG/DL (ref 8.6–10.2)
CHLORIDE SERPL-SCNC: 108 MMOL/L (ref 98–107)
CO2 SERPL-SCNC: 22 MMOL/L (ref 22–29)
CREAT SERPL-MCNC: 1.3 MG/DL (ref 0.5–1)
EOSINOPHIL # BLD: 0.44 K/UL (ref 0.05–0.5)
EOSINOPHILS RELATIVE PERCENT: 5 % (ref 0–6)
ERYTHROCYTE [DISTWIDTH] IN BLOOD BY AUTOMATED COUNT: 18.3 % (ref 11.5–15)
GFR, ESTIMATED: 40 ML/MIN/1.73M2
GLUCOSE BLD-MCNC: 145 MG/DL (ref 74–99)
GLUCOSE BLD-MCNC: 295 MG/DL (ref 74–99)
GLUCOSE SERPL-MCNC: 128 MG/DL (ref 74–99)
HCT VFR BLD AUTO: 26.8 % (ref 34–48)
HGB BLD-MCNC: 8 G/DL (ref 11.5–15.5)
IMM GRANULOCYTES # BLD AUTO: 0.03 K/UL (ref 0–0.58)
IMM GRANULOCYTES NFR BLD: 0 % (ref 0–5)
LYMPHOCYTES NFR BLD: 1.89 K/UL (ref 1.5–4)
LYMPHOCYTES RELATIVE PERCENT: 23 % (ref 20–42)
MCH RBC QN AUTO: 23.9 PG (ref 26–35)
MCHC RBC AUTO-ENTMCNC: 29.9 G/DL (ref 32–34.5)
MCV RBC AUTO: 80 FL (ref 80–99.9)
MONOCYTES NFR BLD: 0.93 K/UL (ref 0.1–0.95)
MONOCYTES NFR BLD: 11 % (ref 2–12)
NEUTROPHILS NFR BLD: 59 % (ref 43–80)
NEUTS SEG NFR BLD: 4.85 K/UL (ref 1.8–7.3)
PLATELET # BLD AUTO: 314 K/UL (ref 130–450)
PMV BLD AUTO: 9.6 FL (ref 7–12)
POTASSIUM SERPL-SCNC: 4.4 MMOL/L (ref 3.5–5)
PROT SERPL-MCNC: 5.7 G/DL (ref 6.4–8.3)
RBC # BLD AUTO: 3.35 M/UL (ref 3.5–5.5)
SODIUM SERPL-SCNC: 138 MMOL/L (ref 132–146)
WBC OTHER # BLD: 8.2 K/UL (ref 4.5–11.5)

## 2025-04-05 PROCEDURE — 85025 COMPLETE CBC W/AUTO DIFF WBC: CPT

## 2025-04-05 PROCEDURE — 6370000000 HC RX 637 (ALT 250 FOR IP): Performed by: INTERNAL MEDICINE

## 2025-04-05 PROCEDURE — 6360000002 HC RX W HCPCS

## 2025-04-05 PROCEDURE — 80053 COMPREHEN METABOLIC PANEL: CPT

## 2025-04-05 PROCEDURE — 2580000003 HC RX 258

## 2025-04-05 PROCEDURE — 36415 COLL VENOUS BLD VENIPUNCTURE: CPT

## 2025-04-05 PROCEDURE — 6360000002 HC RX W HCPCS: Performed by: INTERNAL MEDICINE

## 2025-04-05 PROCEDURE — 2500000003 HC RX 250 WO HCPCS: Performed by: INTERNAL MEDICINE

## 2025-04-05 PROCEDURE — 82962 GLUCOSE BLOOD TEST: CPT

## 2025-04-05 PROCEDURE — 76937 US GUIDE VASCULAR ACCESS: CPT

## 2025-04-05 PROCEDURE — 6370000000 HC RX 637 (ALT 250 FOR IP)

## 2025-04-05 RX ADMIN — ACETAMINOPHEN 650 MG: 325 TABLET ORAL at 16:20

## 2025-04-05 RX ADMIN — AMLODIPINE BESYLATE 5 MG: 5 TABLET ORAL at 09:51

## 2025-04-05 RX ADMIN — LEVOTHYROXINE SODIUM 75 MCG: 0.07 TABLET ORAL at 05:36

## 2025-04-05 RX ADMIN — PANTOPRAZOLE SODIUM 40 MG: 40 TABLET, DELAYED RELEASE ORAL at 09:51

## 2025-04-05 RX ADMIN — BUPROPION HYDROCHLORIDE 150 MG: 150 TABLET, EXTENDED RELEASE ORAL at 09:51

## 2025-04-05 RX ADMIN — ENOXAPARIN SODIUM 40 MG: 100 INJECTION SUBCUTANEOUS at 09:57

## 2025-04-05 RX ADMIN — GABAPENTIN 300 MG: 300 CAPSULE ORAL at 09:51

## 2025-04-05 RX ADMIN — HYDRALAZINE HYDROCHLORIDE 50 MG: 50 TABLET ORAL at 05:36

## 2025-04-05 RX ADMIN — SODIUM CHLORIDE 125 MG: 9 INJECTION, SOLUTION INTRAVENOUS at 14:20

## 2025-04-05 RX ADMIN — INSULIN LISPRO 4 UNITS: 100 INJECTION, SOLUTION INTRAVENOUS; SUBCUTANEOUS at 11:44

## 2025-04-05 RX ADMIN — SODIUM CHLORIDE, PRESERVATIVE FREE 10 ML: 5 INJECTION INTRAVENOUS at 05:38

## 2025-04-05 RX ADMIN — METOPROLOL SUCCINATE 25 MG: 25 TABLET, EXTENDED RELEASE ORAL at 09:51

## 2025-04-05 RX ADMIN — HYDRALAZINE HYDROCHLORIDE 50 MG: 50 TABLET ORAL at 16:20

## 2025-04-05 NOTE — PROGRESS NOTES
Date:  4/5/2025  Patient: Kylee Wang  Admission:  4/2/2025  4:02 PM  Admit DX: Failure to thrive in adult [R62.7]  Age:  84 y.o., 1940     LOS: 3 days       SUBJECTIVE:    Denies any cardiac symptoms  Vitals are stable  Coronary CTA showed no significant disease      OBJECTIVE:    /61   Pulse 67   Temp 98.6 °F (37 °C) (Oral)   Resp 18   Ht 1.651 m (5' 5\")   Wt 74.6 kg (164 lb 7.4 oz)   SpO2 96%   BMI 27.37 kg/m²     Intake/Output Summary (Last 24 hours) at 4/5/2025 1232  Last data filed at 4/5/2025 0900  Gross per 24 hour   Intake 755.46 ml   Output --   Net 755.46 ml       EXAM:       General: Alert and oriented, No acute distress.  Appears as stated age.  Eye:  Pupils are equal, round and reactive to light, Extraocular movements are intact, Normal conjunctiva.    Head: Normocephalic, Normal hearing, Oral mucosa is moist.  Neck: Supple, No carotid bruit, No jugular venous distention, No lymphadenopathy.  Respiratory: Lungs are clear to auscultation, Respirations are non-labored, Breath sounds are equal, Symmetrical chest wall expansion.  Cardiovascular: Normal rate, No murmur, No gallop, Good pulses equal in all extremities, No edema.  Gastrointestinal: Soft, Non-tender, Non-distended, Normal bowel sounds.  Musculoskeletal: Normal strength, No tenderness, No deformity.  Integumentary:  Warm, Dry.    Neurologic: Alert, Oriented, No focal deficits.  Cognition and Speech: Oriented, Speech clear and coherent.  Poor memory.  Psychiatric: Cooperative, Appropriate mood & affect, Normal judgment.    Current Inpatient Medications:   sodium chloride flush  5-40 mL IntraVENous 2 times per day    ferric gluconate (FERRLECIT) 125 mg in sodium chloride 0.9 % 100 mL IVPB  125 mg IntraVENous Daily    insulin lispro  0-8 Units SubCUTAneous 4x Daily AC & HS    sodium chloride flush  5-40 mL IntraVENous 2 times per day    enoxaparin  40 mg SubCUTAneous Daily    amLODIPine  5 mg Oral Daily    buPROPion  150 mg

## 2025-04-05 NOTE — PROGRESS NOTES
Patient stated she did not have a ride home and could not leave this evening.  Per social workers notes patient's daughter would be her transportation home.  Call placed to patient's daughter Vickie by nurse to inform her of patient being discharged.  Per Vickie someone will be in to pick her up this evening to transport home.

## 2025-04-05 NOTE — PROGRESS NOTES
Date:  4/4/2025  Patient: Kylee Wang  Admission:  4/2/2025  4:02 PM  Admit DX: Failure to thrive in adult [R62.7]  Age:  84 y.o., 1940     LOS: 2 days       SUBJECTIVE:    Patient seen and examined  She is up in nature  Denies chest pain  No shortness breath      OBJECTIVE:    /74   Pulse 65   Temp 98.1 °F (36.7 °C) (Oral)   Resp 16   Ht 1.651 m (5' 5\")   Wt 74.6 kg (164 lb 7.4 oz)   SpO2 98%   BMI 27.37 kg/m²     Intake/Output Summary (Last 24 hours) at 4/4/2025 2054  Last data filed at 4/4/2025 1704  Gross per 24 hour   Intake 440 ml   Output --   Net 440 ml       EXAM:       General: Alert and oriented, No acute distress.  Appears as stated age.  Eye:  Pupils are equal, round and reactive to light, Extraocular movements are intact, Normal conjunctiva.    Head: Normocephalic, Normal hearing, Oral mucosa is moist.  Neck: Supple, No carotid bruit, No jugular venous distention, No lymphadenopathy.  Respiratory: Lungs are clear to auscultation, Respirations are non-labored, Breath sounds are equal, Symmetrical chest wall expansion.  Cardiovascular: Normal rate, No murmur, No gallop, Good pulses equal in all extremities, No edema.  Gastrointestinal: Soft, Non-tender, Non-distended, Normal bowel sounds.  Musculoskeletal: Normal strength, No tenderness, No deformity.  Integumentary:  Warm, Dry.    Neurologic: Alert, Oriented, No focal deficits.  Cognition and Speech: Oriented, Speech clear and coherent.  Poor memory.  Psychiatric: Cooperative, Appropriate mood & affect, Normal judgment.    Current Inpatient Medications:   sodium chloride flush  5-40 mL IntraVENous 2 times per day    [START ON 4/5/2025] ferric gluconate (FERRLECIT) 125 mg in sodium chloride 0.9 % 100 mL IVPB  125 mg IntraVENous Daily    insulin lispro  0-8 Units SubCUTAneous 4x Daily AC & HS    sodium chloride flush  5-40 mL IntraVENous 2 times per day    enoxaparin  40 mg SubCUTAneous Daily    amLODIPine  5 mg Oral Daily

## 2025-04-05 NOTE — CARE COORDINATION
Ss note:4/5/2025 9:12 AM discharge order noted. New C orders noted, pt was active with [facility] care by [facility] but new orders needed. SW notified  Eva at [facility] [phone] orders. Dtr to transport home per charting. ATIYA Hoang

## 2025-04-05 NOTE — DISCHARGE INSTRUCTIONS
Your information:  Name: Kylee Wang  : 1940    Your instructions:  Discharge Home with [facility] Care    What to do after you leave the hospital:    Recommended diet: regular diet    Recommended activity: activity as tolerated    The following personal items were collected during your admission and were returned to you:    Belongings  Dental Appliances: Uppers, Lowers, At bedside  Vision - Corrective Lenses: None  Hearing Aid: None  Clothing: Pajamas, Slippers, At bedside  Jewelry: At bedside, Watch, Necklace  Electronic Devices: Cell Phone  Weapons (Notify Protective Services/Security): None  Home Medications: None  Valuables Given To: Patient  Provide Name(s) of Who Valuable(s) Were Given To: self    Information obtained by:  By signing below, I understand that if any problems occur once I leave the hospital I am to contact PCP.  I understand and acknowledge receipt of the instructions indicated above.   When should you call for help?   Call 911 anytime you think you may need emergency care. For example, call if:    You passed out (lost consciousness).     You have symptoms of a stroke. These may include:  Sudden numbness, tingling, weakness, or loss of movement in your face, arm, or leg, especially on only one side of your body.  Sudden vision changes.  Sudden trouble speaking.  Sudden confusion or trouble understanding simple statements.  Sudden problems with walking or balance.  A sudden, severe headache that is different from past headaches.     You have symptoms of a heart attack. These may include:  Chest pain or pressure, or a strange feeling in the chest.  Sweating.  Shortness of breath.  Nausea or vomiting.  Pain, pressure, or a strange feeling in the back, neck, jaw, or upper belly or in one or both shoulders or arms.  Lightheadedness or sudden weakness.  A fast or irregular heartbeat.  After you call 911, the  may tell you to chew 1 adult-strength or 2 to 4 low-dose aspirin. Wait for  an ambulance. Do not try to drive yourself.   Watch closely for changes in your health, and be sure to contact your doctor if:    Your lightheadedness gets worse or does not get better with home care.

## 2025-04-05 NOTE — PLAN OF CARE
Problem: Safety - Adult  Goal: Free from fall injury  4/5/2025 1218 by Martha Tarango RN  Outcome: Progressing  4/5/2025 0402 by Janina Waddell RN  Outcome: Progressing     Problem: Chronic Conditions and Co-morbidities  Goal: Patient's chronic conditions and co-morbidity symptoms are monitored and maintained or improved  4/5/2025 1218 by Martha Tarango RN  Outcome: Progressing  4/5/2025 0402 by Janina Waddell RN  Outcome: Progressing     Problem: Discharge Planning  Goal: Discharge to home or other facility with appropriate resources  4/5/2025 1218 by Martha Tarango RN  Outcome: Progressing  4/5/2025 0402 by Janina Waddell RN  Outcome: Progressing     Problem: ABCDS Injury Assessment  Goal: Absence of physical injury  4/5/2025 1218 by Martha Tarango RN  Outcome: Progressing  4/5/2025 0402 by Janina Waddell RN  Outcome: Progressing

## 2025-04-05 NOTE — DISCHARGE SUMMARY
Internal Medicine Progress Note     [facility]     Cuate Christianson D.O., ULISSES.                         Jose Jones D.O., AMARA Caraballo D.O.     Desirae Bonilla, MSN, APRN, NP-C  Aren Zaldivar, MSN, APRN-CNP  Augustus Swartz, MSN, APRN, NP-C  Guerda Gunderson, MSN, APRN-CNP  Iliana Mendes, MSN, APRN, NP-C       Internal Medicine  Discharge Summary    NAME: Kylee Wang  :  1940  MRN:  [id number]  PCP:Roxie Maldonado MD  ADMITTED: 2025      DISCHARGED: 25    ADMITTING PHYSICIAN: Cuate Christianson DO    CONSULTANT(S):   IP CONSULT TO CARDIOLOGY  IP CONSULT TO SOCIAL WORK     ADMITTING DIAGNOSIS:   Failure to thrive in adult [R62.7]     DISCHARGE DIAGNOSES:   Failure to thrive with generalized weakness and fatigue  Chronic kidney disease stage IIIb-IV  Paroxysmal atrial fibrillation  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 Patient was just discharged yesterday after being treated for UTI and SVT. States that she felt okay yesterday. However earlier today she woke up feeling very sick. She admits to associated headache and lightheadedness. She denies any fall. Patient has been difficulty ambulating as a result.     LABS::  Lab Results   Component Value Date    WBC 6.9 2025    HGB 8.2 (L) 2025    HCT 28.1 (L) 2025     2025     2025    K 4.4 2025     (H) 2025    CREATININE 1.3 (H) 2025    BUN 18 2025    CO2 22 2025    GLUCOSE 128 (H) 2025    ALT 25 2025    AST 27 2025    INR 1.0 2017    APTT 28.6 2017     Lab Results   Component Value Date    INR 1.0 2017    INR 0.9 2016     INR 1.2 05/15/2012    PROTIME 11.4 01/13/2017    PROTIME 10.3 12/09/2016    PROTIME 13.3 (H) 05/15/2012      Lab Results   Component Value Date    TSH 3.32 04/04/2025     Lab Results   Component Value Date    TRIG 151 (H) 03/28/2025    TRIG 126 08/04/2024    TRIG 98 12/05/2023     Lab Results   Component Value Date    HDL 42 03/28/2025    HDL 54 08/04/2024    HDL 54 12/05/2023     No components found for: \"LDLCALC\"  Lab Results   Component Value Date    LABA1C 9.2 02/11/2025       IMAGING:  CTA CORON EJECT FRAC WALL MOTION  Result Date: 4/4/2025  EXAMINATION: CTA OF THE CORONARY ARTERIES WITH CALCIUM SCORE 4/4/2025 TECHNIQUE: Computed tomographic angiography of the heart was performed after the bolus administration of 75 cc of Isovue 370 intravenous contrast with retrospective cardiac gating.  Multiplanar reformatted images were created on a separate workstation. Automated exposure control, iterative reconstruction, and/or weight based adjustment of the mA/kV was utilized to reduce the radiation dose to as low as reasonably achievable. COMPARISON: None HISTORY: ORDERING SYSTEM PROVIDED HISTORY: NSTEMI. TECHNOLOGIST PROVIDED HISTORY: Reason for exam:->NSTEMI. FINDINGS: Mediastinum: The thoracic aorta is normal caliber.  There is no pericardial effusion.  No mediastinal or hilar lymphadenopathy is identified. There is a moderate sized hiatal hernia. Lungs: Segments of the lungs were included on examination.  There is no pulmonary infiltrate, mass or suspicious pulmonary nodule.  There is no pleural thickening or pleural effusion. Heart: Right coronary artery: The right coronary artery is dominant.  The right coronary artery is normal caliber.  No soft or calcified plaque is seen within the right coronary.  The PDA is widely patent. Left coronary artery: The left coronary artery arises from a normal left coronary sinus.  The left main coronary artery is widely patent and gives rise to a patent LAD and left circumflex  (DESYREL) 50 MG tablet Take 1 tablet by mouth nightly  Qty: 90 tablet, Refills: 0      glimepiride (AMARYL) 4 MG tablet Take 1 tablet by mouth in the morning and 1 tablet in the evening.  Qty: 180 tablet, Refills: 0    Associated Diagnoses: Type 2 diabetes mellitus with stage 3b chronic kidney disease, with long-term current use of insulin ([facility])      buPROPion (WELLBUTRIN XL) 300 MG extended release tablet Take 150 mg by mouth every morning      blood glucose test strips (TRUE METRIX BLOOD GLUCOSE TEST) strip 1 each by In Vitro route 3 times daily As needed.  Qty: 300 each, Refills: 1    Associated Diagnoses: Type 2 diabetes mellitus with stage 3b chronic kidney disease, with long-term current use of insulin ([facility])      LANTUS 100 UNIT/ML injection vial Use 28 U SC nightly  Qty: 10 mL, Refills: 3    Associated Diagnoses: Type 2 diabetes mellitus with stage 3b chronic kidney disease, with long-term current use of insulin ([facility])      Ultra Thin Lancets 31G MISC 1 each by Does not apply route in the morning, at noon, and at bedtime  Qty: 300 each, Refills: 1    Associated Diagnoses: Type 2 diabetes mellitus with stage 3b chronic kidney disease, with long-term current use of insulin ([facility])             FOLLOW UP/INSTRUCTIONS:  This patient is instructed to follow-up with her primary care physician.  Patient is instructed to follow-up with the consults listed above as directed by them.  she is instructed to resume home medications and take new medications as indicated in the list above.  If the patient has a recurrence of symptoms, she is instructed to go to the ED.    Preparing for this patient's discharge, including paperwork, orders, instructions, and meeting with patient did require > 40 minutes.    RACHEL Lauren CNP     4/5/2025  6:08 AM

## 2025-04-06 LAB
MICROORGANISM SPEC CULT: NORMAL
MICROORGANISM SPEC CULT: NORMAL
PYRIDOXAL PHOS SERPL-SCNC: 19.3 NMOL/L (ref 20–125)
SERVICE CMNT-IMP: NORMAL
SERVICE CMNT-IMP: NORMAL
SPECIMEN DESCRIPTION: NORMAL
SPECIMEN DESCRIPTION: NORMAL

## 2025-04-07 NOTE — ED PROVIDER NOTES
[facility] 3 MED SURG  EMERGENCY DEPARTMENT ENCOUNTER      Pt Name: Kylee Wang  MRN: [id number]  Birthdate 1940  Date of evaluation: 4/2/2025  Provider: Eran Atkinson DO  PCP: Roxie Maldonado MD    CHIEF COMPLAINT       Chief Complaint   Patient presents with    Shortness of Breath     Pt complaint of shortness of breath beginning this morning. Pt states she feels unwell       HISTORY OF PRESENT ILLNESS: 1 or more Elements   History From: Patient  Limitations to history : None    Kylee Wang is a 84 y.o. female Past medical history of hypertension, hypothyroidism, hyperlipidemia as well as type 2 diabetes.  Patient presents with chief complaint of shortness of breath as well as generalized feeling of fatigue and malaise.  Patient states that she was just recently mated to the hospital for similar complaints was discharged yesterday.  She notes that she woke up this morning and felt significantly weak as well as fatigued.  Patient also notes some shortness of breath.  States that she is unable to care for self at home.  Patient denies any fevers, chills, nausea, vomiting, Raul pain, constipation or diarrhea.    Nursing Notes were all reviewed and agreed with or any disagreements were addressed in the HPI.    REVIEW OF SYSTEMS :    Positives and Pertinent negatives as per HPI.     PAST MEDICAL HISTORY/Chronic Conditions Affecting Care    has a past medical history of Depression, Diabetes mellitus (HCC), Hyperlipidemia, Hypertension, Thyroid disease, and Type 2 diabetes mellitus without complication (HCC).     SURGICAL HISTORY     Past Surgical History:   Procedure Laterality Date    CHOLECYSTECTOMY      COLONOSCOPY      EYE SURGERY      cataract both eyes    HYSTERECTOMY (CERVIX STATUS UNKNOWN)      JOINT REPLACEMENT Left     KNEE    PAIN MANAGEMENT PROCEDURE Bilateral 8/11/2022    BILATERAL THORACIC FACET MEDIAL BRANCH BLOCK AT T8, 9, 10 (CPT 95947) performed by Ozzy Haro MD at [facility] OR    [facility]  SubLINGual Given 4/4/25 1135)     Or   nitroGLYCERIN (NITROSTAT) SL tablet 0.8 mg ( SubLINGual See Alternative 4/4/25 1135)   ferric gluconate (FERRLECIT) 25 mg in sodium chloride 0.9 % 50 mL (test dose) IVPB (0 mg IntraVENous Stopped 4/4/25 1116)     Followed by   ferric gluconate (FERRLECIT) 100 mg in sodium chloride 0.9 % 100 mL IVPB (0 mg IntraVENous Stopped 4/4/25 1254)   iopamidol (ISOVUE-370) 76 % injection 75 mL (75 mLs IntraVENous Given 4/4/25 1126)         Is this patient to be included in the SEP-1 core measure due to severe sepsis or septic shock? No Exclusion criteria - the patient is NOT to be included for SEP-1 Core Measure due to: Infection is not suspected        Medical Decision Making/Differential Diagnosis:    CC/HPI Summary, Social Determinants of health, Records Reviewed, DDx, testing done/not done, ED Course, Reassessment, disposition considerations/shared decision making with patient, consults, disposition:            Chronic Conditions:   Past Medical History:   Diagnosis Date    Depression     Diabetes mellitus (HCC)     Hyperlipidemia     Hypertension     Thyroid disease     Type 2 diabetes mellitus without complication (HCC)        CONSULTS: (Who and What was discussed)  IP CONSULT TO CARDIOLOGY  IP CONSULT TO SOCIAL WORK    Discussion with Other Profesionals : Admitting Team internal medicine who agreed to admit patient    Social Determinants : None    Records Reviewed : Inpatient Notes inpatient notes from 3/26/2025    CC/HPI Summary, DDx, ED Course, and Reassessment:   Patient is an 84-year-old female past medical history of hypertension, hypothyroidism, hyperlipidemia as well as type 2 diabetes.  Patient presents with chief complaint of shortness of breath as well as generalized fatigue and malaise.  Vital signs stable presentation.  On physical exam heart regular rate rhythm, lungs, station bilaterally, abdomen soft nontender no rigidity around or guarding.  Differential diagnosis  includes failure to thrive, viral illness, UTI, dehydration.  EKG obtained demonstrate no acute ischemic changes.  Laboratory work obtained CBC demonstrated chronic anemia hemoglobin 9.0 CMP demonstrate creatinine 1.2 lactic acid 1.2 COVID-negative flu negative urinalysis nonaddictive infection RFA negative lipase 44 beta hydroxy 0.15 blood cultures obtained and are pending.  Patient given 1 L of IV fluids.  Plan consistent generalized weakness fatigue, secondary failure to thrive do not feel patient is comfortable going home at this time admit patient overnight for internal medicine evaluation.  Case discussed they are in agreement plan of care patient mated to the hospital stable condition.    FINAL IMPRESSION      1. General weakness    2. Failure to thrive in adult          DISPOSITION/PLAN     DISPOSITION Admitted 04/02/2025 07:42:27 PM    PATIENT REFERRED TO:  [facility]  [place]  [address] [place]  [facility] [id number] [phone]        Roxie Maldonado MD  [address] [facility] [id number]  [phone]    Schedule an appointment as soon as possible for a visit in 1 week(s)      Hany Myrick MD  [address] [facility] [address]  [phone]    Schedule an appointment as soon as possible for a visit in 1 week(s)        DISCHARGE MEDICATIONS:  Discharge Medication List as of 4/5/2025 11:57 AM          DISCONTINUED MEDICATIONS:  Discharge Medication List as of 4/5/2025 11:57 AM        STOP taking these medications       losartan (COZAAR) 100 MG tablet Comments:   Reason for Stopping:                    (Please note that portions of this note were completed with a voice recognition program.  Efforts were made to edit the dictations but occasionally words are mis-transcribed.)    Eran Atkinson DO (electronically signed)       Eran Atkinson DO  04/07/25 1938

## 2025-04-07 NOTE — PROGRESS NOTES
Physician Progress Note      PATIENT:               SACHIN LIMON  CSN #:                  [id number]  :                       1940  ADMIT DATE:       2025 4:02 PM  DISCH DATE:        2025 5:08 PM  RESPONDING  PROVIDER #:        Jose Jones DO          QUERY TEXT:    Uncontrolled diabetes is documented in the medical record PN  by Hany Myrick MD .  Please clarify if the uncontrolled status is with hyperglycemia:    The clinical indicators include:  84 yrs old Patient presents with SOB:  CKD and HTN in DS   On PN  by  Hany Myrick MD states \"Uncontrolled diabetes\"  On DS  by Jose Jones DO states \" Insulin-dependent diabetes mellitus   type 2 with peripheral neuropathy\"  On EPIC Glucose  -338  4/3-166  -91  -128  Glucose POC  4/3-370  -298  -295  Treated with insulin, Serial lab monitoring    Thank you  Daniele Garvin [facility] CDS  Options provided:  -- Uncontrolled DM with hyperglycemia  -- Other - I will add my own diagnosis  -- Disagree - Not applicable / Not valid  -- Disagree - Clinically unable to determine / Unknown  -- Refer to Clinical Documentation Reviewer    PROVIDER RESPONSE TEXT:    This patient has uncontrolled DM with hyperglycemia.    Query created by: Daniele Garvin on 2025 5:54 AM      Electronically signed by:  Jose Jones DO 2025 11:12 AM

## 2025-04-08 ENCOUNTER — TELEPHONE (OUTPATIENT)
Dept: PRIMARY CARE CLINIC | Age: 85
End: 2025-04-08

## 2025-04-08 LAB
MICROORGANISM SPEC CULT: NORMAL
MICROORGANISM SPEC CULT: NORMAL
SERVICE CMNT-IMP: NORMAL
SERVICE CMNT-IMP: NORMAL
SPECIMEN DESCRIPTION: NORMAL
SPECIMEN DESCRIPTION: NORMAL

## 2025-04-08 NOTE — TELEPHONE ENCOUNTER
Care Transitions Initial Follow Up Call    Outreach made within 2 business days of discharge: Yes    Patient: Kylee Wang Patient : 1940   MRN: [id number]  Reason for Admission: Failure to thrive  Discharge Date: 25       Spoke with: Daughter    Discharge department/facility: [facility] Interactive Patient Contact:  Was patient able to fill all prescriptions: Yes  Was patient instructed to bring all medications to the follow-up visit: Yes  Is patient taking all medications as directed in the discharge summary? Yes  Does patient understand their discharge instructions: Yes  Does patient have questions or concerns that need addressed prior to 7-14 day follow up office visit: yes -     Additional needs identified to be addressed with provider  No needs identified             Scheduled appointment with PCP within 7-14 days    Follow Up  Future Appointments   Date Time Provider Department Center   2025 11:00 AM Roxie Maldonado MD [facility]       Alice Sinha MA

## 2025-04-11 ENCOUNTER — TELEPHONE (OUTPATIENT)
Dept: PRIMARY CARE CLINIC | Age: 85
End: 2025-04-11

## 2025-04-11 NOTE — TELEPHONE ENCOUNTER
Home Health physical therapist called and stated he tried to call the home twice and also texted them and they hung up on him, so he will try again to reach out to them next week.

## 2025-04-15 ENCOUNTER — OFFICE VISIT (OUTPATIENT)
Dept: PRIMARY CARE CLINIC | Age: 85
End: 2025-04-15

## 2025-04-15 VITALS
TEMPERATURE: 97.9 F | OXYGEN SATURATION: 93 % | DIASTOLIC BLOOD PRESSURE: 78 MMHG | HEIGHT: 65 IN | BODY MASS INDEX: 27.37 KG/M2 | HEART RATE: 76 BPM | SYSTOLIC BLOOD PRESSURE: 128 MMHG

## 2025-04-15 DIAGNOSIS — E06.3 AUTOIMMUNE HYPOTHYROIDISM: ICD-10-CM

## 2025-04-15 DIAGNOSIS — N39.490 OVERFLOW INCONTINENCE: ICD-10-CM

## 2025-04-15 DIAGNOSIS — N18.32 TYPE 2 DIABETES MELLITUS WITH STAGE 3B CHRONIC KIDNEY DISEASE, WITH LONG-TERM CURRENT USE OF INSULIN (HCC): ICD-10-CM

## 2025-04-15 DIAGNOSIS — D64.9 ACUTE ON CHRONIC ANEMIA: Primary | ICD-10-CM

## 2025-04-15 DIAGNOSIS — Z79.4 TYPE 2 DIABETES MELLITUS WITH STAGE 3B CHRONIC KIDNEY DISEASE, WITH LONG-TERM CURRENT USE OF INSULIN (HCC): ICD-10-CM

## 2025-04-15 DIAGNOSIS — Z09 HOSPITAL DISCHARGE FOLLOW-UP: ICD-10-CM

## 2025-04-15 DIAGNOSIS — I27.20 PULMONARY HYPERTENSION (HCC): ICD-10-CM

## 2025-04-15 DIAGNOSIS — F32.89 OTHER DEPRESSION: ICD-10-CM

## 2025-04-15 DIAGNOSIS — I10 PRIMARY HYPERTENSION: ICD-10-CM

## 2025-04-15 DIAGNOSIS — E11.22 TYPE 2 DIABETES MELLITUS WITH STAGE 3B CHRONIC KIDNEY DISEASE, WITH LONG-TERM CURRENT USE OF INSULIN (HCC): ICD-10-CM

## 2025-04-15 RX ORDER — FERROUS SULFATE 325(65) MG
325 TABLET ORAL
Qty: 90 TABLET | Refills: 1 | Status: SHIPPED | OUTPATIENT
Start: 2025-04-15

## 2025-04-15 RX ORDER — FLUOXETINE HYDROCHLORIDE 40 MG/1
40 CAPSULE ORAL NIGHTLY
Qty: 90 CAPSULE | Refills: 0 | Status: SHIPPED | OUTPATIENT
Start: 2025-04-15

## 2025-04-15 RX ORDER — OXYBUTYNIN CHLORIDE 10 MG/1
10 TABLET, EXTENDED RELEASE ORAL DAILY
Qty: 30 TABLET | Refills: 3 | Status: SHIPPED | OUTPATIENT
Start: 2025-04-15

## 2025-04-15 RX ORDER — LEVOTHYROXINE SODIUM 75 UG/1
75 TABLET ORAL DAILY
Qty: 90 TABLET | Refills: 0 | Status: SHIPPED | OUTPATIENT
Start: 2025-04-15

## 2025-04-15 RX ORDER — SYRINGE-NEEDLE,INSULIN,0.5 ML 27GX1/2"
1 SYRINGE, EMPTY DISPOSABLE MISCELLANEOUS DAILY
Qty: 100 EACH | Refills: 0 | Status: SHIPPED | OUTPATIENT
Start: 2025-04-15

## 2025-04-15 RX ORDER — TRAZODONE HYDROCHLORIDE 50 MG/1
50 TABLET ORAL NIGHTLY
Qty: 90 TABLET | Refills: 0 | Status: SHIPPED | OUTPATIENT
Start: 2025-04-15

## 2025-04-15 RX ORDER — PANTOPRAZOLE SODIUM 40 MG/1
40 TABLET, DELAYED RELEASE ORAL DAILY
Qty: 90 TABLET | Refills: 0 | Status: SHIPPED | OUTPATIENT
Start: 2025-04-15

## 2025-04-15 RX ORDER — GLIMEPIRIDE 4 MG/1
4 TABLET ORAL 2 TIMES DAILY
Qty: 180 TABLET | Refills: 0 | Status: SHIPPED | OUTPATIENT
Start: 2025-04-15

## 2025-04-15 RX ORDER — METOPROLOL SUCCINATE 25 MG/1
25 TABLET, EXTENDED RELEASE ORAL DAILY
Qty: 30 TABLET | Refills: 3 | Status: SHIPPED | OUTPATIENT
Start: 2025-04-15

## 2025-04-15 NOTE — PROGRESS NOTES
Post-Discharge Transitional Care  Follow Up      Kylee Wang   YOB: 1940    Date of Office Visit:  4/15/2025  Date of Hospital Admission: 4/2/25  Date of Hospital Discharge: 4/5/25  Risk of hospital readmission (high >=14%. Medium >=10%) :Readmission Risk Score: 21.6      Care management risk score Rising risk (score 2-5) and Complex Care (Scores >=6): No Risk Score On File     Non face to face  following discharge, date last encounter closed (first attempt may have been earlier): 04/08/2025    Call initiated 2 business days of discharge: Yes    ASSESSMENT/PLAN:   SVT with development of bradycardia following Cardizem infusion -resolved  Sepsis (POA) secondary to Klebsiella urinary tract infection  Acute septic/infectious encephalopathy with clinical findings to suggest some degree of underlying dementia -resolved  Acute on chronic kidney disease stage III  Paroxysmal atrial fibrillation  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    On this date 4/15/2025 I have spent 40 minutes reviewing previous notes, test results and face to face with the patient discussing the diagnosis and importance of compliance with the treatment plan as well as documenting on the day of the visit.       Subjective:   HPI:  Follow up of Hospital problems/diagnosis(es): Kylee presented to the hospital with findings of SVT and developed bradycardia following Cardizem infusion. Dysrhythmia resolved and there are no plans for anticoagulation therapy in the setting of an extreme fall risk. She met sepsis criteria stemming from a Klebsiella urinary tract infection and she completed a course

## 2025-05-12 ENCOUNTER — TELEPHONE (OUTPATIENT)
Dept: PRIMARY CARE CLINIC | Age: 85
End: 2025-05-12

## 2025-05-12 NOTE — TELEPHONE ENCOUNTER
Carlo from [facility] called to state patient being discharged from home health physical therapy- patient was non compliant but is doing better and is seeing pain management today for an injection in her spine.

## 2025-05-16 ENCOUNTER — TELEPHONE (OUTPATIENT)
Dept: PRIMARY CARE CLINIC | Age: 85
End: 2025-05-16

## 2025-05-16 NOTE — TELEPHONE ENCOUNTER
Alejandro from [facility] called to state patient has missed last two scheduled appointments with home health

## 2025-06-03 ENCOUNTER — OFFICE VISIT (OUTPATIENT)
Dept: PRIMARY CARE CLINIC | Age: 85
End: 2025-06-03

## 2025-06-03 VITALS
DIASTOLIC BLOOD PRESSURE: 76 MMHG | TEMPERATURE: 97.9 F | BODY MASS INDEX: 27.37 KG/M2 | HEART RATE: 72 BPM | HEIGHT: 65 IN | OXYGEN SATURATION: 94 % | SYSTOLIC BLOOD PRESSURE: 118 MMHG

## 2025-06-03 DIAGNOSIS — F32.89 OTHER DEPRESSION: ICD-10-CM

## 2025-06-03 DIAGNOSIS — E11.22 TYPE 2 DIABETES MELLITUS WITH STAGE 3B CHRONIC KIDNEY DISEASE, WITH LONG-TERM CURRENT USE OF INSULIN (HCC): ICD-10-CM

## 2025-06-03 DIAGNOSIS — I10 PRIMARY HYPERTENSION: ICD-10-CM

## 2025-06-03 DIAGNOSIS — N18.32 TYPE 2 DIABETES MELLITUS WITH STAGE 3B CHRONIC KIDNEY DISEASE, WITH LONG-TERM CURRENT USE OF INSULIN (HCC): ICD-10-CM

## 2025-06-03 DIAGNOSIS — N39.490 OVERFLOW INCONTINENCE: ICD-10-CM

## 2025-06-03 DIAGNOSIS — E06.3 AUTOIMMUNE HYPOTHYROIDISM: ICD-10-CM

## 2025-06-03 DIAGNOSIS — D50.9 HYPOCHROMIC ANEMIA: Primary | ICD-10-CM

## 2025-06-03 DIAGNOSIS — Z79.4 TYPE 2 DIABETES MELLITUS WITH STAGE 3B CHRONIC KIDNEY DISEASE, WITH LONG-TERM CURRENT USE OF INSULIN (HCC): ICD-10-CM

## 2025-06-03 LAB — HBA1C MFR BLD: 8.1 %

## 2025-06-03 RX ORDER — METOPROLOL SUCCINATE 25 MG/1
25 TABLET, EXTENDED RELEASE ORAL DAILY
Qty: 30 TABLET | Refills: 3 | Status: SHIPPED | OUTPATIENT
Start: 2025-06-03

## 2025-06-03 RX ORDER — SYRINGE-NEEDLE,INSULIN,0.5 ML 27GX1/2"
1 SYRINGE, EMPTY DISPOSABLE MISCELLANEOUS DAILY
Qty: 100 EACH | Refills: 0 | Status: SHIPPED | OUTPATIENT
Start: 2025-06-03

## 2025-06-03 RX ORDER — LEVOTHYROXINE SODIUM 75 UG/1
75 TABLET ORAL DAILY
Qty: 90 TABLET | Refills: 0 | Status: SHIPPED | OUTPATIENT
Start: 2025-06-03

## 2025-06-03 RX ORDER — PANTOPRAZOLE SODIUM 40 MG/1
40 TABLET, DELAYED RELEASE ORAL DAILY
Qty: 90 TABLET | Refills: 0 | Status: SHIPPED | OUTPATIENT
Start: 2025-06-03

## 2025-06-03 RX ORDER — OXYBUTYNIN CHLORIDE 10 MG/1
10 TABLET, EXTENDED RELEASE ORAL DAILY
Qty: 30 TABLET | Refills: 3 | Status: SHIPPED | OUTPATIENT
Start: 2025-06-03

## 2025-06-03 RX ORDER — TRAZODONE HYDROCHLORIDE 50 MG/1
50 TABLET ORAL NIGHTLY
Qty: 90 TABLET | Refills: 0 | Status: SHIPPED | OUTPATIENT
Start: 2025-06-03

## 2025-06-03 RX ORDER — GLIMEPIRIDE 4 MG/1
4 TABLET ORAL 2 TIMES DAILY
Qty: 180 TABLET | Refills: 0 | Status: SHIPPED | OUTPATIENT
Start: 2025-06-03

## 2025-06-03 RX ORDER — FLUOXETINE HYDROCHLORIDE 40 MG/1
40 CAPSULE ORAL NIGHTLY
Qty: 90 CAPSULE | Refills: 0 | Status: SHIPPED | OUTPATIENT
Start: 2025-06-03

## 2025-06-03 NOTE — PROGRESS NOTES
Chief Complaint   Patient presents with    Follow-up     Patient is a 6 week F/U, and states she has been feeling fatigued lately and doesn't know why. Blood sugars have been ranging from 130-250. A1C done at this visit.        HPI:  Patient is here for follow-up of diabetes mellitus type 2 hypertension hyperlipidemia paroxysmal atrial fibrillation and acute anemia and chronic kidney disease, depression and anxiety.  Patient is accompanied by her daughter.  She has been compliant with medications.  Lab work from April was discussed with patient appears within reasonable range and they are stable except for the acute anemia with a hemoglobin count of 8 g at that time.  Patient was started on iron supplement at that time daily.  Will repeat iron studies and CBC to check for stability.  Also will refer patient to GI for further workup of the anemia.  She had a colonoscopy close to 10 years ago according to her.    .  Patient complains of not feeling good in general.  She feels jittery according to her.  This happens mainly in the morning when she wakes up but can continue through the day time.  She was counseled to check her blood sugars immediately if that happens because that can indicate hypoglycemic episodes.  She denies any abdominal pain nausea vomiting any shortness of breath or any other complaint except that she is \"not feeling that well'\"    Hemoglobin A1c today is 8.1%.    .    Past Medical History, Surgical History, and Family History has been reviewed and updated.    Review of Systems:  Constitutional:  No fever, no fatigue, no chills, no headaches, no weight change  Dermatology:  No rash, no mole, no dry or sensitive skin  ENT:  No cough, no sore throat, no sinus pain, no runny nose, no ear pain  Cardiology:  No chest pain, no palpitations, no leg edema, no shortness of breath, no PND  Gastroenterology:  No dysphagia, no abdominal pain, no nausea, no vomiting, no constipation, no diarrhea, no

## 2025-06-16 LAB
ALBUMIN: NORMAL
ALP BLD-CCNC: NORMAL U/L
ALT SERPL-CCNC: NORMAL U/L
ANION GAP SERPL CALCULATED.3IONS-SCNC: NORMAL MMOL/L
AST SERPL-CCNC: NORMAL U/L
BASOPHILS ABSOLUTE: NORMAL
BASOPHILS RELATIVE PERCENT: NORMAL
BILIRUB SERPL-MCNC: NORMAL MG/DL
BUN BLDV-MCNC: NORMAL MG/DL
CALCIUM SERPL-MCNC: NORMAL MG/DL
CHLORIDE BLD-SCNC: NORMAL MMOL/L
CHOLESTEROL, TOTAL: NORMAL
CHOLESTEROL/HDL RATIO: NORMAL
CO2: NORMAL
CREAT SERPL-MCNC: NORMAL MG/DL
EOSINOPHILS ABSOLUTE: NORMAL
EOSINOPHILS RELATIVE PERCENT: NORMAL
ESTIMATED AVERAGE GLUCOSE: NORMAL
FERRITIN: NORMAL
GFR, ESTIMATED: NORMAL
GLUCOSE BLD-MCNC: NORMAL MG/DL
HBA1C MFR BLD: 8.4 %
HCT VFR BLD CALC: NORMAL %
HDLC SERPL-MCNC: NORMAL MG/DL
HEMOGLOBIN: NORMAL
IRON: NORMAL
LDL CHOLESTEROL: NORMAL
LYMPHOCYTES ABSOLUTE: NORMAL
LYMPHOCYTES RELATIVE PERCENT: NORMAL
MCH RBC QN AUTO: NORMAL PG
MCHC RBC AUTO-ENTMCNC: NORMAL G/DL
MCV RBC AUTO: NORMAL FL
MONOCYTES ABSOLUTE: NORMAL
MONOCYTES RELATIVE PERCENT: NORMAL
NEUTROPHILS ABSOLUTE: NORMAL
NEUTROPHILS RELATIVE PERCENT: NORMAL
NONHDLC SERPL-MCNC: NORMAL MG/DL
PDW BLD-RTO: NORMAL %
PLATELET # BLD: NORMAL 10*3/UL
PMV BLD AUTO: NORMAL FL
POTASSIUM SERPL-SCNC: NORMAL MMOL/L
RBC # BLD: NORMAL 10*6/UL
SODIUM BLD-SCNC: NORMAL MMOL/L
TOTAL IRON BINDING CAPACITY: NORMAL
TOTAL PROTEIN: NORMAL
TRIGL SERPL-MCNC: NORMAL MG/DL
TSH SERPL DL<=0.05 MIU/L-ACNC: NORMAL M[IU]/L
VITAMIN B-12: NORMAL
VLDLC SERPL CALC-MCNC: NORMAL MG/DL
WBC # BLD: NORMAL 10*3/UL

## 2025-06-18 DIAGNOSIS — I10 PRIMARY HYPERTENSION: ICD-10-CM

## 2025-06-18 DIAGNOSIS — N18.32 TYPE 2 DIABETES MELLITUS WITH STAGE 3B CHRONIC KIDNEY DISEASE, WITH LONG-TERM CURRENT USE OF INSULIN (HCC): ICD-10-CM

## 2025-06-18 DIAGNOSIS — E11.22 TYPE 2 DIABETES MELLITUS WITH STAGE 3B CHRONIC KIDNEY DISEASE, WITH LONG-TERM CURRENT USE OF INSULIN (HCC): ICD-10-CM

## 2025-06-18 DIAGNOSIS — Z79.4 TYPE 2 DIABETES MELLITUS WITH STAGE 3B CHRONIC KIDNEY DISEASE, WITH LONG-TERM CURRENT USE OF INSULIN (HCC): ICD-10-CM

## 2025-06-18 DIAGNOSIS — D64.9 ACUTE ON CHRONIC ANEMIA: ICD-10-CM

## 2025-06-18 DIAGNOSIS — E06.3 AUTOIMMUNE HYPOTHYROIDISM: ICD-10-CM

## 2025-07-10 ENCOUNTER — TELEPHONE (OUTPATIENT)
Dept: INFUSION THERAPY | Age: 85
End: 2025-07-10

## 2025-07-13 RX ORDER — FERROUS SULFATE 325(65) MG
1 TABLET ORAL
Qty: 90 TABLET | Refills: 1 | Status: SHIPPED | OUTPATIENT
Start: 2025-07-13

## 2025-07-15 DIAGNOSIS — G89.29 CHRONIC LOW BACK PAIN WITH SCIATICA, SCIATICA LATERALITY UNSPECIFIED, UNSPECIFIED BACK PAIN LATERALITY: ICD-10-CM

## 2025-07-15 DIAGNOSIS — M54.40 CHRONIC LOW BACK PAIN WITH SCIATICA, SCIATICA LATERALITY UNSPECIFIED, UNSPECIFIED BACK PAIN LATERALITY: ICD-10-CM

## 2025-07-16 NOTE — TELEPHONE ENCOUNTER
Name of Medication(s) Requested:  Requested Prescriptions     Pending Prescriptions Disp Refills    gabapentin (NEURONTIN) 300 MG capsule 180 capsule 0     Sig: Take 1 capsule by mouth in the morning and at bedtime for 90 days. Intended supply: 90 days    amLODIPine (NORVASC) 5 MG tablet 30 tablet 3     Sig: Take 1 tablet by mouth daily       Medication is on current medication list Yes    Dosage and directions were verified? Yes    Quantity verified: 90 day supply     Pharmacy Verified?  Yes    Last Appointment:  6/3/2025    Future appts:  Future Appointments   Date Time Provider Department Center   9/8/2025 10:00 AM Roxie Maldonado MD [facility]        (If no appt send self scheduling link. .REFILLAPPT)  Scheduling request sent?     [] Yes  [x] No    Does patient need updated?  [] Yes  [x] No

## 2025-07-18 RX ORDER — AMLODIPINE BESYLATE 5 MG/1
5 TABLET ORAL DAILY
Qty: 30 TABLET | Refills: 3 | Status: SHIPPED | OUTPATIENT
Start: 2025-07-18

## 2025-07-18 RX ORDER — GABAPENTIN 300 MG/1
300 CAPSULE ORAL 2 TIMES DAILY
Qty: 180 CAPSULE | Refills: 0 | Status: SHIPPED | OUTPATIENT
Start: 2025-07-18 | End: 2025-10-16

## 2025-07-21 ENCOUNTER — TELEPHONE (OUTPATIENT)
Dept: PRIMARY CARE CLINIC | Age: 85
End: 2025-07-21

## 2025-07-21 NOTE — TELEPHONE ENCOUNTER
Daughter chris called stating patient not eating and has upset stomach- advised the physician is on vacation and recommend walk in clinic or urgent care or we could schedule when doctor returns    Daughter states \"that's not happening\" and hung up    I called daughter back and left message advising maybe they could get in with dr burnett since our next available is too far out.

## 2025-07-28 ENCOUNTER — TELEPHONE (OUTPATIENT)
Dept: PRIMARY CARE CLINIC | Age: 85
End: 2025-07-28

## 2025-07-28 DIAGNOSIS — I10 PRIMARY HYPERTENSION: Primary | ICD-10-CM

## 2025-07-28 RX ORDER — LOSARTAN POTASSIUM 25 MG/1
25 TABLET ORAL DAILY
Qty: 90 TABLET | Refills: 1 | Status: SHIPPED | OUTPATIENT
Start: 2025-07-28

## 2025-07-28 NOTE — TELEPHONE ENCOUNTER
This medication was discontinued in April but patients daughter is sure that she is supposed to be taking it-   please advise and send to pharmacy if patient should be taking

## 2025-07-29 ENCOUNTER — TELEMEDICINE (OUTPATIENT)
Dept: PRIMARY CARE CLINIC | Age: 85
End: 2025-07-29
Payer: MEDICARE

## 2025-07-29 DIAGNOSIS — Z00.00 MEDICARE ANNUAL WELLNESS VISIT, SUBSEQUENT: Primary | ICD-10-CM

## 2025-07-29 PROCEDURE — G0439 PPPS, SUBSEQ VISIT: HCPCS

## 2025-07-29 PROCEDURE — 1159F MED LIST DOCD IN RCRD: CPT

## 2025-07-29 PROCEDURE — 1123F ACP DISCUSS/DSCN MKR DOCD: CPT

## 2025-07-29 ASSESSMENT — PATIENT HEALTH QUESTIONNAIRE - PHQ9
5. POOR APPETITE OR OVEREATING: NOT AT ALL
8. MOVING OR SPEAKING SO SLOWLY THAT OTHER PEOPLE COULD HAVE NOTICED. OR THE OPPOSITE, BEING SO FIGETY OR RESTLESS THAT YOU HAVE BEEN MOVING AROUND A LOT MORE THAN USUAL: NOT AT ALL
6. FEELING BAD ABOUT YOURSELF - OR THAT YOU ARE A FAILURE OR HAVE LET YOURSELF OR YOUR FAMILY DOWN: NOT AT ALL
SUM OF ALL RESPONSES TO PHQ QUESTIONS 1-9: 1
4. FEELING TIRED OR HAVING LITTLE ENERGY: SEVERAL DAYS
9. THOUGHTS THAT YOU WOULD BE BETTER OFF DEAD, OR OF HURTING YOURSELF: NOT AT ALL
1. LITTLE INTEREST OR PLEASURE IN DOING THINGS: NOT AT ALL
7. TROUBLE CONCENTRATING ON THINGS, SUCH AS READING THE NEWSPAPER OR WATCHING TELEVISION: NOT AT ALL
10. IF YOU CHECKED OFF ANY PROBLEMS, HOW DIFFICULT HAVE THESE PROBLEMS MADE IT FOR YOU TO DO YOUR WORK, TAKE CARE OF THINGS AT HOME, OR GET ALONG WITH OTHER PEOPLE: NOT DIFFICULT AT ALL
3. TROUBLE FALLING OR STAYING ASLEEP: NOT AT ALL
SUM OF ALL RESPONSES TO PHQ QUESTIONS 1-9: 1
2. FEELING DOWN, DEPRESSED OR HOPELESS: NOT AT ALL
SUM OF ALL RESPONSES TO PHQ QUESTIONS 1-9: 1
SUM OF ALL RESPONSES TO PHQ QUESTIONS 1-9: 1

## 2025-07-29 NOTE — PROGRESS NOTES
Medicare Annual Wellness Visit    Kylee Wang is here for Medicare AWV    Assessment & Plan   Medicare annual wellness visit, subsequent       Return in 1 year (on 7/29/2026) for Medicare AW.     Subjective       Patient's complete Health Risk Assessment and screening values have been reviewed and are found in Flowsheets. The following problems were reviewed today and where indicated follow up appointments were made and/or referrals ordered.    Positive Risk Factor Screenings with Interventions:    Fall Risk:  Do you feel unsteady or are you worried about falling? : no  2 or more falls in past year?: (!) yes  Fall with injury in past year?: no  Interventions:    Reviewed medications, home hazards, visual acuity, and co-morbidities that can increase risk for falls  Patient declines any further evaluation or treatment    Cognitive:      Words recalled: 2 Words Recalled     Total Score Interpretation: Abnormal Mini-Cog  Interventions:  Patient declines any further evaluation or treatment            Inactivity:  On average, how many days per week do you engage in moderate to strenuous exercise (like a brisk walk)?: 0 days (!) Abnormal  On average, how many minutes do you engage in exercise at this level?: 0 min  Interventions:  Patient declined any further interventions or treatment        Vision Screen:  Do you have difficulty driving, watching TV, or doing any of your daily activities because of your eyesight?: No  Have you had an eye exam within the past year?: (!) No  Interventions:   Patient encouraged to make appointment with their eye specialist                               Objective    Patient-Reported Vitals  No data recorded             No Known Allergies  Prior to Visit Medications    Medication Sig Taking? Authorizing Provider   losartan (COZAAR) 25 MG tablet Take 1 tablet by mouth daily Yes Roxie Maldonado MD   gabapentin (NEURONTIN) 300 MG capsule Take 1 capsule by mouth in the morning and at bedtime

## 2025-08-04 ENCOUNTER — TELEPHONE (OUTPATIENT)
Dept: PRIMARY CARE CLINIC | Age: 85
End: 2025-08-04

## 2025-08-07 ENCOUNTER — OFFICE VISIT (OUTPATIENT)
Dept: PRIMARY CARE CLINIC | Age: 85
End: 2025-08-07
Payer: MEDICARE

## 2025-08-07 VITALS
BODY MASS INDEX: 26.66 KG/M2 | HEIGHT: 65 IN | OXYGEN SATURATION: 93 % | SYSTOLIC BLOOD PRESSURE: 136 MMHG | WEIGHT: 160 LBS | HEART RATE: 76 BPM | DIASTOLIC BLOOD PRESSURE: 70 MMHG | TEMPERATURE: 97.4 F

## 2025-08-07 DIAGNOSIS — E06.3 AUTOIMMUNE HYPOTHYROIDISM: ICD-10-CM

## 2025-08-07 DIAGNOSIS — R11.0 INTRACTABLE NAUSEA: ICD-10-CM

## 2025-08-07 DIAGNOSIS — E11.22 TYPE 2 DIABETES MELLITUS WITH STAGE 3B CHRONIC KIDNEY DISEASE, WITH LONG-TERM CURRENT USE OF INSULIN (HCC): ICD-10-CM

## 2025-08-07 DIAGNOSIS — R53.1 GENERALIZED WEAKNESS: ICD-10-CM

## 2025-08-07 DIAGNOSIS — I36.1 NONRHEUMATIC TRICUSPID VALVE REGURGITATION: ICD-10-CM

## 2025-08-07 DIAGNOSIS — Z79.4 TYPE 2 DIABETES MELLITUS WITH STAGE 3B CHRONIC KIDNEY DISEASE, WITH LONG-TERM CURRENT USE OF INSULIN (HCC): ICD-10-CM

## 2025-08-07 DIAGNOSIS — R10.9 ABDOMINAL PAIN IN FEMALE: ICD-10-CM

## 2025-08-07 DIAGNOSIS — D50.9 HYPOCHROMIC MICROCYTIC ANEMIA: Primary | ICD-10-CM

## 2025-08-07 DIAGNOSIS — I10 PRIMARY HYPERTENSION: ICD-10-CM

## 2025-08-07 DIAGNOSIS — M47.814 THORACIC SPONDYLOSIS: ICD-10-CM

## 2025-08-07 DIAGNOSIS — N18.32 TYPE 2 DIABETES MELLITUS WITH STAGE 3B CHRONIC KIDNEY DISEASE, WITH LONG-TERM CURRENT USE OF INSULIN (HCC): ICD-10-CM

## 2025-08-07 DIAGNOSIS — E78.2 MIXED HYPERLIPIDEMIA: ICD-10-CM

## 2025-08-07 DIAGNOSIS — G89.4 CHRONIC PAIN SYNDROME: ICD-10-CM

## 2025-08-07 DIAGNOSIS — M48.061 SPINAL STENOSIS OF LUMBAR REGION WITHOUT NEUROGENIC CLAUDICATION: ICD-10-CM

## 2025-08-07 PROCEDURE — 1123F ACP DISCUSS/DSCN MKR DOCD: CPT | Performed by: INTERNAL MEDICINE

## 2025-08-07 PROCEDURE — 99214 OFFICE O/P EST MOD 30 MIN: CPT | Performed by: INTERNAL MEDICINE

## 2025-08-07 PROCEDURE — 1160F RVW MEDS BY RX/DR IN RCRD: CPT | Performed by: INTERNAL MEDICINE

## 2025-08-07 PROCEDURE — 1159F MED LIST DOCD IN RCRD: CPT | Performed by: INTERNAL MEDICINE

## 2025-08-07 PROCEDURE — 3052F HG A1C>EQUAL 8.0%<EQUAL 9.0%: CPT | Performed by: INTERNAL MEDICINE

## 2025-08-07 RX ORDER — FAMOTIDINE 40 MG/1
40 TABLET, FILM COATED ORAL EVERY EVENING
Qty: 30 TABLET | Refills: 3 | Status: SHIPPED | OUTPATIENT
Start: 2025-08-07

## 2025-08-07 RX ORDER — METOCLOPRAMIDE 5 MG/1
5 TABLET ORAL 2 TIMES DAILY
Qty: 60 TABLET | Refills: 3 | Status: SHIPPED | OUTPATIENT
Start: 2025-08-07

## 2025-08-08 DIAGNOSIS — N18.32 TYPE 2 DIABETES MELLITUS WITH STAGE 3B CHRONIC KIDNEY DISEASE, WITH LONG-TERM CURRENT USE OF INSULIN (HCC): ICD-10-CM

## 2025-08-08 DIAGNOSIS — E11.22 TYPE 2 DIABETES MELLITUS WITH STAGE 3B CHRONIC KIDNEY DISEASE, WITH LONG-TERM CURRENT USE OF INSULIN (HCC): ICD-10-CM

## 2025-08-08 DIAGNOSIS — Z79.4 TYPE 2 DIABETES MELLITUS WITH STAGE 3B CHRONIC KIDNEY DISEASE, WITH LONG-TERM CURRENT USE OF INSULIN (HCC): ICD-10-CM

## 2025-08-08 RX ORDER — PANTOPRAZOLE SODIUM 40 MG/1
40 TABLET, DELAYED RELEASE ORAL DAILY
Qty: 90 TABLET | Refills: 0 | Status: SHIPPED | OUTPATIENT
Start: 2025-08-08

## 2025-08-08 RX ORDER — INSULIN GLARGINE 100 [IU]/ML
INJECTION, SOLUTION SUBCUTANEOUS
Qty: 10 ML | Refills: 3 | Status: SHIPPED | OUTPATIENT
Start: 2025-08-08

## 2025-08-12 DIAGNOSIS — E11.22 TYPE 2 DIABETES MELLITUS WITH STAGE 3B CHRONIC KIDNEY DISEASE, WITH LONG-TERM CURRENT USE OF INSULIN (HCC): ICD-10-CM

## 2025-08-12 DIAGNOSIS — N18.32 TYPE 2 DIABETES MELLITUS WITH STAGE 3B CHRONIC KIDNEY DISEASE, WITH LONG-TERM CURRENT USE OF INSULIN (HCC): ICD-10-CM

## 2025-08-12 DIAGNOSIS — Z79.4 TYPE 2 DIABETES MELLITUS WITH STAGE 3B CHRONIC KIDNEY DISEASE, WITH LONG-TERM CURRENT USE OF INSULIN (HCC): ICD-10-CM

## 2025-08-12 DIAGNOSIS — F32.89 OTHER DEPRESSION: ICD-10-CM

## 2025-08-12 DIAGNOSIS — E06.3 AUTOIMMUNE HYPOTHYROIDISM: ICD-10-CM

## 2025-08-14 RX ORDER — SYRINGE-NEEDLE,INSULIN,0.5 ML 27GX1/2"
1 SYRINGE, EMPTY DISPOSABLE MISCELLANEOUS DAILY
Qty: 100 EACH | Refills: 0 | Status: SHIPPED | OUTPATIENT
Start: 2025-08-14

## 2025-08-14 RX ORDER — TRAZODONE HYDROCHLORIDE 50 MG/1
50 TABLET ORAL NIGHTLY
Qty: 90 TABLET | Refills: 0 | Status: SHIPPED | OUTPATIENT
Start: 2025-08-14

## 2025-08-14 RX ORDER — LEVOTHYROXINE SODIUM 75 UG/1
75 TABLET ORAL DAILY
Qty: 90 TABLET | Refills: 0 | Status: SHIPPED | OUTPATIENT
Start: 2025-08-14

## 2025-08-14 RX ORDER — GLIMEPIRIDE 4 MG/1
4 TABLET ORAL 2 TIMES DAILY
Qty: 180 TABLET | Refills: 0 | Status: SHIPPED | OUTPATIENT
Start: 2025-08-14

## 2025-08-14 RX ORDER — FLUOXETINE HYDROCHLORIDE 40 MG/1
40 CAPSULE ORAL NIGHTLY
Qty: 90 CAPSULE | Refills: 0 | Status: SHIPPED | OUTPATIENT
Start: 2025-08-14

## 2025-08-15 ENCOUNTER — TELEPHONE (OUTPATIENT)
Dept: PRIMARY CARE CLINIC | Age: 85
End: 2025-08-15

## 2025-09-04 ENCOUNTER — TRANSCRIBE ORDERS (OUTPATIENT)
Dept: ADMINISTRATIVE | Age: 85
End: 2025-09-04

## 2025-09-04 DIAGNOSIS — M47.814 THORACIC SPONDYLOSIS WITHOUT MYELOPATHY: Primary | ICD-10-CM

## (undated) DEVICE — GLOVE ORANGE PI 7 1/2   MSG9075

## (undated) DEVICE — APPLICATOR MEDICATED 10.5 CC SOLUTION HI LT ORNG CHLORAPREP

## (undated) DEVICE — TOWEL,OR,DSP,ST,BLUE,STD,6/PK,12PK/CS: Brand: MEDLINE

## (undated) DEVICE — 3M™ RED DOT™ MONITORING ELECTRODE WITH FOAM TAPE AND STICKY GEL 2560, 50/BAG, 20/CASE, 72/PLT: Brand: RED DOT™

## (undated) DEVICE — TRAY EPI SGL DOSE 18GA NDL CUST AULTMAN HOSP

## (undated) DEVICE — NEEDLE SPNL 25GA L2IN BLU SHT NEO LUM PUNC DISP

## (undated) DEVICE — NEEDLE HYPO 18GA L1.5IN PNK POLYPR HUB S STL THN WALL FILL

## (undated) DEVICE — BANDAGE ADH W1XL3IN NAT FAB WVN FLX DURABLE N ADH PD SEAL

## (undated) DEVICE — GAUZE,SPONGE,4"X4",12PLY,STERILE,LF,2'S: Brand: MEDLINE

## (undated) DEVICE — 12 ML SYRINGE,LUER-LOCK TIP: Brand: MONOJECT

## (undated) DEVICE — 3 ML SYRINGE LUER-LOCK TIP: Brand: MONOJECT

## (undated) DEVICE — 6 ML SYRINGE LUER-LOCK TIP: Brand: MONOJECT

## (undated) DEVICE — NEEDLE SPNL 22GA L3.5IN BLK HUB S STL REG WALL FIT STYL W/

## (undated) DEVICE — NEEDLE HYPO 27GA L1.25IN GRY POLYPR HUB S STL REG BVL STR